# Patient Record
Sex: FEMALE | Race: WHITE | NOT HISPANIC OR LATINO | Employment: OTHER | URBAN - METROPOLITAN AREA
[De-identification: names, ages, dates, MRNs, and addresses within clinical notes are randomized per-mention and may not be internally consistent; named-entity substitution may affect disease eponyms.]

---

## 2017-02-06 DIAGNOSIS — I10 HTN (HYPERTENSION), BENIGN: ICD-10-CM

## 2017-02-06 RX ORDER — TRIAMTERENE/HYDROCHLOROTHIAZID 37.5-25 MG
TABLET ORAL
Qty: 90 TABLET | Refills: 1 | Status: SHIPPED | OUTPATIENT
Start: 2017-02-06 | End: 2017-08-30

## 2017-02-08 ENCOUNTER — OFFICE VISIT (OUTPATIENT)
Dept: UROGYNECOLOGY | Facility: CLINIC | Age: 72
End: 2017-02-08
Payer: MEDICARE

## 2017-02-08 VITALS
HEIGHT: 66 IN | BODY MASS INDEX: 27.8 KG/M2 | SYSTOLIC BLOOD PRESSURE: 117 MMHG | WEIGHT: 173 LBS | HEART RATE: 81 BPM | DIASTOLIC BLOOD PRESSURE: 72 MMHG

## 2017-02-08 DIAGNOSIS — Z46.89 PESSARY MAINTENANCE: Primary | ICD-10-CM

## 2017-02-08 DIAGNOSIS — K46.9 ENTEROCELE: ICD-10-CM

## 2017-02-08 LAB
BILIRUB SERPL-MCNC: ABNORMAL MG/DL
BLOOD URINE, POC: ABNORMAL
COLOR, POC UA: YELLOW
GLUCOSE UR QL STRIP: ABNORMAL
KETONES UR QL STRIP: ABNORMAL
LEUKOCYTE ESTERASE URINE, POC: ABNORMAL
NITRITE, POC UA: ABNORMAL
PH, POC UA: 5
PROTEIN, POC: ABNORMAL
SPECIFIC GRAVITY, POC UA: 1.02
UROBILINOGEN, POC UA: ABNORMAL

## 2017-02-08 PROCEDURE — 99999 PR PBB SHADOW E&M-EST. PATIENT-LVL III: CPT | Mod: PBBFAC,,, | Performed by: OBSTETRICS & GYNECOLOGY

## 2017-02-08 PROCEDURE — 81002 URINALYSIS NONAUTO W/O SCOPE: CPT | Mod: PBBFAC,PO | Performed by: OBSTETRICS & GYNECOLOGY

## 2017-02-08 PROCEDURE — 99213 OFFICE O/P EST LOW 20 MIN: CPT | Mod: S$PBB,,, | Performed by: OBSTETRICS & GYNECOLOGY

## 2017-02-08 PROCEDURE — 99213 OFFICE O/P EST LOW 20 MIN: CPT | Mod: PBBFAC,PO | Performed by: OBSTETRICS & GYNECOLOGY

## 2017-02-08 NOTE — PROGRESS NOTES
Subjective:     Chief Complaint: Enterocele  History of Present Illness: Jazz Martinez is a 71 y.o. female who presents for pessary maintenance for enterocele.  She has been doing well with no discharge or bleeding.  The pessary stays in place with no discomfort and she is able to do all her activities.    Review of Systems    Constitutional: No acute distress. No weight gain/loss.  Eyes: No vision changes.  ENT: No headaches.   Respiratory: No smoker  Cardiovascular: Hypertension  Gastrointestinal: GERD  Genitourinary: No vaginal bleeding or discharge.  Integument/Breast: Negative  Hematologic/Lymphatic: gout  Musculoskeletal: Arthritis  Neurological: No disc problems. No paresthesias.  Behavioral/Psych: No history of depression.   Endocrine:  hormonal replacement.  Allergy/Immune: no recent reactions     Objective:   General Exam:  General appearance: WDNF. NAD.   HEENT: Eyeglasses  Neck: Normal thyroid.   Back: No CVA tenderness.  RESP: No SOB.  Breasts: deferred  Abdomen: Benign without localizing signs.  Extremities: Superficial varices.  Lymphatic: noncontributory  Skin: No rashes. No lesions.  Neurologic: Intact.   Psych: Oriented.   Pelvic Exam:  V:  No lesions. No palpable nodes.   Va:No d/c bleeding or lesions.  small enterocele.  Pessary fits well   .Meatus:No caruncle or stenosis  Urethra: Non tender. No suburethral masses.  Cx/Cuff: Normal   Uterus: Surgically absent.  Ad: No mass or tenderness.  Levators :Symmetrical. Normal tone. Non tender.  BL: Non tender  RV: No hemorrhoids.  Assessment:   Doing well with ring pessary     Procedure note; pessary cleaning; the ring pessary was removed without difficulty; there were no lesions and only minimal discharge; after Betadine irrigation the pessary was replaced without difficulty  Plan:      rtc 3mo

## 2017-03-10 ENCOUNTER — PATIENT MESSAGE (OUTPATIENT)
Dept: FAMILY MEDICINE | Facility: CLINIC | Age: 72
End: 2017-03-10

## 2017-03-10 DIAGNOSIS — F32.A DEPRESSION: ICD-10-CM

## 2017-03-10 DIAGNOSIS — N95.1 MENOPAUSAL SYMPTOMS: ICD-10-CM

## 2017-03-10 RX ORDER — SERTRALINE HYDROCHLORIDE 100 MG/1
100 TABLET, FILM COATED ORAL DAILY
Qty: 90 TABLET | Refills: 3 | Status: SHIPPED | OUTPATIENT
Start: 2017-03-10 | End: 2018-05-04 | Stop reason: SDUPTHER

## 2017-05-10 ENCOUNTER — OFFICE VISIT (OUTPATIENT)
Dept: UROGYNECOLOGY | Facility: CLINIC | Age: 72
End: 2017-05-10
Payer: MEDICARE

## 2017-05-10 VITALS
BODY MASS INDEX: 26.75 KG/M2 | DIASTOLIC BLOOD PRESSURE: 73 MMHG | HEART RATE: 74 BPM | SYSTOLIC BLOOD PRESSURE: 131 MMHG | WEIGHT: 166.44 LBS | TEMPERATURE: 98 F | HEIGHT: 66 IN

## 2017-05-10 DIAGNOSIS — N95.2 ATROPHIC VAGINITIS: ICD-10-CM

## 2017-05-10 DIAGNOSIS — Z46.89 PESSARY MAINTENANCE: ICD-10-CM

## 2017-05-10 DIAGNOSIS — N81.11 MIDLINE CYSTOCELE: Primary | ICD-10-CM

## 2017-05-10 DIAGNOSIS — N32.81 OAB (OVERACTIVE BLADDER): ICD-10-CM

## 2017-05-10 LAB
BILIRUB SERPL-MCNC: NORMAL MG/DL
BLOOD URINE, POC: NORMAL
COLOR, POC UA: YELLOW
GLUCOSE UR QL STRIP: NORMAL
KETONES UR QL STRIP: NORMAL
LEUKOCYTE ESTERASE URINE, POC: NORMAL
NITRITE, POC UA: NORMAL
PH, POC UA: 7
PROTEIN, POC: NORMAL
SPECIFIC GRAVITY, POC UA: 1.01
UROBILINOGEN, POC UA: NORMAL

## 2017-05-10 PROCEDURE — 99214 OFFICE O/P EST MOD 30 MIN: CPT | Mod: PBBFAC,PO | Performed by: NURSE PRACTITIONER

## 2017-05-10 PROCEDURE — 99999 PR PBB SHADOW E&M-EST. PATIENT-LVL IV: CPT | Mod: PBBFAC,,, | Performed by: NURSE PRACTITIONER

## 2017-05-10 PROCEDURE — 81002 URINALYSIS NONAUTO W/O SCOPE: CPT | Mod: PBBFAC,PO | Performed by: NURSE PRACTITIONER

## 2017-05-10 PROCEDURE — 99213 OFFICE O/P EST LOW 20 MIN: CPT | Mod: S$PBB,,, | Performed by: NURSE PRACTITIONER

## 2017-05-10 NOTE — PROGRESS NOTES
Subjective:       Patient ID: Jazz Martinez is a 71 y.o. female.    Chief Complaint: Pessary Check    HPI  Jazz Martinez is a 71 y.o. female who presents today for routine pessary maintenance.  She has been using the ring pessary for awhile and feels that it works well for her.  She denies any complaints/concerns.  She wishes to continue using the ring pessary.    Review of Systems   Constitutional: Negative for activity change, fever and unexpected weight change.   HENT: Negative for hearing loss.    Eyes: Negative for visual disturbance.   Respiratory: Negative for shortness of breath and wheezing.    Cardiovascular: Negative for chest pain, palpitations and leg swelling.   Gastrointestinal: Negative for abdominal pain, constipation and diarrhea.   Genitourinary: Negative for dyspareunia, dysuria, frequency, urgency, vaginal bleeding and vaginal discharge.   Musculoskeletal: Negative for gait problem and neck pain.   Skin: Negative for rash and wound.   Allergic/Immunologic: Negative for immunocompromised state.   Neurological: Negative for tremors, speech difficulty and weakness.   Hematological: Does not bruise/bleed easily.   Psychiatric/Behavioral: Negative for agitation and confusion.       Objective:      Physical Exam   Constitutional: She is oriented to person, place, and time. She appears well-developed and well-nourished. No distress.   HENT:   Head: Normocephalic and atraumatic.   Neck: Neck supple. No thyromegaly present.   Pulmonary/Chest: Effort normal. No respiratory distress.   Abdominal: Soft. There is no tenderness. No hernia.   Musculoskeletal: Normal range of motion.   Neurological: She is alert and oriented to person, place, and time.   Skin: Skin is warm and dry. No rash noted.   Psychiatric: She has a normal mood and affect. Her behavior is normal.     Pelvic Exam:  V: No lesions. No palpable nodes.   Va: small amount of thin white discharge.  No bleeding.  Good length.  Tissue is  slightly atrophic.  Midline cystocele Ba=-1  Meatus:No caruncle or stenosis  Urethra: Non tender. No suburethral masses.  Cx/Cuff: Normal   Uterus: surgically absent  Ad: No mass or tenderness.  Levators :Symmetrical. Normal tone. Non tender.  BL: Non tender  RV: No hemorrhoids.      Assessment:       1. Midline cystocele    2. Pessary maintenance    3. Atrophic vaginitis    4. OAB (overactive bladder)          Procedure note- #2 ring pessary removed and cleaned with soap and water.  After betadine irrigation of the vagina, #2 ring pessary was reinserted into the vagina.  Pt ambulated in the room and denies any discomfort.  NP reminded pt that the first 24-48 hours are the most likely time for the pessary to fall out and to monitor the toilet before flushing.  Pt verbalized understanding.      Plan:       Midline cystocele- continue with ring pessary    Pessary maintenance- continue with ring pessary  -     POCT urine dipstick without microscope    Atrophic vaginitis- monitor    OAB (overactive bladder)- doing well lately, monitor    RTC 3 months

## 2017-05-17 ENCOUNTER — TELEPHONE (OUTPATIENT)
Dept: FAMILY MEDICINE | Facility: CLINIC | Age: 72
End: 2017-05-17

## 2017-05-17 NOTE — TELEPHONE ENCOUNTER
----- Message from Yanelis Rivas sent at 5/16/2017 10:27 AM CDT -----  Contact: self  Patient wants to speak with nurse regarding being seen today. Please call back at [phone]

## 2017-08-16 ENCOUNTER — OFFICE VISIT (OUTPATIENT)
Dept: UROGYNECOLOGY | Facility: CLINIC | Age: 72
End: 2017-08-16
Payer: MEDICARE

## 2017-08-16 VITALS
TEMPERATURE: 99 F | DIASTOLIC BLOOD PRESSURE: 80 MMHG | BODY MASS INDEX: 27.66 KG/M2 | WEIGHT: 166 LBS | SYSTOLIC BLOOD PRESSURE: 129 MMHG | HEART RATE: 71 BPM | HEIGHT: 65 IN

## 2017-08-16 DIAGNOSIS — Z46.89 PESSARY MAINTENANCE: ICD-10-CM

## 2017-08-16 DIAGNOSIS — N81.11 MIDLINE CYSTOCELE: Primary | ICD-10-CM

## 2017-08-16 DIAGNOSIS — N39.41 URGE INCONTINENCE OF URINE: ICD-10-CM

## 2017-08-16 DIAGNOSIS — R35.0 URINARY FREQUENCY: ICD-10-CM

## 2017-08-16 DIAGNOSIS — R39.15 URINARY URGENCY: ICD-10-CM

## 2017-08-16 PROCEDURE — 99999 PR PBB SHADOW E&M-EST. PATIENT-LVL IV: CPT | Mod: PBBFAC,,, | Performed by: NURSE PRACTITIONER

## 2017-08-16 PROCEDURE — 99214 OFFICE O/P EST MOD 30 MIN: CPT | Mod: PBBFAC,PO | Performed by: NURSE PRACTITIONER

## 2017-08-16 PROCEDURE — 3079F DIAST BP 80-89 MM HG: CPT | Mod: ,,, | Performed by: NURSE PRACTITIONER

## 2017-08-16 PROCEDURE — 81002 URINALYSIS NONAUTO W/O SCOPE: CPT | Mod: PBBFAC,PO | Performed by: NURSE PRACTITIONER

## 2017-08-16 PROCEDURE — 1126F AMNT PAIN NOTED NONE PRSNT: CPT | Mod: ,,, | Performed by: NURSE PRACTITIONER

## 2017-08-16 PROCEDURE — 3074F SYST BP LT 130 MM HG: CPT | Mod: ,,, | Performed by: NURSE PRACTITIONER

## 2017-08-16 PROCEDURE — 99214 OFFICE O/P EST MOD 30 MIN: CPT | Mod: S$PBB,,, | Performed by: NURSE PRACTITIONER

## 2017-08-16 PROCEDURE — 1159F MED LIST DOCD IN RCRD: CPT | Mod: ,,, | Performed by: NURSE PRACTITIONER

## 2017-08-16 NOTE — PROGRESS NOTES
Subjective:       Patient ID: Jazz Martinez is a 72 y.o. female.    Chief Complaint: Pessary Check    HPI  Jazz Martinez is a 72 y.o. female who presents today for routine pessary maintenance.  She denies any vaginal complaints/concerns.  She feels that the pessary is comfortable and denies any discharge or bleeding.  She is however, getting upset about her urinary status.  She has been having worsening symptoms for the past year.  She has frequency x 4-6 during the day and nocturia x 2-3.  She has urge incontinence mostly at night but occasionally during the day.  She denies any FLEX.  She denies any feeling of PVF.  She drinks mostly water and tries to avoid caffeine.  She would prefer to avoid medication, but her symptoms are bothersome enough that she is willing to try something for awhile.    Review of Systems   Constitutional: Negative for activity change, fever and unexpected weight change.   HENT: Negative for hearing loss.    Eyes: Negative for visual disturbance.   Respiratory: Negative for shortness of breath and wheezing.    Cardiovascular: Negative for chest pain, palpitations and leg swelling.   Gastrointestinal: Negative for abdominal pain, constipation and diarrhea.   Genitourinary: Positive for frequency and urgency. Negative for dyspareunia, dysuria, vaginal bleeding and vaginal discharge.   Musculoskeletal: Negative for gait problem and neck pain.   Skin: Negative for rash and wound.   Allergic/Immunologic: Negative for immunocompromised state.   Neurological: Negative for tremors, speech difficulty and weakness.   Hematological: Does not bruise/bleed easily.   Psychiatric/Behavioral: Negative for agitation and confusion.       Objective:      Physical Exam   Constitutional: She is oriented to person, place, and time. She appears well-developed and well-nourished. No distress.   HENT:   Head: Normocephalic and atraumatic.   Neck: Neck supple. No thyromegaly present.   Pulmonary/Chest:  Effort normal. No respiratory distress.   Abdominal: Soft. There is no tenderness. No hernia.   Musculoskeletal: Normal range of motion.   Neurological: She is alert and oriented to person, place, and time.   Skin: Skin is warm and dry. No rash noted.   Psychiatric: She has a normal mood and affect. Her behavior is normal.     Pelvic Exam:  V: No lesions. No palpable nodes.   Va: no discharge or bleeding.  Good length.  Dominant cystocele.  Ba=0  Meatus:No caruncle or stenosis  Urethra: Non tender. No suburethral masses.  Cx/Cuff: Normal   Uterus: surgically absent  Ad: No mass or tenderness.  Levators :Symmetrical. Normal tone. Non tender.  BL: Non tender  RV: No hemorrhoids.      Assessment:       1. Midline cystocele    2. Pessary maintenance    3. Urinary frequency    4. Urinary urgency    5. Urge incontinence of urine          Procedure note- #2 ring pessary removed and cleaned with soap and water.  After betadine irrigation of the vagina, #2 ring pessary was reinserted into the vagina.  Pt ambulated in the room and denies any discomfort.  NP reminded pt that the first 24-48 hours are the most likely time for the pessary to fall out and to monitor the toilet before flushing.  Pt verbalized understanding.      Plan:       Midline cystocele- continue with ring pessary    Pessary maintenance- continue with ring pessary    Urinary frequency- trial of myrbetriq 50 mg 1 tab po daily  -     POCT urine dipstick without microscope    Urinary urgency- as noted above    Urge incontinence of urine- trial of myrbetriq, but also reviewed pelvic floor exercises and information sheet given    RTC 1 month for med follow up and 3 months for pessary

## 2017-08-17 LAB
BILIRUB SERPL-MCNC: ABNORMAL MG/DL
BLOOD URINE, POC: ABNORMAL
COLOR, POC UA: YELLOW
GLUCOSE UR QL STRIP: ABNORMAL
KETONES UR QL STRIP: ABNORMAL
LEUKOCYTE ESTERASE URINE, POC: ABNORMAL
NITRITE, POC UA: ABNORMAL
PH, POC UA: 8
PROTEIN, POC: ABNORMAL
SPECIFIC GRAVITY, POC UA: 1.01
UROBILINOGEN, POC UA: ABNORMAL

## 2017-08-30 ENCOUNTER — OFFICE VISIT (OUTPATIENT)
Dept: FAMILY MEDICINE | Facility: CLINIC | Age: 72
End: 2017-08-30
Payer: MEDICARE

## 2017-08-30 ENCOUNTER — DOCUMENTATION ONLY (OUTPATIENT)
Dept: FAMILY MEDICINE | Facility: CLINIC | Age: 72
End: 2017-08-30

## 2017-08-30 VITALS
DIASTOLIC BLOOD PRESSURE: 75 MMHG | OXYGEN SATURATION: 98 % | HEART RATE: 72 BPM | SYSTOLIC BLOOD PRESSURE: 113 MMHG | TEMPERATURE: 98 F | BODY MASS INDEX: 27.66 KG/M2 | HEIGHT: 65 IN | WEIGHT: 166 LBS

## 2017-08-30 DIAGNOSIS — E78.5 HYPERLIPIDEMIA, UNSPECIFIED HYPERLIPIDEMIA TYPE: ICD-10-CM

## 2017-08-30 DIAGNOSIS — I10 ESSENTIAL HYPERTENSION: Primary | ICD-10-CM

## 2017-08-30 PROCEDURE — 3074F SYST BP LT 130 MM HG: CPT | Mod: S$GLB,,, | Performed by: INTERNAL MEDICINE

## 2017-08-30 PROCEDURE — 1159F MED LIST DOCD IN RCRD: CPT | Mod: S$GLB,,, | Performed by: INTERNAL MEDICINE

## 2017-08-30 PROCEDURE — 99213 OFFICE O/P EST LOW 20 MIN: CPT | Mod: S$GLB,,, | Performed by: INTERNAL MEDICINE

## 2017-08-30 PROCEDURE — 3078F DIAST BP <80 MM HG: CPT | Mod: S$GLB,,, | Performed by: INTERNAL MEDICINE

## 2017-08-30 PROCEDURE — 1125F AMNT PAIN NOTED PAIN PRSNT: CPT | Mod: S$GLB,,, | Performed by: INTERNAL MEDICINE

## 2017-08-30 RX ORDER — ATORVASTATIN CALCIUM 10 MG/1
10 TABLET, FILM COATED ORAL DAILY
Qty: 90 TABLET | Refills: 3 | Status: SHIPPED | OUTPATIENT
Start: 2017-08-30 | End: 2018-10-11 | Stop reason: SDUPTHER

## 2017-08-30 NOTE — PATIENT INSTRUCTIONS

## 2017-08-30 NOTE — PROGRESS NOTES
Health Maintenance Due   Topic Date Due    TETANUS VACCINE  07/24/1961    Colonoscopy  07/24/1993    Zoster Vaccine  07/24/2003    Influenza Vaccine  08/01/2017

## 2017-08-30 NOTE — PROGRESS NOTES
Subjective:       Patient ID: Jazz Martinez is a 72 y.o. female.    Chief Complaint: Follow-up    HPI     Migraine stop when she got off of caffeine.      CHIEF COMPLAINT: Hypertension  HPI:     ONSET:      QUALITY/COURSE:   Controlled:  yes     INTENSITY/SEVERITY:  Average blood pressure is 120/70.     MODIFIERS/TREATMENTS:  Taking medications: yes. .High sodium intake: no. alcohol: no      The following symptoms are positive only if BOLDED, otherwise are negative.      SYMPTOMS/RELATED: Possible medication side effects include:   Depression..  . Cough. . Constipation.    REVIEW OF SYMPTOMS: . Weight_loss . Weight_gain . Leg_cramps .Potency_problems .    TARGET ORGAN DAMAGE:: angina/ prior myocardial infarction, chronic kidney disease, heart failure, left ventricular hypertrophy, peripheral artery disease, prior coronary revascularization, retinopathy, stroke. transient ischemic attack.        CHIEF COMPLAINT: Hyperlipidemia. cholesterol screening: no.   HPI: Dr. Daniel asked her to increase the door to 40 mg after she was having chest pain.  Her stress test was negative.  She stopped taking the Lipitor at that time.    ONSET:    MODIFIERS/TREATMENTS: . Taking medications: No. . Non-compliance with following diet: no. .     SYMPTOMS/RELATED:Possible medication side effects include:   Myalgia: no.  .     REVIEW OF SYMPTOMS: past weights:   Wt Readings from Last 1 Encounters:   08/30/17 1015 75.3 kg (166 lb 0.1 oz)                                                     Last lipids: total   Lab Results   Component Value Date    CHOL 206 (H) 10/19/2016    CHOL 228 (H) 10/20/2015    CHOL 306 (H) 07/15/2015                                                                     HDL   Lab Results   Component Value Date    HDL 53 10/19/2016    HDL 55 10/20/2015    HDL 72 07/15/2015                                                                     LDL   Lab Results   Component Value Date    LDLCALC 111.4 10/19/2016    LDLCALC  "128.2 10/20/2015    LDLCALC 189.4 (H) 07/15/2015                                                                     TRIG   Lab Results   Component Value Date    TRIG 208 (H) 10/19/2016    TRIG 224 (H) 10/20/2015    TRIG 223 (H) 07/15/2015                                                                         Review of Systems   Constitutional: Negative for diaphoresis.   Eyes: Negative for visual disturbance.   Respiratory: Negative for chest tightness and shortness of breath.    Cardiovascular: Negative for chest pain.   Neurological: Positive for headaches (much better). Negative for dizziness, syncope and weakness.   Psychiatric/Behavioral: Negative for dysphoric mood. The patient is not nervous/anxious.        Objective:      Vitals:    08/30/17 1015   BP: 113/75   Pulse: 72   Temp: 98.3 °F (36.8 °C)   TempSrc: Oral   SpO2: 98%   Weight: 75.3 kg (166 lb 0.1 oz)   Height: 5' 5" (1.651 m)   PainSc:   3   PainLoc: Generalized     Physical Exam   Constitutional: She appears well-developed and well-nourished.   Eyes: Pupils are equal, round, and reactive to light.   Cardiovascular: Normal rate, regular rhythm and normal heart sounds.    Pulmonary/Chest: Effort normal and breath sounds normal.   Abdominal: Soft. There is no tenderness.   Neurological: She is alert.   Psychiatric: She has a normal mood and affect. Her behavior is normal. Thought content normal.   Nursing note and vitals reviewed.        Assessment:       1. Essential hypertension    2. Hyperlipidemia, unspecified hyperlipidemia type          Plan:     Essential hypertension  -     Comprehensive metabolic panel; Future; Expected date: 08/30/2017    Hyperlipidemia, unspecified hyperlipidemia type  -     Lipid panel; Future; Expected date: 08/30/2017  -     atorvastatin (LIPITOR) 10 MG tablet; Take 1 tablet (10 mg total) by mouth once daily.  Dispense: 90 tablet; Refill: 3      Return in about 6 months (around 2/28/2018).      "

## 2017-08-31 DIAGNOSIS — Z12.11 COLON CANCER SCREENING: ICD-10-CM

## 2017-10-05 DIAGNOSIS — I10 HTN (HYPERTENSION), BENIGN: ICD-10-CM

## 2017-10-05 RX ORDER — TRIAMTERENE/HYDROCHLOROTHIAZID 37.5-25 MG
TABLET ORAL
Qty: 90 TABLET | Refills: 0 | Status: SHIPPED | OUTPATIENT
Start: 2017-10-05 | End: 2017-10-06 | Stop reason: SDUPTHER

## 2017-10-06 DIAGNOSIS — I10 HTN (HYPERTENSION), BENIGN: ICD-10-CM

## 2017-10-06 RX ORDER — TRIAMTERENE/HYDROCHLOROTHIAZID 37.5-25 MG
1 TABLET ORAL DAILY
Qty: 90 TABLET | Refills: 0 | Status: SHIPPED | OUTPATIENT
Start: 2017-10-06 | End: 2018-06-12 | Stop reason: SDUPTHER

## 2017-11-15 ENCOUNTER — OFFICE VISIT (OUTPATIENT)
Dept: UROGYNECOLOGY | Facility: CLINIC | Age: 72
End: 2017-11-15
Payer: MEDICARE

## 2017-11-15 ENCOUNTER — TELEPHONE (OUTPATIENT)
Dept: UROGYNECOLOGY | Facility: CLINIC | Age: 72
End: 2017-11-15

## 2017-11-15 VITALS
WEIGHT: 170.63 LBS | SYSTOLIC BLOOD PRESSURE: 128 MMHG | TEMPERATURE: 99 F | DIASTOLIC BLOOD PRESSURE: 75 MMHG | HEIGHT: 65 IN | HEART RATE: 71 BPM | BODY MASS INDEX: 28.43 KG/M2 | RESPIRATION RATE: 15 BRPM

## 2017-11-15 DIAGNOSIS — N39.41 URGE INCONTINENCE OF URINE: ICD-10-CM

## 2017-11-15 DIAGNOSIS — Z12.31 ENCOUNTER FOR SCREENING MAMMOGRAM FOR BREAST CANCER: Primary | ICD-10-CM

## 2017-11-15 DIAGNOSIS — R35.0 URINARY FREQUENCY: ICD-10-CM

## 2017-11-15 DIAGNOSIS — N81.11 CYSTOCELE, MIDLINE: Primary | ICD-10-CM

## 2017-11-15 DIAGNOSIS — Z46.89 PESSARY MAINTENANCE: ICD-10-CM

## 2017-11-15 LAB
BILIRUB SERPL-MCNC: NORMAL MG/DL
BLOOD URINE, POC: NORMAL
COLOR, POC UA: YELLOW
GLUCOSE UR QL STRIP: NORMAL
KETONES UR QL STRIP: NORMAL
LEUKOCYTE ESTERASE URINE, POC: NORMAL
NITRITE, POC UA: NORMAL
PH, POC UA: 5
PROTEIN, POC: NORMAL
SPECIFIC GRAVITY, POC UA: 1.02
UROBILINOGEN, POC UA: NORMAL

## 2017-11-15 PROCEDURE — 99213 OFFICE O/P EST LOW 20 MIN: CPT | Mod: S$PBB,,, | Performed by: NURSE PRACTITIONER

## 2017-11-15 PROCEDURE — 81002 URINALYSIS NONAUTO W/O SCOPE: CPT | Mod: PBBFAC,PO | Performed by: NURSE PRACTITIONER

## 2017-11-15 PROCEDURE — 99214 OFFICE O/P EST MOD 30 MIN: CPT | Mod: PBBFAC,PO | Performed by: NURSE PRACTITIONER

## 2017-11-15 PROCEDURE — 99999 PR PBB SHADOW E&M-EST. PATIENT-LVL IV: CPT | Mod: PBBFAC,,, | Performed by: NURSE PRACTITIONER

## 2017-11-15 NOTE — PROGRESS NOTES
"Subjective:       Patient ID: Jazz Martinez is a 72 y.o. female.    Chief Complaint: cystocele    HPI  Jazz Martinez is a 72 y.o. female who presents today for routine pessary maintenance.  She feels that the pessary has been doing well and denies any vaginal discharge, bleeding or bulging around the pessary.  She took the myrbetriq 50 mg for about 3 weeks and felt that it did help with her urinary symptoms, especially in the first week, but then she had a lot of stress and family issues so that she stopped the medication after the 3rd week.  She is now back to having urinary frequency, urgency and urge incontinence to the point where she just "pours"  She would like to try medication again.  She denies any other acute complaints/concerns at this time.    Review of Systems   Constitutional: Negative for activity change, fever and unexpected weight change.   HENT: Negative for hearing loss.    Eyes: Negative for visual disturbance.   Respiratory: Negative for shortness of breath and wheezing.    Cardiovascular: Negative for chest pain, palpitations and leg swelling.   Gastrointestinal: Negative for abdominal pain, constipation and diarrhea.   Genitourinary: Positive for frequency and urgency. Negative for dyspareunia, dysuria, vaginal bleeding and vaginal discharge.   Musculoskeletal: Negative for gait problem and neck pain.   Skin: Negative for rash and wound.   Allergic/Immunologic: Negative for immunocompromised state.   Neurological: Negative for tremors, speech difficulty and weakness.   Hematological: Does not bruise/bleed easily.   Psychiatric/Behavioral: Negative for agitation and confusion.       Objective:      Physical Exam   Constitutional: She is oriented to person, place, and time. She appears well-developed and well-nourished. No distress.   HENT:   Head: Normocephalic and atraumatic.   Neck: Neck supple. No thyromegaly present.   Pulmonary/Chest: Effort normal. No respiratory distress. "   Abdominal: Soft. There is no tenderness. No hernia.   Musculoskeletal: Normal range of motion.   Neurological: She is alert and oriented to person, place, and time.   Skin: Skin is warm and dry. No rash noted.   Psychiatric: She has a normal mood and affect. Her behavior is normal.     Pelvic Exam:  V: No lesions. No palpable nodes.   Va: no discharge or bleeding.  Good length.  Dominant midline cystocele Ba=0  Meatus:No caruncle or stenosis  Urethra: Non tender. No suburethral masses.  Cx/Cuff: Normal   Uterus: surgically absent  Ad: No mass or tenderness.  Levators :Symmetrical. Normal tone. Non tender.  BL: Non tender  RV: No hemorrhoids.      Assessment:       1. Cystocele, midline    2. Pessary maintenance    3. Urge incontinence of urine    4. Urinary frequency          Procedure note- #2 ring pessary removed and cleaned with soap and water.  After betadine irrigation of the vagina, #2 ring pessary was reinserted into the vagina.  Pt ambulated in the room and denies any discomfort.  NP reminded pt that the first 24-48 hours are the most likely time for the pessary to fall out and to monitor the toilet before flushing.  Pt verbalized understanding.      Plan:       Cystocele, midline- continue with #2 ring pessary    Pessary maintenance- continue with ring pessary    Urge incontinence of urine- restart myrbetriq 50 mg 1 tab po daily  -     POCT URINE DIPSTICK WITHOUT MICROSCOPE    Urinary frequency- restart the myrbetriq    RTC 3 months

## 2017-11-15 NOTE — TELEPHONE ENCOUNTER
I forgot to print out her mammogram order.  I have it in my in basket.  Please let the pt know she can either stop by and pick it up or we can fax it to [facility]

## 2017-11-16 NOTE — TELEPHONE ENCOUNTER
----- Message from Ivonne Downs sent at 11/16/2017 10:31 AM CST -----  Contact: self [phone] or [phone]  Call placed to pod. Patient returned your call, please call back.  Thank you!

## 2017-11-16 NOTE — TELEPHONE ENCOUNTER
Attempted to contact patient on home and mobile number no answer left message with call back number

## 2017-11-27 DIAGNOSIS — N95.2 ATROPHIC VAGINITIS: ICD-10-CM

## 2017-11-27 DIAGNOSIS — N95.1 MENOPAUSAL SYMPTOMS: ICD-10-CM

## 2017-11-27 RX ORDER — ESTRADIOL 0.5 MG/1
TABLET ORAL
Qty: 30 TABLET | Refills: 0 | Status: SHIPPED | OUTPATIENT
Start: 2017-11-27 | End: 2018-03-05 | Stop reason: SDUPTHER

## 2017-12-17 ENCOUNTER — PATIENT MESSAGE (OUTPATIENT)
Dept: UROGYNECOLOGY | Facility: CLINIC | Age: 72
End: 2017-12-17

## 2017-12-18 DIAGNOSIS — R10.32 LEFT LOWER QUADRANT PAIN: Primary | ICD-10-CM

## 2017-12-19 NOTE — TELEPHONE ENCOUNTER
I tried to send in myrbetriq 50 mg to [facility].  They do not accept e prescriptions.  Could you please fax the RX and let the pt know when the fax has gone through.

## 2018-02-21 ENCOUNTER — OFFICE VISIT (OUTPATIENT)
Dept: UROGYNECOLOGY | Facility: CLINIC | Age: 73
End: 2018-02-21
Payer: MEDICARE

## 2018-02-21 ENCOUNTER — APPOINTMENT (OUTPATIENT)
Dept: LAB | Facility: HOSPITAL | Age: 73
End: 2018-02-21
Attending: NURSE PRACTITIONER
Payer: MEDICARE

## 2018-02-21 VITALS
DIASTOLIC BLOOD PRESSURE: 76 MMHG | HEART RATE: 72 BPM | WEIGHT: 165.56 LBS | SYSTOLIC BLOOD PRESSURE: 120 MMHG | BODY MASS INDEX: 27.58 KG/M2 | HEIGHT: 65 IN

## 2018-02-21 DIAGNOSIS — N39.41 URGE INCONTINENCE OF URINE: ICD-10-CM

## 2018-02-21 DIAGNOSIS — T83.89XA VAGINAL IRRITATION FROM PESSARY: ICD-10-CM

## 2018-02-21 DIAGNOSIS — Z46.89 PESSARY MAINTENANCE: ICD-10-CM

## 2018-02-21 DIAGNOSIS — N89.8 VAGINAL IRRITATION FROM PESSARY: ICD-10-CM

## 2018-02-21 DIAGNOSIS — N81.11 CYSTOCELE, MIDLINE: Primary | ICD-10-CM

## 2018-02-21 DIAGNOSIS — R35.0 URINARY FREQUENCY: ICD-10-CM

## 2018-02-21 DIAGNOSIS — R31.0 GROSS HEMATURIA: ICD-10-CM

## 2018-02-21 LAB
BACTERIA #/AREA URNS HPF: NORMAL /HPF
BILIRUB SERPL-MCNC: ABNORMAL MG/DL
BLOOD URINE, POC: ABNORMAL
COLOR, POC UA: YELLOW
GLUCOSE UR QL STRIP: ABNORMAL
KETONES UR QL STRIP: ABNORMAL
LEUKOCYTE ESTERASE URINE, POC: ABNORMAL
MICROSCOPIC COMMENT: NORMAL
NITRITE, POC UA: ABNORMAL
PH, POC UA: 5
PROTEIN, POC: ABNORMAL
RBC #/AREA URNS HPF: 2 /HPF (ref 0–4)
SPECIFIC GRAVITY, POC UA: 1.01
SQUAMOUS #/AREA URNS HPF: 8 /HPF
UROBILINOGEN, POC UA: ABNORMAL
WBC #/AREA URNS HPF: 1 /HPF (ref 0–5)

## 2018-02-21 PROCEDURE — 88112 CYTOPATH CELL ENHANCE TECH: CPT | Performed by: PATHOLOGY

## 2018-02-21 PROCEDURE — 1126F AMNT PAIN NOTED NONE PRSNT: CPT | Mod: ,,, | Performed by: NURSE PRACTITIONER

## 2018-02-21 PROCEDURE — 81000 URINALYSIS NONAUTO W/SCOPE: CPT

## 2018-02-21 PROCEDURE — 87086 URINE CULTURE/COLONY COUNT: CPT

## 2018-02-21 PROCEDURE — 99999 PR PBB SHADOW E&M-EST. PATIENT-LVL IV: CPT | Mod: PBBFAC,,, | Performed by: NURSE PRACTITIONER

## 2018-02-21 PROCEDURE — 81002 URINALYSIS NONAUTO W/O SCOPE: CPT | Mod: PBBFAC,PO | Performed by: NURSE PRACTITIONER

## 2018-02-21 PROCEDURE — 99214 OFFICE O/P EST MOD 30 MIN: CPT | Mod: S$PBB,,, | Performed by: NURSE PRACTITIONER

## 2018-02-21 PROCEDURE — 99214 OFFICE O/P EST MOD 30 MIN: CPT | Mod: PBBFAC,PO | Performed by: NURSE PRACTITIONER

## 2018-02-21 PROCEDURE — 1159F MED LIST DOCD IN RCRD: CPT | Mod: ,,, | Performed by: NURSE PRACTITIONER

## 2018-02-21 NOTE — PROGRESS NOTES
Subjective:       Patient ID: Jazz Martinez is a 72 y.o. female.    Chief Complaint: Pessary Check (has been having bladder infections ( june and jan))    HPI  Jazz Martinez is a 72 y.o. female who presents today for routine pessary maintenance.  She denies any complaints/concerns with the pessary.  She is concerned about her bladder.  She had symptoms of UTI in December and she went to [facility] in [place].  They treated her and she felt better.  Then in Jan. She had a large amount of gross hematuria.  She went back to urgent care and was given a shot and antibiotics.  She believes that they did a culture because they called her later and told her she was positive, but she does not remember the organism or the antibiotics she was given.  She  Since feels better, but on Sunday she started having some spotting.  This stopped yesterday.  She  Is not sure that all the blood that she has seen is in the urine or if it is from the pessary.  She is nervous though, because her brother had bladder cancer and his first sign was hematuria.  She would like to begin the workup for evaluation.  Otherwise, she is also frustrated with her incontinence.  She has been on sanctura, ditropan, vesicare and most recently myrbetriq.  She felt that she was reasonable well controlled on the sanctura, but did not like taking medication and it became too expensive.  She does not feel that the myrbetriq worked for her and it was also very expensive.  She is wondering if there is anything else that can be done.    Review of Systems   Constitutional: Negative for activity change, fever and unexpected weight change.   HENT: Negative for hearing loss.    Eyes: Negative for visual disturbance.   Respiratory: Negative for shortness of breath and wheezing.    Cardiovascular: Negative for chest pain, palpitations and leg swelling.   Gastrointestinal: Negative for abdominal pain, constipation and diarrhea.   Genitourinary: Positive  for frequency, hematuria, urgency and vaginal bleeding. Negative for dyspareunia, dysuria and vaginal discharge.   Musculoskeletal: Negative for gait problem and neck pain.   Skin: Negative for rash and wound.   Allergic/Immunologic: Negative for immunocompromised state.   Neurological: Negative for tremors, speech difficulty and weakness.   Hematological: Does not bruise/bleed easily.   Psychiatric/Behavioral: Negative for agitation and confusion.       Objective:      Physical Exam   Constitutional: She is oriented to person, place, and time. She appears well-developed and well-nourished. No distress.   HENT:   Head: Normocephalic and atraumatic.   Neck: Neck supple. No thyromegaly present.   Pulmonary/Chest: Effort normal. No respiratory distress.   Abdominal: Soft. There is no tenderness. No hernia.   Musculoskeletal: Normal range of motion.   Neurological: She is alert and oriented to person, place, and time.   Skin: Skin is warm and dry. No rash noted.   Psychiatric: She has a normal mood and affect. Her behavior is normal.     Pelvic Exam:  V: No lesions. No palpable nodes.   Va: minimal amount of thin yellow discharge.  No active bleeding, but 2 areas of irritation noted on the anterior vaginal wall.  Dominant midline cystocele Ba=-1  Meatus:No caruncle or stenosis  Urethra: Non tender. No suburethral masses.  Cx/Cuff: Normal   Uterus: surgically absent  Ad: No mass or tenderness.  Levators :Symmetrical. Normal tone. Non tender.  BL: Non tender  RV: No hemorrhoids.      Assessment:       1. Cystocele, midline    2. Pessary maintenance    3. Urinary frequency    4. Urge incontinence of urine    5. Gross hematuria    6. Vaginal irritation from pessary          Procedure note- #2 ring  pessary removed and cleaned with soap and water.  After betadine irrigation of the vagina, #2 ring pessary was reinserted into the vagina.  Pt ambulated in the room and denies any discomfort.  NP reminded pt that the first 24-48  hours are the most likely time for the pessary to fall out and to monitor the toilet before flushing.  Pt verbalized understanding.      Plan:       Cystocele, midline- continue with ring pessary for now    Pessary maintenance- continue with ring pessary    Urinary frequency- we will schedule a CMG to evaluate next step in therapy    Urge incontinence of urine- as noted above    Gross hematuria- it is not 100% clear if there is hematuria or if it was vaginal bleeding.  Pt declined to have a cath urine specimen drawn at this time.  There is a trace amount of blood on UA today so we will send for the tests noted below.  Discussed possible cysto of hematuria continues.    -     POCT urine dipstick without microscope  -     Urinalysis Microscopic  -     Urine culture  -     Cytology, urine    Vaginal irritation from pessary- some irritation noted at this time.  Reviewed with pt that it is not enough to keep the pessary out at this time since the pt would prefer to keep using the pessary.  However,  If it remains the same at next visit we should probably leave the pessary out for a week.    RTC 1 month for CMG, 3 months for pessary

## 2018-02-22 ENCOUNTER — TELEPHONE (OUTPATIENT)
Dept: OBSTETRICS AND GYNECOLOGY | Facility: CLINIC | Age: 73
End: 2018-02-22

## 2018-02-23 ENCOUNTER — TELEPHONE (OUTPATIENT)
Dept: UROGYNECOLOGY | Facility: CLINIC | Age: 73
End: 2018-02-23

## 2018-02-23 LAB — BACTERIA UR CULT: NO GROWTH

## 2018-02-23 NOTE — TELEPHONE ENCOUNTER
----- Message from BORA Banks sent at 2/23/2018 12:39 PM CST -----  Her urine culture shows no growth.  How is she feeling?

## 2018-02-26 ENCOUNTER — TELEPHONE (OUTPATIENT)
Dept: UROGYNECOLOGY | Facility: CLINIC | Age: 73
End: 2018-02-26

## 2018-02-26 NOTE — TELEPHONE ENCOUNTER
Spoke with patient, results reviewed.  States doing better.  Also, needs refill on hormones.  Wanted to know if they have come out with a patch that is less expensive.  Easier to remember.  If not then just the regular Rx and send to [facility].

## 2018-02-26 NOTE — TELEPHONE ENCOUNTER
----- Message from Sami Rivas sent at 2/26/2018 11:31 AM CST -----  Contact: Patient  Jazz, [phone], returning nurse's call regarding some questions needing answers from her. Please advise. Thanks.

## 2018-02-27 NOTE — TELEPHONE ENCOUNTER
There is the estring and climara patch.  I don't know how much her cost would be, but she can check with her insurance company and let us know what she wants to do, or I can refill the tablets.

## 2018-02-27 NOTE — TELEPHONE ENCOUNTER
----- Message from BORA Banks sent at 2/27/2018  9:20 AM CST -----  Her urine cytology was negative.  I believe the blood she saw was from the vaginal area not in the urine.  If she continues to have gross hematuria or any concerns, we can discuss doing a cystoscopy.

## 2018-03-05 ENCOUNTER — PATIENT MESSAGE (OUTPATIENT)
Dept: OBSTETRICS AND GYNECOLOGY | Facility: CLINIC | Age: 73
End: 2018-03-05

## 2018-03-05 ENCOUNTER — PATIENT MESSAGE (OUTPATIENT)
Dept: UROGYNECOLOGY | Facility: CLINIC | Age: 73
End: 2018-03-05

## 2018-03-05 DIAGNOSIS — N95.2 ATROPHIC VAGINITIS: ICD-10-CM

## 2018-03-05 DIAGNOSIS — N95.1 MENOPAUSAL SYMPTOMS: ICD-10-CM

## 2018-03-05 RX ORDER — ESTRADIOL 0.5 MG/1
TABLET ORAL
Qty: 30 TABLET | Refills: 0 | Status: SHIPPED | OUTPATIENT
Start: 2018-03-05 | End: 2018-06-06 | Stop reason: SDUPTHER

## 2018-03-05 NOTE — TELEPHONE ENCOUNTER
Patient is out,  And was told by carline that you usually don't fill this med, had told patient, but seeing if you can call in enough till she gets in with someone?

## 2018-03-07 ENCOUNTER — CLINICAL SUPPORT (OUTPATIENT)
Dept: FAMILY MEDICINE | Facility: CLINIC | Age: 73
End: 2018-03-07
Payer: MEDICARE

## 2018-03-07 DIAGNOSIS — E78.5 HYPERLIPIDEMIA, UNSPECIFIED HYPERLIPIDEMIA TYPE: ICD-10-CM

## 2018-03-07 DIAGNOSIS — I10 ESSENTIAL HYPERTENSION: ICD-10-CM

## 2018-03-07 LAB
ALBUMIN SERPL BCP-MCNC: 4 G/DL
ALP SERPL-CCNC: 66 U/L
ALT SERPL W/O P-5'-P-CCNC: 14 U/L
ANION GAP SERPL CALC-SCNC: 9 MMOL/L
AST SERPL-CCNC: 21 U/L
BILIRUB SERPL-MCNC: 0.4 MG/DL
BUN SERPL-MCNC: 16 MG/DL
CALCIUM SERPL-MCNC: 9.7 MG/DL
CHLORIDE SERPL-SCNC: 103 MMOL/L
CHOLEST SERPL-MCNC: 238 MG/DL
CHOLEST/HDLC SERPL: 5 {RATIO}
CO2 SERPL-SCNC: 29 MMOL/L
CREAT SERPL-MCNC: 0.7 MG/DL
EST. GFR  (AFRICAN AMERICAN): >60 ML/MIN/1.73 M^2
EST. GFR  (NON AFRICAN AMERICAN): >60 ML/MIN/1.73 M^2
GLUCOSE SERPL-MCNC: 82 MG/DL
HDLC SERPL-MCNC: 48 MG/DL
HDLC SERPL: 20.2 %
LDLC SERPL CALC-MCNC: 141.6 MG/DL
NONHDLC SERPL-MCNC: 190 MG/DL
POTASSIUM SERPL-SCNC: 4.7 MMOL/L
PROT SERPL-MCNC: 7.3 G/DL
SODIUM SERPL-SCNC: 141 MMOL/L
TRIGL SERPL-MCNC: 242 MG/DL

## 2018-03-07 PROCEDURE — 80061 LIPID PANEL: CPT

## 2018-03-07 PROCEDURE — 80053 COMPREHEN METABOLIC PANEL: CPT

## 2018-03-08 ENCOUNTER — TELEPHONE (OUTPATIENT)
Dept: UROGYNECOLOGY | Facility: CLINIC | Age: 73
End: 2018-03-08

## 2018-03-12 DIAGNOSIS — Z78.0 ASYMPTOMATIC MENOPAUSAL STATE: Primary | ICD-10-CM

## 2018-03-13 ENCOUNTER — DOCUMENTATION ONLY (OUTPATIENT)
Dept: FAMILY MEDICINE | Facility: CLINIC | Age: 73
End: 2018-03-13

## 2018-03-13 NOTE — PROGRESS NOTES
Health Maintenance Due   Topic Date Due    TETANUS VACCINE  07/24/1961    Zoster Vaccine  07/24/2003    Influenza Vaccine  08/01/2017    DEXA SCAN  10/14/2017    Pneumococcal (65+) (2 of 2 - PPSV23) 10/17/2017

## 2018-03-14 ENCOUNTER — OFFICE VISIT (OUTPATIENT)
Dept: FAMILY MEDICINE | Facility: CLINIC | Age: 73
End: 2018-03-14
Payer: MEDICARE

## 2018-03-14 VITALS
HEART RATE: 69 BPM | SYSTOLIC BLOOD PRESSURE: 130 MMHG | OXYGEN SATURATION: 99 % | BODY MASS INDEX: 28.1 KG/M2 | HEIGHT: 65 IN | RESPIRATION RATE: 16 BRPM | WEIGHT: 168.63 LBS | TEMPERATURE: 99 F | DIASTOLIC BLOOD PRESSURE: 86 MMHG

## 2018-03-14 DIAGNOSIS — Z78.0 POSTMENOPAUSAL: ICD-10-CM

## 2018-03-14 DIAGNOSIS — I10 ESSENTIAL HYPERTENSION: ICD-10-CM

## 2018-03-14 DIAGNOSIS — B02.9 HERPES ZOSTER WITHOUT COMPLICATION: ICD-10-CM

## 2018-03-14 DIAGNOSIS — M70.61 GREATER TROCHANTERIC BURSITIS OF RIGHT HIP: Primary | ICD-10-CM

## 2018-03-14 DIAGNOSIS — E78.5 HYPERLIPIDEMIA, UNSPECIFIED HYPERLIPIDEMIA TYPE: ICD-10-CM

## 2018-03-14 PROCEDURE — 99213 OFFICE O/P EST LOW 20 MIN: CPT | Mod: 25,S$GLB,, | Performed by: INTERNAL MEDICINE

## 2018-03-14 PROCEDURE — 20610 DRAIN/INJ JOINT/BURSA W/O US: CPT | Mod: RT,S$GLB,, | Performed by: INTERNAL MEDICINE

## 2018-03-14 RX ORDER — METHYLPREDNISOLONE ACETATE 40 MG/ML
40 INJECTION, SUSPENSION INTRA-ARTICULAR; INTRALESIONAL; INTRAMUSCULAR; SOFT TISSUE
Status: DISCONTINUED | OUTPATIENT
Start: 2018-03-14 | End: 2018-03-14 | Stop reason: HOSPADM

## 2018-03-14 RX ORDER — VALACYCLOVIR HYDROCHLORIDE 500 MG/1
500 TABLET, FILM COATED ORAL 2 TIMES DAILY
Qty: 6 TABLET | Refills: 5 | Status: SHIPPED | OUTPATIENT
Start: 2018-03-14 | End: 2018-04-09 | Stop reason: SDUPTHER

## 2018-03-14 RX ADMIN — METHYLPREDNISOLONE ACETATE 40 MG: 40 INJECTION, SUSPENSION INTRA-ARTICULAR; INTRALESIONAL; INTRAMUSCULAR; SOFT TISSUE at 08:03

## 2018-03-14 NOTE — PROCEDURES
Large Joint Aspiration/Injection  Date/Time: 3/14/2018 8:53 AM  Performed by: DEV OSWALD  Authorized by: DEV OSWALD     Consent Done?:  Yes (Written)  Indications:  Pain  Timeout: Prior to procedure the correct patient, procedure, and site was verified    Needle size:  25 G  Medications:  40 mg methylPREDNISolone acetate 40 mg/mL  Patient tolerance:  Patient tolerated the procedure well with no immediate complications

## 2018-03-14 NOTE — PROGRESS NOTES
Subjective:       Patient ID: Jazz Martinez is a 72 y.o. female.    Chief Complaint: Hypertension (labs)    HPI         CHIEF COMPLAINT: Hyperlipidemia. cholesterol screening: no.   HPI:     ONSET:    MODIFIERS/TREATMENTS: . Taking medications: yes. But not at night.  . Non-compliance with following diet: no. .     SYMPTOMS/RELATED:Possible medication side effects include:   Myalgia: no.  .     REVIEW OF SYMPTOMS: past weights:   Wt Readings from Last 1 Encounters:   03/14/18 0815 76.5 kg (168 lb 10.4 oz)                                                     Last lipids: total   Lab Results   Component Value Date    CHOL 238 (H) 03/07/2018    CHOL 206 (H) 10/19/2016    CHOL 228 (H) 10/20/2015                                                                     HDL   Lab Results   Component Value Date    HDL 48 03/07/2018    HDL 53 10/19/2016    HDL 55 10/20/2015                                                                     LDL   Lab Results   Component Value Date    LDLCALC 141.6 03/07/2018    LDLCALC 111.4 10/19/2016    LDLCALC 128.2 10/20/2015                                                                     TRIG   Lab Results   Component Value Date    TRIG 242 (H) 03/07/2018    TRIG 208 (H) 10/19/2016    TRIG 224 (H) 10/20/2015                                                                     Has frequent recurrences of shingles in the left sacral area has frequent recurrences of shingles in the left sacral area.      CHIEF COMPLAINT: Hypertension  HPI:     ONSET:      QUALITY/COURSE:   Unchanged.     INTENSITY/SEVERITY:  Average blood pressure is 120/78 .     MODIFIERS/TREATMENTS:  Taking medications: yes. .High sodium intake: no. alcohol: no      The following symptoms are positive only if BOLDED, otherwise are negative.      SYMPTOMS/RELATED: Possible medication side effects include:   Depression..  . Cough. . Constipation.    REVIEW OF SYMPTOMS: . Weight_loss . Weight_gain . Leg_cramps ..     "TARGET ORGAN DAMAGE:: angina/ prior myocardial infarction, chronic kidney disease, heart failure, left ventricular hypertrophy, peripheral artery disease, prior coronary revascularization, retinopathy, stroke. transient ischemic attack.          CHIEF COMPLAINT: Back Pain.  HPI: had 3 epidurals    ONSET/TIMING: Onset   15 y    ago. . Inciting event:  Lifting: no.  Over-exertion: no.     DURATION: . Intermittent    QUALITY/COURSE:   worse    LOCATION:       bilat s1         Radiation:   none    INTENSITY/SEVERITY: Severity is #    4  (10 point scale)..      MODIFIERS/TREATMENTS: .. Taking medications:    yes. . . Litigation_pending: no ..  MRI: no .    SYMPTOMS/RELATED: . --Possible medication side effects include:  .     The symptoms/statements  below are positive if BOLDED, otherwise negative.           CONTEXT/WHEN: . --Activity. . Coughing..  Bending.  Sitting. Hx_of_CA:.. 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  Work_related.. Similar_problems _in_past. Trauma .       REVIEW OF SYMPTOMS:       .Leg _Pain_to_below_knee.  Hip_pain..  Weight_loss.  Incontinence .  dyspareunia .  Weakness.  Numbness.          Review of Systems    Objective:      Vitals:    03/14/18 0815   BP: 130/86   Pulse: 69   Resp: 16   Temp: 98.6 °F (37 °C)   TempSrc: Oral   SpO2: 99%   Weight: 76.5 kg (168 lb 10.4 oz)   Height: 5' 5" (1.651 m)   PainSc: 0-No pain     Physical Exam   Constitutional: She appears well-developed and well-nourished.   Cardiovascular: Normal rate, regular rhythm and normal heart sounds.    Pulmonary/Chest: Effort normal and breath sounds normal.   Abdominal: Soft. There is no tenderness.   Musculoskeletal: She exhibits tenderness (tender over right S1 and over the right greater).   Range of motion at the waist: Limited  Straight leg raising: Normal  Gait: Normal  Strength: Normal  Sensation: Normal   Neurological: She is alert.   Skin:        Psychiatric: She has a normal mood and affect. Her behavior is normal. Thought content " normal.   Nursing note and vitals reviewed.        Assessment:       1. Greater trochanteric bursitis of right hip (for procedure only)     2. Essential hypertension    3. Hyperlipidemia, unspecified hyperlipidemia type    4. Postmenopausal    5. Herpes zoster without complication          Plan:     Greater trochanteric bursitis of right hip  -     Large Joint Aspiration/Injection  -     methylPREDNISolone acetate injection 40 mg; Inject 40 mg into the articular space.    Essential hypertension  -     Comprehensive metabolic panel; Future; Expected date: 03/14/2018    Hyperlipidemia, unspecified hyperlipidemia type  -     Lipid panel; Future; Expected date: 03/14/2018    Postmenopausal  -     DXA Bone Density Spine And Hip; Future; Expected date: 03/14/2018    Herpes zoster without complication  -     valACYclovir (VALTREX) 500 MG tablet; Take 1 tablet (500 mg total) by mouth 2 (two) times daily.  Dispense: 6 tablet; Refill: 5      Follow-up in about 3 months (around 6/14/2018).

## 2018-03-14 NOTE — PATIENT INSTRUCTIONS
Take a total distended bedtime    Smart temp cold pack and a back buddy    Low-Salt Diet  This diet removes foods that are high in salt. It also limits the amount of salt you use when cooking. It is most often used for people with high blood pressure, edema (fluid retention), and kidney, liver, or heart disease.  Table salt contains the mineral sodium. Your body needs sodium to work normally. But too much sodium can make your health problems worse. Your healthcare provider is recommending a low-salt (also called low-sodium) diet for you. Your total daily allowance of salt is 1,500 to 2,300 milligrams (mg). It is less than 1 teaspoon of table salt. This means you can have only about 500 to 700 mg of sodium at each meal. People with certain health problems should limit salt intake to the lower end of the recommended range.    When you cook, dont add much salt. If you can cook without using salt, even better. Dont add salt to your food at the table.  When shopping, read food labels. Salt is often called sodium on the label. Choose foods that are salt-free, low salt, or very low salt. Note that foods with reduced salt may not lower your salt intake enough.    Beans, potatoes, and pasta  Ok: Dry beans, split peas, lentils, potatoes, rice, macaroni, pasta, spaghetti without added salt  Avoid: Potato chips, tortilla chips, and similar products  Breads and cereals  Ok: Low-sodium breads, rolls, cereals, and cakes; low-salt crackers, matzo crackers  Avoid: Salted crackers, pretzels, popcorn, French toast, pancakes, muffins  Dairy  Ok: Milk, chocolate milk, hot chocolate mix, low-salt cheeses, and yogurt  Avoid: Processed cheese and cheese spreads; Roquefort, Camembert, and cottage cheese; buttermilk, instant breakfast drink  Desserts  Ok: Ice cream, frozen yogurt, juice bars, gelatin, cookies and pies, sugar, honey, jelly, hard candy  Avoid: Most pies, cakes and cookies prepared or processed with salt; instant  pudding  Drinks  Ok: Tea, coffee, fizzy (carbonated) drinks, juices  Avoid: Flavored coffees, electrolyte replacement drinks, sports drinks  Meats  Ok: All fresh meat, fish, poultry, low-salt tuna, eggs, egg substitute  Avoid: Smoked, pickled, brine-cured, or salted meats and fish. This includes sanchez, chipped beef, corned beef, hot dogs, deli meats, ham, kosher meats, salt pork, sausage, canned tuna, salted codfish, smoked salmon, herring, sardines, or anchovies.  Seasonings and spices  Ok: Most seasonings are okay. Good substitutes for salt include: fresh herb blends, hot sauce, lemon, garlic, capps, vinegar, dry mustard, parsley, cilantro, horseradish, tomato paste, regular margarine, mayonnaise, unsalted butter, cream cheese, vegetable oil, cream, low-salt salad dressing and gravy.  Avoid: Regular ketchup, relishes, pickles, soy sauce, teriyaki sauce, Worcestershire sauce, BBQ sauce, tartar sauce, meat tenderizer, chili sauce, regular gravy, regular salad dressing, salted butter  Soups  Ok: Low-salt soups and broths made with allowed foods  Avoid: Bouillon cubes, soups with smoked or salted meats, regular soup and broth  Vegetables  Ok: Most vegetables are okay; also low-salt tomato and vegetable juices  Avoid: Sauerkraut and other brine-soaked vegetables; pickles and other pickled vegetables; tomato juice, olives  © 3060-5693 [employer]. [address] [place] [address], [place] [address]. All rights reserved. This information is not intended as a substitute for professional medical care. Always follow your healthcare professional's instructions.  .

## 2018-03-22 ENCOUNTER — APPOINTMENT (OUTPATIENT)
Dept: LAB | Facility: HOSPITAL | Age: 73
End: 2018-03-22
Attending: NURSE PRACTITIONER
Payer: MEDICARE

## 2018-03-22 ENCOUNTER — OFFICE VISIT (OUTPATIENT)
Dept: UROGYNECOLOGY | Facility: CLINIC | Age: 73
End: 2018-03-22
Payer: MEDICARE

## 2018-03-22 VITALS
HEIGHT: 65 IN | HEART RATE: 64 BPM | BODY MASS INDEX: 27.99 KG/M2 | WEIGHT: 168 LBS | SYSTOLIC BLOOD PRESSURE: 147 MMHG | DIASTOLIC BLOOD PRESSURE: 87 MMHG | TEMPERATURE: 98 F

## 2018-03-22 DIAGNOSIS — R35.0 URINARY FREQUENCY: Primary | ICD-10-CM

## 2018-03-22 DIAGNOSIS — N81.11 CYSTOCELE, MIDLINE: ICD-10-CM

## 2018-03-22 DIAGNOSIS — N39.41 URGE INCONTINENCE OF URINE: ICD-10-CM

## 2018-03-22 LAB
BACTERIA #/AREA URNS HPF: NORMAL /HPF
BILIRUB SERPL-MCNC: ABNORMAL MG/DL
BLOOD URINE, POC: ABNORMAL
COLOR, POC UA: ABNORMAL
GLUCOSE UR QL STRIP: ABNORMAL
KETONES UR QL STRIP: ABNORMAL
LEUKOCYTE ESTERASE URINE, POC: ABNORMAL
MICROSCOPIC COMMENT: NORMAL
NITRITE, POC UA: ABNORMAL
PH, POC UA: 7
PROTEIN, POC: ABNORMAL
RBC #/AREA URNS HPF: 3 /HPF (ref 0–4)
SPECIFIC GRAVITY, POC UA: 1.01
SQUAMOUS #/AREA URNS HPF: 1 /HPF
UROBILINOGEN, POC UA: ABNORMAL
WBC #/AREA URNS HPF: 2 /HPF (ref 0–5)

## 2018-03-22 PROCEDURE — 99215 OFFICE O/P EST HI 40 MIN: CPT | Mod: PBBFAC,PO,25 | Performed by: NURSE PRACTITIONER

## 2018-03-22 PROCEDURE — 81002 URINALYSIS NONAUTO W/O SCOPE: CPT | Mod: PBBFAC,PO | Performed by: NURSE PRACTITIONER

## 2018-03-22 PROCEDURE — 51725 SIMPLE CYSTOMETROGRAM: CPT | Mod: 26,S$PBB,, | Performed by: NURSE PRACTITIONER

## 2018-03-22 PROCEDURE — 51725 SIMPLE CYSTOMETROGRAM: CPT | Mod: PBBFAC,PO | Performed by: NURSE PRACTITIONER

## 2018-03-22 PROCEDURE — 81000 URINALYSIS NONAUTO W/SCOPE: CPT

## 2018-03-22 PROCEDURE — 99999 PR PBB SHADOW E&M-EST. PATIENT-LVL V: CPT | Mod: PBBFAC,,, | Performed by: NURSE PRACTITIONER

## 2018-03-22 PROCEDURE — 87086 URINE CULTURE/COLONY COUNT: CPT

## 2018-03-22 PROCEDURE — 99214 OFFICE O/P EST MOD 30 MIN: CPT | Mod: S$PBB,25,, | Performed by: NURSE PRACTITIONER

## 2018-03-22 NOTE — PROGRESS NOTES
Subjective:       Patient ID: Jazz Martinez is a 72 y.o. female.    Chief Complaint: PELVIC PRESSURE    HPI  Jazz Martinez is a 72 y.o. female who presents today for CMG to help determine the next step in therapy.  She has been using a ring pessary for quite awhile and has been starting to have some problems with it lately.  She had surgery in the past with Dr. Oakes about 8 years ago, and is questioning what happened that she continues to need the pessary.  She started having some spotting on Tuesday, but has not had any other discharge since then.  She is also having some lower abd. Pressure starting yesterday and is concerned that she may be developing another UTI.  Her cath urine shows trace blood.  She is very concerned about bladder cancer at this time.    Review of Systems   Constitutional: Negative for activity change, fever and unexpected weight change.   HENT: Negative for hearing loss.    Eyes: Negative for visual disturbance.   Respiratory: Negative for shortness of breath and wheezing.    Cardiovascular: Negative for chest pain, palpitations and leg swelling.   Gastrointestinal: Positive for abdominal pain. Negative for constipation and diarrhea.   Genitourinary: Positive for frequency, urgency and vaginal bleeding. Negative for dyspareunia, dysuria and vaginal discharge.   Musculoskeletal: Negative for gait problem and neck pain.   Skin: Negative for rash and wound.   Allergic/Immunologic: Negative for immunocompromised state.   Neurological: Negative for tremors, speech difficulty and weakness.   Hematological: Does not bruise/bleed easily.   Psychiatric/Behavioral: Negative for agitation and confusion.       Objective:      Physical Exam   Constitutional: She is oriented to person, place, and time. She appears well-developed and well-nourished. No distress.   HENT:   Head: Normocephalic and atraumatic.   Neck: Neck supple. No thyromegaly present.   Pulmonary/Chest: Effort normal. No  respiratory distress.   Abdominal: Soft. There is tenderness in the suprapubic area. No hernia.   Musculoskeletal: Normal range of motion.   Neurological: She is alert and oriented to person, place, and time.   Skin: Skin is warm and dry. No rash noted.   Psychiatric: She has a normal mood and affect. Her behavior is normal.     Pelvic Exam:  V: No lesions. No palpable nodes.   Va: small amount of thin yellow discharge.  Area of irritation noted on the anterior vaginal wall.  Dominant midline cystocele Ba=-1  Meatus:No caruncle or stenosis  Urethra: Non tender. No suburethral masses.  Cx/Cuff: Normal   Uterus: surgically absent  Ad: No mass or tenderness.  Levators :Symmetrical. Normal tone. Non tender.  BL: mildly tender  RV: No hemorrhoids.      Assessment:       1. Urinary frequency    2. Cystocele, midline    3. Urge incontinence of urine          Procedure note- CMG- After betadine irrigation of the urethra, lidocaine instilled via urojet.  A #8 [place] catheter inserted into the bladder.  100 mls of urine withdrawn.  The catheter was then attached to sterile water and the water was allowed to flow into the bladder.  30-35 cm of water closure pressure noted.  The pt was then asked to stand.  First sensation was at approx 300 mls.  Total bladder capacity was approx 500 mls.  The catheter was then withdrawn.  Pt asked to cough and had some incontinence.  Pt then allowed to dress and ambulate to the restroom.  Pt had no incontinence while ambulating and waiting for the restroom.    Procedure note- #2 ring pessary was removed before CMG and vaginal tissue examined.  The pessary was replace for the CMG, but was again removed after the CMG to help allow the irritation to heal.    Plan:       Urinary frequency- NP will review the CMG results with Dr. Oakes.  NP did discuss either pelvic floor physical therapy and/or surgical correction as possible options.  Pt is interested in pelvic floor physical therapy.  -      POCT URINE DIPSTICK WITHOUT MICROSCOPE    Cystocele, midline- we will leave the pessary out at this time but pt will return next week for it to be replaced.    Urge incontinence of urine- will discuss with Dr. aOkes    RTC 1 week

## 2018-03-24 LAB — BACTERIA UR CULT: NO GROWTH

## 2018-03-27 ENCOUNTER — HOSPITAL ENCOUNTER (OUTPATIENT)
Dept: RADIOLOGY | Facility: CLINIC | Age: 73
Discharge: HOME | End: 2018-03-27
Attending: INTERNAL MEDICINE
Payer: MEDICARE

## 2018-03-27 ENCOUNTER — TELEPHONE (OUTPATIENT)
Dept: UROGYNECOLOGY | Facility: CLINIC | Age: 73
End: 2018-03-27

## 2018-03-27 DIAGNOSIS — Z78.0 ASYMPTOMATIC MENOPAUSAL STATE: ICD-10-CM

## 2018-03-27 PROCEDURE — 77080 DXA BONE DENSITY AXIAL: CPT | Mod: TC,PO

## 2018-03-27 PROCEDURE — 77080 DXA BONE DENSITY AXIAL: CPT | Mod: 26,,, | Performed by: RADIOLOGY

## 2018-03-27 NOTE — TELEPHONE ENCOUNTER
I did review the CMG results with Dr. Oakes as well as her irritation from the pessary, her concern about bladder cancer and her question about previous surgery that Dr. Oakes performed 8 years ago.  He would like for her to leave the pessary out for a few weeks and then come in for an appt with him to discuss her questions and concerns.  She has an appt on 4/3 with me.  Please cancel this appt and make her an appt at least 1-2 weeks later with Dr. Oakes.

## 2018-03-27 NOTE — TELEPHONE ENCOUNTER
----- Message from BORA Banks sent at 3/27/2018 10:31 AM CDT -----  Her urine culture shows no growth.  Her microscopic UA shows 3 RBC's which is in a normal range.  I will discuss her CMG and her options with Dr. Oakes later today

## 2018-04-09 ENCOUNTER — OFFICE VISIT (OUTPATIENT)
Dept: UROGYNECOLOGY | Facility: CLINIC | Age: 73
End: 2018-04-09
Payer: MEDICARE

## 2018-04-09 VITALS
BODY MASS INDEX: 28.25 KG/M2 | SYSTOLIC BLOOD PRESSURE: 138 MMHG | HEIGHT: 65 IN | WEIGHT: 169.56 LBS | DIASTOLIC BLOOD PRESSURE: 82 MMHG | HEART RATE: 71 BPM

## 2018-04-09 DIAGNOSIS — N32.81 OAB (OVERACTIVE BLADDER): ICD-10-CM

## 2018-04-09 DIAGNOSIS — N81.11 CYSTOCELE, MIDLINE: ICD-10-CM

## 2018-04-09 DIAGNOSIS — R35.0 URINARY FREQUENCY: Primary | ICD-10-CM

## 2018-04-09 LAB
BILIRUB SERPL-MCNC: NORMAL MG/DL
BLOOD URINE, POC: NORMAL
COLOR, POC UA: YELLOW
GLUCOSE UR QL STRIP: NORMAL
KETONES UR QL STRIP: NORMAL
LEUKOCYTE ESTERASE URINE, POC: NORMAL
NITRITE, POC UA: NORMAL
PH, POC UA: 7
PROTEIN, POC: NORMAL
SPECIFIC GRAVITY, POC UA: 1
UROBILINOGEN, POC UA: NORMAL

## 2018-04-09 PROCEDURE — 99999 PR PBB SHADOW E&M-EST. PATIENT-LVL III: CPT | Mod: PBBFAC,,, | Performed by: OBSTETRICS & GYNECOLOGY

## 2018-04-09 PROCEDURE — 99213 OFFICE O/P EST LOW 20 MIN: CPT | Mod: PBBFAC,PO | Performed by: OBSTETRICS & GYNECOLOGY

## 2018-04-09 PROCEDURE — 99213 OFFICE O/P EST LOW 20 MIN: CPT | Mod: S$PBB,,, | Performed by: OBSTETRICS & GYNECOLOGY

## 2018-04-09 PROCEDURE — 81002 URINALYSIS NONAUTO W/O SCOPE: CPT | Mod: PBBFAC,PO | Performed by: OBSTETRICS & GYNECOLOGY

## 2018-04-09 RX ORDER — VALACYCLOVIR HYDROCHLORIDE 500 MG/1
TABLET, FILM COATED ORAL
COMMUNITY
Start: 2018-03-26 | End: 2018-07-20 | Stop reason: SDUPTHER

## 2018-04-09 NOTE — PROGRESS NOTES
Subjective:     Chief Complaint: Cystocele  History of Present Illness: Jazz Martinez is a 72 y.o. female who presents for cystocele.  She had previous pelvic relaxation surgery twice and while she had good apical support with weakness anteriorly.  We have been trying to protect this from worsening with the pessary.  She is however is beginning to have complications with the pessary.  She is also concerned about the possibility of bladder cancer as her brother  of bladder cancer.  We reviewed her urine test for last 4 years and has been no hematuria except when she had a UTI.  She will rest the further options as far as protecting from recurrence of her cystocele.  We also discussed that she has significant overactive bladder symptoms which has not responded well to medications.  Emphasized that this is probably age-related issue and not related to her previous surgeries or the presence of a cystocele    Review of Systems    Constitutional: No acute distress. No weight gain/loss.  Eyes: No vision changes.  ENT: No headaches.   Respiratory: PE.  Cardiovascular:  Hypertension  Gastrointestinal: GERD   Genitourinary: No vaginal bleeding or discharge.  Integument/Breast: Negative  Hematologic/Lymphatic: Gout  Musculoskeletal:  back pain. No abdominal pain.  Neurological: No disc problems. No paresthesias.  Behavioral/Psych: No history of depression.   Endocrine: No hormonal replacement.  Allergy/Immune: no recent reactions     Objective:   General Exam:  General appearance: WDNF. NAD.   HEENT: Jenni. EOM's intact.  Neck: Normal thyroid.   Back: No CVA tenderness.  RESP: No SOB.  Breasts: deferred  Abdomen: Benign without localizing signs.  Extremities: No edema. No varices.  Lymphatic: noncontributory  Skin: No rashes. No lesions.  Neurologic: Intact.   Psych: Oriented.   Pelvic Exam:  V:  No lesions. No palpable nodes.   Va:No d/c bleeding or lesions.  Good apical support and posterior length but there is  weakening anterior.Ba-1  .Meatus:No caruncle or stenosis  Urethra: Non tender. No suburethral masses.  Cx/Cuff: Normal good support  Uterus: Surgically absent.  Ad: No mass or tenderness.  Levators :Symmetrical. Normal tone. Non tender.  BL: Non tender  RV: No hemorrhoids.  Assessment:   She has good apical support she may do well without the pessary  With normal urinalysis would last 4 years but does not think she's had any significant risk of bladder cancer   OAB which has not responded well to medications    Plan:      TNS  observe for any worsening of the cystocele

## 2018-04-25 ENCOUNTER — OFFICE VISIT (OUTPATIENT)
Dept: UROGYNECOLOGY | Facility: CLINIC | Age: 73
End: 2018-04-25
Payer: MEDICARE

## 2018-04-25 VITALS
SYSTOLIC BLOOD PRESSURE: 135 MMHG | BODY MASS INDEX: 28.5 KG/M2 | HEART RATE: 72 BPM | WEIGHT: 171.06 LBS | HEIGHT: 65 IN | DIASTOLIC BLOOD PRESSURE: 75 MMHG

## 2018-04-25 DIAGNOSIS — R35.0 URINARY FREQUENCY: ICD-10-CM

## 2018-04-25 DIAGNOSIS — N39.41 URGE INCONTINENCE OF URINE: Primary | ICD-10-CM

## 2018-04-25 PROCEDURE — 99499 UNLISTED E&M SERVICE: CPT | Mod: S$PBB,,, | Performed by: NURSE PRACTITIONER

## 2018-04-25 PROCEDURE — 99213 OFFICE O/P EST LOW 20 MIN: CPT | Mod: PBBFAC,PO | Performed by: NURSE PRACTITIONER

## 2018-04-25 PROCEDURE — 64566 NEUROELTRD STIM POST TIBIAL: CPT | Mod: PBBFAC,PO | Performed by: NURSE PRACTITIONER

## 2018-04-25 PROCEDURE — 64566 NEUROELTRD STIM POST TIBIAL: CPT | Mod: S$PBB,,, | Performed by: NURSE PRACTITIONER

## 2018-04-25 PROCEDURE — 99999 PR PBB SHADOW E&M-EST. PATIENT-LVL III: CPT | Mod: PBBFAC,,, | Performed by: NURSE PRACTITIONER

## 2018-04-25 NOTE — PROGRESS NOTES
Subjective:       Patient ID: Jazz Martinez is a 72 y.o. female.    Chief Complaint: Urinary Frequency    HPI  Jazz Martinez is a 72 y.o. female who presents today for her initial PTNS therapy.  She has tried vesicare, ditropan, and myrbetriq in the past with no relief in her bladder symptoms.  She has frequency x 6 during the day and nocturia x 2.  She has about 3 accidents a day and has strong urgency.  She has left the pessary out at this time, but has not been feeling the bulging sensation like she did in the past so she may continue to leave it out.  She denies any other acute complaints/concerns and is ready to proceed with the treatment.    Review of Systems   Constitutional: Negative for activity change, fever and unexpected weight change.   HENT: Negative for hearing loss.    Eyes: Negative for visual disturbance.   Respiratory: Negative for shortness of breath and wheezing.    Cardiovascular: Negative for chest pain, palpitations and leg swelling.   Gastrointestinal: Negative for abdominal pain, constipation and diarrhea.   Genitourinary: Positive for frequency and urgency. Negative for dyspareunia, dysuria, vaginal bleeding and vaginal discharge.   Musculoskeletal: Negative for gait problem and neck pain.   Skin: Negative for rash and wound.   Allergic/Immunologic: Negative for immunocompromised state.   Neurological: Negative for tremors, speech difficulty and weakness.   Hematological: Does not bruise/bleed easily.   Psychiatric/Behavioral: Negative for agitation and confusion.       Objective:      Physical Exam   Constitutional: She is oriented to person, place, and time. She appears well-developed and well-nourished. No distress.   HENT:   Head: Normocephalic and atraumatic.   Neck: Neck supple. No thyromegaly present.   Pulmonary/Chest: Effort normal. No respiratory distress.   Abdominal: Soft. There is no tenderness. No hernia.   Musculoskeletal: Normal range of motion.   Neurological:  She is alert and oriented to person, place, and time.   Skin: Skin is warm and dry. No rash noted.   Psychiatric: She has a normal mood and affect. Her behavior is normal.         Assessment:       1. Urge incontinence of urine    2. Urinary frequency          Procedure note-  After informed consent obtained, Patient's right upper ankle was swabbed with alcohol.  #34 gauge needle inserted into ankle and attached to stimulator.  Treatment setting was 1 today.  Pt tolerated 30 minute therapy without discomfort.        Plan:       Urge incontinence of urine- continue with PTNS    Urinary frequency- continue with PTNS          RTC 1 week.

## 2018-05-02 ENCOUNTER — OFFICE VISIT (OUTPATIENT)
Dept: UROGYNECOLOGY | Facility: CLINIC | Age: 73
End: 2018-05-02
Payer: MEDICARE

## 2018-05-02 VITALS
BODY MASS INDEX: 26.61 KG/M2 | WEIGHT: 169.56 LBS | HEART RATE: 63 BPM | HEIGHT: 67 IN | SYSTOLIC BLOOD PRESSURE: 135 MMHG | DIASTOLIC BLOOD PRESSURE: 77 MMHG

## 2018-05-02 DIAGNOSIS — N39.41 URGE INCONTINENCE OF URINE: Primary | ICD-10-CM

## 2018-05-02 DIAGNOSIS — R35.0 URINARY FREQUENCY: ICD-10-CM

## 2018-05-02 LAB
BILIRUB SERPL-MCNC: NEGATIVE MG/DL
BLOOD URINE, POC: NEGATIVE
COLOR, POC UA: NORMAL
GLUCOSE UR QL STRIP: NORMAL
KETONES UR QL STRIP: NEGATIVE
LEUKOCYTE ESTERASE URINE, POC: NEGATIVE
NITRITE, POC UA: NEGATIVE
PH, POC UA: 7
PROTEIN, POC: NEGATIVE
SPECIFIC GRAVITY, POC UA: 1.01
UROBILINOGEN, POC UA: NORMAL

## 2018-05-02 PROCEDURE — 64566 NEUROELTRD STIM POST TIBIAL: CPT | Mod: PBBFAC,PO | Performed by: NURSE PRACTITIONER

## 2018-05-02 PROCEDURE — 99999 PR PBB SHADOW E&M-EST. PATIENT-LVL III: CPT | Mod: PBBFAC,,, | Performed by: NURSE PRACTITIONER

## 2018-05-02 PROCEDURE — 81002 URINALYSIS NONAUTO W/O SCOPE: CPT | Mod: PBBFAC,PO | Performed by: NURSE PRACTITIONER

## 2018-05-02 PROCEDURE — 99213 OFFICE O/P EST LOW 20 MIN: CPT | Mod: PBBFAC,PO,25 | Performed by: NURSE PRACTITIONER

## 2018-05-02 PROCEDURE — 99499 UNLISTED E&M SERVICE: CPT | Mod: S$PBB,,, | Performed by: NURSE PRACTITIONER

## 2018-05-02 PROCEDURE — 64566 NEUROELTRD STIM POST TIBIAL: CPT | Mod: S$PBB,,, | Performed by: NURSE PRACTITIONER

## 2018-05-02 NOTE — PROGRESS NOTES
Subjective:       Patient ID: Jazz Martinez is a 72 y.o. female.    Chief Complaint: PTNS    HPI  Jazz Martinez is a 72 y.o. female who presents today for her 2nd PTNS therapy.  She feels that everything is about the same as last week.  She does note that she is having more of a daily large leak episode instead of 3 smaller leaks a day.  Otherwise, her frequency, urgency and nocturia remain the same.  She denies any other acute complaints/concerns and is ready to proceed with the therapy.    Review of Systems   Constitutional: Negative for activity change, fever and unexpected weight change.   HENT: Negative for hearing loss.    Eyes: Negative for visual disturbance.   Respiratory: Negative for shortness of breath and wheezing.    Cardiovascular: Negative for chest pain, palpitations and leg swelling.   Gastrointestinal: Negative for abdominal pain, constipation and diarrhea.   Genitourinary: Positive for frequency and urgency. Negative for dyspareunia, dysuria, vaginal bleeding and vaginal discharge.   Musculoskeletal: Negative for gait problem and neck pain.   Skin: Negative for rash and wound.   Allergic/Immunologic: Negative for immunocompromised state.   Neurological: Negative for tremors, speech difficulty and weakness.   Hematological: Does not bruise/bleed easily.   Psychiatric/Behavioral: Negative for agitation and confusion.       Objective:      Physical Exam   Constitutional: She is oriented to person, place, and time. She appears well-developed and well-nourished. No distress.   HENT:   Head: Normocephalic and atraumatic.   Neck: Neck supple. No thyromegaly present.   Pulmonary/Chest: Effort normal. No respiratory distress.   Abdominal: Soft. There is no tenderness. No hernia.   Musculoskeletal: Normal range of motion.   Neurological: She is alert and oriented to person, place, and time.   Skin: Skin is warm and dry. No rash noted.   Psychiatric: She has a normal mood and affect. Her behavior  is normal.         Assessment:       1. Urge incontinence of urine    2. Urinary frequency          Procedure note-  After informed consent obtained, Patient's right upper ankle was swabbed with alcohol.  #34 gauge needle inserted into ankle and attached to stimulator.  Treatment setting was 18 today.  Pt tolerated 30 minute therapy without discomfort.        Plan:       Urge incontinence of urine- continue with PTNS    Urinary frequency- continue with PTNS  -     POCT urine dipstick without microscope          RTC 1 week.

## 2018-05-04 DIAGNOSIS — F32.A DEPRESSION: ICD-10-CM

## 2018-05-04 DIAGNOSIS — N95.1 MENOPAUSAL SYMPTOMS: ICD-10-CM

## 2018-05-04 RX ORDER — SERTRALINE HYDROCHLORIDE 100 MG/1
TABLET, FILM COATED ORAL
Qty: 90 TABLET | Refills: 0 | Status: SHIPPED | OUTPATIENT
Start: 2018-05-04 | End: 2018-07-20 | Stop reason: SDUPTHER

## 2018-05-09 ENCOUNTER — OFFICE VISIT (OUTPATIENT)
Dept: UROGYNECOLOGY | Facility: CLINIC | Age: 73
End: 2018-05-09
Payer: MEDICARE

## 2018-05-09 VITALS
BODY MASS INDEX: 26.09 KG/M2 | HEART RATE: 74 BPM | SYSTOLIC BLOOD PRESSURE: 133 MMHG | HEIGHT: 67 IN | DIASTOLIC BLOOD PRESSURE: 80 MMHG | WEIGHT: 166.25 LBS

## 2018-05-09 DIAGNOSIS — N39.41 URGE INCONTINENCE OF URINE: Primary | ICD-10-CM

## 2018-05-09 DIAGNOSIS — R35.0 URINARY FREQUENCY: ICD-10-CM

## 2018-05-09 LAB
BILIRUB SERPL-MCNC: NORMAL MG/DL
BLOOD URINE, POC: NORMAL
COLOR, POC UA: NORMAL
GLUCOSE UR QL STRIP: NORMAL
KETONES UR QL STRIP: NORMAL
LEUKOCYTE ESTERASE URINE, POC: NORMAL
NITRITE, POC UA: NORMAL
PH, POC UA: 8
PROTEIN, POC: NORMAL
SPECIFIC GRAVITY, POC UA: 1
UROBILINOGEN, POC UA: NORMAL

## 2018-05-09 PROCEDURE — 81002 URINALYSIS NONAUTO W/O SCOPE: CPT | Mod: PBBFAC,PO | Performed by: NURSE PRACTITIONER

## 2018-05-09 PROCEDURE — 99499 UNLISTED E&M SERVICE: CPT | Mod: S$PBB,,, | Performed by: NURSE PRACTITIONER

## 2018-05-09 PROCEDURE — 99213 OFFICE O/P EST LOW 20 MIN: CPT | Mod: PBBFAC,PO | Performed by: NURSE PRACTITIONER

## 2018-05-09 PROCEDURE — 64566 NEUROELTRD STIM POST TIBIAL: CPT | Mod: S$PBB,,, | Performed by: NURSE PRACTITIONER

## 2018-05-09 PROCEDURE — 64566 NEUROELTRD STIM POST TIBIAL: CPT | Mod: PBBFAC,PO | Performed by: NURSE PRACTITIONER

## 2018-05-09 PROCEDURE — 99999 PR PBB SHADOW E&M-EST. PATIENT-LVL III: CPT | Mod: PBBFAC,,, | Performed by: NURSE PRACTITIONER

## 2018-05-09 NOTE — PROGRESS NOTES
Subjective:       Patient ID: Jazz Martinez is a 72 y.o. female.    Chief Complaint: Urinary Frequency and Urinary Incontinence    HPI  Jazz Martinez is a 72 y.o. female who presents today for her 3rd PTNS therapy.  She does not feel that she has had much change in her symptoms as of yet.  She has frequency x 6 during the day and nocturia x 2.  She still has a lot of urgency, but her incontinence does seem to be decreasing slightly to 3 x a week.  She denies any other acute complaints/concerns and is ready to proceed with the therapy.    Review of Systems   Constitutional: Negative for activity change, fever and unexpected weight change.   HENT: Negative for hearing loss.    Eyes: Negative for visual disturbance.   Respiratory: Negative for shortness of breath and wheezing.    Cardiovascular: Negative for chest pain, palpitations and leg swelling.   Gastrointestinal: Negative for abdominal pain, constipation and diarrhea.   Genitourinary: Positive for frequency and urgency. Negative for dyspareunia, dysuria, vaginal bleeding and vaginal discharge.   Musculoskeletal: Negative for gait problem and neck pain.   Skin: Negative for rash and wound.   Allergic/Immunologic: Negative for immunocompromised state.   Neurological: Negative for tremors, speech difficulty and weakness.   Hematological: Does not bruise/bleed easily.   Psychiatric/Behavioral: Negative for agitation and confusion.       Objective:      Physical Exam   Constitutional: She is oriented to person, place, and time. She appears well-developed and well-nourished. No distress.   HENT:   Head: Normocephalic and atraumatic.   Neck: Neck supple. No thyromegaly present.   Pulmonary/Chest: Effort normal. No respiratory distress.   Abdominal: Soft. There is no tenderness. No hernia.   Musculoskeletal: Normal range of motion.   Neurological: She is alert and oriented to person, place, and time.   Skin: Skin is warm and dry. No rash noted.   Psychiatric:  She has a normal mood and affect. Her behavior is normal.         Assessment:       1. Urge incontinence of urine    2. Urinary frequency          Procedure note-  After informed consent obtained, Patient's right upper ankle was swabbed with alcohol.  #34 gauge needle inserted into ankle and attached to stimulator.  Treatment setting was 7 today.  Pt tolerated 30 minute therapy without discomfort.        Plan:       Urge incontinence of urine- continue with PTNS    Urinary frequency- continue with PTNS  -     POCT URINE DIPSTICK WITHOUT MICROSCOPE          RTC 1 week.

## 2018-05-16 ENCOUNTER — OFFICE VISIT (OUTPATIENT)
Dept: UROGYNECOLOGY | Facility: CLINIC | Age: 73
End: 2018-05-16
Payer: MEDICARE

## 2018-05-16 VITALS
WEIGHT: 168.44 LBS | SYSTOLIC BLOOD PRESSURE: 129 MMHG | HEART RATE: 65 BPM | DIASTOLIC BLOOD PRESSURE: 76 MMHG | BODY MASS INDEX: 26.44 KG/M2 | HEIGHT: 67 IN

## 2018-05-16 DIAGNOSIS — N39.41 URGE INCONTINENCE OF URINE: Primary | ICD-10-CM

## 2018-05-16 DIAGNOSIS — R35.0 URINARY FREQUENCY: ICD-10-CM

## 2018-05-16 LAB
BILIRUB SERPL-MCNC: NEGATIVE MG/DL
BLOOD URINE, POC: NEGATIVE
COLOR, POC UA: CLEAR
GLUCOSE UR QL STRIP: NORMAL
KETONES UR QL STRIP: NEGATIVE
LEUKOCYTE ESTERASE URINE, POC: NEGATIVE
NITRITE, POC UA: NEGATIVE
PH, POC UA: 7
PROTEIN, POC: NEGATIVE
SPECIFIC GRAVITY, POC UA: 1.01
UROBILINOGEN, POC UA: NORMAL

## 2018-05-16 PROCEDURE — 99999 PR PBB SHADOW E&M-EST. PATIENT-LVL III: CPT | Mod: PBBFAC,,, | Performed by: NURSE PRACTITIONER

## 2018-05-16 PROCEDURE — 99213 OFFICE O/P EST LOW 20 MIN: CPT | Mod: PBBFAC,PO,25 | Performed by: NURSE PRACTITIONER

## 2018-05-16 PROCEDURE — 64566 NEUROELTRD STIM POST TIBIAL: CPT | Mod: S$PBB,,, | Performed by: NURSE PRACTITIONER

## 2018-05-16 PROCEDURE — 99499 UNLISTED E&M SERVICE: CPT | Mod: S$PBB,,, | Performed by: NURSE PRACTITIONER

## 2018-05-16 PROCEDURE — 81002 URINALYSIS NONAUTO W/O SCOPE: CPT | Mod: PBBFAC,PO | Performed by: NURSE PRACTITIONER

## 2018-05-16 PROCEDURE — 64566 NEUROELTRD STIM POST TIBIAL: CPT | Mod: PBBFAC,PO | Performed by: NURSE PRACTITIONER

## 2018-05-16 NOTE — PROGRESS NOTES
Subjective:       Patient ID: Jazz Martinez is a 72 y.o. female.    Chief Complaint: Follow-up (ptns)    HPI  Jazz Martinez is a 72 y.o. female who presents today for her 4th PTNS therapy.  She had 2 nights this past week where she was able to sleep through the night and didn't get up at all.  Now she feels like she is back to where she was previously, but she is noticing a gradual change.  She has daytime frequency x 5 and nocturia x 1 on average.  She feels that her urgency is slightly less and her incontinence is down to about 3 times a week.  She denies any other acute complaints/concerns and is ready to proceed with the therapy.    Review of Systems   Constitutional: Negative for activity change, fever and unexpected weight change.   HENT: Negative for hearing loss.    Eyes: Negative for visual disturbance.   Respiratory: Negative for shortness of breath and wheezing.    Cardiovascular: Negative for chest pain, palpitations and leg swelling.   Gastrointestinal: Negative for abdominal pain, constipation and diarrhea.   Genitourinary: Positive for frequency and urgency. Negative for dyspareunia, dysuria, vaginal bleeding and vaginal discharge.   Musculoskeletal: Negative for gait problem and neck pain.   Skin: Negative for rash and wound.   Allergic/Immunologic: Negative for immunocompromised state.   Neurological: Negative for tremors, speech difficulty and weakness.   Hematological: Does not bruise/bleed easily.   Psychiatric/Behavioral: Negative for agitation and confusion.       Objective:      Physical Exam   Constitutional: She is oriented to person, place, and time. She appears well-developed and well-nourished. No distress.   HENT:   Head: Normocephalic and atraumatic.   Neck: Neck supple. No thyromegaly present.   Pulmonary/Chest: Effort normal. No respiratory distress.   Abdominal: Soft. There is no tenderness. No hernia.   Musculoskeletal: Normal range of motion.   Neurological: She is alert  and oriented to person, place, and time.   Skin: Skin is warm and dry. No rash noted.   Psychiatric: She has a normal mood and affect. Her behavior is normal.         Assessment:       1. Urge incontinence of urine    2. Urinary frequency          Procedure note-  After informed consent obtained, Patient's right upper ankle was swabbed with alcohol.  #34 gauge needle inserted into ankle and attached to stimulator.  Treatment setting was 13 today.  Pt tolerated 30 minute therapy without discomfort.        Plan:       Urge incontinence of urine- continue with PTNS    Urinary frequency- continue with PTNS  -     POCT urine dipstick without microscope          RTC 1 week.

## 2018-05-23 ENCOUNTER — OFFICE VISIT (OUTPATIENT)
Dept: UROGYNECOLOGY | Facility: CLINIC | Age: 73
End: 2018-05-23
Payer: MEDICARE

## 2018-05-23 VITALS
DIASTOLIC BLOOD PRESSURE: 69 MMHG | HEART RATE: 71 BPM | SYSTOLIC BLOOD PRESSURE: 129 MMHG | BODY MASS INDEX: 26.57 KG/M2 | WEIGHT: 169.31 LBS | HEIGHT: 67 IN

## 2018-05-23 DIAGNOSIS — N39.41 URGE INCONTINENCE OF URINE: Primary | ICD-10-CM

## 2018-05-23 DIAGNOSIS — R35.0 URINARY FREQUENCY: ICD-10-CM

## 2018-05-23 PROCEDURE — 64566 NEUROELTRD STIM POST TIBIAL: CPT | Mod: PBBFAC,PO | Performed by: NURSE PRACTITIONER

## 2018-05-23 PROCEDURE — 99499 UNLISTED E&M SERVICE: CPT | Mod: S$PBB,,, | Performed by: NURSE PRACTITIONER

## 2018-05-23 PROCEDURE — 99999 PR PBB SHADOW E&M-EST. PATIENT-LVL III: CPT | Mod: PBBFAC,,, | Performed by: NURSE PRACTITIONER

## 2018-05-23 PROCEDURE — 64566 NEUROELTRD STIM POST TIBIAL: CPT | Mod: S$PBB,,, | Performed by: NURSE PRACTITIONER

## 2018-05-23 PROCEDURE — 99213 OFFICE O/P EST LOW 20 MIN: CPT | Mod: PBBFAC,PO | Performed by: NURSE PRACTITIONER

## 2018-05-23 NOTE — PROGRESS NOTES
Subjective:       Patient ID: Jazz Martinez is a 72 y.o. female.    Chief Complaint: PTNS    HPI  Jazz Martinez is a 72 y.o. female who presents today for her 5th PTNS therapy.  She has had a noticeable increase in her stress level this week and feels that as a results her bladder is acting up as well.  Her daytime frequency has increased to x 8.  Her nocturia increased to x 2.  Her urgency is still present and she had six episodes of incontinence yesterday.  She feels that shortly her stress will start decreasing and she is hoping that the PTNS will start to have more effect.  She denies any other acute complaints/concern and is ready to proceed with the therapy.    Review of Systems   Constitutional: Negative for activity change, fever and unexpected weight change.   HENT: Negative for hearing loss.    Eyes: Negative for visual disturbance.   Respiratory: Negative for shortness of breath and wheezing.    Cardiovascular: Negative for chest pain, palpitations and leg swelling.   Gastrointestinal: Negative for abdominal pain, constipation and diarrhea.   Genitourinary: Positive for frequency and urgency. Negative for dyspareunia, dysuria, vaginal bleeding and vaginal discharge.   Musculoskeletal: Negative for gait problem and neck pain.   Skin: Negative for rash and wound.   Allergic/Immunologic: Negative for immunocompromised state.   Neurological: Negative for tremors, speech difficulty and weakness.   Hematological: Does not bruise/bleed easily.   Psychiatric/Behavioral: Negative for agitation and confusion.       Objective:      Physical Exam   Constitutional: She is oriented to person, place, and time. She appears well-developed and well-nourished. No distress.   HENT:   Head: Normocephalic and atraumatic.   Neck: Neck supple. No thyromegaly present.   Pulmonary/Chest: Effort normal. No respiratory distress.   Abdominal: Soft. There is no tenderness. No hernia.   Musculoskeletal: Normal range of  motion.   Neurological: She is alert and oriented to person, place, and time.   Skin: Skin is warm and dry. No rash noted.   Psychiatric: She has a normal mood and affect. Her behavior is normal.         Assessment:       1. Urge incontinence of urine    2. Urinary frequency          Procedure note-  After informed consent obtained, Patient's right upper ankle was swabbed with alcohol.  #34 gauge needle inserted into ankle and attached to stimulator.  Treatment setting was 8 today.  Pt tolerated 30 minute therapy without discomfort.        Plan:       Urge incontinence of urine- continue with PTNS    Urinary frequency- continue with PNTS          RTC 1 week.

## 2018-05-30 ENCOUNTER — OFFICE VISIT (OUTPATIENT)
Dept: UROGYNECOLOGY | Facility: CLINIC | Age: 73
End: 2018-05-30
Payer: MEDICARE

## 2018-05-30 VITALS
BODY MASS INDEX: 26.51 KG/M2 | HEART RATE: 71 BPM | SYSTOLIC BLOOD PRESSURE: 115 MMHG | DIASTOLIC BLOOD PRESSURE: 72 MMHG | WEIGHT: 168.88 LBS | HEIGHT: 67 IN

## 2018-05-30 DIAGNOSIS — R35.0 URINARY FREQUENCY: ICD-10-CM

## 2018-05-30 DIAGNOSIS — N39.41 URGE INCONTINENCE OF URINE: Primary | ICD-10-CM

## 2018-05-30 LAB
BILIRUB SERPL-MCNC: NORMAL MG/DL
BLOOD URINE, POC: NORMAL
COLOR, POC UA: CLEAR
GLUCOSE UR QL STRIP: NORMAL
KETONES UR QL STRIP: NORMAL
LEUKOCYTE ESTERASE URINE, POC: NORMAL
NITRITE, POC UA: NORMAL
PH, POC UA: 7
PROTEIN, POC: NORMAL
SPECIFIC GRAVITY, POC UA: 1.01
UROBILINOGEN, POC UA: NORMAL

## 2018-05-30 PROCEDURE — 64566 NEUROELTRD STIM POST TIBIAL: CPT | Mod: S$PBB,,, | Performed by: NURSE PRACTITIONER

## 2018-05-30 PROCEDURE — 99213 OFFICE O/P EST LOW 20 MIN: CPT | Mod: PBBFAC,PO | Performed by: NURSE PRACTITIONER

## 2018-05-30 PROCEDURE — 81002 URINALYSIS NONAUTO W/O SCOPE: CPT | Mod: PBBFAC,PO | Performed by: NURSE PRACTITIONER

## 2018-05-30 PROCEDURE — 99999 PR PBB SHADOW E&M-EST. PATIENT-LVL III: CPT | Mod: PBBFAC,,, | Performed by: NURSE PRACTITIONER

## 2018-05-30 PROCEDURE — 99499 UNLISTED E&M SERVICE: CPT | Mod: S$PBB,,, | Performed by: NURSE PRACTITIONER

## 2018-05-30 PROCEDURE — 64566 NEUROELTRD STIM POST TIBIAL: CPT | Mod: PBBFAC,PO | Performed by: NURSE PRACTITIONER

## 2018-05-30 NOTE — PROGRESS NOTES
Subjective:       Patient ID: Jazz Martinez is a 72 y.o. female.    Chief Complaint: PTNS    HPI  Jazz Martinez is a 72 y.o. female who presents today for her 6th weekly PTNS treatment.  She feels that she is doing slightly better.  Her frequency during the day is down to 6.  Her nocturia has remained about the same at 2-3.  She still has strong urgency at times and her incontinence is about the same at 6 episodes a day.  Her stress level has not decreased recently and she still feels that this plays a role in her bladder issues.  She denies any other acute complaints/concerns and is ready to proceed with the therapy.    Review of Systems   Constitutional: Negative for activity change, fever and unexpected weight change.   HENT: Negative for hearing loss.    Eyes: Negative for visual disturbance.   Respiratory: Negative for shortness of breath and wheezing.    Cardiovascular: Negative for chest pain, palpitations and leg swelling.   Gastrointestinal: Negative for abdominal pain, constipation and diarrhea.   Genitourinary: Positive for frequency and urgency. Negative for dyspareunia, dysuria, vaginal bleeding and vaginal discharge.   Musculoskeletal: Negative for gait problem and neck pain.   Skin: Negative for rash and wound.   Allergic/Immunologic: Negative for immunocompromised state.   Neurological: Negative for tremors, speech difficulty and weakness.   Hematological: Does not bruise/bleed easily.   Psychiatric/Behavioral: Negative for agitation and confusion.       Objective:      Physical Exam   Constitutional: She is oriented to person, place, and time. She appears well-developed and well-nourished. No distress.   HENT:   Head: Normocephalic and atraumatic.   Neck: Neck supple. No thyromegaly present.   Pulmonary/Chest: Effort normal. No respiratory distress.   Abdominal: Soft. There is no tenderness. No hernia.   Musculoskeletal: Normal range of motion.   Neurological: She is alert and oriented to  person, place, and time.   Skin: Skin is warm and dry. No rash noted.   Psychiatric: She has a normal mood and affect. Her behavior is normal.         Assessment:       1. Urge incontinence of urine    2. Urinary frequency          Procedure note-  After informed consent obtained, Patient's right upper ankle was swabbed with alcohol.  #34 gauge needle inserted into ankle and attached to stimulator.  Treatment setting was 9 today.  Pt tolerated 30 minute therapy without discomfort.        Plan:       Urge incontinence of urine- continue with PTNS    Urinary frequency- continue with PTNS  -     POCT URINE DIPSTICK WITHOUT MICROSCOPE          RTC 1 week.

## 2018-06-06 ENCOUNTER — OFFICE VISIT (OUTPATIENT)
Dept: UROGYNECOLOGY | Facility: CLINIC | Age: 73
End: 2018-06-06
Payer: MEDICARE

## 2018-06-06 ENCOUNTER — TELEPHONE (OUTPATIENT)
Dept: FAMILY MEDICINE | Facility: CLINIC | Age: 73
End: 2018-06-06

## 2018-06-06 ENCOUNTER — OFFICE VISIT (OUTPATIENT)
Dept: FAMILY MEDICINE | Facility: CLINIC | Age: 73
End: 2018-06-06
Payer: MEDICARE

## 2018-06-06 VITALS
SYSTOLIC BLOOD PRESSURE: 124 MMHG | HEART RATE: 70 BPM | WEIGHT: 171.94 LBS | TEMPERATURE: 98 F | DIASTOLIC BLOOD PRESSURE: 76 MMHG | BODY MASS INDEX: 26.93 KG/M2

## 2018-06-06 VITALS
SYSTOLIC BLOOD PRESSURE: 142 MMHG | HEART RATE: 66 BPM | DIASTOLIC BLOOD PRESSURE: 81 MMHG | WEIGHT: 171.06 LBS | HEIGHT: 67 IN | BODY MASS INDEX: 26.85 KG/M2

## 2018-06-06 DIAGNOSIS — Z01.419 ENCOUNTER FOR WELL WOMAN EXAM: Primary | ICD-10-CM

## 2018-06-06 DIAGNOSIS — N95.1 MENOPAUSAL SYMPTOMS: ICD-10-CM

## 2018-06-06 DIAGNOSIS — N39.41 URGE INCONTINENCE OF URINE: Primary | ICD-10-CM

## 2018-06-06 DIAGNOSIS — N95.2 ATROPHIC VAGINITIS: ICD-10-CM

## 2018-06-06 DIAGNOSIS — R35.0 URINARY FREQUENCY: ICD-10-CM

## 2018-06-06 DIAGNOSIS — I10 HTN (HYPERTENSION), BENIGN: ICD-10-CM

## 2018-06-06 PROCEDURE — 99213 OFFICE O/P EST LOW 20 MIN: CPT | Mod: PBBFAC,PO,25 | Performed by: NURSE PRACTITIONER

## 2018-06-06 PROCEDURE — 99999 PR PBB SHADOW E&M-EST. PATIENT-LVL III: CPT | Mod: PBBFAC,,, | Performed by: NURSE PRACTITIONER

## 2018-06-06 PROCEDURE — 64566 NEUROELTRD STIM POST TIBIAL: CPT | Mod: S$PBB,,, | Performed by: NURSE PRACTITIONER

## 2018-06-06 PROCEDURE — 81002 URINALYSIS NONAUTO W/O SCOPE: CPT | Mod: PBBFAC,PO | Performed by: NURSE PRACTITIONER

## 2018-06-06 PROCEDURE — 64566 NEUROELTRD STIM POST TIBIAL: CPT | Mod: PBBFAC,PO | Performed by: NURSE PRACTITIONER

## 2018-06-06 PROCEDURE — G0101 CA SCREEN;PELVIC/BREAST EXAM: HCPCS | Mod: PBBFAC,PO | Performed by: NURSE PRACTITIONER

## 2018-06-06 PROCEDURE — 99213 OFFICE O/P EST LOW 20 MIN: CPT | Mod: PBBFAC,27,PO,25 | Performed by: NURSE PRACTITIONER

## 2018-06-06 PROCEDURE — G0101 CA SCREEN;PELVIC/BREAST EXAM: HCPCS | Mod: S$PBB,,, | Performed by: NURSE PRACTITIONER

## 2018-06-06 PROCEDURE — 99499 UNLISTED E&M SERVICE: CPT | Mod: S$PBB,,, | Performed by: NURSE PRACTITIONER

## 2018-06-06 RX ORDER — ESTRADIOL 0.5 MG/1
TABLET ORAL
Qty: 30 TABLET | Refills: 12 | Status: SHIPPED | OUTPATIENT
Start: 2018-06-06 | End: 2019-12-11

## 2018-06-06 RX ORDER — TRIAMTERENE/HYDROCHLOROTHIAZID 37.5-25 MG
1 TABLET ORAL DAILY
Qty: 90 TABLET | Refills: 0 | Status: CANCELLED | OUTPATIENT
Start: 2018-06-06

## 2018-06-06 NOTE — TELEPHONE ENCOUNTER
Patient present for appointment with Mrs. Yamileth Mane NP. Requesting a message be forwarded to PCP (Dr. Juan) for refill of Maxzide. Patient is requesting a call once refill has been forwarded to pharmacy.

## 2018-06-06 NOTE — PROGRESS NOTES
CC: Well Woman Exam    History of Present Illness: Jazz Martinez is a 72 y.o. female that presents today 2018 for well gyn visit.  Pt presents today for well woman exam. Pt reports that she had a hysterectomy/oophorectomy when she was 25 due to ovarian cysts. She has been on hormone replacement since then. Pt tried weaning off of the hormones about 2 years ago, but was having vaginal irritation and some bleeding from the pessary and she began noticing hot flashes. Her dose of estrace has been decreased for about 3 years now and she does not take the pills daily - sometimes every other day to every other 2 days. Pt does not currently have pessary in because she is having pelvic floor treatments done frequently. Pt had mammogram done in 2017 (scanned in media). No other complaints or concerns noted.         Past Medical History:   Diagnosis Date    Corns and callosities     Deep vein thrombosis     GERD (gastroesophageal reflux disease)     Gout     H/O bladder problems     uses pessary    Hyperlipidemia     Hypertension     Pulmonary embolism        Past Surgical History:   Procedure Laterality Date    APPENDECTOMY  AGE 12    BLADDER SURGERY      Twice    BREAST CYST EXCISION      Benign per patient    HAND SURGERY Left     HYSTERECTOMY      Not related to cancer per patient.    OOPHORECTOMY         Family History   Problem Relation Age of Onset    Cancer Brother         bladder    Breast cancer Neg Hx     Colon cancer Neg Hx     Ovarian cancer Neg Hx        Social History     Social History    Marital status:      Spouse name: N/A    Number of children: N/A    Years of education: N/A     Social History Main Topics    Smoking status: Never Smoker    Smokeless tobacco: Never Used    Alcohol use No    Drug use: No    Sexual activity: No     Other Topics Concern    None     Social History Narrative    None       OB History    Para Term  AB Living   1 1  1     1   SAB TAB Ectopic Multiple Live Births                  # Outcome Date GA Lbr Cesar/2nd Weight Sex Delivery Anes PTL Lv   1 Term               Obstetric Comments          Current Outpatient Prescriptions   Medication Sig Dispense Refill    atorvastatin (LIPITOR) 10 MG tablet Take 1 tablet (10 mg total) by mouth once daily. 90 tablet 3    estradiol (ESTRACE) 0.5 MG tablet TAKE 1 TABLET(0.5 MG) BY MOUTH EVERY DAY 30 tablet 12    sertraline (ZOLOFT) 100 MG tablet TAKE 1 TABLET EVERY DAY 90 tablet 0    sumatriptan (IMITREX) 100 MG tablet 1 tab po at start of headache, may repeat in 2 hours X1 in 24 hours. (Patient taking differently: as needed. 1 tab po at start of headache, may repeat in 2 hours X1 in 24 hours.) 10 tablet 11    tramadol (ULTRAM) 50 mg tablet 1 tablet every 4 to 6 hours as needed.  0    triamterene-hydrochlorothiazide 37.5-25 mg (MAXZIDE-25) 37.5-25 mg per tablet Take 1 tablet by mouth once daily. 90 tablet 0    valACYclovir (VALTREX) 500 MG tablet        No current facility-administered medications for this visit.        Review of patient's allergies indicates:   Allergen Reactions    Sulfa dyne Other (See Comments)     Blisters in mouth    Sulfa (sulfonamide antibiotics)          Review of Symptoms:  GENERAL: Denies weight gain or weight loss. Feeling well overall.   SKIN: Denies rash or lesions.   HEAD: Denies head injury or headache.   ABDOMEN: No abdominal pain, constipation, diarrhea, nausea, vomiting or rectal bleeding.   URINARY: No frequency, dysuria, hematuria, or burning on urination.    /76 (BP Location: Right arm, Patient Position: Sitting, BP Method: Small (Automatic))   Pulse 70   Temp 98.3 °F (36.8 °C)   Wt 78 kg (171 lb 15.3 oz)     Physical Exam:  APPEARANCE: Well nourished, well developed, in no acute distress.  SKIN: Normal skin turgor, no lesions.  RESPIRATORY: Normal respiratory effort with no retractions or use of accessory muscles  ABDOMEN: Soft.  No tenderness or masses. No hepatosplenomegaly. No hernias.  BREASTS: Symmetrical, no skin changes or visible lesions. No palpable masses, nipple discharge or adenopathy bilaterally.  PELVIC: Normal external female genitalia without lesions. Normal hair distribution. Adequate perineal body, normal urethral meatus. Urethra with no masses.  Bladder nontender. Vagina dry and atrophic; without lesions or discharge. Cystocele noted.  Adnexa without masses or tenderness. Urethra and bladder normal.     ASSESSMENT/PLAN:  Encounter for well woman exam    Atrophic vaginitis  -     estradiol (ESTRACE) 0.5 MG tablet; TAKE 1 TABLET(0.5 MG) BY MOUTH EVERY DAY  Dispense: 30 tablet; Refill: 12    Menopausal symptoms  -     estradiol (ESTRACE) 0.5 MG tablet; TAKE 1 TABLET(0.5 MG) BY MOUTH EVERY DAY  Dispense: 30 tablet; Refill: 12      A full discussion of the benefit-risk ratio of hormonal replacement therapy was carried out. Improvement in vasomotor and other climacteric symptoms is discussed, including possible improvements in sleep and mood. Reduction of risk for osteoporosis was explained. We discussed the study data showing increased risk of thrombo-embolic events such as myocardial infarction, stroke and also possibly breast cancer with estrogen replacement, and how this might affect her. The range of side effects such as breast tenderness, weight gain and including possible increases in lifetime risk of breast cancer and possible thrombotic complications was discussed. We also discussed ACOG's recommendation to use hormone replacement therapy for the shortest amount of time and the lowest dose possible. Alternative such as herbal and soy-based products were reviewed. All of her questions about this therapy were answered. PT is fully aware of risks and wishes to continue taking pills.           Patient was counseled today on Pap guidelines, recommendation for yearly exams, mammograms starting annually at age 40, Colonoscopy  after the age of 50, Dexa Bone Scan and calcium and vitamin D supplementation in menopause and to see her PCP for other health maintenance.       Follow-up:  RTC in 1 year for well woman exam or as needed

## 2018-06-06 NOTE — PROGRESS NOTES
Subjective:       Patient ID: Jazz Martinez is a 72 y.o. female.    Chief Complaint: PTNS    HPI  Jazz Martinez is a 72 y.o. female who presents today for her 7th weekly treatment.  She feels that she might have had some slight improvement this week.  Her frequency is down to 6-7 during the day.  Her nocturia remains the same at about 2.  Her urgency is still present.  Her incontinence has decreased slightly to 4-5 times a day.  She denies any other acute changes in her status and is ready to proceed with the therapy.    Review of Systems   Constitutional: Negative for activity change, fever and unexpected weight change.   HENT: Negative for hearing loss.    Eyes: Negative for visual disturbance.   Respiratory: Negative for shortness of breath and wheezing.    Cardiovascular: Negative for chest pain, palpitations and leg swelling.   Gastrointestinal: Negative for abdominal pain, constipation and diarrhea.   Genitourinary: Positive for frequency and urgency. Negative for dyspareunia, dysuria, vaginal bleeding and vaginal discharge.   Musculoskeletal: Negative for gait problem and neck pain.   Skin: Negative for rash and wound.   Allergic/Immunologic: Negative for immunocompromised state.   Neurological: Negative for tremors, speech difficulty and weakness.   Hematological: Does not bruise/bleed easily.   Psychiatric/Behavioral: Negative for agitation and confusion.       Objective:      Physical Exam   Constitutional: She is oriented to person, place, and time. She appears well-developed and well-nourished. No distress.   HENT:   Head: Normocephalic and atraumatic.   Neck: Neck supple. No thyromegaly present.   Pulmonary/Chest: Effort normal. No respiratory distress.   Abdominal: Soft. There is no tenderness. No hernia.   Musculoskeletal: Normal range of motion.   Neurological: She is alert and oriented to person, place, and time.   Skin: Skin is warm and dry. No rash noted.   Psychiatric: She has a normal  mood and affect. Her behavior is normal.         Assessment:       1. Urge incontinence of urine    2. Urinary frequency          Procedure note-  After informed consent obtained, Patient's right upper ankle was swabbed with alcohol.  #34 gauge needle inserted into ankle and attached to stimulator.  Treatment setting was 9 today.  Pt tolerated 30 minute therapy without discomfort.        Plan:       Urge incontinence of urine- continue with PTNS    Urinary frequency- continue with PTNS  -     POCT URINE DIPSTICK WITHOUT MICROSCOPE          RTC 1 week.

## 2018-06-12 DIAGNOSIS — I10 HTN (HYPERTENSION), BENIGN: ICD-10-CM

## 2018-06-12 RX ORDER — TRIAMTERENE/HYDROCHLOROTHIAZID 37.5-25 MG
TABLET ORAL
Qty: 90 TABLET | Refills: 0 | Status: SHIPPED | OUTPATIENT
Start: 2018-06-12 | End: 2018-06-14 | Stop reason: SDUPTHER

## 2018-06-13 ENCOUNTER — OFFICE VISIT (OUTPATIENT)
Dept: UROGYNECOLOGY | Facility: CLINIC | Age: 73
End: 2018-06-13
Payer: MEDICARE

## 2018-06-13 VITALS
BODY MASS INDEX: 26.4 KG/M2 | HEART RATE: 68 BPM | SYSTOLIC BLOOD PRESSURE: 132 MMHG | WEIGHT: 168.19 LBS | HEIGHT: 67 IN | DIASTOLIC BLOOD PRESSURE: 85 MMHG

## 2018-06-13 DIAGNOSIS — N39.41 URGE INCONTINENCE: Primary | ICD-10-CM

## 2018-06-13 DIAGNOSIS — R35.0 URINARY FREQUENCY: ICD-10-CM

## 2018-06-13 LAB
BILIRUB SERPL-MCNC: ABNORMAL MG/DL
BLOOD URINE, POC: ABNORMAL
COLOR, POC UA: YELLOW
GLUCOSE UR QL STRIP: ABNORMAL
KETONES UR QL STRIP: ABNORMAL
LEUKOCYTE ESTERASE URINE, POC: ABNORMAL
NITRITE, POC UA: ABNORMAL
PH, POC UA: 5
PROTEIN, POC: ABNORMAL
SPECIFIC GRAVITY, POC UA: 1.01
UROBILINOGEN, POC UA: ABNORMAL

## 2018-06-13 PROCEDURE — 99214 OFFICE O/P EST MOD 30 MIN: CPT | Mod: PBBFAC,PO | Performed by: NURSE PRACTITIONER

## 2018-06-13 PROCEDURE — 81002 URINALYSIS NONAUTO W/O SCOPE: CPT | Mod: PBBFAC,PO | Performed by: NURSE PRACTITIONER

## 2018-06-13 PROCEDURE — 64566 NEUROELTRD STIM POST TIBIAL: CPT | Mod: S$PBB,,, | Performed by: NURSE PRACTITIONER

## 2018-06-13 PROCEDURE — 87077 CULTURE AEROBIC IDENTIFY: CPT

## 2018-06-13 PROCEDURE — 99499 UNLISTED E&M SERVICE: CPT | Mod: S$PBB,,, | Performed by: NURSE PRACTITIONER

## 2018-06-13 PROCEDURE — 99999 PR PBB SHADOW E&M-EST. PATIENT-LVL IV: CPT | Mod: PBBFAC,,, | Performed by: NURSE PRACTITIONER

## 2018-06-13 PROCEDURE — 64566 NEUROELTRD STIM POST TIBIAL: CPT | Mod: PBBFAC,PO | Performed by: NURSE PRACTITIONER

## 2018-06-13 PROCEDURE — 87186 SC STD MICRODIL/AGAR DIL: CPT

## 2018-06-13 PROCEDURE — 87088 URINE BACTERIA CULTURE: CPT

## 2018-06-13 PROCEDURE — 87086 URINE CULTURE/COLONY COUNT: CPT

## 2018-06-13 NOTE — PROGRESS NOTES
Subjective:       Patient ID: Jazz Martinez is a 72 y.o. female.    Chief Complaint: PTNS    HPI  Jazz Martinez is a 72 y.o. female who presents today for her 8th weekly PTNS therapy.  She has remained about the same this past week with frequency x 6-7 during the day.  Nocturia x 2.  She still has a fairly strong sense of urgency and about 5 incontinence episodes a day.  She did not 3 days where she had dysuria and she is not sure what this is related to.  Otherwise, she is willing to continue with the therapy.    Review of Systems   Constitutional: Negative for activity change, fever and unexpected weight change.   HENT: Negative for hearing loss.    Eyes: Negative for visual disturbance.   Respiratory: Negative for shortness of breath and wheezing.    Cardiovascular: Negative for chest pain, palpitations and leg swelling.   Gastrointestinal: Negative for abdominal pain, constipation and diarrhea.   Genitourinary: Positive for dysuria, frequency and urgency. Negative for dyspareunia, vaginal bleeding and vaginal discharge.   Musculoskeletal: Negative for gait problem and neck pain.   Skin: Negative for rash and wound.   Allergic/Immunologic: Negative for immunocompromised state.   Neurological: Negative for tremors, speech difficulty and weakness.   Hematological: Does not bruise/bleed easily.   Psychiatric/Behavioral: Negative for agitation and confusion.       Objective:      Physical Exam   Constitutional: She is oriented to person, place, and time. She appears well-developed and well-nourished. No distress.   HENT:   Head: Normocephalic and atraumatic.   Neck: Neck supple. No thyromegaly present.   Pulmonary/Chest: Effort normal. No respiratory distress.   Abdominal: Soft. There is no tenderness. No hernia.   Musculoskeletal: Normal range of motion.   Neurological: She is alert and oriented to person, place, and time.   Skin: Skin is warm and dry. No rash noted.   Psychiatric: She has a normal mood  and affect. Her behavior is normal.         Assessment:       1. Urge incontinence    2. Urinary frequency          Procedure note-  After informed consent obtained, Patient's right upper ankle was swabbed with alcohol.  #34 gauge needle inserted into ankle and attached to stimulator.  Treatment setting was 12 today.  Pt tolerated 30 minute therapy without discomfort.        Plan:       Urge incontinence- we will send her urine for culture.  Pt will continue the weekly PTNS  -     Urine culture    Urinary frequency- as noted above  -     POCT urine dipstick without microscope  -     Urine culture          RTC 1 week.

## 2018-06-14 DIAGNOSIS — I10 HTN (HYPERTENSION), BENIGN: ICD-10-CM

## 2018-06-14 RX ORDER — TRIAMTERENE/HYDROCHLOROTHIAZID 37.5-25 MG
1 TABLET ORAL DAILY
Qty: 90 TABLET | Refills: 0 | Status: SHIPPED | OUTPATIENT
Start: 2018-06-14 | End: 2019-05-30 | Stop reason: SDUPTHER

## 2018-06-15 ENCOUNTER — TELEPHONE (OUTPATIENT)
Dept: UROGYNECOLOGY | Facility: CLINIC | Age: 73
End: 2018-06-15

## 2018-06-15 RX ORDER — NITROFURANTOIN (MACROCRYSTALS) 100 MG/1
100 CAPSULE ORAL EVERY 12 HOURS
Qty: 10 CAPSULE | Refills: 0 | Status: SHIPPED | OUTPATIENT
Start: 2018-06-15 | End: 2018-06-20

## 2018-06-15 NOTE — TELEPHONE ENCOUNTER
Called and informed patient of this and she will  antibiotic today and understands if we need to change once the final results come back.

## 2018-06-15 NOTE — TELEPHONE ENCOUNTER
----- Message from Adriane Barber sent at 6/15/2018  9:07 AM CDT -----  Type:  Test Results    Who Called: self   Name of Test (Lab/Mammo/Etc):  Lab    Date of Test:  06/13/2018  Ordering Provider:  Hina Ms Cohen   Where the test was performed:  [facility]   Best Call Back Number:  [phone]  Additional Information:  Patient called asking for lab results.

## 2018-06-15 NOTE — TELEPHONE ENCOUNTER
Her urine culture is not complete yet, but it is showing enterococcus species.  I will send in antibiotics for her, but we may have to change them once the sensitivities come in.

## 2018-06-15 NOTE — TELEPHONE ENCOUNTER
Every time she goes to urinate  she is having a burning  pain , informed that we are waiting on the urine culture results they are still pending. States that she is not sure if it because she does not have the pessary in or not. Informed her we will let her know as soon as the results come in.

## 2018-06-17 LAB — BACTERIA UR CULT: NORMAL

## 2018-06-18 ENCOUNTER — TELEPHONE (OUTPATIENT)
Dept: UROGYNECOLOGY | Facility: CLINIC | Age: 73
End: 2018-06-18

## 2018-06-18 RX ORDER — CIPROFLOXACIN 500 MG/1
500 TABLET ORAL 2 TIMES DAILY
Qty: 10 TABLET | Refills: 1 | Status: SHIPPED | OUTPATIENT
Start: 2018-06-18 | End: 2018-07-20

## 2018-06-18 NOTE — TELEPHONE ENCOUNTER
Patient called and states that the antibiotic Nitrofurantoin, she took one dose Friday night and  And she has been wiped out since Friday, states that she had urine incontenence on Saturday and a headache that did not go away even with her migraine medicine.

## 2018-06-18 NOTE — TELEPHONE ENCOUNTER
----- Message from Maddi Kerr sent at 6/18/2018 12:02 PM CDT -----   Type:  Pharmacy Calling to Clarify an RX    Name of Caller:  pt  Pharmacy Name:   [facility] - [place], [place] - [address] [place] [address]  Phone: [phone] Fax: [phone]    Prescription Name:[facility]  [facility]   What do they need to clarify?:na  Best Call Back Number: [phone]  Additional Information:   Patient  Is calling  About a  Med  For  uti  That  Made  Pt  Ill // please call

## 2018-06-19 NOTE — TELEPHONE ENCOUNTER
Called and left message that cipro was called in for patient and that we did place the nitrofurantoin as an allergy in the computer for her.

## 2018-06-21 ENCOUNTER — OFFICE VISIT (OUTPATIENT)
Dept: UROGYNECOLOGY | Facility: CLINIC | Age: 73
End: 2018-06-21
Payer: MEDICARE

## 2018-06-21 VITALS
HEART RATE: 68 BPM | WEIGHT: 170 LBS | SYSTOLIC BLOOD PRESSURE: 137 MMHG | DIASTOLIC BLOOD PRESSURE: 83 MMHG | HEIGHT: 67 IN | BODY MASS INDEX: 26.68 KG/M2

## 2018-06-21 DIAGNOSIS — N39.41 URGE INCONTINENCE OF URINE: Primary | ICD-10-CM

## 2018-06-21 DIAGNOSIS — R35.0 URINARY FREQUENCY: ICD-10-CM

## 2018-06-21 PROCEDURE — 81002 URINALYSIS NONAUTO W/O SCOPE: CPT | Mod: PBBFAC,PO | Performed by: NURSE PRACTITIONER

## 2018-06-21 PROCEDURE — 99213 OFFICE O/P EST LOW 20 MIN: CPT | Mod: PBBFAC,PO,25 | Performed by: NURSE PRACTITIONER

## 2018-06-21 PROCEDURE — 99499 UNLISTED E&M SERVICE: CPT | Mod: S$PBB,,, | Performed by: NURSE PRACTITIONER

## 2018-06-21 PROCEDURE — 99999 PR PBB SHADOW E&M-EST. PATIENT-LVL III: CPT | Mod: PBBFAC,,, | Performed by: NURSE PRACTITIONER

## 2018-06-21 PROCEDURE — 64566 NEUROELTRD STIM POST TIBIAL: CPT | Mod: S$PBB,,, | Performed by: NURSE PRACTITIONER

## 2018-06-21 PROCEDURE — 64566 NEUROELTRD STIM POST TIBIAL: CPT | Mod: PBBFAC,PO | Performed by: NURSE PRACTITIONER

## 2018-06-21 NOTE — PROGRESS NOTES
Subjective:       Patient ID: Jazz Martinez is a 72 y.o. female.    Chief Complaint: ptns    HPI  Jazz Martinez is a 72 y.o. female  Who presents today for her 9th PTNS therapy.  She had a UTI last week and was given macrodantin.  She had an allergic reaction to the macrodantin and was changed to cipro.  She is still having some residual effects of everything and has not seen a huge change in her urinary symptoms.  She has daytime frequency x 6-7.  Nocturia x 2.  Her urgency is still fairly strong.  Her incontinence has decreased to 2-3 during the day.  Overall, she feels she is improving.  She is ready to proceed with the therapy.    Review of Systems   Constitutional: Negative for activity change, fever and unexpected weight change.   HENT: Negative for hearing loss.    Eyes: Negative for visual disturbance.   Respiratory: Negative for shortness of breath and wheezing.    Cardiovascular: Negative for chest pain, palpitations and leg swelling.   Gastrointestinal: Negative for abdominal pain, constipation and diarrhea.   Genitourinary: Positive for frequency and urgency. Negative for dyspareunia, dysuria, vaginal bleeding and vaginal discharge.   Musculoskeletal: Negative for gait problem and neck pain.   Skin: Negative for rash and wound.   Allergic/Immunologic: Negative for immunocompromised state.   Neurological: Negative for tremors, speech difficulty and weakness.   Hematological: Does not bruise/bleed easily.   Psychiatric/Behavioral: Negative for agitation and confusion.       Objective:      Physical Exam   Constitutional: She is oriented to person, place, and time. She appears well-developed and well-nourished. No distress.   HENT:   Head: Normocephalic and atraumatic.   Neck: Neck supple. No thyromegaly present.   Pulmonary/Chest: Effort normal. No respiratory distress.   Abdominal: Soft. There is no tenderness. No hernia.   Musculoskeletal: Normal range of motion.   Neurological: She is alert  and oriented to person, place, and time.   Skin: Skin is warm and dry. No rash noted.   Psychiatric: She has a normal mood and affect. Her behavior is normal.         Assessment:       1. Urge incontinence of urine    2. Urinary frequency          Procedure note-  After informed consent obtained, Patient's right upper ankle was swabbed with alcohol.  #34 gauge needle inserted into ankle and attached to stimulator.  Treatment setting was 7 today.  Pt tolerated 30 minute therapy without discomfort.        Plan:       Urge incontinence of urine- finish cipro.  Continue with PTNS  -     POCT URINE DIPSTICK WITHOUT MICROSCOPE    Urinary frequency- as noted above          RTC 1 week.

## 2018-06-27 ENCOUNTER — OFFICE VISIT (OUTPATIENT)
Dept: UROGYNECOLOGY | Facility: CLINIC | Age: 73
End: 2018-06-27
Payer: MEDICARE

## 2018-06-27 VITALS
HEIGHT: 67 IN | BODY MASS INDEX: 26.82 KG/M2 | WEIGHT: 170.88 LBS | DIASTOLIC BLOOD PRESSURE: 81 MMHG | HEART RATE: 66 BPM | SYSTOLIC BLOOD PRESSURE: 119 MMHG

## 2018-06-27 DIAGNOSIS — N39.41 URGE INCONTINENCE OF URINE: Primary | ICD-10-CM

## 2018-06-27 DIAGNOSIS — R35.0 URINARY FREQUENCY: ICD-10-CM

## 2018-06-27 PROCEDURE — 64566 NEUROELTRD STIM POST TIBIAL: CPT | Mod: PBBFAC,PO | Performed by: NURSE PRACTITIONER

## 2018-06-27 PROCEDURE — 99213 OFFICE O/P EST LOW 20 MIN: CPT | Mod: PBBFAC,PO,25 | Performed by: NURSE PRACTITIONER

## 2018-06-27 PROCEDURE — 99499 UNLISTED E&M SERVICE: CPT | Mod: S$PBB,,, | Performed by: NURSE PRACTITIONER

## 2018-06-27 PROCEDURE — 99999 PR PBB SHADOW E&M-EST. PATIENT-LVL III: CPT | Mod: PBBFAC,,, | Performed by: NURSE PRACTITIONER

## 2018-06-27 PROCEDURE — 81002 URINALYSIS NONAUTO W/O SCOPE: CPT | Mod: PBBFAC,PO | Performed by: NURSE PRACTITIONER

## 2018-06-27 PROCEDURE — 64566 NEUROELTRD STIM POST TIBIAL: CPT | Mod: S$PBB,,, | Performed by: NURSE PRACTITIONER

## 2018-06-27 NOTE — PROGRESS NOTES
Subjective:       Patient ID: Jazz Martinez is a 72 y.o. female.    Chief Complaint: PTNS    HPI  Jazz Martinez is a 72 y.o. female who presents today for her 10th PTNS therapy.  She feels that she is doing a little better this week.  Her daytime voids are averaging 7 and nocturia remains steady at 2.  Her incontinence has improved to 2-3 x a day.  She denies any other acute complaints/concerns and is ready to proceed with the therapy.    Review of Systems   Constitutional: Negative for activity change, fever and unexpected weight change.   HENT: Negative for hearing loss.    Eyes: Negative for visual disturbance.   Respiratory: Negative for shortness of breath and wheezing.    Cardiovascular: Negative for chest pain, palpitations and leg swelling.   Gastrointestinal: Negative for abdominal pain, constipation and diarrhea.   Genitourinary: Positive for frequency and urgency. Negative for dyspareunia, dysuria, vaginal bleeding and vaginal discharge.   Musculoskeletal: Negative for gait problem and neck pain.   Skin: Negative for rash and wound.   Allergic/Immunologic: Negative for immunocompromised state.   Neurological: Negative for tremors, speech difficulty and weakness.   Hematological: Does not bruise/bleed easily.   Psychiatric/Behavioral: Negative for agitation and confusion.       Objective:      Physical Exam   Constitutional: She is oriented to person, place, and time. She appears well-developed and well-nourished. No distress.   HENT:   Head: Normocephalic and atraumatic.   Neck: Neck supple. No thyromegaly present.   Pulmonary/Chest: Effort normal. No respiratory distress.   Abdominal: Soft. There is no tenderness. No hernia.   Musculoskeletal: Normal range of motion.   Neurological: She is alert and oriented to person, place, and time.   Skin: Skin is warm and dry. No rash noted.   Psychiatric: She has a normal mood and affect. Her behavior is normal.         Assessment:       1. Urge  incontinence of urine    2. Urinary frequency          Procedure note-  After informed consent obtained, Patient's right upper ankle was swabbed with alcohol.  #34 gauge needle inserted into ankle and attached to stimulator.  Treatment setting was 11 today.  Pt tolerated 30 minute therapy without discomfort.        Plan:       Urge incontinence of urine- continue with PTNS    Urinary frequency - continue with PTNS  -     POCT URINE DIPSTICK WITHOUT MICROSCOPE          RTC 1 week.

## 2018-07-10 ENCOUNTER — TELEPHONE (OUTPATIENT)
Dept: UROGYNECOLOGY | Facility: CLINIC | Age: 73
End: 2018-07-10

## 2018-07-10 NOTE — TELEPHONE ENCOUNTER
----- Message from Krysten Moreno sent at 7/10/2018 10:09 AM CDT -----  Contact: Self  Patient just spoke to Araceli and you changed her appt time for tomorrow.  Now the patient is requesting a call back to let you know she might be 5 to 10 minutes late tomorrow because she has to go to [place] first.  Call back at [phone].  Thank you!

## 2018-07-10 NOTE — TELEPHONE ENCOUNTER
Spoke to pt and she does not think that she will make it in time for 10:30 , would be late is asking to come in at 1:00.

## 2018-07-11 ENCOUNTER — OFFICE VISIT (OUTPATIENT)
Dept: UROGYNECOLOGY | Facility: CLINIC | Age: 73
End: 2018-07-11
Payer: MEDICARE

## 2018-07-11 VITALS
WEIGHT: 170.63 LBS | HEART RATE: 72 BPM | HEIGHT: 67 IN | DIASTOLIC BLOOD PRESSURE: 79 MMHG | BODY MASS INDEX: 26.78 KG/M2 | SYSTOLIC BLOOD PRESSURE: 135 MMHG

## 2018-07-11 DIAGNOSIS — N39.41 URGE INCONTINENCE OF URINE: ICD-10-CM

## 2018-07-11 DIAGNOSIS — R35.0 URINARY FREQUENCY: ICD-10-CM

## 2018-07-11 LAB
BILIRUB SERPL-MCNC: ABNORMAL MG/DL
BLOOD URINE, POC: ABNORMAL
COLOR, POC UA: ABNORMAL
GLUCOSE UR QL STRIP: ABNORMAL
KETONES UR QL STRIP: ABNORMAL
LEUKOCYTE ESTERASE URINE, POC: ABNORMAL
NITRITE, POC UA: ABNORMAL
PH, POC UA: 7
PROTEIN, POC: ABNORMAL
SPECIFIC GRAVITY, POC UA: 1
UROBILINOGEN, POC UA: ABNORMAL

## 2018-07-11 PROCEDURE — 99499 UNLISTED E&M SERVICE: CPT | Mod: S$PBB,,, | Performed by: NURSE PRACTITIONER

## 2018-07-11 PROCEDURE — 81002 URINALYSIS NONAUTO W/O SCOPE: CPT | Mod: PBBFAC,PO | Performed by: NURSE PRACTITIONER

## 2018-07-11 PROCEDURE — 64566 NEUROELTRD STIM POST TIBIAL: CPT | Mod: PBBFAC,PO | Performed by: NURSE PRACTITIONER

## 2018-07-11 PROCEDURE — 99213 OFFICE O/P EST LOW 20 MIN: CPT | Mod: PBBFAC,PO | Performed by: NURSE PRACTITIONER

## 2018-07-11 PROCEDURE — 99999 PR PBB SHADOW E&M-EST. PATIENT-LVL III: CPT | Mod: PBBFAC,,, | Performed by: NURSE PRACTITIONER

## 2018-07-11 PROCEDURE — 64566 NEUROELTRD STIM POST TIBIAL: CPT | Mod: S$PBB,,, | Performed by: NURSE PRACTITIONER

## 2018-07-11 NOTE — PROGRESS NOTES
Subjective:       Patient ID: Jazz Martinez is a 72 y.o. female.    Chief Complaint: PTNS    HPI  Jazz Martinez is a 72 y.o. female who presents today for her 11th PTNS treatment.  She feels that she has really started to see improvement lately.  She has frequency down to 4-5 during the day and nocturia x 1.  Her incontinence is down to x1 during the day.  Her urgency has also decreased.  Overall, she is happy with her improvement and wishes to continue with the PTNS.    Review of Systems   Constitutional: Negative for activity change, fever and unexpected weight change.   HENT: Negative for hearing loss.    Eyes: Negative for visual disturbance.   Respiratory: Negative for shortness of breath and wheezing.    Cardiovascular: Negative for chest pain, palpitations and leg swelling.   Gastrointestinal: Negative for abdominal pain, constipation and diarrhea.   Genitourinary: Positive for frequency and urgency. Negative for dyspareunia, dysuria, vaginal bleeding and vaginal discharge.   Musculoskeletal: Negative for gait problem and neck pain.   Skin: Negative for rash and wound.   Allergic/Immunologic: Negative for immunocompromised state.   Neurological: Negative for tremors, speech difficulty and weakness.   Hematological: Does not bruise/bleed easily.   Psychiatric/Behavioral: Negative for agitation and confusion.       Objective:      Physical Exam   Constitutional: She is oriented to person, place, and time. She appears well-developed and well-nourished. No distress.   HENT:   Head: Normocephalic and atraumatic.   Neck: Neck supple. No thyromegaly present.   Pulmonary/Chest: Effort normal. No respiratory distress.   Abdominal: Soft. There is no tenderness. No hernia.   Musculoskeletal: Normal range of motion.   Neurological: She is alert and oriented to person, place, and time.   Skin: Skin is warm and dry. No rash noted.   Psychiatric: She has a normal mood and affect. Her behavior is normal.          Assessment:       1. Urge incontinence of urine    2. Urinary frequency          Procedure note-  After informed consent obtained, Patient's right upper ankle was swabbed with alcohol.  #34 gauge needle inserted into ankle and attached to stimulator.  Treatment setting was 12 today.  Pt tolerated 30 minute therapy without discomfort.        Plan:       Urge incontinence of urine- continue with PTNS    Urinary frequency- continue with PTNS  -     POCT URINE DIPSTICK WITHOUT MICROSCOPE          RTC 1 week.

## 2018-07-18 ENCOUNTER — PATIENT OUTREACH (OUTPATIENT)
Dept: ADMINISTRATIVE | Facility: HOSPITAL | Age: 73
End: 2018-07-18

## 2018-07-20 ENCOUNTER — DOCUMENTATION ONLY (OUTPATIENT)
Dept: FAMILY MEDICINE | Facility: CLINIC | Age: 73
End: 2018-07-20

## 2018-07-20 ENCOUNTER — OFFICE VISIT (OUTPATIENT)
Dept: FAMILY MEDICINE | Facility: CLINIC | Age: 73
End: 2018-07-20
Payer: MEDICARE

## 2018-07-20 VITALS
SYSTOLIC BLOOD PRESSURE: 128 MMHG | DIASTOLIC BLOOD PRESSURE: 80 MMHG | RESPIRATION RATE: 20 BRPM | OXYGEN SATURATION: 96 % | WEIGHT: 171.94 LBS | BODY MASS INDEX: 26.99 KG/M2 | TEMPERATURE: 97 F | HEIGHT: 67 IN | HEART RATE: 61 BPM

## 2018-07-20 DIAGNOSIS — E78.5 HYPERLIPIDEMIA, UNSPECIFIED HYPERLIPIDEMIA TYPE: Primary | ICD-10-CM

## 2018-07-20 DIAGNOSIS — N95.1 MENOPAUSAL SYMPTOMS: ICD-10-CM

## 2018-07-20 DIAGNOSIS — I10 ESSENTIAL HYPERTENSION: ICD-10-CM

## 2018-07-20 DIAGNOSIS — F32.A DEPRESSION, UNSPECIFIED DEPRESSION TYPE: ICD-10-CM

## 2018-07-20 DIAGNOSIS — R35.0 URINARY FREQUENCY: ICD-10-CM

## 2018-07-20 PROCEDURE — 99213 OFFICE O/P EST LOW 20 MIN: CPT | Mod: S$GLB,,, | Performed by: INTERNAL MEDICINE

## 2018-07-20 RX ORDER — VALACYCLOVIR HYDROCHLORIDE 500 MG/1
1000 TABLET, FILM COATED ORAL 2 TIMES DAILY
Qty: 12 TABLET | Refills: 3 | Status: SHIPPED | OUTPATIENT
Start: 2018-07-20 | End: 2018-12-27 | Stop reason: SDUPTHER

## 2018-07-20 RX ORDER — SERTRALINE HYDROCHLORIDE 100 MG/1
100 TABLET, FILM COATED ORAL DAILY
Qty: 90 TABLET | Refills: 0 | Status: SHIPPED | OUTPATIENT
Start: 2018-07-20 | End: 2018-10-31 | Stop reason: SDUPTHER

## 2018-07-20 NOTE — PROGRESS NOTES
Health Maintenance Due   Topic Date Due    TETANUS VACCINE  07/24/1963    Pneumococcal (65+) (2 of 2 - PPSV23) 10/17/2017

## 2018-07-20 NOTE — PATIENT INSTRUCTIONS
Low-Salt Diet  This diet removes foods that are high in salt. It also limits the amount of salt you use when cooking. It is most often used for people with high blood pressure, edema (fluid retention), and kidney, liver, or heart disease.  Table salt contains the mineral sodium. Your body needs sodium to work normally. But too much sodium can make your health problems worse. Your healthcare provider is recommending a low-salt (also called low-sodium) diet for you. Your total daily allowance of salt is 1,500 to 2,300 milligrams (mg). It is less than 1 teaspoon of table salt. This means you can have only about 500 to 700 mg of sodium at each meal. People with certain health problems should limit salt intake to the lower end of the recommended range.    When you cook, don´t add much salt. If you can cook without using salt, even better. Don´t add salt to your food at the table.  When shopping, read food labels. Salt is often called sodium on the label. Choose foods that are salt-free, low salt, or very low salt. Note that foods with reduced salt may not lower your salt intake enough.    Beans, potatoes, and pasta  Ok: Dry beans, split peas, lentils, potatoes, rice, macaroni, pasta, spaghetti without added salt  Avoid: Potato chips, tortilla chips, and similar products  Breads and cereals  Ok: Low-sodium breads, rolls, cereals, and cakes; low-salt crackers, matzo crackers  Avoid: Salted crackers, pretzels, popcorn, [place] toast, pancakes, muffins  Dairy  Ok: Milk, chocolate milk, hot chocolate mix, low-salt cheeses, and yogurt  Avoid: Processed cheese and cheese spreads; Roquefort, Camembert, and cottage cheese; buttermilk, instant breakfast drink  Desserts  Ok: Ice cream, frozen yogurt, juice bars, gelatin, cookies and pies, sugar, honey, jelly, hard candy  Avoid: Most pies, cakes and cookies prepared or processed with salt; instant pudding  Drinks  Ok: Tea, coffee, fizzy (carbonated) drinks, juices  Avoid:  Flavored coffees, electrolyte replacement drinks, sports drinks  Meats  Ok: All fresh meat, fish, poultry, low-salt tuna, eggs, egg substitute  Avoid: Smoked, pickled, brine-cured, or salted meats and fish. This includes sanchez, chipped beef, corned beef, hot dogs, deli meats, ham, kosher meats, salt pork, sausage, canned tuna, salted codfish, smoked salmon, herring, sardines, or anchovies.  Seasonings and spices  Ok: Most seasonings are okay. Good substitutes for salt include: fresh herb blends, hot sauce, lemon, garlic, capps, vinegar, dry mustard, parsley, cilantro, horseradish, tomato paste, regular margarine, mayonnaise, unsalted butter, cream cheese, vegetable oil, cream, low-salt salad dressing and gravy.  Avoid: Regular ketchup, relishes, pickles, soy sauce, teriyaki sauce, Worcestershire sauce, BBQ sauce, tartar sauce, meat tenderizer, chili sauce, regular gravy, regular salad dressing, salted butter  Soups  Ok: Low-salt soups and broths made with allowed foods  Avoid: Bouillon cubes, soups with smoked or salted meats, regular soup and broth  Vegetables  Ok: Most vegetables are okay; also low-salt tomato and vegetable juices  Avoid: Sauerkraut and other brine-soaked vegetables; pickles and other pickled vegetables; tomato juice, olives  © 5694-8065 [employer]. [address] [place] [id number]. All rights reserved. This information is not intended as a substitute for professional medical care. Always follow your healthcare professional's instructions.

## 2018-07-20 NOTE — PROGRESS NOTES
Subjective:       Patient ID: Jazz Martinez is a 72 y.o. female.    Chief Complaint: Hyperlipidemia and Hypertension    HPI           CHIEF COMPLAINT: Hypertension  HPI:     ONSET:      QUALITY/COURSE:   Unchanged.     INTENSITY/SEVERITY:  Average blood pressure is  130/80.     MODIFIERS/TREATMENTS:  Taking medications: yes. .High sodium intake: no. alcohol: no      The following symptoms are positive only if BOLDED, otherwise are negative.      SYMPTOMS/RELATED: Possible medication side effects include:   Depression..  . Cough. . Constipation.    REVIEW OF SYMPTOMS: . Weight_loss . Weight_gain . Leg_cramps .Potency_problems .    TARGET ORGAN DAMAGE:: angina/ prior myocardial infarction, chronic kidney disease, heart failure, left ventricular hypertrophy, peripheral artery disease, prior coronary revascularization, retinopathy, stroke. transient ischemic attack.        CHIEF COMPLAINT: Hyperlipidemia. cholesterol screening: no.   HPI:     ONSET:    MODIFIERS/TREATMENTS: . Taking medications: yes. . Non-compliance with following diet: no. .     SYMPTOMS/RELATED:Possible medication side effects include:   Myalgia: no.  .     REVIEW OF SYMPTOMS: past weights:   Wt Readings from Last 1 Encounters:   07/20/18 0902 78 kg (171 lb 15.3 oz)                                                     Last lipids: total   Lab Results   Component Value Date    CHOL 238 (H) 03/07/2018    CHOL 206 (H) 10/19/2016    CHOL 228 (H) 10/20/2015                                                                     HDL   Lab Results   Component Value Date    HDL 48 03/07/2018    HDL 53 10/19/2016    HDL 55 10/20/2015                                                                     LDL   Lab Results   Component Value Date    LDLCALC 141.6 03/07/2018    LDLCALC 111.4 10/19/2016    LDLCALC 128.2 10/20/2015                                                                     TRIG   Lab Results   Component Value Date    TRIG 242 (H) 03/07/2018  "   TRIG 208 (H) 10/19/2016    TRIG 224 (H) 10/20/2015             PTS is working well for her urinary incontinence.                                                              Review of Systems   Constitutional: Negative for diaphoresis.   Eyes: Negative for visual disturbance.   Respiratory: Negative for chest tightness and shortness of breath.    Cardiovascular: Negative for chest pain.   Neurological: Negative for dizziness, syncope, weakness and headaches.   Psychiatric/Behavioral: Negative for dysphoric mood. The patient is not nervous/anxious.          Objective:      Vitals:    07/20/18 0902   BP: 128/80   Pulse: 61   Resp: 20   Temp: 97.4 °F (36.3 °C)   TempSrc: Oral   SpO2: 96%   Weight: 78 kg (171 lb 15.3 oz)   Height: 5' 7" (1.702 m)   PainSc:   4   PainLoc: Back     Physical Exam   Constitutional: She appears well-developed and well-nourished.   Cardiovascular: Normal rate, regular rhythm and normal heart sounds.    Pulmonary/Chest: Effort normal and breath sounds normal.   Abdominal: Soft. There is no tenderness.   Neurological: She is alert.   Psychiatric: She has a normal mood and affect. Her behavior is normal. Thought content normal.   Nursing note and vitals reviewed.        Assessment:       1. Hyperlipidemia, unspecified hyperlipidemia type    2. Menopausal symptoms    3. Depression, unspecified depression type    4. Urinary frequency    5. Essential hypertension          Plan:       Hyperlipidemia, unspecified hyperlipidemia type  -     Lipid panel; Future; Expected date: 07/20/2018    Menopausal symptoms  -     sertraline (ZOLOFT) 100 MG tablet; Take 1 tablet (100 mg total) by mouth once daily.  Dispense: 90 tablet; Refill: 0    Depression, unspecified depression type  -     sertraline (ZOLOFT) 100 MG tablet; Take 1 tablet (100 mg total) by mouth once daily.  Dispense: 90 tablet; Refill: 0    Urinary frequency  -     valACYclovir (VALTREX) 500 MG tablet; Take 2 tablets (1,000 mg total) by " mouth 2 (two) times daily.  Dispense: 12 tablet; Refill: 3    Essential hypertension  -     Comprehensive metabolic panel; Future; Expected date: 07/20/2018      Follow-up in about 3 months (around 10/20/2018).

## 2018-07-25 ENCOUNTER — OFFICE VISIT (OUTPATIENT)
Dept: UROGYNECOLOGY | Facility: CLINIC | Age: 73
End: 2018-07-25
Payer: MEDICARE

## 2018-07-25 VITALS
HEART RATE: 77 BPM | SYSTOLIC BLOOD PRESSURE: 128 MMHG | BODY MASS INDEX: 27.09 KG/M2 | WEIGHT: 172.63 LBS | DIASTOLIC BLOOD PRESSURE: 77 MMHG | HEIGHT: 67 IN

## 2018-07-25 DIAGNOSIS — R35.0 URINARY FREQUENCY: ICD-10-CM

## 2018-07-25 DIAGNOSIS — N39.41 URGE INCONTINENCE OF URINE: Primary | ICD-10-CM

## 2018-07-25 LAB
BILIRUB SERPL-MCNC: NORMAL MG/DL
BLOOD URINE, POC: NORMAL
COLOR, POC UA: NORMAL
GLUCOSE UR QL STRIP: NORMAL
KETONES UR QL STRIP: NORMAL
LEUKOCYTE ESTERASE URINE, POC: NORMAL
NITRITE, POC UA: NORMAL
PH, POC UA: 5
PROTEIN, POC: NORMAL
SPECIFIC GRAVITY, POC UA: 1.01
UROBILINOGEN, POC UA: NORMAL

## 2018-07-25 PROCEDURE — 64566 NEUROELTRD STIM POST TIBIAL: CPT | Mod: S$PBB,,, | Performed by: NURSE PRACTITIONER

## 2018-07-25 PROCEDURE — 81002 URINALYSIS NONAUTO W/O SCOPE: CPT | Mod: PBBFAC,PO | Performed by: NURSE PRACTITIONER

## 2018-07-25 PROCEDURE — 99499 UNLISTED E&M SERVICE: CPT | Mod: S$PBB,,, | Performed by: NURSE PRACTITIONER

## 2018-07-25 PROCEDURE — 64566 NEUROELTRD STIM POST TIBIAL: CPT | Mod: PBBFAC,PO | Performed by: NURSE PRACTITIONER

## 2018-07-25 PROCEDURE — 99999 PR PBB SHADOW E&M-EST. PATIENT-LVL III: CPT | Mod: PBBFAC,,, | Performed by: NURSE PRACTITIONER

## 2018-07-25 PROCEDURE — 99213 OFFICE O/P EST LOW 20 MIN: CPT | Mod: PBBFAC,PO,25 | Performed by: NURSE PRACTITIONER

## 2018-07-25 NOTE — PROGRESS NOTES
Subjective:       Patient ID: aJzz Martinez is a 73 y.o. female.    Chief Complaint: PTNS    HPI  Jazz Martinez is a 73 y.o. female who presents today for her 12th PTNS therapy.  She feels that she is doing very well.  Her frequency has decreased to 4 during the day and nocturia x 1.  She has very little urgency.  She does have some incontinence at night, but she is no longer wearing pads during the day.  She is very happy with her status and would like to continue with the treatment.    Review of Systems   Constitutional: Negative for activity change, fever and unexpected weight change.   HENT: Negative for hearing loss.    Eyes: Negative for visual disturbance.   Respiratory: Negative for shortness of breath and wheezing.    Cardiovascular: Negative for chest pain, palpitations and leg swelling.   Gastrointestinal: Negative for abdominal pain, constipation and diarrhea.   Genitourinary: Negative for dyspareunia, dysuria, frequency, urgency, vaginal bleeding and vaginal discharge.   Musculoskeletal: Negative for gait problem and neck pain.   Skin: Negative for rash and wound.   Allergic/Immunologic: Negative for immunocompromised state.   Neurological: Negative for tremors, speech difficulty and weakness.   Hematological: Does not bruise/bleed easily.   Psychiatric/Behavioral: Negative for agitation and confusion.       Objective:      Physical Exam   Constitutional: She is oriented to person, place, and time. She appears well-developed and well-nourished. No distress.   HENT:   Head: Normocephalic and atraumatic.   Neck: Neck supple. No thyromegaly present.   Pulmonary/Chest: Effort normal. No respiratory distress.   Abdominal: Soft. There is no tenderness. No hernia.   Musculoskeletal: Normal range of motion.   Neurological: She is alert and oriented to person, place, and time.   Skin: Skin is warm and dry. No rash noted.   Psychiatric: She has a normal mood and affect. Her behavior is normal.          Assessment:       1. Urge incontinence of urine    2. Urinary frequency          Procedure note-  After informed consent obtained, Patient's right upper ankle was swabbed with alcohol.  #34 gauge needle inserted into ankle and attached to stimulator.  Treatment setting was 10 today.  Pt tolerated 30 minute therapy without discomfort.        Plan:       Urge incontinence of urine- continue with monthly PTNS    Urinary frequency- continue with monthly PTNS  -     POCT urine dipstick without microscope          RTC 1 month

## 2018-08-06 ENCOUNTER — TELEPHONE (OUTPATIENT)
Dept: UROGYNECOLOGY | Facility: CLINIC | Age: 73
End: 2018-08-06

## 2018-08-06 ENCOUNTER — CLINICAL SUPPORT (OUTPATIENT)
Dept: UROGYNECOLOGY | Facility: CLINIC | Age: 73
End: 2018-08-06
Payer: MEDICARE

## 2018-08-06 DIAGNOSIS — R35.0 URINARY FREQUENCY: Primary | ICD-10-CM

## 2018-08-06 LAB
BILIRUB SERPL-MCNC: NEGATIVE MG/DL
BLOOD URINE, POC: 250
COLOR, POC UA: ABNORMAL
GLUCOSE UR QL STRIP: NEGATIVE
KETONES UR QL STRIP: NEGATIVE
LEUKOCYTE ESTERASE URINE, POC: NEGATIVE
NITRITE, POC UA: NEGATIVE
PH, POC UA: 8
PROTEIN, POC: NEGATIVE
SPECIFIC GRAVITY, POC UA: 1
UROBILINOGEN, POC UA: NEGATIVE

## 2018-08-06 PROCEDURE — 87086 URINE CULTURE/COLONY COUNT: CPT

## 2018-08-06 PROCEDURE — 87186 SC STD MICRODIL/AGAR DIL: CPT

## 2018-08-06 PROCEDURE — 81002 URINALYSIS NONAUTO W/O SCOPE: CPT | Mod: PBBFAC,PO | Performed by: OBSTETRICS & GYNECOLOGY

## 2018-08-06 PROCEDURE — 87077 CULTURE AEROBIC IDENTIFY: CPT

## 2018-08-06 PROCEDURE — 87088 URINE BACTERIA CULTURE: CPT

## 2018-08-06 RX ORDER — CEPHALEXIN 500 MG/1
500 CAPSULE ORAL 3 TIMES DAILY
Qty: 15 CAPSULE | Refills: 0 | Status: SHIPPED | OUTPATIENT
Start: 2018-08-06 | End: 2018-08-11

## 2018-08-06 NOTE — PROGRESS NOTES
Kai complained of urgency frequency and pain on urination, dropped of urine , sent for culture, informed Dr Oakes.

## 2018-08-06 NOTE — TELEPHONE ENCOUNTER
Called and spoke to pt and she is having urgency and frequency and pain on urination is going to drop off a urine sample for a nurse visit to be tested and will show Dr Oakes.

## 2018-08-06 NOTE — TELEPHONE ENCOUNTER
----- Message from Butch GONZALES Frisard sent at 8/6/2018  7:50 AM CDT -----  Contact: same  Patient called in and wanted to see if she can be squeezed in today Monday 8/6/18 as she thinks she has another bladder infection.  Next available appt was not until Wed 8/8/18.    Patient call back number is [phone]

## 2018-08-07 ENCOUNTER — OFFICE VISIT (OUTPATIENT)
Dept: UROGYNECOLOGY | Facility: CLINIC | Age: 73
End: 2018-08-07
Payer: MEDICARE

## 2018-08-07 VITALS
WEIGHT: 172.38 LBS | BODY MASS INDEX: 27.06 KG/M2 | DIASTOLIC BLOOD PRESSURE: 71 MMHG | HEIGHT: 67 IN | HEART RATE: 71 BPM | SYSTOLIC BLOOD PRESSURE: 118 MMHG

## 2018-08-07 DIAGNOSIS — R35.0 URINARY FREQUENCY: ICD-10-CM

## 2018-08-07 DIAGNOSIS — N81.11 CYSTOCELE, MIDLINE: ICD-10-CM

## 2018-08-07 DIAGNOSIS — N39.41 URGE INCONTINENCE OF URINE: Primary | ICD-10-CM

## 2018-08-07 DIAGNOSIS — Z46.89 PESSARY MAINTENANCE: ICD-10-CM

## 2018-08-07 LAB
BILIRUB SERPL-MCNC: ABNORMAL MG/DL
BLOOD URINE, POC: ABNORMAL
COLOR, POC UA: YELLOW
GLUCOSE UR QL STRIP: ABNORMAL
KETONES UR QL STRIP: ABNORMAL
LEUKOCYTE ESTERASE URINE, POC: ABNORMAL
NITRITE, POC UA: ABNORMAL
PH, POC UA: 7
PROTEIN, POC: ABNORMAL
SPECIFIC GRAVITY, POC UA: 1.02
UROBILINOGEN, POC UA: ABNORMAL

## 2018-08-07 PROCEDURE — 99213 OFFICE O/P EST LOW 20 MIN: CPT | Mod: PBBFAC,PO | Performed by: NURSE PRACTITIONER

## 2018-08-07 PROCEDURE — 81002 URINALYSIS NONAUTO W/O SCOPE: CPT | Mod: PBBFAC,PO | Performed by: NURSE PRACTITIONER

## 2018-08-07 PROCEDURE — 99999 PR PBB SHADOW E&M-EST. PATIENT-LVL III: CPT | Mod: PBBFAC,,, | Performed by: NURSE PRACTITIONER

## 2018-08-07 PROCEDURE — 99213 OFFICE O/P EST LOW 20 MIN: CPT | Mod: S$PBB,,, | Performed by: NURSE PRACTITIONER

## 2018-08-07 NOTE — PROGRESS NOTES
Subjective:       Patient ID: Jazz Martinez is a 73 y.o. female.    Chief Complaint: pessary    HPI  Jazz Martinez is a 73 y.o. female who presents today for pessary placement.  She was at her brother's house last Friday helping clean and noticed more vaginal bulging.  She then started to have dysuria and increased frequency/urgency on sat and sun.  She stopped by the office yesterday and her urine was sent for culture and antibiotics were sent out.  She is feeling slightly better, but she continues to feel a mild bulging sensation in the vaginal area and would like to have the pessary replaced.  She denies any other acute complaints/concerns at this time.    Review of Systems   Constitutional: Negative for activity change, fever and unexpected weight change.   HENT: Negative for hearing loss.    Eyes: Negative for visual disturbance.   Respiratory: Negative for shortness of breath and wheezing.    Cardiovascular: Negative for chest pain, palpitations and leg swelling.   Gastrointestinal: Negative for abdominal pain, constipation and diarrhea.   Genitourinary: Positive for frequency and urgency. Negative for dyspareunia, dysuria, vaginal bleeding and vaginal discharge.        Vaginal bulge   Musculoskeletal: Negative for gait problem and neck pain.   Skin: Negative for rash and wound.   Allergic/Immunologic: Negative for immunocompromised state.   Neurological: Negative for tremors, speech difficulty and weakness.   Hematological: Does not bruise/bleed easily.   Psychiatric/Behavioral: Negative for agitation and confusion.       Objective:      Physical Exam   Constitutional: She is oriented to person, place, and time. She appears well-developed and well-nourished. No distress.   HENT:   Head: Normocephalic and atraumatic.   Neck: Neck supple. No thyromegaly present.   Pulmonary/Chest: Effort normal. No respiratory distress.   Abdominal: Soft. There is no tenderness. No hernia.   Musculoskeletal: Normal  range of motion.   Neurological: She is alert and oriented to person, place, and time.   Skin: Skin is warm and dry. No rash noted.   Psychiatric: She has a normal mood and affect. Her behavior is normal.     Pelvic Exam:  V: No lesions. No palpable nodes.   Va: no discharge or bleeding.  Mild irritation noted on the anterior vaginal wall near the 9 o'clock position.  Dominant midline cystocele Ba=+1  Meatus:No caruncle or stenosis  Urethra: Non tender. No suburethral masses.  Cx/Cuff: Normal   Uterus: surgically absent  Ad: No mass or tenderness.  Levators :Symmetrical. Normal tone. Non tender.  BL: Non tender  RV: No hemorrhoids.      Assessment:       1. Urge incontinence of urine    2. Urinary frequency    3. Cystocele, midline    4. Pessary maintenance          Procedure note- After betadine irrigation of the vagina, #2 permanent ring pessary which the pt brought in with her,  was reinserted into the vagina.  Pt ambulated in the room and denies any discomfort.  NP reminded pt that the first 24-48 hours are the most likely time for the pessary to fall out and to monitor the toilet before flushing.  Pt verbalized understanding.      Plan:       Urge incontinence of urine- continue with PTNS    Urinary frequency- continue with PTNS  -     POCT urine dipstick without microscope    Cystocele, midline- resume #2 ring pessary    Pessary maintenance- as noted above    RTC 3 months for pessary

## 2018-08-09 LAB — BACTERIA UR CULT: NORMAL

## 2018-08-13 ENCOUNTER — TELEPHONE (OUTPATIENT)
Dept: UROGYNECOLOGY | Facility: CLINIC | Age: 73
End: 2018-08-13

## 2018-08-13 NOTE — TELEPHONE ENCOUNTER
----- Message from Mima Woody sent at 8/13/2018  2:08 PM CDT -----  Type:  Patient Returning Call    Who Called:  Patient   Who Left Message for Patient:  Nurse  Does the patient know what this is regarding?:  The Nurse wanted to know if she felt better  Best Call Back Number:    Additional Information:  She is calling to let the Nurse know that she had a little irritation yesterday. She took her last pill today and is feeling better. Please advise.

## 2018-08-13 NOTE — TELEPHONE ENCOUNTER
Spoke with Ms. Chuchoclint and she had some minor irritation yesterday after several days of feeling better but the she reported feeling well again today. She will let us know if she starts to feel bad over the next few days.

## 2018-08-30 ENCOUNTER — OFFICE VISIT (OUTPATIENT)
Dept: UROGYNECOLOGY | Facility: CLINIC | Age: 73
End: 2018-08-30
Payer: MEDICARE

## 2018-08-30 VITALS
WEIGHT: 171.06 LBS | HEART RATE: 72 BPM | HEIGHT: 67 IN | BODY MASS INDEX: 26.85 KG/M2 | DIASTOLIC BLOOD PRESSURE: 87 MMHG | SYSTOLIC BLOOD PRESSURE: 140 MMHG

## 2018-08-30 DIAGNOSIS — N39.41 URGE INCONTINENCE OF URINE: ICD-10-CM

## 2018-08-30 DIAGNOSIS — R35.0 URINARY FREQUENCY: Primary | ICD-10-CM

## 2018-08-30 PROCEDURE — 99213 OFFICE O/P EST LOW 20 MIN: CPT | Mod: PBBFAC,PO | Performed by: NURSE PRACTITIONER

## 2018-08-30 PROCEDURE — 81002 URINALYSIS NONAUTO W/O SCOPE: CPT | Mod: PBBFAC,PO | Performed by: NURSE PRACTITIONER

## 2018-08-30 PROCEDURE — 99999 PR PBB SHADOW E&M-EST. PATIENT-LVL III: CPT | Mod: PBBFAC,,, | Performed by: NURSE PRACTITIONER

## 2018-08-30 PROCEDURE — 64566 NEUROELTRD STIM POST TIBIAL: CPT | Mod: S$PBB,,, | Performed by: NURSE PRACTITIONER

## 2018-08-30 PROCEDURE — 99499 UNLISTED E&M SERVICE: CPT | Mod: S$PBB,,, | Performed by: NURSE PRACTITIONER

## 2018-08-30 PROCEDURE — 64566 NEUROELTRD STIM POST TIBIAL: CPT | Mod: PBBFAC,PO | Performed by: NURSE PRACTITIONER

## 2018-08-30 NOTE — PROGRESS NOTES
Subjective:       Patient ID: Jazz Martinez is a 73 y.o. female.    Chief Complaint: PTNS    HPI  Jazz Martinez is a 73 y.o. female who presents today for her 1st monthly PTNS therapy.   She feels that she is doing well with the therapy.  She has frequency x 5 during the day and nocturia x 1.  She has some urgency, but she has been procrastinating when she needs to urinate, so has been having about 2 incontinence episodes a day.  She does realize that if she would go when she gets the sensation that this would probably decrease.  She deneis any other acute complaints/concerns and would like to proceed with the therapy.    Review of Systems   Constitutional: Negative for activity change, fever and unexpected weight change.   HENT: Negative for hearing loss.    Eyes: Negative for visual disturbance.   Respiratory: Negative for shortness of breath and wheezing.    Cardiovascular: Negative for chest pain, palpitations and leg swelling.   Gastrointestinal: Negative for abdominal pain, constipation and diarrhea.   Genitourinary: Positive for frequency. Negative for dyspareunia, dysuria, urgency, vaginal bleeding and vaginal discharge.   Musculoskeletal: Negative for gait problem and neck pain.   Skin: Negative for rash and wound.   Allergic/Immunologic: Negative for immunocompromised state.   Neurological: Negative for tremors, speech difficulty and weakness.   Hematological: Does not bruise/bleed easily.   Psychiatric/Behavioral: Negative for agitation and confusion.       Objective:      Physical Exam   Constitutional: She is oriented to person, place, and time. She appears well-developed and well-nourished. No distress.   HENT:   Head: Normocephalic and atraumatic.   Neck: Neck supple. No thyromegaly present.   Pulmonary/Chest: Effort normal. No respiratory distress.   Abdominal: Soft. There is no tenderness. No hernia.   Musculoskeletal: Normal range of motion.   Neurological: She is alert and oriented to  person, place, and time.   Skin: Skin is warm and dry. No rash noted.   Psychiatric: She has a normal mood and affect. Her behavior is normal.         Assessment:       1. Urinary frequency    2. Urge incontinence of urine          Procedure note-  After informed consent obtained, Patient's right upper ankle was swabbed with alcohol.  #34 gauge needle inserted into ankle and attached to stimulator.  Treatment setting was 14 today.  Pt tolerated 30 minute therapy without discomfort.        Plan:       Urinary frequency- continue with monthly PTNS  -     POCT urine dipstick without microscope    Urge incontinence of urine- as noted above          RTC 1 month

## 2018-09-27 ENCOUNTER — OFFICE VISIT (OUTPATIENT)
Dept: UROGYNECOLOGY | Facility: CLINIC | Age: 73
End: 2018-09-27
Payer: MEDICARE

## 2018-09-27 VITALS
DIASTOLIC BLOOD PRESSURE: 71 MMHG | WEIGHT: 171.31 LBS | SYSTOLIC BLOOD PRESSURE: 127 MMHG | HEIGHT: 67 IN | BODY MASS INDEX: 26.89 KG/M2 | HEART RATE: 70 BPM

## 2018-09-27 DIAGNOSIS — N39.41 URGE INCONTINENCE OF URINE: Primary | ICD-10-CM

## 2018-09-27 DIAGNOSIS — R35.0 URINARY FREQUENCY: ICD-10-CM

## 2018-09-27 PROCEDURE — 99213 OFFICE O/P EST LOW 20 MIN: CPT | Mod: PBBFAC,PO | Performed by: NURSE PRACTITIONER

## 2018-09-27 PROCEDURE — 64566 NEUROELTRD STIM POST TIBIAL: CPT | Mod: PBBFAC,PO | Performed by: NURSE PRACTITIONER

## 2018-09-27 PROCEDURE — 81002 URINALYSIS NONAUTO W/O SCOPE: CPT | Mod: PBBFAC,PO | Performed by: NURSE PRACTITIONER

## 2018-09-27 PROCEDURE — 99499 UNLISTED E&M SERVICE: CPT | Mod: S$PBB,,, | Performed by: NURSE PRACTITIONER

## 2018-09-27 PROCEDURE — 99999 PR PBB SHADOW E&M-EST. PATIENT-LVL III: CPT | Mod: PBBFAC,,, | Performed by: NURSE PRACTITIONER

## 2018-09-27 PROCEDURE — 64566 NEUROELTRD STIM POST TIBIAL: CPT | Mod: S$PBB,,, | Performed by: NURSE PRACTITIONER

## 2018-09-27 RX ORDER — CIPROFLOXACIN 500 MG/1
TABLET ORAL
Refills: 0 | COMMUNITY
Start: 2018-09-15 | End: 2018-10-31 | Stop reason: ALTCHOICE

## 2018-09-27 NOTE — PROGRESS NOTES
Subjective:       Patient ID: Jazz Martinez is a 73 y.o. female.    Chief Complaint: PTNS    HPI  Jazz Martinez is a 73 y.o. female who presents today for her 2nd monthly PTNS therapy. She feels that she has been doing fairly well.  She did end up going to see urgent care for a UTI on 09/15.  She has taken all the antibiotics and feels that she has cleared the infection.  Her UA today is negative.  She has frequency x 5 during the day and nocturia x 2.  She has been having some incontinence only at night.  She thinks this might be because she is not completely awake when she is getting up to void and not having as much control as she would like.  She has some minor urgency at times.  She denies any other acute complaints/concerns and is ready to proceed with the therapy.    Review of Systems   Constitutional: Negative for activity change, fever and unexpected weight change.   HENT: Negative for hearing loss.    Eyes: Negative for visual disturbance.   Respiratory: Negative for shortness of breath and wheezing.    Cardiovascular: Negative for chest pain, palpitations and leg swelling.   Gastrointestinal: Negative for abdominal pain, constipation and diarrhea.   Genitourinary: Positive for frequency and urgency. Negative for dyspareunia, dysuria, vaginal bleeding and vaginal discharge.   Musculoskeletal: Negative for gait problem and neck pain.   Skin: Negative for rash and wound.   Allergic/Immunologic: Negative for immunocompromised state.   Neurological: Negative for tremors, speech difficulty and weakness.   Hematological: Does not bruise/bleed easily.   Psychiatric/Behavioral: Negative for agitation and confusion.       Objective:      Physical Exam   Constitutional: She is oriented to person, place, and time. She appears well-developed and well-nourished. No distress.   HENT:   Head: Normocephalic and atraumatic.   Neck: Neck supple. No thyromegaly present.   Pulmonary/Chest: Effort normal. No  respiratory distress.   Abdominal: Soft. There is no tenderness. No hernia.   Musculoskeletal: Normal range of motion.   Neurological: She is alert and oriented to person, place, and time.   Skin: Skin is warm and dry. No rash noted.   Psychiatric: She has a normal mood and affect. Her behavior is normal.         Assessment:       1. Urge incontinence of urine    2. Urinary frequency          Procedure note-  After informed consent obtained, Patient's right upper ankle was swabbed with alcohol.  #34 gauge needle inserted into ankle and attached to stimulator.  Treatment setting was 6 today.  Pt tolerated 30 minute therapy without discomfort.        Plan:       Urge incontinence of urine- continue with PTNS    Urinary frequency- continue with PTNS  -     POCT URINE DIPSTICK WITHOUT MICROSCOPE          RTC 1 month

## 2018-10-11 DIAGNOSIS — E78.5 HYPERLIPIDEMIA, UNSPECIFIED HYPERLIPIDEMIA TYPE: ICD-10-CM

## 2018-10-14 RX ORDER — ATORVASTATIN CALCIUM 10 MG/1
TABLET, FILM COATED ORAL
Qty: 90 TABLET | Refills: 0 | Status: SHIPPED | OUTPATIENT
Start: 2018-10-14 | End: 2018-12-27 | Stop reason: SDUPTHER

## 2018-10-23 ENCOUNTER — TELEPHONE (OUTPATIENT)
Dept: FAMILY MEDICINE | Facility: CLINIC | Age: 73
End: 2018-10-23

## 2018-10-23 NOTE — TELEPHONE ENCOUNTER
I spoke with patient and told her she doesn't have to come at exactly 8 at for her labs, she just have to come before 10.   She verbally understood.

## 2018-10-23 NOTE — TELEPHONE ENCOUNTER
----- Message from Maddi Kerr sent at 10/23/2018  3:30 PM CDT -----    Pt  Is calling to  Reschedule  Her lab  Tomorrow  // pt  Wanted a  Later  Time   With lab // schedule  Is  Months  Out  // placed a call to  Pod // please call [phone]

## 2018-10-24 ENCOUNTER — CLINICAL SUPPORT (OUTPATIENT)
Dept: FAMILY MEDICINE | Facility: CLINIC | Age: 73
End: 2018-10-24
Payer: MEDICARE

## 2018-10-24 DIAGNOSIS — E78.5 HYPERLIPIDEMIA, UNSPECIFIED HYPERLIPIDEMIA TYPE: ICD-10-CM

## 2018-10-24 DIAGNOSIS — I10 ESSENTIAL HYPERTENSION: ICD-10-CM

## 2018-10-24 LAB
ALBUMIN SERPL BCP-MCNC: 4.2 G/DL
ALP SERPL-CCNC: 61 U/L
ALT SERPL W/O P-5'-P-CCNC: 20 U/L
ANION GAP SERPL CALC-SCNC: 13 MMOL/L
AST SERPL-CCNC: 22 U/L
BILIRUB SERPL-MCNC: 0.4 MG/DL
BUN SERPL-MCNC: 20 MG/DL
CALCIUM SERPL-MCNC: 9.5 MG/DL
CHLORIDE SERPL-SCNC: 103 MMOL/L
CHOLEST SERPL-MCNC: 334 MG/DL
CHOLEST/HDLC SERPL: 7.3 {RATIO}
CO2 SERPL-SCNC: 25 MMOL/L
CREAT SERPL-MCNC: 0.7 MG/DL
EST. GFR  (AFRICAN AMERICAN): >60 ML/MIN/1.73 M^2
EST. GFR  (NON AFRICAN AMERICAN): >60 ML/MIN/1.73 M^2
GLUCOSE SERPL-MCNC: 88 MG/DL
HDLC SERPL-MCNC: 46 MG/DL
HDLC SERPL: 13.8 %
LDLC SERPL CALC-MCNC: ABNORMAL MG/DL
NONHDLC SERPL-MCNC: 288 MG/DL
POTASSIUM SERPL-SCNC: 4.1 MMOL/L
PROT SERPL-MCNC: 7.4 G/DL
SODIUM SERPL-SCNC: 141 MMOL/L
TRIGL SERPL-MCNC: 440 MG/DL

## 2018-10-24 PROCEDURE — 80053 COMPREHEN METABOLIC PANEL: CPT

## 2018-10-24 PROCEDURE — 80061 LIPID PANEL: CPT

## 2018-10-31 ENCOUNTER — TELEPHONE (OUTPATIENT)
Dept: FAMILY MEDICINE | Facility: CLINIC | Age: 73
End: 2018-10-31

## 2018-10-31 ENCOUNTER — OFFICE VISIT (OUTPATIENT)
Dept: FAMILY MEDICINE | Facility: CLINIC | Age: 73
End: 2018-10-31
Payer: MEDICARE

## 2018-10-31 VITALS
OXYGEN SATURATION: 97 % | HEIGHT: 67 IN | RESPIRATION RATE: 16 BRPM | SYSTOLIC BLOOD PRESSURE: 132 MMHG | WEIGHT: 171.31 LBS | TEMPERATURE: 98 F | BODY MASS INDEX: 26.89 KG/M2 | DIASTOLIC BLOOD PRESSURE: 84 MMHG | HEART RATE: 68 BPM

## 2018-10-31 DIAGNOSIS — E78.2 MIXED HYPERLIPIDEMIA: Primary | ICD-10-CM

## 2018-10-31 DIAGNOSIS — F32.A DEPRESSION, UNSPECIFIED DEPRESSION TYPE: ICD-10-CM

## 2018-10-31 DIAGNOSIS — I10 ESSENTIAL HYPERTENSION: ICD-10-CM

## 2018-10-31 DIAGNOSIS — N95.1 MENOPAUSAL SYMPTOMS: ICD-10-CM

## 2018-10-31 DIAGNOSIS — M54.50 CHRONIC BILATERAL LOW BACK PAIN WITHOUT SCIATICA: ICD-10-CM

## 2018-10-31 DIAGNOSIS — G89.29 CHRONIC BILATERAL LOW BACK PAIN WITHOUT SCIATICA: ICD-10-CM

## 2018-10-31 DIAGNOSIS — F33.1 MODERATE EPISODE OF RECURRENT MAJOR DEPRESSIVE DISORDER: Primary | ICD-10-CM

## 2018-10-31 PROCEDURE — 99214 OFFICE O/P EST MOD 30 MIN: CPT | Mod: S$GLB,,, | Performed by: INTERNAL MEDICINE

## 2018-10-31 RX ORDER — BUSPIRONE HYDROCHLORIDE 15 MG/1
15 TABLET ORAL 2 TIMES DAILY
Qty: 60 TABLET | Refills: 11 | Status: SHIPPED | OUTPATIENT
Start: 2018-10-31 | End: 2019-03-29

## 2018-10-31 RX ORDER — FENOFIBRATE 160 MG/1
160 TABLET ORAL DAILY
Qty: 90 TABLET | Refills: 3 | Status: SHIPPED | OUTPATIENT
Start: 2018-10-31 | End: 2019-12-19

## 2018-10-31 RX ORDER — SERTRALINE HYDROCHLORIDE 100 MG/1
150 TABLET, FILM COATED ORAL DAILY
Qty: 135 TABLET | Refills: 1 | Status: SHIPPED | OUTPATIENT
Start: 2018-10-31 | End: 2019-05-30 | Stop reason: SDUPTHER

## 2018-10-31 RX ORDER — DICLOFENAC SODIUM 10 MG/G
2 GEL TOPICAL 4 TIMES DAILY
Qty: 100 G | Refills: 5 | Status: SHIPPED | OUTPATIENT
Start: 2018-10-31 | End: 2022-01-18

## 2018-10-31 NOTE — PROGRESS NOTES
Subjective:       Patient ID: Jazz Martinez is a 73 y.o. female.    Chief Complaint: Hyperlipidemia (labs)    HPI         CHIEF COMPLAINT: Hyperlipidemia. cholesterol screening: no.   HPI:     ONSET:    MODIFIERS/TREATMENTS: . Taking medications: yes. . Non-compliance with following diet: no. .     SYMPTOMS/RELATED:Possible medication side effects include:   Myalgia: no.  .     REVIEW OF SYMPTOMS: past weights:   Wt Readings from Last 1 Encounters:   10/31/18 0912 77.7 kg (171 lb 4.8 oz)                                                     Last lipids: total   Lab Results   Component Value Date    CHOL 334 (H) 10/24/2018    CHOL 238 (H) 03/07/2018    CHOL 206 (H) 10/19/2016                                                                     HDL   Lab Results   Component Value Date    HDL 46 10/24/2018    HDL 48 03/07/2018    HDL 53 10/19/2016                                                                     LDL   Lab Results   Component Value Date    LDLCALC Invalid, Trig>400.0 10/24/2018    LDLCALC 141.6 03/07/2018    LDLCALC 111.4 10/19/2016                                                                     TRIG   Lab Results   Component Value Date    TRIG 440 (H) 10/24/2018    TRIG 242 (H) 03/07/2018    TRIG 208 (H) 10/19/2016                                                                           CHIEF COMPLAINT: Hypertension  HPI:     ONSET:      QUALITY/COURSE:   Unchanged.     INTENSITY/SEVERITY:  Average blood pressure is 120/80 .     MODIFIERS/TREATMENTS:  Taking medications: yes. .High sodium intake: no. alcohol: no      The following symptoms are positive only if BOLDED, otherwise are negative.      SYMPTOMS/RELATED: Possible medication side effects include:   Depression..  . Cough. . Constipation.    REVIEW OF SYMPTOMS: . Weight_loss . Weight_gain . Leg_cramps ..    TARGET ORGAN DAMAGE:: angina/ prior myocardial infarction, chronic kidney disease, heart failure, left ventricular hypertrophy,  "peripheral artery disease, prior coronary revascularization, retinopathy, stroke. transient ischemic attack.        CHIEF COMPLAINT: Depression: yes.  . Anxiety: yes.   HPI: stress in family    ONSET/TIMIN yrs ago.  Similar problems in past: .yes.   . # of episodes  of panic per week      0. .    DURATION:  constant    QUALITY/COURSE: worse x 6 mo    INTENSITY/SEVERITY: .Severity is # 7 (10 point scale)    CONTEXT/WHEN:  Postpartum: no..  Personal loss: no ..  Stress: yes..    MODIFIERS/TREATMENTS: . Taking medications: yes.  Compliance with taking prescribed medications: yes.  alcohol abuse: no ...  Drug abuse: no .  Chronic disease: no.      The following symptoms are positive if BOLD, negative otherwise.        SYMPTOMS/RELATED: Possible medication side effects include:  dry mouth, decreased libido, impotence, GI disturbance, headache, insomnia, weight gain and weight loss.    REVIEW OF SYSTEMS: Sadness . Weakness . Fatigue . Lack_of _enjoyment_in_life . Sleep_disturbances . Anorexia .  Increased_appetite .  Irritability . Crying_spells .  Guilt .  Lost_concentration . Suicidal_ideation .          Review of Systems      Objective:      Vitals:    10/31/18 0912   BP: 132/84   Pulse: 68   Resp: 16   Temp: 97.7 °F (36.5 °C)   TempSrc: Oral   SpO2: 97%   Weight: 77.7 kg (171 lb 4.8 oz)   Height: 5' 7" (1.702 m)   PainSc: 0-No pain     Physical Exam   Constitutional: She appears well-developed and well-nourished.   Cardiovascular: Normal rate, regular rhythm and normal heart sounds.   Pulmonary/Chest: Effort normal and breath sounds normal.   Abdominal: Soft. There is no tenderness.   Neurological: She is alert.   Psychiatric: She has a normal mood and affect. Her behavior is normal. Thought content normal.   Nursing note and vitals reviewed.        Assessment:       1. Mixed hyperlipidemia    2. Menopausal symptoms    3. Depression, unspecified depression type    4. Essential hypertension    5. Chronic bilateral " low back pain without sciatica          Plan:       Mixed hyperlipidemia  -     fenofibrate 160 MG Tab; Take 1 tablet (160 mg total) by mouth once daily.  Dispense: 90 tablet; Refill: 3  -     Comprehensive metabolic panel; Future; Expected date: 10/31/2018  -     Lipid panel; Future; Expected date: 10/31/2018    Menopausal symptoms  -     sertraline (ZOLOFT) 100 MG tablet; Take 1.5 tablets (150 mg total) by mouth once daily.  Dispense: 135 tablet; Refill: 1    Depression, unspecified depression type  -     sertraline (ZOLOFT) 100 MG tablet; Take 1.5 tablets (150 mg total) by mouth once daily.  Dispense: 135 tablet; Refill: 1  -     busPIRone (BUSPAR) 15 MG tablet; Take 1 tablet (15 mg total) by mouth 2 (two) times daily.  Dispense: 60 tablet; Refill: 11  -     Ambulatory Referral to Psychiatry    Essential hypertension  -     Comprehensive metabolic panel; Future; Expected date: 10/31/2018    Chronic bilateral low back pain without sciatica  -     diclofenac sodium (VOLTAREN) 1 % Gel; Apply 2 g topically 4 (four) times daily.  Dispense: 100 g; Refill: 5      Follow-up in about 6 weeks (around 12/12/2018).

## 2018-10-31 NOTE — TELEPHONE ENCOUNTER
----- Message from Moriah Barron sent at 10/31/2018  3:34 PM CDT -----  Type: Needs Medical Advice    Who Called: Patient    Best Call Back Number:[phone]  Additional Information: Stating Dr. Juan referred patient to service and they do not service her county, please advise

## 2018-11-01 ENCOUNTER — OFFICE VISIT (OUTPATIENT)
Dept: UROGYNECOLOGY | Facility: CLINIC | Age: 73
End: 2018-11-01
Payer: MEDICARE

## 2018-11-01 VITALS
HEART RATE: 75 BPM | DIASTOLIC BLOOD PRESSURE: 74 MMHG | SYSTOLIC BLOOD PRESSURE: 135 MMHG | HEIGHT: 67 IN | WEIGHT: 174.63 LBS | BODY MASS INDEX: 27.41 KG/M2 | TEMPERATURE: 99 F

## 2018-11-01 DIAGNOSIS — R35.0 URINARY FREQUENCY: ICD-10-CM

## 2018-11-01 DIAGNOSIS — N39.41 URGE INCONTINENCE OF URINE: Primary | ICD-10-CM

## 2018-11-01 PROCEDURE — 81002 URINALYSIS NONAUTO W/O SCOPE: CPT | Mod: PBBFAC,PO | Performed by: NURSE PRACTITIONER

## 2018-11-01 PROCEDURE — 64566 NEUROELTRD STIM POST TIBIAL: CPT | Mod: S$PBB,,, | Performed by: NURSE PRACTITIONER

## 2018-11-01 PROCEDURE — 99213 OFFICE O/P EST LOW 20 MIN: CPT | Mod: PBBFAC,PO | Performed by: NURSE PRACTITIONER

## 2018-11-01 PROCEDURE — 99999 PR PBB SHADOW E&M-EST. PATIENT-LVL III: CPT | Mod: PBBFAC,,, | Performed by: NURSE PRACTITIONER

## 2018-11-01 PROCEDURE — 64566 NEUROELTRD STIM POST TIBIAL: CPT | Mod: PBBFAC,PO | Performed by: NURSE PRACTITIONER

## 2018-11-01 PROCEDURE — 99499 UNLISTED E&M SERVICE: CPT | Mod: S$PBB,,, | Performed by: NURSE PRACTITIONER

## 2018-11-01 NOTE — PROGRESS NOTES
Subjective:       Patient ID: Jazz Martinez is a 73 y.o. female.    Chief Complaint: PTNS    HPI  Jazz Martinez is a 73 y.o. female who presents today for her 3rd monthly PTNS therapy.  She has had some problems this past month with her urgency and UUI.  She feels that she has almost even regressed slightly.  She has frequency x 6-8 during the day and nocturia x 2.  She has urgency and urge incontinence about 3-4 x a day.  She believes that a lot of the change in her urinary symptoms is coming from stress and anxiety.  She recently saw her PCP and is going to start some medication for depression and anxiety.      Review of Systems   Constitutional: Negative for activity change, fever and unexpected weight change.   HENT: Negative for hearing loss.    Eyes: Negative for visual disturbance.   Respiratory: Negative for shortness of breath and wheezing.    Cardiovascular: Negative for chest pain, palpitations and leg swelling.   Gastrointestinal: Negative for abdominal pain, constipation and diarrhea.   Genitourinary: Positive for frequency and urgency. Negative for dyspareunia, dysuria, vaginal bleeding and vaginal discharge.   Musculoskeletal: Negative for gait problem and neck pain.   Skin: Negative for rash and wound.   Allergic/Immunologic: Negative for immunocompromised state.   Neurological: Negative for tremors, speech difficulty and weakness.   Hematological: Does not bruise/bleed easily.   Psychiatric/Behavioral: Negative for agitation and confusion.       Objective:      Physical Exam   Constitutional: She is oriented to person, place, and time. She appears well-developed and well-nourished. No distress.   HENT:   Head: Normocephalic and atraumatic.   Neck: Neck supple. No thyromegaly present.   Pulmonary/Chest: Effort normal. No respiratory distress.   Abdominal: Soft. There is no tenderness. No hernia.   Musculoskeletal: Normal range of motion.   Neurological: She is alert and oriented to person,  place, and time.   Skin: Skin is warm and dry. No rash noted.   Psychiatric: She has a normal mood and affect. Her behavior is normal.         Assessment:       1. Urge incontinence of urine    2. Urinary frequency          Procedure note-  After informed consent obtained, Patient's right upper ankle was swabbed with alcohol.  #34 gauge needle inserted into ankle and attached to stimulator.  Treatment setting was 6 today.  Pt tolerated 30 minute therapy without discomfort.        Plan:       Urge incontinence of urine- continue with monthly PTNS    Urinary frequency- continue with monthly PTNS  -     POCT URINE DIPSTICK WITHOUT MICROSCOPE          RTC 1 month

## 2018-12-06 ENCOUNTER — OFFICE VISIT (OUTPATIENT)
Dept: UROGYNECOLOGY | Facility: CLINIC | Age: 73
End: 2018-12-06
Payer: MEDICARE

## 2018-12-06 VITALS
HEART RATE: 60 BPM | WEIGHT: 173.5 LBS | HEIGHT: 67 IN | TEMPERATURE: 98 F | BODY MASS INDEX: 27.23 KG/M2 | SYSTOLIC BLOOD PRESSURE: 122 MMHG | DIASTOLIC BLOOD PRESSURE: 65 MMHG

## 2018-12-06 DIAGNOSIS — N39.41 URGE INCONTINENCE OF URINE: ICD-10-CM

## 2018-12-06 DIAGNOSIS — Z46.89 PESSARY MAINTENANCE: ICD-10-CM

## 2018-12-06 DIAGNOSIS — R35.0 URINARY FREQUENCY: Primary | ICD-10-CM

## 2018-12-06 DIAGNOSIS — N81.11 CYSTOCELE, MIDLINE: ICD-10-CM

## 2018-12-06 PROCEDURE — 99214 OFFICE O/P EST MOD 30 MIN: CPT | Mod: PBBFAC,PO | Performed by: NURSE PRACTITIONER

## 2018-12-06 PROCEDURE — 99999 PR PBB SHADOW E&M-EST. PATIENT-LVL IV: CPT | Mod: PBBFAC,,, | Performed by: NURSE PRACTITIONER

## 2018-12-06 PROCEDURE — 99213 OFFICE O/P EST LOW 20 MIN: CPT | Mod: S$PBB,,, | Performed by: NURSE PRACTITIONER

## 2018-12-06 PROCEDURE — 81002 URINALYSIS NONAUTO W/O SCOPE: CPT | Mod: PBBFAC,PO | Performed by: NURSE PRACTITIONER

## 2018-12-06 RX ORDER — TOPIRAMATE 25 MG/1
TABLET ORAL
Refills: 5 | COMMUNITY
Start: 2018-11-07 | End: 2018-12-19 | Stop reason: SDUPTHER

## 2018-12-06 RX ORDER — TOPIRAMATE 25 MG/1
TABLET ORAL
COMMUNITY
End: 2019-12-11

## 2018-12-06 RX ORDER — ALPRAZOLAM 1 MG/1
TABLET ORAL
COMMUNITY
End: 2019-10-16

## 2018-12-06 RX ORDER — IBUPROFEN 600 MG/1
TABLET ORAL
COMMUNITY
End: 2020-11-16

## 2018-12-06 RX ORDER — ESZOPICLONE 2 MG/1
TABLET, FILM COATED ORAL
COMMUNITY
End: 2019-10-16

## 2018-12-06 RX ORDER — GABAPENTIN 300 MG/1
CAPSULE ORAL
COMMUNITY
End: 2019-03-29 | Stop reason: SINTOL

## 2018-12-06 RX ORDER — OXYCODONE HYDROCHLORIDE AND ASPIRIN 4.8355; 325 MG/1; MG/1
TABLET ORAL
COMMUNITY
End: 2018-12-19

## 2018-12-06 RX ORDER — TRAMADOL HYDROCHLORIDE 50 MG/1
TABLET ORAL
COMMUNITY
End: 2019-11-04

## 2018-12-06 NOTE — PROGRESS NOTES
Subjective:       Patient ID: Jazz Martinez is a 73 y.o. female.    Chief Complaint: Pessary Check    HPI  Jazz Martinez is a 73 y.o. female who presents today for routine pessary maintenance.  She has been using the ring pessary since august and feels that it is doing well.  She denies any vaginal discharge/bleeding or bulging around the pessary.  She denies any real urinary complaints at this time.  She is ready to proceed with the pessary cleaning.    Review of Systems   Constitutional: Negative for activity change, fever and unexpected weight change.   HENT: Negative for hearing loss.    Eyes: Negative for visual disturbance.   Respiratory: Negative for shortness of breath and wheezing.    Cardiovascular: Negative for chest pain, palpitations and leg swelling.   Gastrointestinal: Negative for abdominal pain, constipation and diarrhea.   Genitourinary: Positive for frequency and urgency. Negative for dyspareunia, dysuria, vaginal bleeding and vaginal discharge.   Musculoskeletal: Negative for gait problem and neck pain.   Skin: Negative for rash and wound.   Allergic/Immunologic: Negative for immunocompromised state.   Neurological: Negative for tremors, speech difficulty and weakness.   Hematological: Does not bruise/bleed easily.   Psychiatric/Behavioral: Negative for agitation and confusion.       Objective:      Physical Exam   Constitutional: She is oriented to person, place, and time. She appears well-developed and well-nourished. No distress.   HENT:   Head: Normocephalic and atraumatic.   Neck: Neck supple. No thyromegaly present.   Pulmonary/Chest: Effort normal. No respiratory distress.   Abdominal: Soft. There is no tenderness. No hernia.   Musculoskeletal: Normal range of motion.   Neurological: She is alert and oriented to person, place, and time.   Skin: Skin is warm and dry. No rash noted.   Psychiatric: She has a normal mood and affect. Her behavior is normal.     Pelvic Exam:  V: No  lesions. No palpable nodes.   Va: no discharge or bleeding.  Good length.  Dominant midline cystocele Ba=0  Meatus:No caruncle or stenosis  Urethra: Non tender. No suburethral masses.  Cx/Cuff: Normal   Uterus: surgically absent  Ad: No mass or tenderness.  Levators :Symmetrical. Normal tone. Non tender.  BL: Non tender  RV: No hemorrhoids.      Assessment:       1. Urinary frequency    2. Urge incontinence of urine    3. Cystocele, midline    4. Pessary maintenance          Procedure note- #2 ring pessary removed and cleaned with soap and water.  After betadine irrigation of the vagina, #2 ring pessary was reinserted into the vagina.  Pt ambulated in the room and denies any discomfort.  NP reminded pt that the first 24-48 hours are the most likely time for the pessary to fall out and to monitor the toilet before flushing.  Pt verbalized understanding.      Plan:       Urinary frequency- continue with PTNS  -     POCT URINE DIPSTICK WITHOUT MICROSCOPE  -     Cancel: POCT 24 HR Ambulatory BP monitor    Urge incontinence of urine- continue with PTNS    Cystocele, midline- continue with ring pessary    Pessary maintenance- continue with ring pessary    RTC 3 months for pessary

## 2018-12-10 ENCOUNTER — LAB VISIT (OUTPATIENT)
Dept: LAB | Facility: HOSPITAL | Age: 73
End: 2018-12-10
Attending: INTERNAL MEDICINE
Payer: MEDICARE

## 2018-12-10 DIAGNOSIS — I10 ESSENTIAL HYPERTENSION: ICD-10-CM

## 2018-12-10 DIAGNOSIS — E78.2 MIXED HYPERLIPIDEMIA: ICD-10-CM

## 2018-12-10 LAB
ALBUMIN SERPL BCP-MCNC: 3.9 G/DL
ALP SERPL-CCNC: 40 U/L
ALT SERPL W/O P-5'-P-CCNC: 19 U/L
ANION GAP SERPL CALC-SCNC: 8 MMOL/L
AST SERPL-CCNC: 23 U/L
BILIRUB SERPL-MCNC: 0.5 MG/DL
BUN SERPL-MCNC: 24 MG/DL
CALCIUM SERPL-MCNC: 9 MG/DL
CHLORIDE SERPL-SCNC: 107 MMOL/L
CHOLEST SERPL-MCNC: 141 MG/DL
CHOLEST/HDLC SERPL: 2.1 {RATIO}
CO2 SERPL-SCNC: 26 MMOL/L
CREAT SERPL-MCNC: 0.8 MG/DL
EST. GFR  (AFRICAN AMERICAN): >60 ML/MIN/1.73 M^2
EST. GFR  (NON AFRICAN AMERICAN): >60 ML/MIN/1.73 M^2
GLUCOSE SERPL-MCNC: 85 MG/DL
HDLC SERPL-MCNC: 66 MG/DL
HDLC SERPL: 46.8 %
LDLC SERPL CALC-MCNC: 57.8 MG/DL
NONHDLC SERPL-MCNC: 75 MG/DL
POTASSIUM SERPL-SCNC: 3.6 MMOL/L
PROT SERPL-MCNC: 6.9 G/DL
SODIUM SERPL-SCNC: 141 MMOL/L
TRIGL SERPL-MCNC: 86 MG/DL

## 2018-12-10 PROCEDURE — 36415 COLL VENOUS BLD VENIPUNCTURE: CPT | Mod: PO

## 2018-12-10 PROCEDURE — 80053 COMPREHEN METABOLIC PANEL: CPT

## 2018-12-10 PROCEDURE — 80061 LIPID PANEL: CPT

## 2018-12-12 ENCOUNTER — OFFICE VISIT (OUTPATIENT)
Dept: UROGYNECOLOGY | Facility: CLINIC | Age: 73
End: 2018-12-12
Payer: MEDICARE

## 2018-12-12 VITALS
HEART RATE: 66 BPM | DIASTOLIC BLOOD PRESSURE: 77 MMHG | HEIGHT: 67 IN | SYSTOLIC BLOOD PRESSURE: 142 MMHG | WEIGHT: 171.94 LBS | BODY MASS INDEX: 26.99 KG/M2

## 2018-12-12 DIAGNOSIS — N39.41 URGE INCONTINENCE OF URINE: Primary | ICD-10-CM

## 2018-12-12 DIAGNOSIS — R35.0 URINARY FREQUENCY: ICD-10-CM

## 2018-12-12 PROCEDURE — 81002 URINALYSIS NONAUTO W/O SCOPE: CPT | Mod: PBBFAC,PO | Performed by: NURSE PRACTITIONER

## 2018-12-12 PROCEDURE — 99214 OFFICE O/P EST MOD 30 MIN: CPT | Mod: PBBFAC,PO,25 | Performed by: NURSE PRACTITIONER

## 2018-12-12 PROCEDURE — 64566 NEUROELTRD STIM POST TIBIAL: CPT | Mod: S$PBB,,, | Performed by: NURSE PRACTITIONER

## 2018-12-12 PROCEDURE — 64566 NEUROELTRD STIM POST TIBIAL: CPT | Mod: PBBFAC,PO | Performed by: NURSE PRACTITIONER

## 2018-12-12 PROCEDURE — 99499 UNLISTED E&M SERVICE: CPT | Mod: S$PBB,,, | Performed by: NURSE PRACTITIONER

## 2018-12-12 PROCEDURE — 99999 PR PBB SHADOW E&M-EST. PATIENT-LVL IV: CPT | Mod: PBBFAC,,, | Performed by: NURSE PRACTITIONER

## 2018-12-12 NOTE — PROGRESS NOTES
Subjective:       Patient ID: Jazz Martinez is a 73 y.o. female.    Chief Complaint: PTNS    HPI  Jazz Martinez is a 73 y.o. female who presents today for her 4th monthly PTNS therapy.  She feels that she is doing a little better this month.  Her frequency is down to about 5 during the day.  Her nocturia is x 1-2.  She feels that her urgency is less.  Her incontinence is about 1-2.  She denies any other acute complaints/concerns and is ready to proceed with the therapy.    Review of Systems   Constitutional: Negative for activity change, fever and unexpected weight change.   HENT: Negative for hearing loss.    Eyes: Negative for visual disturbance.   Respiratory: Negative for shortness of breath and wheezing.    Cardiovascular: Negative for chest pain, palpitations and leg swelling.   Gastrointestinal: Negative for abdominal pain, constipation and diarrhea.   Genitourinary: Positive for frequency. Negative for dyspareunia, dysuria, urgency, vaginal bleeding and vaginal discharge.   Musculoskeletal: Negative for gait problem and neck pain.   Skin: Negative for rash and wound.   Allergic/Immunologic: Negative for immunocompromised state.   Neurological: Negative for tremors, speech difficulty and weakness.   Hematological: Does not bruise/bleed easily.   Psychiatric/Behavioral: Negative for agitation and confusion.       Objective:      Physical Exam   Constitutional: She is oriented to person, place, and time. She appears well-developed and well-nourished. No distress.   HENT:   Head: Normocephalic and atraumatic.   Neck: Neck supple. No thyromegaly present.   Pulmonary/Chest: Effort normal. No respiratory distress.   Abdominal: Soft. There is no tenderness. No hernia.   Musculoskeletal: Normal range of motion.   Neurological: She is alert and oriented to person, place, and time.   Skin: Skin is warm and dry. No rash noted.   Psychiatric: She has a normal mood and affect. Her behavior is normal.          Assessment:       1. Urge incontinence of urine    2. Urinary frequency          Procedure note-  After informed consent obtained, Patient's right upper ankle was swabbed with alcohol.  #34 gauge needle inserted into ankle and attached to stimulator.  Treatment setting was 11 today.  Pt tolerated 30 minute therapy without discomfort.        Plan:       Urge incontinence of urine- continue with monthly PTNS    Urinary frequency- continue with monthly PTNS  -     POCT urine dipstick without microscope          RTC 1 month

## 2018-12-19 ENCOUNTER — OFFICE VISIT (OUTPATIENT)
Dept: FAMILY MEDICINE | Facility: CLINIC | Age: 73
End: 2018-12-19
Payer: MEDICARE

## 2018-12-19 VITALS
BODY MASS INDEX: 26.78 KG/M2 | SYSTOLIC BLOOD PRESSURE: 122 MMHG | RESPIRATION RATE: 16 BRPM | TEMPERATURE: 98 F | HEIGHT: 67 IN | OXYGEN SATURATION: 96 % | DIASTOLIC BLOOD PRESSURE: 82 MMHG | HEART RATE: 80 BPM | WEIGHT: 170.63 LBS

## 2018-12-19 DIAGNOSIS — E78.5 HYPERLIPIDEMIA, UNSPECIFIED HYPERLIPIDEMIA TYPE: ICD-10-CM

## 2018-12-19 DIAGNOSIS — Z12.39 BREAST CANCER SCREENING: ICD-10-CM

## 2018-12-19 DIAGNOSIS — J20.9 ACUTE BRONCHITIS, UNSPECIFIED ORGANISM: Primary | ICD-10-CM

## 2018-12-19 PROCEDURE — 99213 OFFICE O/P EST LOW 20 MIN: CPT | Mod: 25,S$GLB,, | Performed by: INTERNAL MEDICINE

## 2018-12-19 PROCEDURE — 96372 THER/PROPH/DIAG INJ SC/IM: CPT | Mod: S$GLB,,, | Performed by: INTERNAL MEDICINE

## 2018-12-19 RX ORDER — BENZONATATE 200 MG/1
200 CAPSULE ORAL 2 TIMES DAILY PRN
Qty: 20 CAPSULE | Refills: 0 | Status: SHIPPED | OUTPATIENT
Start: 2018-12-19 | End: 2018-12-29

## 2018-12-19 RX ORDER — FLUTICASONE PROPIONATE 110 UG/1
1 AEROSOL, METERED RESPIRATORY (INHALATION) 2 TIMES DAILY
Qty: 12 G | Refills: 0 | Status: SHIPPED | OUTPATIENT
Start: 2018-12-19 | End: 2019-06-13 | Stop reason: ALTCHOICE

## 2018-12-19 RX ORDER — DEXAMETHASONE SODIUM PHOSPHATE 4 MG/ML
4 INJECTION, SOLUTION INTRA-ARTICULAR; INTRALESIONAL; INTRAMUSCULAR; INTRAVENOUS; SOFT TISSUE
Status: COMPLETED | OUTPATIENT
Start: 2018-12-19 | End: 2018-12-19

## 2018-12-19 RX ADMIN — DEXAMETHASONE SODIUM PHOSPHATE 4 MG: 4 INJECTION, SOLUTION INTRA-ARTICULAR; INTRALESIONAL; INTRAMUSCULAR; INTRAVENOUS; SOFT TISSUE at 04:12

## 2018-12-19 NOTE — PATIENT INSTRUCTIONS
Cool mist vaporizor.  Hot fluids. Hot tea with lemon and honey.    For best results take both the Tessalon Perles and Robitussin-RAMA

## 2018-12-19 NOTE — PROGRESS NOTES
Subjective:       Patient ID: Jazz Martinez is a 73 y.o. female.    Chief Complaint: Hyperlipidemia (labs); Headache; Sinus Problem; and Cough    HPI         CHIEF COMPLAINT: Hyperlipidemia. cholesterol screening: no.   HPI:     ONSET:    MODIFIERS/TREATMENTS: . Taking medications: yes. . Non-compliance with following diet: no. .     SYMPTOMS/RELATED:Possible medication side effects include:   Myalgia: no.  .     REVIEW OF SYMPTOMS: past weights:   Wt Readings from Last 1 Encounters:   12/19/18 1525 77.4 kg (170 lb 10.2 oz)                                                     Last lipids: total   Lab Results   Component Value Date    CHOL 141 12/10/2018    CHOL 334 (H) 10/24/2018    CHOL 238 (H) 03/07/2018                                                                     HDL   Lab Results   Component Value Date    HDL 66 12/10/2018    HDL 46 10/24/2018    HDL 48 03/07/2018                                                                     LDL   Lab Results   Component Value Date    LDLCALC 57.8 (L) 12/10/2018    LDLCALC Invalid, Trig>400.0 10/24/2018    LDLCALC 141.6 03/07/2018                                                                     TRIG   Lab Results   Component Value Date    TRIG 86 12/10/2018    TRIG 440 (H) 10/24/2018    TRIG 242 (H) 03/07/2018                                 CHIEF COMPLAINT: Cough(+).  HPI: had a flu shot    ONSET/TIMING:   3 d  ago    DURATION:               Paroxysmal: no .    QUALITY/COURSE:. worse    INTENSITY/SEVERITY: Severity is #  7(10 point scale)      The following symptoms/statements are positive if BOLD, negative otherwise.      CONTEXT/WHEN:  Tobacco_use. Smokers_in_home. Seasonal_pattern. Allergies/Hayfever. Sinusitis. Irritant_Exposure(smoke/dust/fumes). Exposure_to_others_with_similar_symptoms.        Similar_problems_in_past.   PAST TREATMENT OR EVALUATION: mucinex, zyrtec, flonase.   previous PPD. Recent_previous_chest_x-ray. Recent_antibiotics.  Associated  "Symptoms:     sputum production: scant. copious. Hemoptysis.  Medical History: Past_pulmonary_infections.  Cardiovascular_disease.chronic_lung_disease.  tuberculosis. Asthma. AIDS. Gastroesophageal_reflux_disease .                                                        Review of Systems   Constitutional: Negative for activity change, chills, diaphoresis, fever and unexpected weight change.   HENT: Positive for rhinorrhea, sinus pressure and sore throat. Negative for hearing loss and trouble swallowing.    Eyes: Negative for discharge and visual disturbance.   Respiratory: Positive for cough. Negative for chest tightness, shortness of breath and wheezing.    Cardiovascular: Negative for chest pain and palpitations.   Gastrointestinal: Negative for blood in stool, constipation, diarrhea and vomiting.   Endocrine: Negative for polydipsia and polyuria.   Genitourinary: Negative for difficulty urinating, dysuria, hematuria and menstrual problem.   Musculoskeletal: Positive for arthralgias and neck pain. Negative for joint swelling and myalgias.   Neurological: Positive for headaches. Negative for weakness.   Psychiatric/Behavioral: Negative for confusion and dysphoric mood.         Objective:      Vitals:    12/19/18 1525   BP: 122/82   Pulse: 80   Resp: 16   Temp: 98.3 °F (36.8 °C)   TempSrc: Oral   SpO2: 96%   Weight: 77.4 kg (170 lb 10.2 oz)   Height: 5' 7" (1.702 m)   PainSc:   3   PainLoc: Head     Physical Exam   Constitutional: She appears well-developed and well-nourished.   HENT:   Right Ear: External ear normal.   Left Ear: External ear normal.   Mouth/Throat: Oropharynx is clear and moist. No oropharyngeal exudate.   Congested   Cardiovascular: Normal rate, regular rhythm and normal heart sounds. Exam reveals no friction rub.   No murmur heard.  Pulmonary/Chest: Effort normal and breath sounds normal. No respiratory distress. She has no wheezes. She has no rales. She exhibits no tenderness.   Abdominal: Soft. " Bowel sounds are normal. There is no tenderness.   Musculoskeletal: She exhibits no edema.   Lymphadenopathy:     She has no cervical adenopathy.   Nursing note and vitals reviewed.        Assessment:       1. Acute bronchitis, unspecified organism    2. Breast cancer screening    3. Hyperlipidemia, unspecified hyperlipidemia type          Plan:       Acute bronchitis, unspecified organism  -     fluticasone (FLOVENT HFA) 110 mcg/actuation inhaler; Inhale 1 puff into the lungs 2 (two) times daily. Wash mouth afterwards with water  Dispense: 12 g; Refill: 0  -     benzonatate (TESSALON) 200 MG capsule; Take 1 capsule (200 mg total) by mouth 2 (two) times daily as needed for Cough.  Dispense: 20 capsule; Refill: 0  -     dexamethasone injection 4 mg    Breast cancer screening  -     Mammo Digital Screening Bilat; Future; Expected date: 12/19/2018    Hyperlipidemia, unspecified hyperlipidemia type  -     Lipid panel; Future; Expected date: 12/19/2018      Follow-up in about 3 months (around 3/19/2019) for if you are not better return in 2 weeks.

## 2018-12-27 DIAGNOSIS — E78.5 HYPERLIPIDEMIA, UNSPECIFIED HYPERLIPIDEMIA TYPE: ICD-10-CM

## 2018-12-27 DIAGNOSIS — R35.0 URINARY FREQUENCY: ICD-10-CM

## 2018-12-28 RX ORDER — ATORVASTATIN CALCIUM 10 MG/1
TABLET, FILM COATED ORAL
Qty: 90 TABLET | Refills: 2 | Status: SHIPPED | OUTPATIENT
Start: 2018-12-28 | End: 2019-04-29

## 2018-12-28 RX ORDER — VALACYCLOVIR HYDROCHLORIDE 500 MG/1
1000 TABLET, FILM COATED ORAL 2 TIMES DAILY
Qty: 12 TABLET | Refills: 3 | Status: SHIPPED | OUTPATIENT
Start: 2018-12-28 | End: 2019-07-08 | Stop reason: SDUPTHER

## 2019-01-24 ENCOUNTER — OFFICE VISIT (OUTPATIENT)
Dept: UROGYNECOLOGY | Facility: CLINIC | Age: 74
End: 2019-01-24
Payer: MEDICARE

## 2019-01-24 VITALS
HEART RATE: 66 BPM | WEIGHT: 171.31 LBS | DIASTOLIC BLOOD PRESSURE: 63 MMHG | HEIGHT: 67 IN | BODY MASS INDEX: 26.89 KG/M2 | SYSTOLIC BLOOD PRESSURE: 114 MMHG

## 2019-01-24 DIAGNOSIS — N39.41 URGE INCONTINENCE OF URINE: Primary | ICD-10-CM

## 2019-01-24 DIAGNOSIS — R35.0 URINARY FREQUENCY: ICD-10-CM

## 2019-01-24 LAB
BILIRUB SERPL-MCNC: ABNORMAL MG/DL
BLOOD URINE, POC: ABNORMAL
COLOR, POC UA: ABNORMAL
GLUCOSE UR QL STRIP: ABNORMAL
KETONES UR QL STRIP: ABNORMAL
LEUKOCYTE ESTERASE URINE, POC: ABNORMAL
NITRITE, POC UA: ABNORMAL
PH, POC UA: 5
PROTEIN, POC: ABNORMAL
SPECIFIC GRAVITY, POC UA: 1.01
UROBILINOGEN, POC UA: ABNORMAL

## 2019-01-24 PROCEDURE — 64566 PR POST TIBIAL NEUROSTIMULATION,PERC NEEDLE ELECTRODE: ICD-10-PCS | Mod: S$PBB,,, | Performed by: NURSE PRACTITIONER

## 2019-01-24 PROCEDURE — 99215 OFFICE O/P EST HI 40 MIN: CPT | Mod: PBBFAC,PO,25 | Performed by: NURSE PRACTITIONER

## 2019-01-24 PROCEDURE — 99499 UNLISTED E&M SERVICE: CPT | Mod: S$PBB,,, | Performed by: NURSE PRACTITIONER

## 2019-01-24 PROCEDURE — 64566 NEUROELTRD STIM POST TIBIAL: CPT | Mod: S$PBB,,, | Performed by: NURSE PRACTITIONER

## 2019-01-24 PROCEDURE — 99499 NO LOS: ICD-10-PCS | Mod: S$PBB,,, | Performed by: NURSE PRACTITIONER

## 2019-01-24 PROCEDURE — 99999 PR PBB SHADOW E&M-EST. PATIENT-LVL V: CPT | Mod: PBBFAC,,, | Performed by: NURSE PRACTITIONER

## 2019-01-24 PROCEDURE — 99999 PR PBB SHADOW E&M-EST. PATIENT-LVL V: ICD-10-PCS | Mod: PBBFAC,,, | Performed by: NURSE PRACTITIONER

## 2019-01-24 PROCEDURE — 64566 NEUROELTRD STIM POST TIBIAL: CPT | Mod: PBBFAC,PO | Performed by: NURSE PRACTITIONER

## 2019-01-24 PROCEDURE — 81002 URINALYSIS NONAUTO W/O SCOPE: CPT | Mod: PBBFAC,PO | Performed by: NURSE PRACTITIONER

## 2019-01-24 NOTE — PROGRESS NOTES
Subjective:       Patient ID: Jazz Martinez is a 73 y.o. female.    Chief Complaint: PTNS# 5    HPI  Jazz Martinez is a 73 y.o. female who presents today for her 5th monthly PTNS therapy.  She feels that she is doing about the same.  She has frequency x 4-5 during the day and nocturia about 3.  She has about 3 incontinence episodes a day.  She does not that stress is a very large part of her problem.  When she was staying at her brother's house to help him out, she didn't have any incontinence  And had decreased nocturia.  She is not sure how exactly to decrease her stress level in her home.  She denies any other acute complaints/concerns at this time.    Review of Systems   Constitutional: Negative for activity change, fever and unexpected weight change.   HENT: Negative for hearing loss.    Eyes: Negative for visual disturbance.   Respiratory: Negative for shortness of breath and wheezing.    Cardiovascular: Negative for chest pain, palpitations and leg swelling.   Gastrointestinal: Negative for abdominal pain, constipation and diarrhea.   Genitourinary: Positive for frequency. Negative for dyspareunia, dysuria, urgency, vaginal bleeding and vaginal discharge.   Musculoskeletal: Negative for gait problem and neck pain.   Skin: Negative for rash and wound.   Allergic/Immunologic: Negative for immunocompromised state.   Neurological: Negative for tremors, speech difficulty and weakness.   Hematological: Does not bruise/bleed easily.   Psychiatric/Behavioral: Negative for agitation and confusion.       Objective:      Physical Exam   Constitutional: She is oriented to person, place, and time. She appears well-developed and well-nourished. No distress.   HENT:   Head: Normocephalic and atraumatic.   Neck: Neck supple. No thyromegaly present.   Pulmonary/Chest: Effort normal. No respiratory distress.   Abdominal: Soft. There is no tenderness. No hernia.   Musculoskeletal: Normal range of motion.    Neurological: She is alert and oriented to person, place, and time.   Skin: Skin is warm and dry. No rash noted.   Psychiatric: She has a normal mood and affect. Her behavior is normal.         Assessment:       1. Urge incontinence of urine    2. Urinary frequency          Procedure note-  After informed consent obtained, Patient's left upper ankle was swabbed with alcohol.  #34 gauge needle inserted into ankle and attached to stimulator.  Treatment setting was 15 today.  Pt tolerated 30 minute therapy without discomfort.        Plan:       Urge incontinence of urine - continue with monthly PTNS    Urinary frequency- continue with monthly PTNS  -     POCT urine dipstick without microscope          RTC 1 month

## 2019-02-27 ENCOUNTER — OFFICE VISIT (OUTPATIENT)
Dept: UROGYNECOLOGY | Facility: CLINIC | Age: 74
End: 2019-02-27
Payer: MEDICARE

## 2019-02-27 VITALS
HEART RATE: 63 BPM | SYSTOLIC BLOOD PRESSURE: 120 MMHG | DIASTOLIC BLOOD PRESSURE: 74 MMHG | BODY MASS INDEX: 27.88 KG/M2 | WEIGHT: 167.31 LBS | HEIGHT: 65 IN

## 2019-02-27 DIAGNOSIS — R35.0 URINARY FREQUENCY: ICD-10-CM

## 2019-02-27 DIAGNOSIS — N39.41 URGE INCONTINENCE OF URINE: Primary | ICD-10-CM

## 2019-02-27 LAB
BILIRUB SERPL-MCNC: ABNORMAL MG/DL
BLOOD URINE, POC: ABNORMAL
COLOR, POC UA: ABNORMAL
GLUCOSE UR QL STRIP: ABNORMAL
KETONES UR QL STRIP: ABNORMAL
LEUKOCYTE ESTERASE URINE, POC: ABNORMAL
NITRITE, POC UA: ABNORMAL
PH, POC UA: 8
PROTEIN, POC: ABNORMAL
SPECIFIC GRAVITY, POC UA: 1.01
UROBILINOGEN, POC UA: ABNORMAL

## 2019-02-27 PROCEDURE — 64566 NEUROELTRD STIM POST TIBIAL: CPT | Mod: PBBFAC,PO | Performed by: NURSE PRACTITIONER

## 2019-02-27 PROCEDURE — 99499 NO LOS: ICD-10-PCS | Mod: S$PBB,,, | Performed by: NURSE PRACTITIONER

## 2019-02-27 PROCEDURE — 64566 NEUROELTRD STIM POST TIBIAL: CPT | Mod: S$PBB,,, | Performed by: NURSE PRACTITIONER

## 2019-02-27 PROCEDURE — 99214 OFFICE O/P EST MOD 30 MIN: CPT | Mod: PBBFAC,PO,25 | Performed by: NURSE PRACTITIONER

## 2019-02-27 PROCEDURE — 64566 PR POST TIBIAL NEUROSTIMULATION,PERC NEEDLE ELECTRODE: ICD-10-PCS | Mod: S$PBB,,, | Performed by: NURSE PRACTITIONER

## 2019-02-27 PROCEDURE — 81002 URINALYSIS NONAUTO W/O SCOPE: CPT | Mod: PBBFAC,PO | Performed by: NURSE PRACTITIONER

## 2019-02-27 PROCEDURE — 99999 PR PBB SHADOW E&M-EST. PATIENT-LVL IV: ICD-10-PCS | Mod: PBBFAC,,, | Performed by: NURSE PRACTITIONER

## 2019-02-27 PROCEDURE — 99999 PR PBB SHADOW E&M-EST. PATIENT-LVL IV: CPT | Mod: PBBFAC,,, | Performed by: NURSE PRACTITIONER

## 2019-02-27 PROCEDURE — 99499 UNLISTED E&M SERVICE: CPT | Mod: S$PBB,,, | Performed by: NURSE PRACTITIONER

## 2019-02-27 NOTE — PROGRESS NOTES
Subjective:       Patient ID: Jazz Martinez is a 73 y.o. female.    Chief Complaint: PTNS #6    HPI  Jazz Martinez is a 73 y.o. female who presents today for her 6th monthly PTNS therapy.  She feels that she is doing about the same this month.  She might be having a few more leaks but does feel that her stress level has been elevated too.  She has frequency x 5 during the day and nocturia x 2.  She has about 4 incontinence episodes a day which tend to be more during the night.  She has urgency with the incontinence, but otherwise denies very much urgency.  She denies any other acute complaints/concerns at this time.  She is ready to proceed with the therapy.    Review of Systems   Constitutional: Negative for activity change, fever and unexpected weight change.   HENT: Negative for hearing loss.    Eyes: Negative for visual disturbance.   Respiratory: Negative for shortness of breath and wheezing.    Cardiovascular: Negative for chest pain, palpitations and leg swelling.   Gastrointestinal: Negative for abdominal pain, constipation and diarrhea.   Genitourinary: Positive for frequency and urgency. Negative for dyspareunia, dysuria, vaginal bleeding and vaginal discharge.   Musculoskeletal: Negative for gait problem and neck pain.   Skin: Negative for rash and wound.   Allergic/Immunologic: Negative for immunocompromised state.   Neurological: Negative for tremors, speech difficulty and weakness.   Hematological: Does not bruise/bleed easily.   Psychiatric/Behavioral: Negative for agitation and confusion.       Objective:      Physical Exam   Constitutional: She is oriented to person, place, and time. She appears well-developed and well-nourished. No distress.   HENT:   Head: Normocephalic and atraumatic.   Neck: Neck supple. No thyromegaly present.   Pulmonary/Chest: Effort normal. No respiratory distress.   Abdominal: Soft. There is no tenderness. No hernia.   Musculoskeletal: Normal range of motion.    Neurological: She is alert and oriented to person, place, and time.   Skin: Skin is warm and dry. No rash noted.   Psychiatric: She has a normal mood and affect. Her behavior is normal.         Assessment:       1. Urge incontinence of urine    2. Urinary frequency          Procedure note-  After informed consent obtained, Patient's right upper ankle was swabbed with alcohol.  #34 gauge needle inserted into ankle and attached to stimulator.  Treatment setting was 13 today.  Pt tolerated 30 minute therapy without discomfort.        Plan:       Urge incontinence of urine- continue with PTNS    Urinary frequency- continue with PTNS  -     POCT URINE DIPSTICK WITHOUT MICROSCOPE          RTC 1 month

## 2019-03-04 ENCOUNTER — TELEPHONE (OUTPATIENT)
Dept: UROGYNECOLOGY | Facility: CLINIC | Age: 74
End: 2019-03-04

## 2019-03-04 NOTE — TELEPHONE ENCOUNTER
----- Message from Danielle Toribio sent at 3/4/2019  9:33 AM CST -----  Contact: self   Type:  Sooner Apoointment Request    Caller is requesting a sooner appointment.  Caller declined first available appointment listed below.  Caller will not accept being placed on the waitlist and is requesting a message be sent to doctor.    Name of Caller: patient   When is the first available appointment? 3/26  Symptoms: f/u   Best Call Back Number:[phone]    Additional Information: patient is requesting to be seen sometime next week

## 2019-03-13 ENCOUNTER — OFFICE VISIT (OUTPATIENT)
Dept: UROGYNECOLOGY | Facility: CLINIC | Age: 74
End: 2019-03-13
Payer: MEDICARE

## 2019-03-13 VITALS
SYSTOLIC BLOOD PRESSURE: 119 MMHG | WEIGHT: 163.81 LBS | HEART RATE: 68 BPM | HEIGHT: 65 IN | BODY MASS INDEX: 27.29 KG/M2 | DIASTOLIC BLOOD PRESSURE: 76 MMHG

## 2019-03-13 DIAGNOSIS — Z46.89 PESSARY MAINTENANCE: ICD-10-CM

## 2019-03-13 DIAGNOSIS — N81.11 CYSTOCELE, MIDLINE: ICD-10-CM

## 2019-03-13 DIAGNOSIS — N39.41 URGE INCONTINENCE OF URINE: ICD-10-CM

## 2019-03-13 DIAGNOSIS — R35.0 URINARY FREQUENCY: Primary | ICD-10-CM

## 2019-03-13 DIAGNOSIS — R10.819 ABDOMINAL TENDERNESS, REBOUND TENDERNESS PRESENCE NOT SPECIFIED, UNSPECIFIED LOCATION: ICD-10-CM

## 2019-03-13 PROCEDURE — 87186 SC STD MICRODIL/AGAR DIL: CPT

## 2019-03-13 PROCEDURE — 81002 URINALYSIS NONAUTO W/O SCOPE: CPT | Mod: PBBFAC,PO | Performed by: NURSE PRACTITIONER

## 2019-03-13 PROCEDURE — 87088 URINE BACTERIA CULTURE: CPT

## 2019-03-13 PROCEDURE — 99213 PR OFFICE/OUTPT VISIT, EST, LEVL III, 20-29 MIN: ICD-10-PCS | Mod: S$PBB,,, | Performed by: NURSE PRACTITIONER

## 2019-03-13 PROCEDURE — 99215 OFFICE O/P EST HI 40 MIN: CPT | Mod: PBBFAC,PO | Performed by: NURSE PRACTITIONER

## 2019-03-13 PROCEDURE — 87077 CULTURE AEROBIC IDENTIFY: CPT

## 2019-03-13 PROCEDURE — 87086 URINE CULTURE/COLONY COUNT: CPT

## 2019-03-13 PROCEDURE — 99999 PR PBB SHADOW E&M-EST. PATIENT-LVL V: CPT | Mod: PBBFAC,,, | Performed by: NURSE PRACTITIONER

## 2019-03-13 PROCEDURE — 99999 PR PBB SHADOW E&M-EST. PATIENT-LVL V: ICD-10-PCS | Mod: PBBFAC,,, | Performed by: NURSE PRACTITIONER

## 2019-03-13 PROCEDURE — 99213 OFFICE O/P EST LOW 20 MIN: CPT | Mod: S$PBB,,, | Performed by: NURSE PRACTITIONER

## 2019-03-13 NOTE — PROGRESS NOTES
Subjective:       Patient ID: Jazz Martinez is a 73 y.o. female.    Chief Complaint: pessary maintenance    HPI  Jazz Martinez is a 73 y.o. female who presents today for routine pessary maintenance.   She has been using the ring pessary for awhile and feels that it works well for her.  She denies any real change in her urinary symptoms.  She is having some abd pain, but is not sure if this is from the pessary, stress or her bladder.  She denies any real LUTS. She denies any vaginal discharge or bleeding.  She is ready to proceed with the pessary cleaning.    Review of Systems   Constitutional: Negative for activity change, fever and unexpected weight change.   HENT: Negative for hearing loss.    Eyes: Negative for visual disturbance.   Respiratory: Negative for shortness of breath and wheezing.    Cardiovascular: Negative for chest pain, palpitations and leg swelling.   Gastrointestinal: Positive for abdominal pain. Negative for constipation and diarrhea.   Genitourinary: Positive for frequency. Negative for dyspareunia, dysuria, urgency, vaginal bleeding and vaginal discharge.   Musculoskeletal: Negative for gait problem and neck pain.   Skin: Negative for rash and wound.   Allergic/Immunologic: Negative for immunocompromised state.   Neurological: Negative for tremors, speech difficulty and weakness.   Hematological: Does not bruise/bleed easily.   Psychiatric/Behavioral: Negative for agitation and confusion.       Objective:      Physical Exam   Constitutional: She is oriented to person, place, and time. She appears well-developed and well-nourished. No distress.   HENT:   Head: Normocephalic and atraumatic.   Neck: Neck supple. No thyromegaly present.   Pulmonary/Chest: Effort normal. No respiratory distress.   Abdominal: Soft. There is tenderness in the suprapubic area. No hernia.   Musculoskeletal: Normal range of motion.   Neurological: She is alert and oriented to person, place, and time.   Skin:  Skin is warm and dry. No rash noted.   Psychiatric: She has a normal mood and affect. Her behavior is normal.     Pelvic Exam:  V: No lesions. No palpable nodes.   Va: no discharge or bleeding.  No areas of irritation.  Good length.  Dominant midline cystocele Ba=-1  Meatus:No caruncle or stenosis  Urethra: Non tender. No suburethral masses.  Cx/Cuff: Normal   Uterus: surgically absent  Ad: No mass or tenderness.  Levators :Symmetrical. Normal tone. mildly tender bilaterally.  BL: mildly tender  RV: No hemorrhoids.      Assessment:       1. Urinary frequency    2. Urge incontinence of urine    3. Cystocele, midline    4. Pessary maintenance    5. Abdominal tenderness, rebound tenderness presence not specified, unspecified location          Procedure note- #2 ring pessary removed and cleaned with soap and water.  After betadine irrigation of the vagina, #2 ring pessary was reinserted into the vagina.  Pt ambulated in the room and denies any discomfort.  NP reminded pt that the first 24-48 hours are the most likely time for the pessary to fall out and to monitor the toilet before flushing.  Pt verbalized understanding.      Plan:       Urinary frequency- continue with monthly PTNS  -     POCT urine dipstick without microscope    Urge incontinence of urine- as noted above    Cystocele, midline- continue with ring pessary    Pessary maintenance- as noted above    Abdominal tenderness, rebound tenderness presence not specified, unspecified location- we will send her urine for culture to be sure it is negative.  I don't believe that the pessary is causing the tenderness, but if it persists we can remove the pessary for a week to see if the tenderness resolves    RTC 3 months for pessary, 3 weeks for PTNS

## 2019-03-15 LAB — BACTERIA UR CULT: NORMAL

## 2019-03-18 ENCOUNTER — TELEPHONE (OUTPATIENT)
Dept: UROGYNECOLOGY | Facility: CLINIC | Age: 74
End: 2019-03-18

## 2019-03-18 RX ORDER — CEPHALEXIN 500 MG/1
500 CAPSULE ORAL 3 TIMES DAILY
Qty: 15 CAPSULE | Refills: 0 | Status: SHIPPED | OUTPATIENT
Start: 2019-03-18 | End: 2019-03-23

## 2019-03-18 NOTE — TELEPHONE ENCOUNTER
----- Message from Bonnie Ocasio sent at 3/18/2019  8:40 AM CDT -----  Contact: self  Type: Needs Medical Advice    Who Called:  self  Symptoms (please be specific):  Bladder infection  How long has patient had these symptoms:  This weekend  Pharmacy name and phone #:  [facility]  Best Call Back Number: [phone]  Additional Information: patient had a urine culture done and is asking if she needs to take medication or come in. Please call patient. Thanks!   [facility] [place] - [address] [facility]  [address] [facility] [place] [address]  Phone: [phone] Fax: [phone]

## 2019-03-18 NOTE — TELEPHONE ENCOUNTER
Urine culture came back positive, is not on antibiotics, is allergic to sulfa, nitrofurantoin, please advise? States lower part of stomach started hurting on Saturday. [facility] something to [facility].

## 2019-03-28 ENCOUNTER — OFFICE VISIT (OUTPATIENT)
Dept: UROGYNECOLOGY | Facility: CLINIC | Age: 74
End: 2019-03-28
Payer: MEDICARE

## 2019-03-28 VITALS
WEIGHT: 163.81 LBS | SYSTOLIC BLOOD PRESSURE: 126 MMHG | DIASTOLIC BLOOD PRESSURE: 68 MMHG | BODY MASS INDEX: 27.29 KG/M2 | HEIGHT: 65 IN | HEART RATE: 70 BPM

## 2019-03-28 DIAGNOSIS — R35.0 URINARY FREQUENCY: ICD-10-CM

## 2019-03-28 DIAGNOSIS — N39.41 URGE INCONTINENCE OF URINE: Primary | ICD-10-CM

## 2019-03-28 PROCEDURE — 64566 NEUROELTRD STIM POST TIBIAL: CPT | Mod: PBBFAC,PO | Performed by: NURSE PRACTITIONER

## 2019-03-28 PROCEDURE — 81002 URINALYSIS NONAUTO W/O SCOPE: CPT | Mod: PBBFAC,PO | Performed by: NURSE PRACTITIONER

## 2019-03-28 PROCEDURE — 87086 URINE CULTURE/COLONY COUNT: CPT

## 2019-03-28 PROCEDURE — 99214 OFFICE O/P EST MOD 30 MIN: CPT | Mod: PBBFAC,PO,25 | Performed by: NURSE PRACTITIONER

## 2019-03-28 PROCEDURE — 87186 SC STD MICRODIL/AGAR DIL: CPT

## 2019-03-28 PROCEDURE — 64566 NEUROELTRD STIM POST TIBIAL: CPT | Mod: S$PBB,,, | Performed by: NURSE PRACTITIONER

## 2019-03-28 PROCEDURE — 99499 UNLISTED E&M SERVICE: CPT | Mod: S$PBB,,, | Performed by: NURSE PRACTITIONER

## 2019-03-28 PROCEDURE — 87077 CULTURE AEROBIC IDENTIFY: CPT

## 2019-03-28 PROCEDURE — 87088 URINE BACTERIA CULTURE: CPT

## 2019-03-28 PROCEDURE — 99999 PR PBB SHADOW E&M-EST. PATIENT-LVL IV: CPT | Mod: PBBFAC,,, | Performed by: NURSE PRACTITIONER

## 2019-03-28 PROCEDURE — 99999 PR PBB SHADOW E&M-EST. PATIENT-LVL IV: ICD-10-PCS | Mod: PBBFAC,,, | Performed by: NURSE PRACTITIONER

## 2019-03-28 PROCEDURE — 99499 NO LOS: ICD-10-PCS | Mod: S$PBB,,, | Performed by: NURSE PRACTITIONER

## 2019-03-28 PROCEDURE — 64566 PR POST TIBIAL NEUROSTIMULATION,PERC NEEDLE ELECTRODE: ICD-10-PCS | Mod: S$PBB,,, | Performed by: NURSE PRACTITIONER

## 2019-03-28 NOTE — PROGRESS NOTES
Subjective:       Patient ID: Jazz Martinez is a 73 y.o. female.    Chief Complaint: ptns#7    HPI  Jazz Martinez is a 73 y.o. female who presents today for her 7th monthly PTNS therapy.  She feels that she has been doing ok.  She did have a UTI about 2 weeks ago and was treated with keflex.  The pt took all the medication, but still feels some irritation in the bladder area.  She is not sure if she is having bladder spasms or where the pain is coming from.  Her UA today is clear.  Her frequency and urgency have stayed about the same as last month and her nocturia  And incontinence is about the same as last month.  She denies any other acute complaints/concerns and is ready to proceed with the PTNS.    Review of Systems   Constitutional: Negative for activity change, fever and unexpected weight change.   HENT: Negative for hearing loss.    Eyes: Negative for visual disturbance.   Respiratory: Negative for shortness of breath and wheezing.    Cardiovascular: Negative for chest pain, palpitations and leg swelling.   Gastrointestinal: Positive for abdominal pain. Negative for constipation and diarrhea.   Genitourinary: Positive for frequency and urgency. Negative for dyspareunia, dysuria, vaginal bleeding and vaginal discharge.   Musculoskeletal: Negative for gait problem and neck pain.   Skin: Negative for rash and wound.   Allergic/Immunologic: Negative for immunocompromised state.   Neurological: Negative for tremors, speech difficulty and weakness.   Hematological: Does not bruise/bleed easily.   Psychiatric/Behavioral: Negative for agitation and confusion.       Objective:      Physical Exam   Constitutional: She is oriented to person, place, and time. She appears well-developed and well-nourished. No distress.   HENT:   Head: Normocephalic and atraumatic.   Neck: Neck supple. No thyromegaly present.   Pulmonary/Chest: Effort normal. No respiratory distress.   Abdominal: Soft. There is tenderness in the  suprapubic area. No hernia.   Musculoskeletal: Normal range of motion.   Neurological: She is alert and oriented to person, place, and time.   Skin: Skin is warm and dry. No rash noted.   Psychiatric: She has a normal mood and affect. Her behavior is normal.         Assessment:       1. Urge incontinence of urine    2. Urinary frequency          Procedure note-  After informed consent obtained, Patient's right upper ankle was swabbed with alcohol.  #34 gauge needle inserted into ankle and attached to stimulator.  Treatment setting was 15 today.  Pt tolerated 30 minute therapy without discomfort.        Plan:       Urge incontinence of urine- continue with PTNS    Urinary frequency- we will send her urine for culture.  She will try some urogesic blue to see if this helps ease her symptoms.  -     POCT URINE DIPSTICK WITHOUT MICROSCOPE          RTC 1 month

## 2019-03-29 ENCOUNTER — OFFICE VISIT (OUTPATIENT)
Dept: FAMILY MEDICINE | Facility: CLINIC | Age: 74
End: 2019-03-29
Payer: MEDICARE

## 2019-03-29 ENCOUNTER — DOCUMENTATION ONLY (OUTPATIENT)
Dept: FAMILY MEDICINE | Facility: CLINIC | Age: 74
End: 2019-03-29

## 2019-03-29 VITALS
TEMPERATURE: 98 F | HEART RATE: 75 BPM | RESPIRATION RATE: 16 BRPM | OXYGEN SATURATION: 98 % | SYSTOLIC BLOOD PRESSURE: 126 MMHG | WEIGHT: 163.38 LBS | DIASTOLIC BLOOD PRESSURE: 82 MMHG | HEIGHT: 65 IN | BODY MASS INDEX: 27.22 KG/M2

## 2019-03-29 DIAGNOSIS — K52.9 ACUTE GASTROENTERITIS: Primary | ICD-10-CM

## 2019-03-29 PROCEDURE — 99213 OFFICE O/P EST LOW 20 MIN: CPT | Mod: S$GLB,,, | Performed by: INTERNAL MEDICINE

## 2019-03-29 PROCEDURE — 99213 PR OFFICE/OUTPT VISIT, EST, LEVL III, 20-29 MIN: ICD-10-PCS | Mod: S$GLB,,, | Performed by: INTERNAL MEDICINE

## 2019-03-29 RX ORDER — ONDANSETRON 4 MG/1
4 TABLET, FILM COATED ORAL EVERY 8 HOURS PRN
Qty: 20 TABLET | Refills: 1 | Status: SHIPPED | OUTPATIENT
Start: 2019-03-29 | End: 2020-11-16

## 2019-03-29 NOTE — PROGRESS NOTES
Health Maintenance Due   Topic Date Due    TETANUS VACCINE  07/24/1963    Pneumococcal Vaccine (65+ Low/Medium Risk) (2 of 2 - PPSV23) 10/17/2017

## 2019-03-29 NOTE — PATIENT INSTRUCTIONS
Clear liquids for 6 hr then add bananas, applesauce, rice and toast.  If doing well after that point can go to a regular diet but no dairy products for 3 days    Imodium ad 2 with the first loose bowel movement and then one with every loose bowel movement thereafter.      Diet for Vomiting or Diarrhea (Adult)    Your symptoms may return or get worse after eating certain foods listed below. If this happens, stop eating these foods until your symptoms ease and you feel better.  Once the vomiting stops, follow the steps below.   During the first 12 to 24 hours  During the first 12 to 24 hours, follow this diet:  · Drinks. Plain water, sport drinks like electrolyte solutions, soft drinks without caffeine, mineral water (plain or flavored), clear fruit juices, and decaffeinated tea and coffee.  · Soups. Clear broth.  · Desserts. Plain gelatin, popsicles, and fruit juice bars. As you feel better, you may add 6 to 8 ounces of yogurt per day. If you have diarrhea, don't have foods or drinks that contain sugar, high-fructose corn syrup, or sugar alcohols.  During the next 24 hours  During the next 24 hours you may add the following to the above:  · Hot cereal, plain toast, bread, rolls, and crackers  · Plain noodles, rice, mashed potatoes, and chicken noodle or rice soup  · Unsweetened canned fruit (but not pineapple) and bananas  Don't eat more than 15 grams of fat a day. Do this by staying away from margarine, butter, oils, mayonnaise, sauces, gravies, fried foods, peanut butter, meat, poultry, and fish.  Don't eat much fiber. Stay away from raw or cooked vegetables, fresh fruits (except bananas), and bran cereals.  Limit how much caffeine and chocolate you have. Do not use any spices or seasonings except salt.  During the next 24 hours  Slowly go back to your normal diet, as you feel better and your symptoms ease.  Date Last Reviewed: 8/1/2016  © 0386-2490 [employer]. [address] [place] [address], [place]  [address]. All rights reserved. This information is not intended as a substitute for professional medical care. Always follow your healthcare professional's instructions.

## 2019-03-29 NOTE — PROGRESS NOTES
"Subjective:       Patient ID: Jazz Martinez is a 73 y.o. female.    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 . Onset  6 h     ago. Sudden: no.      DURATION: .  Intermittent     QUALITY/COURSE:    worse.     LOCATION: lower abd quesy.     INTENSITY/SEVERITY:  #   7  / 10 (on 1 to 10 scale).  Vomited:  0 x/day.  diarhia:  0 x/d.            The following symptoms/statements  are positive if BOLD, negative otherwise.      MODIFIERS/TREATMENTS: . Eating worsens.. . Drinking worsens.  Possible contaminated food.      CONTEXT/WHEN: . Similar problems.  Exposure_to_others_with_similar_symptoms. .  .  Recent_contaminated_food. .  Alcohol_ingestion.   Stopped_adrenal_steroids . . Antibiotics.  Travel    REVIEW OF SYMPTOMS:    . Fever(subjective) .  Vertigo .  Headache . Hematemesis .. Bloody_stools . Watery_stools . Loose_stools . Abdominal_pain . Chest_Pain . Dyspnea .  Urinary_Problems . Jaundice . dizziness .          Review of Systems      Objective:      Vitals:    03/29/19 1100   BP: 126/82   Pulse: 75   Resp: 16   Temp: 98.3 °F (36.8 °C)   TempSrc: Oral   SpO2: 98%   Weight: 74.1 kg (163 lb 5.8 oz)   Height: 5' 5" (1.651 m)   PainSc: 0-No pain     Physical Exam   Constitutional: She appears well-developed and well-nourished.   Immediate capillary refill   Cardiovascular: Normal rate, regular rhythm and normal heart sounds.   Pulmonary/Chest: Effort normal and breath sounds normal.   Abdominal: Soft. There is no tenderness.   Neurological: She is alert.   Psychiatric: She has a normal mood and affect. Her behavior is normal. Thought content normal.   Nursing note and vitals reviewed.        Assessment:       1. Acute gastroenteritis          Plan:       Acute gastroenteritis  -     ondansetron (ZOFRAN) 4 MG tablet; Take 1 " tablet (4 mg total) by mouth every 8 (eight) hours as needed for Nausea.  Dispense: 20 tablet; Refill: 1      Follow up for if you are not better return in one week.

## 2019-03-30 LAB — BACTERIA UR CULT: NORMAL

## 2019-04-02 RX ORDER — AMOXICILLIN AND CLAVULANATE POTASSIUM 875; 125 MG/1; MG/1
1 TABLET, FILM COATED ORAL 2 TIMES DAILY
Qty: 14 TABLET | Refills: 0 | Status: SHIPPED | OUTPATIENT
Start: 2019-04-02 | End: 2019-04-09

## 2019-04-18 ENCOUNTER — TELEPHONE (OUTPATIENT)
Dept: UROGYNECOLOGY | Facility: CLINIC | Age: 74
End: 2019-04-18

## 2019-04-18 ENCOUNTER — CLINICAL SUPPORT (OUTPATIENT)
Dept: UROGYNECOLOGY | Facility: CLINIC | Age: 74
End: 2019-04-18
Payer: MEDICARE

## 2019-04-18 DIAGNOSIS — R35.0 URINARY FREQUENCY: ICD-10-CM

## 2019-04-18 DIAGNOSIS — R30.0 BURNING WITH URINATION: Primary | ICD-10-CM

## 2019-04-18 LAB
BILIRUB SERPL-MCNC: NEGATIVE MG/DL
BLOOD URINE, POC: 250
COLOR, POC UA: ABNORMAL
GLUCOSE UR QL STRIP: NORMAL
KETONES UR QL STRIP: ABNORMAL
LEUKOCYTE ESTERASE URINE, POC: ABNORMAL
NITRITE, POC UA: POSITIVE
PH, POC UA: 5
PROTEIN, POC: ABNORMAL
SPECIFIC GRAVITY, POC UA: 1.03
UROBILINOGEN, POC UA: NORMAL

## 2019-04-18 PROCEDURE — 87086 URINE CULTURE/COLONY COUNT: CPT

## 2019-04-18 PROCEDURE — 81002 URINALYSIS NONAUTO W/O SCOPE: CPT | Mod: PBBFAC,PO

## 2019-04-18 PROCEDURE — 87186 SC STD MICRODIL/AGAR DIL: CPT

## 2019-04-18 PROCEDURE — 87077 CULTURE AEROBIC IDENTIFY: CPT

## 2019-04-18 PROCEDURE — 87088 URINE BACTERIA CULTURE: CPT

## 2019-04-18 RX ORDER — CEPHALEXIN 500 MG/1
500 CAPSULE ORAL 3 TIMES DAILY
COMMUNITY
Start: 2019-04-18 | End: 2019-04-23

## 2019-04-18 NOTE — TELEPHONE ENCOUNTER
Called and spoke to patient and informed that no one was here but would call carline to ask about something I could call in for her.

## 2019-04-18 NOTE — TELEPHONE ENCOUNTER
----- Message from Maricarmen Gonzales sent at 4/18/2019  9:41 AM CDT -----  Type: Needs Medical Advice    Who Called:  Patient   Symptoms (please be specific):  Bladder infecton  How long has patient had these symptoms:    Pharmacy name and phone #:    [facility]  [place], [place] - [address] [address]  Phone: [phone] Fax: [phone]  Best Call Back Number: [phone]  Additional Information:

## 2019-04-18 NOTE — PROGRESS NOTES
gladys complained of burning on urination, urine obtained and shows positive UTI, sent for culture. Keflex 500 mg 1 po TID for 5 days #15 no refills , per verbal order Lubna SAHNI called into  [facility] in [place] to [place]a.

## 2019-04-18 NOTE — TELEPHONE ENCOUNTER
Patient informed that we received verbal order from Lubna Cohen for Keflex 500 mg 1 po TID  # 15 to call in.

## 2019-04-22 LAB — BACTERIA UR CULT: NORMAL

## 2019-04-29 DIAGNOSIS — E78.5 HYPERLIPIDEMIA, UNSPECIFIED HYPERLIPIDEMIA TYPE: ICD-10-CM

## 2019-04-29 RX ORDER — ATORVASTATIN CALCIUM 10 MG/1
10 TABLET, FILM COATED ORAL DAILY
Qty: 90 TABLET | Refills: 2 | Status: SHIPPED | OUTPATIENT
Start: 2019-04-29 | End: 2020-08-31

## 2019-05-16 ENCOUNTER — OFFICE VISIT (OUTPATIENT)
Dept: UROGYNECOLOGY | Facility: CLINIC | Age: 74
End: 2019-05-16
Payer: MEDICARE

## 2019-05-16 VITALS
HEART RATE: 63 BPM | HEIGHT: 65 IN | DIASTOLIC BLOOD PRESSURE: 71 MMHG | BODY MASS INDEX: 27.18 KG/M2 | WEIGHT: 163.13 LBS | SYSTOLIC BLOOD PRESSURE: 114 MMHG

## 2019-05-16 DIAGNOSIS — R35.0 URINARY FREQUENCY: ICD-10-CM

## 2019-05-16 DIAGNOSIS — N39.41 URGE INCONTINENCE OF URINE: Primary | ICD-10-CM

## 2019-05-16 LAB
BILIRUB SERPL-MCNC: NORMAL MG/DL
BLOOD URINE, POC: NORMAL
COLOR, POC UA: YELLOW
GLUCOSE UR QL STRIP: NORMAL
KETONES UR QL STRIP: NORMAL
LEUKOCYTE ESTERASE URINE, POC: NORMAL
NITRITE, POC UA: NORMAL
PH, POC UA: 8
PROTEIN, POC: NORMAL
SPECIFIC GRAVITY, POC UA: 1
UROBILINOGEN, POC UA: NORMAL

## 2019-05-16 PROCEDURE — 99999 PR PBB SHADOW E&M-EST. PATIENT-LVL IV: CPT | Mod: PBBFAC,,, | Performed by: NURSE PRACTITIONER

## 2019-05-16 PROCEDURE — 81002 URINALYSIS NONAUTO W/O SCOPE: CPT | Mod: PBBFAC,PO | Performed by: NURSE PRACTITIONER

## 2019-05-16 PROCEDURE — 99499 UNLISTED E&M SERVICE: CPT | Mod: S$PBB,,, | Performed by: NURSE PRACTITIONER

## 2019-05-16 PROCEDURE — 99999 PR PBB SHADOW E&M-EST. PATIENT-LVL IV: ICD-10-PCS | Mod: PBBFAC,,, | Performed by: NURSE PRACTITIONER

## 2019-05-16 PROCEDURE — 99214 OFFICE O/P EST MOD 30 MIN: CPT | Mod: PBBFAC,PO | Performed by: NURSE PRACTITIONER

## 2019-05-16 PROCEDURE — 99499 NO LOS: ICD-10-PCS | Mod: S$PBB,,, | Performed by: NURSE PRACTITIONER

## 2019-05-16 NOTE — PROGRESS NOTES
Subjective:       Patient ID: Jazz Martinez is a 73 y.o. female.    Chief Complaint: PTNS #8    HPI  Jazz Martinez is a 73 y.o. female who presents today for her 8th monthly PTNS therapy.  She is upset as she does not feel that the PTNS is working as well for her as it did previously.  She is having about the same number of accidents, but it is a much larger quantity of urine and she is unable to stop the flow of urine.  She has more urgency and has been having a slight increase in her nocturia to 3-4 x at night.  Her daytime frequency is about the same around 4-5 x a day. She is wondering if there is anything else that can be done to help give her some more control.    Review of Systems   Constitutional: Negative for activity change, fever and unexpected weight change.   HENT: Negative for hearing loss.    Eyes: Negative for visual disturbance.   Respiratory: Negative for shortness of breath and wheezing.    Cardiovascular: Negative for chest pain, palpitations and leg swelling.   Gastrointestinal: Negative for abdominal pain, constipation and diarrhea.   Genitourinary: Positive for frequency and urgency. Negative for dyspareunia, dysuria, vaginal bleeding and vaginal discharge.   Musculoskeletal: Negative for gait problem and neck pain.   Skin: Negative for rash and wound.   Allergic/Immunologic: Negative for immunocompromised state.   Neurological: Negative for tremors, speech difficulty and weakness.   Hematological: Does not bruise/bleed easily.   Psychiatric/Behavioral: Negative for agitation and confusion.       Objective:      Physical Exam   Constitutional: She is oriented to person, place, and time. She appears well-developed and well-nourished. No distress.   HENT:   Head: Normocephalic and atraumatic.   Neck: Neck supple. No thyromegaly present.   Pulmonary/Chest: Effort normal. No respiratory distress.   Abdominal: Soft. There is no tenderness. No hernia.   Musculoskeletal: Normal range of  motion.   Neurological: She is alert and oriented to person, place, and time.   Skin: Skin is warm and dry. No rash noted.   Psychiatric: She has a normal mood and affect. Her behavior is normal.         Assessment:       1. Urge incontinence of urine    2. Urinary frequency          Procedure note-  After informed consent obtained, Patient's right upper ankle was swabbed with alcohol.  #34 gauge needle inserted into ankle and attached to stimulator.  Treatment setting was 15 today.  Pt tolerated 30 minute therapy without discomfort.        Plan:       Urge incontinence of urine- continue with PTNS for now, but NP did review possible pelvic floor physical therapy which the pt is open to trying.    Urinary frequency- as noted above  -     POCT urine dipstick without microscope          RTC 2 weeks for PT

## 2019-05-30 DIAGNOSIS — N95.1 MENOPAUSAL SYMPTOMS: ICD-10-CM

## 2019-05-30 DIAGNOSIS — I10 HTN (HYPERTENSION), BENIGN: ICD-10-CM

## 2019-05-30 DIAGNOSIS — F32.A DEPRESSION, UNSPECIFIED DEPRESSION TYPE: ICD-10-CM

## 2019-06-04 RX ORDER — SERTRALINE HYDROCHLORIDE 100 MG/1
150 TABLET, FILM COATED ORAL DAILY
Qty: 135 TABLET | Refills: 1 | Status: SHIPPED | OUTPATIENT
Start: 2019-06-04 | End: 2020-03-27

## 2019-06-04 RX ORDER — TRIAMTERENE/HYDROCHLOROTHIAZID 37.5-25 MG
1 TABLET ORAL DAILY
Qty: 90 TABLET | Refills: 0 | Status: SHIPPED | OUTPATIENT
Start: 2019-06-04 | End: 2019-10-16 | Stop reason: SDUPTHER

## 2019-06-13 ENCOUNTER — OFFICE VISIT (OUTPATIENT)
Dept: UROGYNECOLOGY | Facility: CLINIC | Age: 74
End: 2019-06-13
Payer: MEDICARE

## 2019-06-13 VITALS — HEIGHT: 65 IN | BODY MASS INDEX: 26.08 KG/M2 | RESPIRATION RATE: 18 BRPM | WEIGHT: 156.5 LBS

## 2019-06-13 DIAGNOSIS — N39.41 URGE INCONTINENCE OF URINE: Primary | ICD-10-CM

## 2019-06-13 DIAGNOSIS — Z46.89 PESSARY MAINTENANCE: ICD-10-CM

## 2019-06-13 DIAGNOSIS — N81.11 CYSTOCELE, MIDLINE: ICD-10-CM

## 2019-06-13 DIAGNOSIS — R35.0 URINARY FREQUENCY: ICD-10-CM

## 2019-06-13 LAB
BILIRUB SERPL-MCNC: ABNORMAL MG/DL
BLOOD URINE, POC: ABNORMAL
COLOR, POC UA: YELLOW
GLUCOSE UR QL STRIP: ABNORMAL
KETONES UR QL STRIP: ABNORMAL
LEUKOCYTE ESTERASE URINE, POC: ABNORMAL
NITRITE, POC UA: ABNORMAL
PH, POC UA: 7
PROTEIN, POC: ABNORMAL
SPECIFIC GRAVITY, POC UA: 1.01
UROBILINOGEN, POC UA: ABNORMAL

## 2019-06-13 PROCEDURE — 99213 PR OFFICE/OUTPT VISIT, EST, LEVL III, 20-29 MIN: ICD-10-PCS | Mod: S$PBB,,, | Performed by: NURSE PRACTITIONER

## 2019-06-13 PROCEDURE — 99999 PR PBB SHADOW E&M-EST. PATIENT-LVL III: CPT | Mod: PBBFAC,,, | Performed by: NURSE PRACTITIONER

## 2019-06-13 PROCEDURE — 99999 PR PBB SHADOW E&M-EST. PATIENT-LVL III: ICD-10-PCS | Mod: PBBFAC,,, | Performed by: NURSE PRACTITIONER

## 2019-06-13 PROCEDURE — 81002 URINALYSIS NONAUTO W/O SCOPE: CPT | Mod: PBBFAC,PO | Performed by: NURSE PRACTITIONER

## 2019-06-13 PROCEDURE — 99213 OFFICE O/P EST LOW 20 MIN: CPT | Mod: PBBFAC,PO | Performed by: NURSE PRACTITIONER

## 2019-06-13 PROCEDURE — 99213 OFFICE O/P EST LOW 20 MIN: CPT | Mod: S$PBB,,, | Performed by: NURSE PRACTITIONER

## 2019-06-13 NOTE — PROGRESS NOTES
Subjective:       Patient ID: Jazz Martinez is a 73 y.o. female.    Chief Complaint: 3 mth pessary check    HPI  Jazz Martinez is a 73 y.o. female who presents today for routine pessary maintenance.  She has been using the ring pessary for awhile and feels that it works well for her.  She continues to have urinary frequency and urge incontinence.  She is willing to try the pelvic floor PT.  This has been authorized.  However, she is very stressed and has a lot of responsibilities in relation to her family and grandchildren that she is watching for the summer, so we are postponing the pelvic floor PT for now.  She denies any other acute complaints/concerns and is ready to proceed with the pessary.    Review of Systems   Constitutional: Negative for activity change, fever and unexpected weight change.   HENT: Negative for hearing loss.    Eyes: Negative for visual disturbance.   Respiratory: Negative for shortness of breath and wheezing.    Cardiovascular: Negative for chest pain, palpitations and leg swelling.   Gastrointestinal: Negative for abdominal pain, constipation and diarrhea.   Genitourinary: Positive for frequency and urgency. Negative for dyspareunia, dysuria, vaginal bleeding and vaginal discharge.   Musculoskeletal: Negative for gait problem and neck pain.   Skin: Negative for rash and wound.   Allergic/Immunologic: Negative for immunocompromised state.   Neurological: Negative for tremors, speech difficulty and weakness.   Hematological: Does not bruise/bleed easily.   Psychiatric/Behavioral: Negative for agitation and confusion.       Objective:      Physical Exam   Constitutional: She is oriented to person, place, and time. She appears well-developed and well-nourished. No distress.   HENT:   Head: Normocephalic and atraumatic.   Neck: Neck supple. No thyromegaly present.   Pulmonary/Chest: Effort normal. No respiratory distress.   Abdominal: Soft. There is no tenderness. No hernia.    Musculoskeletal: Normal range of motion.   Neurological: She is alert and oriented to person, place, and time.   Skin: Skin is warm and dry. No rash noted.   Psychiatric: She has a normal mood and affect. Her behavior is normal.     Pelvic Exam:  V: No lesions. No palpable nodes.   Va: small amount of thin yellow discharge.  Some mild irritation noted on the anterior vaginal wall.  Dominant midline cystocele Ba=0  Meatus:No caruncle or stenosis  Urethra: Non tender. No suburethral masses.  Cx/Cuff: Normal   Uterus: surgically absent  Ad: No mass or tenderness.  Levators :Symmetrical. Normal tone. Non tender.  BL: mildly tender  RV: No hemorrhoids.      Assessment:       1. Urge incontinence of urine    2. Urinary frequency    3. Cystocele, midline    4. Pessary maintenance          Procedure note- #2 ring pessary removed with forceps and cleaned with soap and water.  After betadine irrigation of the vagina, #2 ring pessary was reinserted into the vagina.  Pt ambulated in the room and denies any discomfort.  NP reminded pt that the first 24-48 hours are the most likely time for the pessary to fall out and to monitor the toilet before flushing.  Pt verbalized understanding.      Plan:       Urge incontinence of urine- she will schedule pelvic floor PT in the future after things settle down at home  -     POCT URINE DIPSTICK WITHOUT MICROSCOPE    Urinary frequency- as noted above    Cystocele, midline- continue with ring pessary    Pessary maintenance- continue with ring pessary    RTC 3 months for pessary

## 2019-07-08 DIAGNOSIS — R35.0 URINARY FREQUENCY: ICD-10-CM

## 2019-07-08 RX ORDER — VALACYCLOVIR HYDROCHLORIDE 500 MG/1
TABLET, FILM COATED ORAL
Qty: 12 TABLET | Refills: 3 | Status: SHIPPED | OUTPATIENT
Start: 2019-07-08 | End: 2020-11-16 | Stop reason: SDUPTHER

## 2019-07-22 ENCOUNTER — TELEPHONE (OUTPATIENT)
Dept: FAMILY MEDICINE | Facility: CLINIC | Age: 74
End: 2019-07-22

## 2019-07-22 NOTE — TELEPHONE ENCOUNTER
----- Message from Mima Woody sent at 7/22/2019 12:41 PM CDT -----  Contact: Patient  Type: Needs Medical Advice    Who Called:  Patient   Symptoms (please be specific):  Bursitis pain  Best Call Back Number:   Additional Information: Calling to request an appt next week, any day except 7/30/19 for the bursitis pain. She is requesting an injection.

## 2019-07-29 ENCOUNTER — DOCUMENTATION ONLY (OUTPATIENT)
Dept: FAMILY MEDICINE | Facility: CLINIC | Age: 74
End: 2019-07-29

## 2019-07-29 ENCOUNTER — OFFICE VISIT (OUTPATIENT)
Dept: FAMILY MEDICINE | Facility: CLINIC | Age: 74
End: 2019-07-29
Payer: MEDICARE

## 2019-07-29 VITALS
OXYGEN SATURATION: 98 % | RESPIRATION RATE: 16 BRPM | BODY MASS INDEX: 26.7 KG/M2 | HEART RATE: 60 BPM | SYSTOLIC BLOOD PRESSURE: 134 MMHG | WEIGHT: 160.25 LBS | TEMPERATURE: 98 F | DIASTOLIC BLOOD PRESSURE: 82 MMHG | HEIGHT: 65 IN

## 2019-07-29 DIAGNOSIS — E78.2 MIXED HYPERLIPIDEMIA: ICD-10-CM

## 2019-07-29 DIAGNOSIS — M70.61 TROCHANTERIC BURSITIS, RIGHT HIP: ICD-10-CM

## 2019-07-29 DIAGNOSIS — N39.492 POSTURAL URINARY INCONTINENCE: Primary | ICD-10-CM

## 2019-07-29 DIAGNOSIS — I10 ESSENTIAL HYPERTENSION: ICD-10-CM

## 2019-07-29 DIAGNOSIS — Z12.11 COLON CANCER SCREENING: ICD-10-CM

## 2019-07-29 LAB
ALBUMIN SERPL BCP-MCNC: 4.4 G/DL (ref 3.5–5.2)
ALP SERPL-CCNC: 48 U/L (ref 55–135)
ALT SERPL W/O P-5'-P-CCNC: 21 U/L (ref 10–44)
ANION GAP SERPL CALC-SCNC: 13 MMOL/L (ref 8–16)
AST SERPL-CCNC: 24 U/L (ref 10–40)
BILIRUB SERPL-MCNC: 0.3 MG/DL (ref 0.1–1)
BUN SERPL-MCNC: 23 MG/DL (ref 8–23)
CALCIUM SERPL-MCNC: 10 MG/DL (ref 8.7–10.5)
CHLORIDE SERPL-SCNC: 107 MMOL/L (ref 95–110)
CHOLEST SERPL-MCNC: 182 MG/DL (ref 120–199)
CHOLEST/HDLC SERPL: 2.8 {RATIO} (ref 2–5)
CO2 SERPL-SCNC: 25 MMOL/L (ref 23–29)
CREAT SERPL-MCNC: 0.8 MG/DL (ref 0.5–1.4)
EST. GFR  (AFRICAN AMERICAN): >60 ML/MIN/1.73 M^2
EST. GFR  (NON AFRICAN AMERICAN): >60 ML/MIN/1.73 M^2
GLUCOSE SERPL-MCNC: 87 MG/DL (ref 70–110)
HDLC SERPL-MCNC: 65 MG/DL (ref 40–75)
HDLC SERPL: 35.7 % (ref 20–50)
LDLC SERPL CALC-MCNC: 89 MG/DL (ref 63–159)
NONHDLC SERPL-MCNC: 117 MG/DL
POTASSIUM SERPL-SCNC: 4.3 MMOL/L (ref 3.5–5.1)
PROT SERPL-MCNC: 7.3 G/DL (ref 6–8.4)
SODIUM SERPL-SCNC: 145 MMOL/L (ref 136–145)
TRIGL SERPL-MCNC: 140 MG/DL (ref 30–150)

## 2019-07-29 PROCEDURE — 80061 LIPID PANEL: CPT

## 2019-07-29 PROCEDURE — 20610 DRAIN/INJ JOINT/BURSA W/O US: CPT

## 2019-07-29 PROCEDURE — 20610 LARGE JOINT ASPIRATION/INJECTION: ICD-10-PCS | Mod: RT,S$GLB,, | Performed by: INTERNAL MEDICINE

## 2019-07-29 PROCEDURE — 99214 OFFICE O/P EST MOD 30 MIN: CPT | Mod: 25,S$GLB,, | Performed by: INTERNAL MEDICINE

## 2019-07-29 PROCEDURE — 99214 PR OFFICE/OUTPT VISIT, EST, LEVL IV, 30-39 MIN: ICD-10-PCS | Mod: 25,S$GLB,, | Performed by: INTERNAL MEDICINE

## 2019-07-29 PROCEDURE — 80053 COMPREHEN METABOLIC PANEL: CPT

## 2019-07-29 PROCEDURE — 20610 DRAIN/INJ JOINT/BURSA W/O US: CPT | Mod: RT,S$GLB,, | Performed by: INTERNAL MEDICINE

## 2019-07-29 RX ORDER — METHYLPREDNISOLONE ACETATE 40 MG/ML
40 INJECTION, SUSPENSION INTRA-ARTICULAR; INTRALESIONAL; INTRAMUSCULAR; SOFT TISSUE
Status: DISCONTINUED | OUTPATIENT
Start: 2019-07-29 | End: 2019-07-31 | Stop reason: HOSPADM

## 2019-07-29 RX ADMIN — METHYLPREDNISOLONE ACETATE 40 MG: 40 INJECTION, SUSPENSION INTRA-ARTICULAR; INTRALESIONAL; INTRAMUSCULAR; SOFT TISSUE at 09:07

## 2019-07-29 NOTE — PROGRESS NOTES
Subjective:      8:58 AM     Patient ID: Jazz Martinez is a 74 y.o. female.    Chief Complaint: Hip Pain (needs labs ordered,no refills needed)    HPI       Lower_Extremity_Problems(+). Pain: yes. . Injury: no.   HPI:     ONSET/TIMING: . Onset   1 y   ago.     DURATION:   Intermittent         QUALITY/COURSE:  better ,. .     LOCATION:     r hip   . Radiation:  Lower back and right knee.      INTENSITY/SEVERITY:. Severity is #   6  (10 point scale).        MODIFIERS/TREATMENTS: Current_Limitations_are:  none..   Taking medications: no    Physical_Therapy: no.  Chiropractor: no.     SYMPTOMS/RELATED: . --Possible medication side effects include:.     The following symptoms/statements  are positive if BOLD, negative otherwise.      CONTEXT/WHEN: . Activity . weight bearing. Inactivity.  Sudden  Work related.  Similar_problems_in past.   . Litigation_pending . X-rays. CT . MRI      REVIEW OF SYMPTOMS:   Numbness. Weakness. Muscle_Problems. Fever. Joint_Problems. Swelling. Erythema. Weight_loss. Instability.      .         CHIEF COMPLAINT: Hyperlipidemia. cholesterol screening: no.   HPI:     ONSET:    MODIFIERS/TREATMENTS: . Taking medications: yes. . Non-compliance with following diet: no. .     SYMPTOMS/RELATED:Possible medication side effects include:   Myalgia: no.  .     REVIEW OF SYMPTOMS: past weights:   Wt Readings from Last 1 Encounters:   07/29/19 0835 72.7 kg (160 lb 4.4 oz)                                                     Last lipids: total   Lab Results   Component Value Date    CHOL 141 12/10/2018    CHOL 334 (H) 10/24/2018    CHOL 238 (H) 03/07/2018                                                                     HDL   Lab Results   Component Value Date    HDL 66 12/10/2018    HDL 46 10/24/2018    HDL 48 03/07/2018                                                                     LDL   Lab Results   Component Value Date    LDLCALC 57.8 (L) 12/10/2018    LDLCALC Invalid, Trig>400.0 10/24/2018  "   LDLCALC 141.6 03/07/2018                                                                     TRIG   Lab Results   Component Value Date    TRIG 86 12/10/2018    TRIG 440 (H) 10/24/2018    TRIG 242 (H) 03/07/2018                                                                           CHIEF COMPLAINT: Hypertension  HPI:     ONSET:      QUALITY/COURSE:   Unchanged.     INTENSITY/SEVERITY:  Average blood pressure is unknown.      MODIFIERS/TREATMENTS:  Taking medications: yes. .High sodium intake: no. alcohol: no      The following symptoms are positive only if BOLDED, otherwise are negative.      SYMPTOMS/RELATED: Possible medication side effects include:   Depression..  . Cough. . Constipation.    REVIEW OF SYMPTOMS: . Weight_loss . Weight_gain . Leg_cramps .Potency_problems .    TARGET ORGAN DAMAGE:: angina/ prior myocardial infarction, chronic kidney disease, heart failure, left ventricular hypertrophy, peripheral artery disease, prior coronary revascularization, retinopathy, stroke. transient ischemic attack.          Has urinary incontinence which is dramatic when she stands up as she has absolutely no control.  She has seen Urogynecology and then had another opinion where somebody with suggesting a surgery.    Review of Systems   Constitutional: Negative for diaphoresis.   Eyes: Negative for visual disturbance.   Respiratory: Negative for chest tightness and shortness of breath.    Cardiovascular: Negative for chest pain.   Neurological: Negative for dizziness, syncope, weakness and headaches.   Psychiatric/Behavioral: Negative for dysphoric mood. The patient is not nervous/anxious.          Objective:      Vitals:    07/29/19 0835   BP: 134/82   Pulse: 60   Resp: 16   Temp: 98.1 °F (36.7 °C)   TempSrc: Oral   SpO2: 98%   Weight: 72.7 kg (160 lb 4.4 oz)   Height: 5' 5" (1.651 m)   PainSc:   5   PainLoc: Hip     Physical Exam   Constitutional: She appears well-developed and well-nourished.   Cardiovascular: " Normal rate, regular rhythm and normal heart sounds.   Pulmonary/Chest: Effort normal and breath sounds normal.   Abdominal: Soft. There is no tenderness.   Musculoskeletal: She exhibits tenderness (Right greater trochanter).   Range of motion at the waist: Limited  Straight leg raising: Normal  Gait: Normal  Strength: Normal  Sensation: Normal   Neurological: She is alert.   Psychiatric: She has a normal mood and affect. Her behavior is normal. Thought content normal.   Nursing note and vitals reviewed.        Assessment:       1. Postural urinary incontinence    2. Colon cancer screening    3. Mixed hyperlipidemia    4. Essential hypertension    5. Trochanteric bursitis, right hip          Plan:       Postural urinary incontinence  -     COMMODE FOR HOME USE    Colon cancer screening  -     Fecal Immunochemical Test (iFOBT); Future; Expected date: 08/12/2019    Mixed hyperlipidemia  -     Lipid panel; Future; Expected date: 07/29/2019    Essential hypertension  -     Comprehensive metabolic panel; Future; Expected date: 07/29/2019    Trochanteric bursitis, right hip  -     Large Joint Aspiration/Injection  -     methylPREDNISolone acetate injection 40 mg      Follow up in about 3 months (around 10/29/2019).

## 2019-07-29 NOTE — PATIENT INSTRUCTIONS
Before undergoing surgery for bladder suggest getting a 2nd opinion      Low-Salt Diet  This diet removes foods that are high in salt. It also limits the amount of salt you use when cooking. It is most often used for people with high blood pressure, edema (fluid retention), and kidney, liver, or heart disease.  Table salt contains the mineral sodium. Your body needs sodium to work normally. But too much sodium can make your health problems worse. Your healthcare provider is recommending a low-salt (also called low-sodium) diet for you. Your total daily allowance of salt is 1,500 to 2,300 milligrams (mg). It is less than 1 teaspoon of table salt. This means you can have only about 500 to 700 mg of sodium at each meal. People with certain health problems should limit salt intake to the lower end of the recommended range.    When you cook, don´t add much salt. If you can cook without using salt, even better. Don´t add salt to your food at the table.  When shopping, read food labels. Salt is often called sodium on the label. Choose foods that are salt-free, low salt, or very low salt. Note that foods with reduced salt may not lower your salt intake enough.    Beans, potatoes, and pasta  Ok: Dry beans, split peas, lentils, potatoes, rice, macaroni, pasta, spaghetti without added salt  Avoid: Potato chips, tortilla chips, and similar products  Breads and cereals  Ok: Low-sodium breads, rolls, cereals, and cakes; low-salt crackers, matzo crackers  Avoid: Salted crackers, pretzels, popcorn, [place] toast, pancakes, muffins  Dairy  Ok: Milk, chocolate milk, hot chocolate mix, low-salt cheeses, and yogurt  Avoid: Processed cheese and cheese spreads; Roquefort, Camembert, and cottage cheese; buttermilk, instant breakfast drink  Desserts  Ok: Ice cream, frozen yogurt, juice bars, gelatin, cookies and pies, sugar, honey, jelly, hard candy  Avoid: Most pies, cakes and cookies prepared or processed with salt; instant  pudding  Drinks  Ok: Tea, coffee, fizzy (carbonated) drinks, juices  Avoid: Flavored coffees, electrolyte replacement drinks, sports drinks  Meats  Ok: All fresh meat, fish, poultry, low-salt tuna, eggs, egg substitute  Avoid: Smoked, pickled, brine-cured, or salted meats and fish. This includes sanchez, chipped beef, corned beef, hot dogs, deli meats, ham, kosher meats, salt pork, sausage, canned tuna, salted codfish, smoked salmon, herring, sardines, or anchovies.  Seasonings and spices  Ok: Most seasonings are okay. Good substitutes for salt include: fresh herb blends, hot sauce, lemon, garlic, capps, vinegar, dry mustard, parsley, cilantro, horseradish, tomato paste, regular margarine, mayonnaise, unsalted butter, cream cheese, vegetable oil, cream, low-salt salad dressing and gravy.  Avoid: Regular ketchup, relishes, pickles, soy sauce, teriyaki sauce, Worcestershire sauce, BBQ sauce, tartar sauce, meat tenderizer, chili sauce, regular gravy, regular salad dressing, salted butter  Soups  Ok: Low-salt soups and broths made with allowed foods  Avoid: Bouillon cubes, soups with smoked or salted meats, regular soup and broth  Vegetables  Ok: Most vegetables are okay; also low-salt tomato and vegetable juices  Avoid: Sauerkraut and other brine-soaked vegetables; pickles and other pickled vegetables; tomato juice, olives  © 9275-5202 [employer]. [address] [place] [address], [place] [address]. All rights reserved. This information is not intended as a substitute for professional medical care. Always follow your healthcare professional's instructions.    Please fill out the patient experience survey.

## 2019-07-29 NOTE — PROCEDURES
Large Joint Aspiration/Injection  Date/Time: 7/29/2019 9:37 AM  Performed by: Timur Juan MD  Authorized by: Timur Juan MD     Consent Done?:  Yes (Written)  Indications:  Pain  Timeout: Prior to procedure the correct patient, procedure, and site was verified    Prep: Patient was prepped and draped in usual sterile fashion    Needle size:  25 G  Medications:  40 mg methylPREDNISolone acetate 40 mg/mL  Patient tolerance:  Patient tolerated the procedure well with no immediate complications

## 2019-07-29 NOTE — PROGRESS NOTES
ID patient by name and date of birth.  Specimen collected with 25g butterfly to left AC using aseptic technique. Patient tolerated well.

## 2019-07-29 NOTE — PROGRESS NOTES
Health Maintenance Due   Topic Date Due    TETANUS VACCINE  07/24/1963    Colonoscopy  07/14/2019

## 2019-07-30 ENCOUNTER — TELEPHONE (OUTPATIENT)
Dept: FAMILY MEDICINE | Facility: CLINIC | Age: 74
End: 2019-07-30

## 2019-07-30 ENCOUNTER — PATIENT MESSAGE (OUTPATIENT)
Dept: FAMILY MEDICINE | Facility: CLINIC | Age: 74
End: 2019-07-30

## 2019-07-31 ENCOUNTER — TELEPHONE (OUTPATIENT)
Dept: FAMILY MEDICINE | Facility: CLINIC | Age: 74
End: 2019-07-31

## 2019-08-06 ENCOUNTER — PATIENT MESSAGE (OUTPATIENT)
Dept: FAMILY MEDICINE | Facility: CLINIC | Age: 74
End: 2019-08-06

## 2019-08-28 ENCOUNTER — TELEPHONE (OUTPATIENT)
Dept: UROGYNECOLOGY | Facility: CLINIC | Age: 74
End: 2019-08-28

## 2019-08-28 NOTE — TELEPHONE ENCOUNTER
----- Message from Manuel Jiménez sent at 8/28/2019  9:20 AM CDT -----  Contact: patient  Type: Needs Medical Advice    Who Called:  patient  Symptoms (please be specific):    How long has patient had these symptoms:    Pharmacy name and phone #:    Best Call Back Number: [phone]  Additional Information: requesting a call back regarding upcoming appointment

## 2019-09-03 ENCOUNTER — TELEPHONE (OUTPATIENT)
Dept: UROGYNECOLOGY | Facility: CLINIC | Age: 74
End: 2019-09-03

## 2019-09-03 NOTE — TELEPHONE ENCOUNTER
----- Message from Maricarmen Gonzales sent at 9/3/2019  9:37 AM CDT -----  Type: Needs Medical Advice    Who Called:  Patient   Symptoms (please be specific):  Blood in urine  How long has patient had these symptoms:  Since yesterday  Pharmacy name and phone #:    [facility]  [place] - [address]  [address] [facility] [address]  Phone: [phone] Fax: [phone]  Best Call Back Number: [phone]  Additional Information:

## 2019-09-04 ENCOUNTER — TELEPHONE (OUTPATIENT)
Dept: UROGYNECOLOGY | Facility: CLINIC | Age: 74
End: 2019-09-04

## 2019-09-04 ENCOUNTER — OFFICE VISIT (OUTPATIENT)
Dept: UROGYNECOLOGY | Facility: CLINIC | Age: 74
End: 2019-09-04
Payer: MEDICARE

## 2019-09-04 VITALS
DIASTOLIC BLOOD PRESSURE: 76 MMHG | SYSTOLIC BLOOD PRESSURE: 113 MMHG | BODY MASS INDEX: 26.26 KG/M2 | HEART RATE: 62 BPM | HEIGHT: 65 IN | WEIGHT: 157.63 LBS

## 2019-09-04 DIAGNOSIS — R35.0 URINARY FREQUENCY: Primary | ICD-10-CM

## 2019-09-04 DIAGNOSIS — N89.8 VAGINAL IRRITATION FROM PESSARY: ICD-10-CM

## 2019-09-04 DIAGNOSIS — N81.11 CYSTOCELE, MIDLINE: ICD-10-CM

## 2019-09-04 DIAGNOSIS — T83.89XA VAGINAL IRRITATION FROM PESSARY: ICD-10-CM

## 2019-09-04 DIAGNOSIS — N39.41 URGE INCONTINENCE OF URINE: ICD-10-CM

## 2019-09-04 DIAGNOSIS — N93.9 VAGINAL BLEEDING: ICD-10-CM

## 2019-09-04 DIAGNOSIS — N89.8 VAGINAL DISCHARGE: ICD-10-CM

## 2019-09-04 LAB
BILIRUB SERPL-MCNC: NORMAL MG/DL
BLOOD URINE, POC: NORMAL
COLOR, POC UA: NORMAL
GLUCOSE UR QL STRIP: NORMAL
KETONES UR QL STRIP: NORMAL
LEUKOCYTE ESTERASE URINE, POC: NORMAL
NITRITE, POC UA: NORMAL
PH, POC UA: 9
PROTEIN, POC: NORMAL
SPECIFIC GRAVITY, POC UA: 1005
UROBILINOGEN, POC UA: NORMAL

## 2019-09-04 PROCEDURE — 81002 URINALYSIS NONAUTO W/O SCOPE: CPT | Mod: PBBFAC,PO | Performed by: NURSE PRACTITIONER

## 2019-09-04 PROCEDURE — 99213 PR OFFICE/OUTPT VISIT, EST, LEVL III, 20-29 MIN: ICD-10-PCS | Mod: S$PBB,,, | Performed by: NURSE PRACTITIONER

## 2019-09-04 PROCEDURE — 99213 OFFICE O/P EST LOW 20 MIN: CPT | Mod: S$PBB,,, | Performed by: NURSE PRACTITIONER

## 2019-09-04 PROCEDURE — 99214 OFFICE O/P EST MOD 30 MIN: CPT | Mod: PBBFAC,PO | Performed by: NURSE PRACTITIONER

## 2019-09-04 PROCEDURE — 99999 PR PBB SHADOW E&M-EST. PATIENT-LVL IV: CPT | Mod: PBBFAC,,, | Performed by: NURSE PRACTITIONER

## 2019-09-04 PROCEDURE — 99999 PR PBB SHADOW E&M-EST. PATIENT-LVL IV: ICD-10-PCS | Mod: PBBFAC,,, | Performed by: NURSE PRACTITIONER

## 2019-09-04 RX ORDER — METRONIDAZOLE 7.5 MG/G
1 GEL VAGINAL NIGHTLY
Qty: 70 G | Refills: 0 | Status: SHIPPED | OUTPATIENT
Start: 2019-09-04 | End: 2019-09-11

## 2019-09-04 NOTE — TELEPHONE ENCOUNTER
I called and spoke with the pharmacy and there is not really anything cheaper.  As this is too expensive, I'll have her use trimosan.  If she doesn't have any at home, she can stop by the office for some.

## 2019-09-04 NOTE — PROGRESS NOTES
"Subjective:       Patient ID: Jazz Martinez is a 74 y.o. female.    Chief Complaint: Hematuria    HPI  Jazz Martinez is a 74 y.o. female who presents today for bleeding.  She started having some spotting on Monday night.  Then yesterday morning she had a large "stream of blood" when she was wiping.  She has some lower abd pressure, but denies any real dysuria.  Her UA today is negative.  She does also feel that she has a strong vaginal odor that is bothersome to her.  She does think that the blood might be coming from the vaginal area and the pessary.  She has decided that she would like to talk to Dr. Putnam about possible surgical correction of her prolapse.  She is tired of using the pessary but she is anxious about surgical intervention as she had surgery with Dr. Oakes in the past and developed blood clots.  She also feels that surgical correction of her prolapse would help her urinary symptoms, but NP did caution her that they might not completely resolve with surgery.  Pt denies any other acute complaints/concerns at this time.    Review of Systems   Constitutional: Negative for activity change, fever and unexpected weight change.   HENT: Negative for hearing loss.    Eyes: Negative for visual disturbance.   Respiratory: Negative for shortness of breath and wheezing.    Cardiovascular: Negative for chest pain, palpitations and leg swelling.   Gastrointestinal: Negative for abdominal pain, constipation and diarrhea.   Genitourinary: Positive for frequency, urgency, vaginal bleeding and vaginal discharge. Negative for dyspareunia and dysuria.   Musculoskeletal: Negative for gait problem and neck pain.   Skin: Negative for rash and wound.   Allergic/Immunologic: Negative for immunocompromised state.   Neurological: Negative for tremors, speech difficulty and weakness.   Hematological: Does not bruise/bleed easily.   Psychiatric/Behavioral: Negative for agitation and confusion.       Objective:    "   Physical Exam   Constitutional: She is oriented to person, place, and time. She appears well-developed and well-nourished. No distress.   HENT:   Head: Normocephalic and atraumatic.   Neck: Neck supple. No thyromegaly present.   Pulmonary/Chest: Effort normal. No respiratory distress.   Abdominal: Soft. There is tenderness in the right lower quadrant and suprapubic area. No hernia.   Mild abd tenderness   Musculoskeletal: Normal range of motion.   Neurological: She is alert and oriented to person, place, and time.   Skin: Skin is warm and dry. No rash noted.   Psychiatric: She has a normal mood and affect. Her behavior is normal.     Pelvic Exam:  V: No lesions. No palpable nodes.   Va: mod. Amount of thin yellow discharge.  Small area of irritation noted on the right vaginal wall at approx. The 10 o'clock position that is oozing.  Area treated with silver nitrate.  Midline cystocele Ba=0  Meatus:No caruncle or stenosis  Urethra: Non tender. No suburethral masses.  Cx/Cuff: Normal   Uterus: surgically absent  Ad: No mass or tenderness.  Levators :Symmetrical. Normal tone. Tender on the Right.  BL: mildly tender  RV: No hemorrhoids.      Assessment:       1. Urinary frequency    2. Vaginal bleeding    3. Vaginal irritation from pessary    4. Vaginal discharge    5. Cystocele, midline    6. Urge incontinence of urine          Procedure note- #2 ring pessary removed with forceps and cleaned with soap and water. We will leave the pessary out at this time and it was sent home with the pt.    Plan:       Urinary frequency- monitor  -     POCT URINE DIPSTICK WITHOUT MICROSCOPE    Vaginal bleeding- area cauterized with silver nitrate    Vaginal irritation from pessary- as noted below  -     metroNIDAZOLE (METROGEL) 0.75 % vaginal gel; Place 1 applicator vaginally nightly. for 7 days  Dispense: 70 g; Refill: 0    Vaginal discharge- metrogel as noted    Cystocele, midline- we will leave the pessary out at this time.  Pt  will meet with Dr. Putnam for evaluation of possible robotic surgical correction of her prolapse.    Urge incontinence of urine- monitor    RTC 1 week with Dr. Putnam

## 2019-09-04 NOTE — TELEPHONE ENCOUNTER
----- Message from Caitlyn Pena sent at 9/4/2019  3:26 PM CDT -----  Contact: patient  Type: Needs Medical Advice    Who Called:  patient  Symptoms (please be specific):  na  How long has patient had these symptoms:  jennifer  Pharmacy name and phone #:  jennifer  Best Call Back Number: [phone]  Additional Information: Patients states cream prescribed at today visit was too expensive 86.00, please call in an alternative.  Please call to advise. Thanks!

## 2019-09-11 ENCOUNTER — OFFICE VISIT (OUTPATIENT)
Dept: UROGYNECOLOGY | Facility: CLINIC | Age: 74
End: 2019-09-11
Payer: MEDICARE

## 2019-09-11 VITALS
HEART RATE: 84 BPM | SYSTOLIC BLOOD PRESSURE: 127 MMHG | WEIGHT: 157.19 LBS | BODY MASS INDEX: 26.19 KG/M2 | DIASTOLIC BLOOD PRESSURE: 76 MMHG | HEIGHT: 65 IN

## 2019-09-11 DIAGNOSIS — N95.2 POSTMENOPAUSE ATROPHIC VAGINITIS: ICD-10-CM

## 2019-09-11 DIAGNOSIS — N81.11 CYSTOCELE, MIDLINE: ICD-10-CM

## 2019-09-11 DIAGNOSIS — T83.89XA VAGINAL IRRITATION FROM PESSARY: ICD-10-CM

## 2019-09-11 DIAGNOSIS — N99.3 PROLAPSE OF VAGINAL VAULT AFTER HYSTERECTOMY: ICD-10-CM

## 2019-09-11 DIAGNOSIS — R35.0 URINARY FREQUENCY: Primary | ICD-10-CM

## 2019-09-11 DIAGNOSIS — N89.8 VAGINAL IRRITATION FROM PESSARY: ICD-10-CM

## 2019-09-11 LAB
BILIRUB SERPL-MCNC: ABNORMAL MG/DL
BLOOD URINE, POC: ABNORMAL
COLOR, POC UA: ABNORMAL
GLUCOSE UR QL STRIP: ABNORMAL
KETONES UR QL STRIP: ABNORMAL
LEUKOCYTE ESTERASE URINE, POC: ABNORMAL
NITRITE, POC UA: ABNORMAL
PH, POC UA: 8
PROTEIN, POC: ABNORMAL
SPECIFIC GRAVITY, POC UA: 1000
UROBILINOGEN, POC UA: ABNORMAL

## 2019-09-11 PROCEDURE — 99214 OFFICE O/P EST MOD 30 MIN: CPT | Mod: PBBFAC,PO | Performed by: OBSTETRICS & GYNECOLOGY

## 2019-09-11 PROCEDURE — 81002 URINALYSIS NONAUTO W/O SCOPE: CPT | Mod: PBBFAC,PO | Performed by: OBSTETRICS & GYNECOLOGY

## 2019-09-11 PROCEDURE — 99215 PR OFFICE/OUTPT VISIT, EST, LEVL V, 40-54 MIN: ICD-10-PCS | Mod: S$PBB,,, | Performed by: OBSTETRICS & GYNECOLOGY

## 2019-09-11 PROCEDURE — 99999 PR PBB SHADOW E&M-EST. PATIENT-LVL IV: CPT | Mod: PBBFAC,,, | Performed by: OBSTETRICS & GYNECOLOGY

## 2019-09-11 PROCEDURE — 99999 PR PBB SHADOW E&M-EST. PATIENT-LVL IV: ICD-10-PCS | Mod: PBBFAC,,, | Performed by: OBSTETRICS & GYNECOLOGY

## 2019-09-11 PROCEDURE — 99215 OFFICE O/P EST HI 40 MIN: CPT | Mod: S$PBB,,, | Performed by: OBSTETRICS & GYNECOLOGY

## 2019-09-11 RX ORDER — ESTRADIOL 0.1 MG/G
1 CREAM VAGINAL
Qty: 12 G | Refills: 11 | Status: SHIPPED | OUTPATIENT
Start: 2019-09-11 | End: 2019-09-13

## 2019-09-11 NOTE — PROGRESS NOTES
Subjective:      Patient ID: Jazz Martinez is a 74 y.o. female.    Chief Complaint:  discuss sx      History of Present Illness  74-year-old para 100 1 times  history of hysterectomy along with bladder suspension x2 secondary to prolapse after the last 1 9 years ago patient developed blood clots since then patient has been trying to manage her recurrent prolapse with pessary however now she has started wearing pessary in fact there has been trauma to the vaginal epithelium secondary to pessary utilization in she just does not want to utilize any more.  At additionally patient was substantial leakage of urine with coughing and sneezing as well as frequent urination.  The review of the electronic medical record does not show the OR case a patient states that it was done at an ambulatory surgery center 9 years ago that at this place stool and business an open that patient will obtain operative record form 80 cc          Past Medical History:   Diagnosis Date    Corns and callosities     Deep vein thrombosis     GERD (gastroesophageal reflux disease)     Gout     H/O bladder problems     uses pessary    Hyperlipidemia     Hypertension     Pulmonary embolism        Past Surgical History:   Procedure Laterality Date    APPENDECTOMY  AGE 12    BLADDER SURGERY      Twice    BREAST CYST EXCISION      Benign per patient    HAND SURGERY Left     HYSTERECTOMY      Not related to cancer per patient.    OOPHORECTOMY         GYN & OB History  No LMP recorded. Patient has had a hysterectomy.   Date of Last Pap: No result found    OB History    Para Term  AB Living   1 1 1     1   SAB TAB Ectopic Multiple Live Births                  # Outcome Date GA Lbr Cesar/2nd Weight Sex Delivery Anes PTL Lv   1 Term               Obstetric Comments          Health Maintenance       Date Due Completion Date    TETANUS VACCINE 1961 ---    Shingles Vaccine (2 of 3) 2015 3/14/2015     "Colonoscopy 07/14/2019 7/14/2014    Influenza Vaccine (1) 09/01/2019 10/15/2018    Lipid Panel 07/29/2020 7/29/2019    DEXA SCAN 03/27/2021 3/27/2018    Override on 10/14/2014: [facility] ([facility]          Family History   Problem Relation Age of Onset    Cancer Brother         bladder    Breast cancer Neg Hx     Colon cancer Neg Hx     Ovarian cancer Neg Hx        Social History     Socioeconomic History    Marital status:      Spouse name: Not on file    Number of children: Not on file    Years of education: Not on file    Highest education level: Not on file   Occupational History    Not on file   Social Needs    Financial resource strain: Not on file    Food insecurity:     Worry: Not on file     Inability: Not on file    Transportation needs:     Medical: Not on file     Non-medical: Not on file   Tobacco Use    Smoking status: Never Smoker    Smokeless tobacco: Never Used   Substance and Sexual Activity    Alcohol use: No    Drug use: No    Sexual activity: Never   Lifestyle    Physical activity:     Days per week: Not on file     Minutes per session: Not on file    Stress: Not on file   Relationships    Social connections:     Talks on phone: Not on file     Gets together: Not on file     Attends Jainism service: Not on file     Active member of club or organization: Not on file     Attends meetings of clubs or organizations: Not on file     Relationship status: Not on file   Other Topics Concern    Not on file   Social History Narrative    Not on file       Review of Systems  Review of Systems   Genitourinary: Positive for frequency (Incontinence incontinence with coughing sneezing movement).    Positive bulge       Objective:   /76   Pulse 84   Ht 5' 5" (1.651 m)   Wt 71.3 kg (157 lb 3 oz)   BMI 26.16 kg/m²     Physical Exam   Genitourinary: Pelvic exam was performed with patient supine. Skenes normal. Right labia normal. Urethra exhibits no urethral " caruncle, no urethral diverticulum, no urethral mass and no hypermobility. There is a cystocele (BpH to +1) and unspecified prolapse of vaginal walls ( pulled FCI to the introitus) in the vagina. No rectocele in the vagina. Right adnexum displays no mass. Left adnexum displays no mass. Cervix exhibits absence. Uterus is absent.   POP-Q  Aa: -1 Ba: 1 C: -4   GH: 2 PB: 2 TVL: 9   Ap: -2 Bp:  D:                       Obvious fixation of bladder neck       Assessment:     1. Urinary frequency    2. Cystocele, midline    3. Vaginal irritation from pessary    4. Prolapse of vaginal vault after hysterectomy    5. Postmenopause atrophic vaginitis            Plan:     1. Urinary frequency    2. Cystocele, midline    3. Vaginal irritation from pessary    4. Prolapse of vaginal vault after hysterectomy    5. Postmenopause atrophic vaginitis      After examination I used [employer] [facility] interactive web site to diagram prolapse  I then discussed her options.  Utilizing patient leaflets we did discuss sacral colpopexy in relation to native tissue repair given graft augmentation represents may be a less chance of yet another recurrence I am going to move recommend with sacral colpopexy patient does not want to lose the ability for possible coital activity in future for that reason colpocleisis will not be covered     Will be carrying out urodynamics and cysto as well as hormone supplementation transvaginal UDS insistent will take place in the next 6 weeks patient understanding pressure of the time spent with her today.  Total time 40 min greater than 50% time 30 min in direct discussion given options and discussing approach patient pleased      Patient Instructions

## 2019-09-12 DIAGNOSIS — N95.2 POSTMENOPAUSE ATROPHIC VAGINITIS: Primary | ICD-10-CM

## 2019-09-12 DIAGNOSIS — T83.89XA VAGINAL IRRITATION FROM PESSARY: ICD-10-CM

## 2019-09-12 DIAGNOSIS — N89.8 VAGINAL IRRITATION FROM PESSARY: ICD-10-CM

## 2019-09-12 NOTE — TELEPHONE ENCOUNTER
----- Message from Bashir Rojas sent at 9/12/2019  3:50 PM CDT -----  Contact: pt  Type: Needs Medical Advice    Who Called:  pt    Best Call Back Number: [phone]  Additional Information: pt states that the medication estradiol (ESTRACE) 0.01 % (0.1 mg/gram) vaginal cream is too expensive. Pt states that the medication is $156 after insurance. Pt would like to discuss options. Please call to advise.

## 2019-09-13 NOTE — TELEPHONE ENCOUNTER
Pt. Informed me that Estrace was too expensive and would like something cheaper called in if possible. I encouraged pt. To visit Estrace website for possible coupons which may be of some assistance. I also informed pt. That I would send a message to  to see if there is anything cheaper. Pt. Thanked me and ended the call.

## 2019-09-16 ENCOUNTER — CLINICAL SUPPORT (OUTPATIENT)
Dept: UROGYNECOLOGY | Facility: CLINIC | Age: 74
End: 2019-09-16
Payer: MEDICARE

## 2019-09-16 ENCOUNTER — TELEPHONE (OUTPATIENT)
Dept: UROGYNECOLOGY | Facility: CLINIC | Age: 74
End: 2019-09-16

## 2019-09-16 DIAGNOSIS — R30.0 BURNING WITH URINATION: Primary | ICD-10-CM

## 2019-09-16 DIAGNOSIS — R35.0 URINARY FREQUENCY: Primary | ICD-10-CM

## 2019-09-16 DIAGNOSIS — R39.82 CHRONIC BLADDER PAIN: ICD-10-CM

## 2019-09-16 DIAGNOSIS — Z87.440 HISTORY OF RECURRENT UTIS: ICD-10-CM

## 2019-09-16 LAB
BILIRUB SERPL-MCNC: ABNORMAL MG/DL
BLOOD URINE, POC: ABNORMAL
COLOR, POC UA: YELLOW
GLUCOSE UR QL STRIP: ABNORMAL
KETONES UR QL STRIP: ABNORMAL
LEUKOCYTE ESTERASE URINE, POC: ABNORMAL
NITRITE, POC UA: ABNORMAL
PH, POC UA: 5
PROTEIN, POC: ABNORMAL
SPECIFIC GRAVITY, POC UA: 1015
UROBILINOGEN, POC UA: ABNORMAL

## 2019-09-16 PROCEDURE — 87077 CULTURE AEROBIC IDENTIFY: CPT

## 2019-09-16 PROCEDURE — 87088 URINE BACTERIA CULTURE: CPT

## 2019-09-16 PROCEDURE — 87086 URINE CULTURE/COLONY COUNT: CPT

## 2019-09-16 PROCEDURE — 87186 SC STD MICRODIL/AGAR DIL: CPT

## 2019-09-16 PROCEDURE — 81002 URINALYSIS NONAUTO W/O SCOPE: CPT | Mod: PBBFAC,PO

## 2019-09-16 RX ORDER — NITROFURANTOIN (MACROCRYSTALS) 100 MG/1
100 CAPSULE ORAL EVERY 12 HOURS
Qty: 14 CAPSULE | Refills: 0 | Status: CANCELLED | OUTPATIENT
Start: 2019-09-16

## 2019-09-16 RX ORDER — CEPHALEXIN 500 MG/1
500 CAPSULE ORAL EVERY 12 HOURS
Qty: 10 CAPSULE | Refills: 0 | Status: SHIPPED | OUTPATIENT
Start: 2019-09-16 | End: 2019-09-21

## 2019-09-16 RX ORDER — NITROFURANTOIN (MACROCRYSTALS) 100 MG/1
100 CAPSULE ORAL 2 TIMES DAILY
Qty: 10 CAPSULE | Refills: 0 | Status: SHIPPED | OUTPATIENT
Start: 2019-09-16 | End: 2019-09-21

## 2019-09-16 NOTE — TELEPHONE ENCOUNTER
I spoke to pt. And she informed me that she spoke to her insurance company and was informed that Estrace was yasmin expensive because she hadn't met her deductible, however pt. Stated she picked up the original Estrace rx. And will do what she needs to do as it relates to cost. Additionally pt. Has c/o's of UTI(burning and dysuria) and stated she is usually treated by her PCP w/Cipro, but would like to know if  would be willing to treat her,  I notified the pt. That we would order a urine culture to [facility] and treat her empirically while awaiting results. Pt. Very appreciative and verbalized understanding.

## 2019-09-16 NOTE — TELEPHONE ENCOUNTER
----- Message from Brien Watters sent at 9/16/2019  8:25 AM CDT -----  PT RETURNING YOUR CALL [phone]

## 2019-09-16 NOTE — TELEPHONE ENCOUNTER
----- Message from Javier Putnam MD sent at 9/16/2019  4:20 PM CDT -----  done  ----- Message -----  From: Kendra Serrano LPN  Sent: 9/16/2019   3:55 PM  To: Javier Putnam MD    The nitrofurantion is on her allergy , if there is something else, please, can't take sulfa either.  ----- Message -----  From: Javier Putnam MD  Sent: 9/16/2019   3:53 PM  To: Kendra Serrano LPN    Done captain    ----- Message -----  From: Kendra Serrano LPN  Sent: 9/16/2019   3:33 PM  To: Javier Putnam MD    patient came by clinic to drop off urine complaints of spotting blood on Saturday, and having burning and pain when urinating. Is asking for something to be called in antibiotic  And something for discomfort, states she had taken AZO and it does not seem to help. Urine was positive for UTI: SP 1.015; PH 5; Leuk 1+;Nitrile  +; protein Trace;glucose normal; ketones negative; urobil normal;bili 2+;and blood trace. Please send something to pharmacy?

## 2019-09-16 NOTE — PROGRESS NOTES
patient dropped by clinic has burning on urination , spotting of blood on Saturday, and pain when urinating. Would like to get something called in for  This , has tried AZO but it did not help. Informed I would let Dr Putnam know and get back with her.

## 2019-09-19 LAB — BACTERIA UR CULT: ABNORMAL

## 2019-09-26 ENCOUNTER — OFFICE VISIT (OUTPATIENT)
Dept: FAMILY MEDICINE | Facility: CLINIC | Age: 74
End: 2019-09-26
Payer: MEDICARE

## 2019-09-26 ENCOUNTER — HOSPITAL ENCOUNTER (OUTPATIENT)
Dept: RADIOLOGY | Facility: HOSPITAL | Age: 74
Discharge: HOME | End: 2019-09-26
Attending: INTERNAL MEDICINE
Payer: MEDICARE

## 2019-09-26 VITALS
HEIGHT: 65 IN | SYSTOLIC BLOOD PRESSURE: 136 MMHG | DIASTOLIC BLOOD PRESSURE: 86 MMHG | BODY MASS INDEX: 26.6 KG/M2 | OXYGEN SATURATION: 98 % | HEART RATE: 64 BPM | WEIGHT: 159.63 LBS | TEMPERATURE: 98 F | RESPIRATION RATE: 16 BRPM

## 2019-09-26 DIAGNOSIS — R60.0 LEG EDEMA, LEFT: ICD-10-CM

## 2019-09-26 DIAGNOSIS — S93.492A SPRAIN OF ANTERIOR TALOFIBULAR LIGAMENT OF LEFT ANKLE, INITIAL ENCOUNTER: Primary | ICD-10-CM

## 2019-09-26 DIAGNOSIS — N30.01 ACUTE CYSTITIS WITH HEMATURIA: ICD-10-CM

## 2019-09-26 PROBLEM — G43.909 MIGRAINE: Status: ACTIVE | Noted: 2019-09-26

## 2019-09-26 PROCEDURE — 90662 FLU VACCINE - HIGH DOSE (65+) PRESERVATIVE FREE IM: ICD-10-PCS | Mod: S$GLB,,, | Performed by: INTERNAL MEDICINE

## 2019-09-26 PROCEDURE — G0008 ADMIN INFLUENZA VIRUS VAC: HCPCS | Mod: S$GLB,,, | Performed by: INTERNAL MEDICINE

## 2019-09-26 PROCEDURE — 99214 OFFICE O/P EST MOD 30 MIN: CPT | Mod: 25,S$GLB,, | Performed by: INTERNAL MEDICINE

## 2019-09-26 PROCEDURE — 93971 US LOWER EXTREMITY VEINS LEFT: ICD-10-PCS | Mod: 26,LT,, | Performed by: RADIOLOGY

## 2019-09-26 PROCEDURE — 93971 EXTREMITY STUDY: CPT | Mod: 26,LT,, | Performed by: RADIOLOGY

## 2019-09-26 PROCEDURE — 99214 PR OFFICE/OUTPT VISIT, EST, LEVL IV, 30-39 MIN: ICD-10-PCS | Mod: 25,S$GLB,, | Performed by: INTERNAL MEDICINE

## 2019-09-26 PROCEDURE — 90662 IIV NO PRSV INCREASED AG IM: CPT | Mod: S$GLB,,, | Performed by: INTERNAL MEDICINE

## 2019-09-26 PROCEDURE — 93971 EXTREMITY STUDY: CPT | Mod: TC,LT

## 2019-09-26 PROCEDURE — G0008 FLU VACCINE - HIGH DOSE (65+) PRESERVATIVE FREE IM: ICD-10-PCS | Mod: S$GLB,,, | Performed by: INTERNAL MEDICINE

## 2019-09-26 RX ORDER — PREDNISONE 20 MG/1
40 TABLET ORAL DAILY
Qty: 4 TABLET | Refills: 0 | Status: SHIPPED | OUTPATIENT
Start: 2019-09-26 | End: 2019-09-28

## 2019-09-26 RX ORDER — TRAMADOL HYDROCHLORIDE 50 MG/1
50 TABLET ORAL EVERY 6 HOURS PRN
Qty: 30 TABLET | Refills: 0 | Status: SHIPPED | OUTPATIENT
Start: 2019-09-26 | End: 2019-11-04

## 2019-09-26 NOTE — PATIENT INSTRUCTIONS
Use of ox a walker and only use partial weight-bearing.        Thank you for choosing [facility].     Please fill out the patient experience survey.

## 2019-09-26 NOTE — PROGRESS NOTES
Subjective:      8:25 AM     Patient ID: Jazz Martinez is a 74 y.o. female.    Chief Complaint: Foot Pain (no refills needed)    HPI     Patient says she has had no shortness of breath.  She has had blood clots in the past.    Lower_Extremity_Problems(+). Pain: yes. . Injury: no.   HPI:  Has had 1 prior episode of gout according to the patient.    ONSET/TIMING: . Onset   2 weeks ago   ago.     DURATION:   Intermittent         QUALITY/COURSE:    Worse for 4 days,. .     LOCATION:     Left ankle laterally   . Radiation: no.      INTENSITY/SEVERITY:. Severity is #   9 with weight-bearing   (10 point scale).        MODIFIERS/TREATMENTS: Current_Limitations_are:  none..   Taking medications: no    Physical_Therapy: no.  Chiropractor: no.     SYMPTOMS/RELATED: . --Possible medication side effects include:.     The following symptoms/statements  are positive if BOLD, negative otherwise.      CONTEXT/WHEN: . Activity . weight bearing. Inactivity.  Sudden  Work related.  Similar_problems_in past.   . Litigation_pending . X-rays. CT . MRI      REVIEW OF SYMPTOMS:   Numbness. Weakness. Muscle_Problems. Fever. Joint_Problems. Swelling. Erythema. Weight_loss. Instability.      .   Had a recent urinary tract infection but that is resolved now.  Was put on antibiotics is seeing Urogynecology with surgery planned.    Hip pain is better    Review of Systems   Constitutional: Negative for activity change and unexpected weight change.   HENT: Negative for hearing loss, rhinorrhea and trouble swallowing.    Eyes: Negative for discharge and visual disturbance.   Respiratory: Negative for chest tightness and wheezing.    Cardiovascular: Negative for chest pain and palpitations.   Gastrointestinal: Negative for blood in stool, constipation, diarrhea and vomiting.   Endocrine: Negative for polydipsia and polyuria.   Genitourinary: Positive for difficulty urinating, dysuria and hematuria.   Musculoskeletal: Positive for arthralgias and  "joint swelling. Negative for neck pain.   Neurological: Negative for weakness and headaches.   Psychiatric/Behavioral: Positive for dysphoric mood. Negative for confusion.         Objective:      Vitals:    09/26/19 0822   BP: 136/86   Pulse: 64   Resp: 16   Temp: 98 °F (36.7 °C)   TempSrc: Oral   SpO2: 98%   Weight: 72.4 kg (159 lb 9.8 oz)   Height: 5' 5" (1.651 m)   PainSc:   7   PainLoc: Foot     Physical Exam   Constitutional: She appears well-developed and well-nourished.   Cardiovascular: Normal rate, regular rhythm and normal heart sounds.   Pulmonary/Chest: Effort normal and breath sounds normal.   Abdominal: Soft. There is no tenderness.   Musculoskeletal: She exhibits edema (Left calf is swollen up to the knee and the foot is quite swollen 3+.) and tenderness ( fibula talar ligament tender and painful especially with valgus stress.).   Left ankle anterior drawer sign is negative.  The patient does not have pain with passive motion of the ankle.  Ankle is not hot   Neurological: She is alert.   Psychiatric: She has a normal mood and affect. Her behavior is normal. Thought content normal.   Nursing note and vitals reviewed.        Assessment:       1. Sprain of anterior talofibular ligament of left ankle, initial encounter    2. Leg edema, left    3. Acute cystitis with hematuria          Plan:   Doubt   she has gout as she has painless passive motion.  Probably has a sprain but need to rule out a DVT    Sprain of anterior talofibular ligament of left ankle, initial encounter  -     traMADol (ULTRAM) 50 mg tablet; Take 1 tablet (50 mg total) by mouth every 6 (six) hours as needed for Pain.  Dispense: 30 tablet; Refill: 0  -     GEL ANKLE SPLINT FOR HOME USE  -     Ambulatory referral/consult to Orthopedics; Future; Expected date: 09/26/2019  -     predniSONE (DELTASONE) 20 MG tablet; Take 2 tablets (40 mg total) by mouth once daily. for 2 days  Dispense: 4 tablet; Refill: 0    Leg edema, left  -     US Lower " Extremity Veins Left; Future; Expected date: 09/26/2019    Acute cystitis with hematuria    Other orders  -     Influenza - High Dose (65+) (PF) (IM)      Follow up for if you are not better return in one week.

## 2019-09-26 NOTE — PROGRESS NOTES
Normal study.  No blood clot in the leg    This was reviewed by Dr. Juan.  If you have any questions needs or problems let us know

## 2019-09-30 DIAGNOSIS — M25.572 LEFT ANKLE PAIN, UNSPECIFIED CHRONICITY: Primary | ICD-10-CM

## 2019-10-01 ENCOUNTER — OFFICE VISIT (OUTPATIENT)
Dept: ORTHOPEDICS | Facility: CLINIC | Age: 74
End: 2019-10-01
Payer: MEDICARE

## 2019-10-01 ENCOUNTER — CLINICAL SUPPORT (OUTPATIENT)
Dept: UROGYNECOLOGY | Facility: CLINIC | Age: 74
End: 2019-10-01
Payer: MEDICARE

## 2019-10-01 ENCOUNTER — HOSPITAL ENCOUNTER (OUTPATIENT)
Dept: RADIOLOGY | Facility: HOSPITAL | Age: 74
Discharge: HOME | End: 2019-10-01
Attending: ORTHOPAEDIC SURGERY
Payer: MEDICARE

## 2019-10-01 VITALS
WEIGHT: 159.63 LBS | BODY MASS INDEX: 26.6 KG/M2 | DIASTOLIC BLOOD PRESSURE: 72 MMHG | HEART RATE: 61 BPM | HEIGHT: 65 IN | SYSTOLIC BLOOD PRESSURE: 162 MMHG

## 2019-10-01 DIAGNOSIS — M25.572 LEFT ANKLE PAIN, UNSPECIFIED CHRONICITY: ICD-10-CM

## 2019-10-01 DIAGNOSIS — S93.492A SPRAIN OF ANTERIOR TALOFIBULAR LIGAMENT OF LEFT ANKLE, INITIAL ENCOUNTER: ICD-10-CM

## 2019-10-01 DIAGNOSIS — R35.0 URINARY FREQUENCY: Primary | ICD-10-CM

## 2019-10-01 LAB
BILIRUB SERPL-MCNC: ABNORMAL MG/DL
BLOOD URINE, POC: ABNORMAL
COLOR, POC UA: YELLOW
GLUCOSE UR QL STRIP: ABNORMAL
KETONES UR QL STRIP: ABNORMAL
LEUKOCYTE ESTERASE URINE, POC: ABNORMAL
NITRITE, POC UA: ABNORMAL
PH, POC UA: 8
PROTEIN, POC: ABNORMAL
SPECIFIC GRAVITY, POC UA: 1
UROBILINOGEN, POC UA: ABNORMAL

## 2019-10-01 PROCEDURE — 87086 URINE CULTURE/COLONY COUNT: CPT

## 2019-10-01 PROCEDURE — 73610 X-RAY EXAM OF ANKLE: CPT | Mod: TC,PN,LT

## 2019-10-01 PROCEDURE — 81002 URINALYSIS NONAUTO W/O SCOPE: CPT | Mod: PBBFAC,PO

## 2019-10-01 PROCEDURE — 99999 PR PBB SHADOW E&M-EST. PATIENT-LVL III: ICD-10-PCS | Mod: PBBFAC,,, | Performed by: ORTHOPAEDIC SURGERY

## 2019-10-01 PROCEDURE — 99203 PR OFFICE/OUTPT VISIT, NEW, LEVL III, 30-44 MIN: ICD-10-PCS | Mod: S$PBB,,, | Performed by: ORTHOPAEDIC SURGERY

## 2019-10-01 PROCEDURE — 99203 OFFICE O/P NEW LOW 30 MIN: CPT | Mod: S$PBB,,, | Performed by: ORTHOPAEDIC SURGERY

## 2019-10-01 PROCEDURE — 73610 XR ANKLE COMPLETE 3 VIEW LEFT: ICD-10-PCS | Mod: 26,LT,, | Performed by: RADIOLOGY

## 2019-10-01 PROCEDURE — 73610 X-RAY EXAM OF ANKLE: CPT | Mod: 26,LT,, | Performed by: RADIOLOGY

## 2019-10-01 PROCEDURE — 99999 PR PBB SHADOW E&M-EST. PATIENT-LVL III: CPT | Mod: PBBFAC,,, | Performed by: ORTHOPAEDIC SURGERY

## 2019-10-01 PROCEDURE — 99213 OFFICE O/P EST LOW 20 MIN: CPT | Mod: PBBFAC,25,PN | Performed by: ORTHOPAEDIC SURGERY

## 2019-10-01 RX ORDER — CEPHALEXIN 500 MG/1
500 CAPSULE ORAL EVERY 8 HOURS
Qty: 15 CAPSULE | Refills: 0 | Status: SHIPPED | OUTPATIENT
Start: 2019-10-01 | End: 2019-10-06

## 2019-10-01 NOTE — PROGRESS NOTES
Walked into clinic complaining of burning and discomfort feels like UTi, urine specimen obtained and dipped. Showed 1+ leukocytes, 1+ protein and trace of blood. Informed will send for culture asked if can wait till culture came back is asking if can get Keflex like last time to take . Informed would check with provider and get back to her. Urine sent for culture.

## 2019-10-01 NOTE — LETTER
October 4, 2019      Timur Juan MD  [address]  [facility] [address]           [facility]  [facility] [address]  Phone: [phone]          Patient: Jazz Martinez   MR Number: [id number]   YOB: 1945   Date of Visit: 10/1/2019       Dear Dr. Timur Juan:    Thank you for referring Jazz Martinez to me for evaluation. Attached you will find relevant portions of my assessment and plan of care.    If you have questions, please do not hesitate to call me. I look forward to following Jazz Martinez along with you.    Sincerely,    Walter Tobias MD    Enclosure  CC:  No Recipients    If you would like to receive this communication electronically, please contact [email or web address] or [phone] to request more information on [facility] Link access.    For providers and/or their staff who would like to refer a patient to [facility], please contact us through our one-stop-shop provider referral line, [facility], at [phone].    If you feel you have received this communication in error or would no longer like to receive these types of communications, please e-mail [email or web address]

## 2019-10-03 LAB
BACTERIA UR CULT: NORMAL
BACTERIA UR CULT: NORMAL

## 2019-10-04 RX ORDER — IBUPROFEN AND FAMOTIDINE 26.6; 8 MG/1; MG/1
1 TABLET ORAL 3 TIMES DAILY
Qty: 90 TABLET | Refills: 0 | Status: SHIPPED | OUTPATIENT
Start: 2019-10-04 | End: 2019-12-11

## 2019-10-04 NOTE — PROGRESS NOTES
Past Medical History:   Diagnosis Date    Corns and callosities     Deep vein thrombosis     GERD (gastroesophageal reflux disease)     Gout     H/O bladder problems     uses pessary    Hyperlipidemia     Hypertension     Pulmonary embolism        Past Surgical History:   Procedure Laterality Date    APPENDECTOMY  AGE 12    BLADDER SURGERY      Twice    BREAST CYST EXCISION      Benign per patient    HAND SURGERY Left     HYSTERECTOMY      Not related to cancer per patient.    OOPHORECTOMY         Current Outpatient Medications   Medication Sig    ALPRAZolam (XANAX) 1 MG tablet Xanax 1 mg tablet   1 po prior to MRI    atorvastatin (LIPITOR) 10 MG tablet Take 1 tablet (10 mg total) by mouth once daily.    diclofenac sodium (VOLTAREN) 1 % Gel Apply 2 g topically 4 (four) times daily.    estradiol (ESTRACE) 0.5 MG tablet TAKE 1 TABLET(0.5 MG) BY MOUTH EVERY DAY    eszopiclone (LUNESTA) 2 MG Tab Lunesta 2 mg tablet   1/2 - 1 po q pm prn insomnia    fenofibrate 160 MG Tab Take 1 tablet (160 mg total) by mouth once daily.    ibuprofen (ADVIL,MOTRIN) 600 MG tablet ibuprofen 600 mg tablet   Take 1 tablet 3 times a day by oral route.    ondansetron (ZOFRAN) 4 MG tablet Take 1 tablet (4 mg total) by mouth every 8 (eight) hours as needed for Nausea.    sertraline (ZOLOFT) 100 MG tablet TAKE 1.5 TABLETS (150 MG TOTAL) BY MOUTH ONCE DAILY.    sumatriptan (IMITREX) 100 MG tablet 1 tab po at start of headache, may repeat in 2 hours X1 in 24 hours. (Patient taking differently: as needed. 1 tab po at start of headache, may repeat in 2 hours X1 in 24 hours.)    topiramate (TOPAMAX) 25 MG tablet topiramate 25 mg tablet   2 po bid    traMADol (ULTRAM) 50 mg tablet Ultram 50 mg tablet   1-2 po tid. No more than 6/day    traMADol (ULTRAM) 50 mg tablet Take 1 tablet (50 mg total) by mouth every 6 (six) hours as needed for Pain.    triamterene-hydrochlorothiazide 37.5-25 mg (MAXZIDE-25) 37.5-25 mg per tablet  "TAKE 1 TABLET BY MOUTH ONCE DAILY.    valACYclovir (VALTREX) 500 MG tablet TAKE 2 TABLETS BY MOUTH TWICE DAILY    cephALEXin (KEFLEX) 500 MG capsule Take 1 capsule (500 mg total) by mouth every 8 (eight) hours. for 5 days     No current facility-administered medications for this visit.        Review of patient's allergies indicates:   Allergen Reactions    Sulfa dyne Other (See Comments)     Blisters in mouth    Gabapentin     Hydrocodone-acetaminophen     Nitrofurantoin Other (See Comments)     Deathly ill , headaches, weakness, 'wiped out"    Sulfa (sulfonamide antibiotics)        Family History   Problem Relation Age of Onset    Cancer Brother         bladder    Breast cancer Neg Hx     Colon cancer Neg Hx     Ovarian cancer Neg Hx        Social History     Socioeconomic History    Marital status:      Spouse name: Not on file    Number of children: Not on file    Years of education: Not on file    Highest education level: Not on file   Occupational History    Not on file   Social Needs    Financial resource strain: Not on file    Food insecurity:     Worry: Not on file     Inability: Not on file    Transportation needs:     Medical: Not on file     Non-medical: Not on file   Tobacco Use    Smoking status: Never Smoker    Smokeless tobacco: Never Used   Substance and Sexual Activity    Alcohol use: No    Drug use: No    Sexual activity: Never   Lifestyle    Physical activity:     Days per week: Not on file     Minutes per session: Not on file    Stress: Not on file   Relationships    Social connections:     Talks on phone: Not on file     Gets together: Not on file     Attends Yarsani service: Not on file     Active member of club or organization: Not on file     Attends meetings of clubs or organizations: Not on file     Relationship status: Not on file   Other Topics Concern    Not on file   Social History Narrative    Not on file       Chief Complaint:   Chief Complaint " "  Patient presents with    Left Ankle - Pain       Consulting Physician: Timur Juan MD    History of present illness:    This is a 74 y.o. year old female who complains of left ankle pain and swelling for approximately 3 weeks.  She has a history of gout.  She also has a history of a left foot fracture 15 years ago.  She denies any recent injury.  She puts her pain at a 5/10 and worse with activities.  She is in a stirrup splint which she states helps.    Review of Systems:    Constitution: Denies chills, fever, and sweats.  HENT: Denies headaches or blurry vision.  Cardiovascular: Denies chest pain or irregular heart beat.  Respiratory: Denies cough or shortness of breath.  Gastrointestinal: Denies abdominal pain, nausea, or vomiting.  Musculoskeletal:  Denies muscle cramps.  Neurological: Denies dizziness or focal weakness.  Psychiatric/Behavioral: Normal mental status.  Hematologic/Lymphatic: Denies bleeding problem or easy bruising/bleeding.  Skin: Denies rash or suspicious lesions.    Examination:    Vital Signs:    Vitals:    10/01/19 0904   BP: (!) 162/72   Pulse: 61   Weight: 72.4 kg (159 lb 9.8 oz)   Height: 5' 5" (1.651 m)   PainSc:   5   PainLoc: Ankle       Body mass index is 26.56 kg/m².    This a well-developed, well nourished patient in no acute distress.    Alert and oriented x 3 and cooperative to examination.       Physical Exam: Left Ankle Exam     Gait:   Antalgic    Skin  Scars:   None  Rashes:  None    Inspection   Deformity:   None  Erythema:   None  Bruising:   None   Swelling:   Mild  Lymphadenopathy: None    Instability:  None    Range of Motion   Ankle Joint   Dorsiflexion:   Normal   Plantar flexion:  Normal   Subtalar Joint   Inversion:   Normal   Eversion:  Normal     Muscle Strength   Strength:  5/5    Tests   Anterior drawer:  Negative  Varus tilt:  Negative    Other   Ankle Crepitus:  None  Sensation:   Normal  Achilles:  Normal  Forefoot:  Normal  Tenderness:  Lateral joint " line    Vascular Exam   Dorsalis Pedis:       Palpable  Capillary refill:    Normal          Imaging: X-rays ordered and images interpreted today personally by me of left ankle appear normal.         Assessment: Sprain of anterior talofibular ligament of left ankle, initial encounter  -     Ambulatory referral/consult to Orthopedics        Plan:  She has some ankle swelling.  This could be gout or arthritis.  Will give her an injection into the ankle today and have her continue to use her splint.  Also given her prescription for Duexis.  We will see her back as needed.      DISCLAIMER: This note may have been dictated using voice recognition software and may contain grammatical errors.     NOTE: Consult report sent to referring provider via [employer] EMR.

## 2019-10-14 ENCOUNTER — TELEPHONE (OUTPATIENT)
Dept: FAMILY MEDICINE | Facility: CLINIC | Age: 74
End: 2019-10-14

## 2019-10-14 NOTE — TELEPHONE ENCOUNTER
----- Message from Yennifer Patel sent at 10/14/2019  9:08 AM CDT -----  Contact: pt [phone]  Patient called she is asking  To see you this week for [place] leg Cramps. Please call back to schedule.

## 2019-10-16 ENCOUNTER — OFFICE VISIT (OUTPATIENT)
Dept: FAMILY MEDICINE | Facility: CLINIC | Age: 74
End: 2019-10-16
Payer: MEDICARE

## 2019-10-16 ENCOUNTER — LAB VISIT (OUTPATIENT)
Dept: LAB | Facility: HOSPITAL | Age: 74
End: 2019-10-16
Attending: INTERNAL MEDICINE
Payer: MEDICARE

## 2019-10-16 ENCOUNTER — DOCUMENTATION ONLY (OUTPATIENT)
Dept: FAMILY MEDICINE | Facility: CLINIC | Age: 74
End: 2019-10-16

## 2019-10-16 VITALS
WEIGHT: 157.19 LBS | OXYGEN SATURATION: 95 % | TEMPERATURE: 98 F | DIASTOLIC BLOOD PRESSURE: 82 MMHG | HEART RATE: 71 BPM | SYSTOLIC BLOOD PRESSURE: 120 MMHG | HEIGHT: 65 IN | BODY MASS INDEX: 26.19 KG/M2 | RESPIRATION RATE: 16 BRPM

## 2019-10-16 DIAGNOSIS — R25.2 NOCTURNAL MUSCLE CRAMPS: Primary | ICD-10-CM

## 2019-10-16 DIAGNOSIS — I10 HTN (HYPERTENSION), BENIGN: ICD-10-CM

## 2019-10-16 DIAGNOSIS — R25.2 NOCTURNAL MUSCLE CRAMPS: ICD-10-CM

## 2019-10-16 PROCEDURE — 82550 ASSAY OF CK (CPK): CPT

## 2019-10-16 PROCEDURE — 99214 OFFICE O/P EST MOD 30 MIN: CPT | Mod: S$GLB,,, | Performed by: INTERNAL MEDICINE

## 2019-10-16 PROCEDURE — 80053 COMPREHEN METABOLIC PANEL: CPT

## 2019-10-16 PROCEDURE — 36415 COLL VENOUS BLD VENIPUNCTURE: CPT | Mod: PO

## 2019-10-16 PROCEDURE — 99214 PR OFFICE/OUTPT VISIT, EST, LEVL IV, 30-39 MIN: ICD-10-PCS | Mod: S$GLB,,, | Performed by: INTERNAL MEDICINE

## 2019-10-16 RX ORDER — LANOLIN ALCOHOL/MO/W.PET/CERES
400 CREAM (GRAM) TOPICAL DAILY
Refills: 0 | COMMUNITY
Start: 2019-10-16 | End: 2019-12-11

## 2019-10-16 RX ORDER — TRIAMTERENE/HYDROCHLOROTHIAZID 37.5-25 MG
TABLET ORAL
Qty: 90 TABLET | Refills: 0 | Status: SHIPPED | OUTPATIENT
Start: 2019-10-16 | End: 2020-11-16 | Stop reason: SDUPTHER

## 2019-10-16 NOTE — PROGRESS NOTES
"Subjective:      2:25 PM     Patient ID: Jazz Martinez is a 74 y.o. female.    Chief Complaint: leg cramps (discuss meds,no refills)          CHIEF COMPLAINT: leg cramps.  HPI:  Was happening about every hour and then she 4 days ago she stopped her Lipitor.  She had 3 days with no cramps then last night she had cramps for 3 episodes    ONSET/TIMING: Onset   chronic  ago.     DURATION:      QUALITY/COURSE:  Much worse over last 2 weeks.     LOCATION: . calves and thighs bilaterally          Radiation: none    INTENSITY/SEVERITY:  severity  8  (on a 1-10 scale).    MODIFIERS/TREATMENTS:  Taking_medications:.     SYMPTOMS/RELATED: Possible medication side effects include:     The following symptoms/statements are positive if BOLD, negative otherwise.        CONTEXT/WHEN: Similar_problems. Nocturnal. .   Activity.  Sudden.. Trauma .           Medication_not_effective_now.  Prior_ultrasound.. . Prior _abs.  .  Heat_exposure.       REVIEW OF SYSTEMS : Sharp_pain. Dull_pain. Burning_pain. Fever. Swelling.       On_diuretics . Muscle_strain . Increase_in_caffeine_or_energy_drinks.  vomiting . spider bite . Pregnancy .    30 years ago the patient had her veins stripped.  Now since that time she has had difficulty with any touch to her legs.  The due to better if she wears tight stockings    HPI  Review of Systems      Objective:      Vitals:    10/16/19 1415   BP: 120/82   Pulse: 71   Resp: 16   Temp: 98.2 °F (36.8 °C)   TempSrc: Oral   SpO2: 95%   Weight: 71.3 kg (157 lb 3 oz)   Height: 5' 5" (1.651 m)   PainSc: 0-No pain     Physical Exam   Constitutional: She appears well-developed and well-nourished.   Cardiovascular: Normal rate, regular rhythm and normal heart sounds.   Pulmonary/Chest: Effort normal and breath sounds normal.   Abdominal: Soft. There is no tenderness.   Musculoskeletal:   Legs are tender bilaterally both thighs and ankles.   Neurological: She is alert.   Psychiatric: She has a normal mood and " affect. Her behavior is normal. Thought content normal.   Nursing note and vitals reviewed.        Assessment:       1. Nocturnal muscle cramps    2. HTN (hypertension), benign          Plan:   Patient has a fit test given to her 3 weeks ago but has not performed yet    Nocturnal muscle cramps  -     magnesium oxide (MAG-OX) 400 mg (241.3 mg magnesium) tablet; Take 1 tablet (400 mg total) by mouth once daily.; Refill: 0  -     Comprehensive metabolic panel; Future; Expected date: 10/16/2019  -     CK; Future; Expected date: 10/16/2019    HTN (hypertension), benign  -     triamterene-hydrochlorothiazide 37.5-25 mg (MAXZIDE-25) 37.5-25 mg per tablet; 1/2 p.o. q.d.  Dispense: 90 tablet; Refill: 0      Follow up for if you are not better return in 2 weeks.

## 2019-10-16 NOTE — PATIENT INSTRUCTIONS
Stop Lipitor until you have had no muscle cramps for week.  You may then restarted and stop it if the muscle cramps returned.  Please let me know that happens    Cut the maxzide down to half a day      If being on off Lipitor and taking the magnesium oxide isn't helping you can take quinine water 1 tbsp at bedtime or [place] leg cramps elixer  Thank you for choosing [facility].     Please fill out the patient experience survey.

## 2019-10-17 LAB
ALBUMIN SERPL BCP-MCNC: 4.3 G/DL (ref 3.5–5.2)
ALP SERPL-CCNC: 69 U/L (ref 55–135)
ALT SERPL W/O P-5'-P-CCNC: 21 U/L (ref 10–44)
ANION GAP SERPL CALC-SCNC: 8 MMOL/L (ref 8–16)
AST SERPL-CCNC: 27 U/L (ref 10–40)
BILIRUB SERPL-MCNC: 0.3 MG/DL (ref 0.1–1)
BUN SERPL-MCNC: 21 MG/DL (ref 8–23)
CALCIUM SERPL-MCNC: 10 MG/DL (ref 8.7–10.5)
CHLORIDE SERPL-SCNC: 102 MMOL/L (ref 95–110)
CK SERPL-CCNC: 86 U/L (ref 20–180)
CO2 SERPL-SCNC: 28 MMOL/L (ref 23–29)
CREAT SERPL-MCNC: 0.8 MG/DL (ref 0.5–1.4)
EST. GFR  (AFRICAN AMERICAN): >60 ML/MIN/1.73 M^2
EST. GFR  (NON AFRICAN AMERICAN): >60 ML/MIN/1.73 M^2
GLUCOSE SERPL-MCNC: 91 MG/DL (ref 70–110)
POTASSIUM SERPL-SCNC: 4.6 MMOL/L (ref 3.5–5.1)
PROT SERPL-MCNC: 7.4 G/DL (ref 6–8.4)
SODIUM SERPL-SCNC: 138 MMOL/L (ref 136–145)

## 2019-10-18 ENCOUNTER — PATIENT MESSAGE (OUTPATIENT)
Dept: FAMILY MEDICINE | Facility: CLINIC | Age: 74
End: 2019-10-18

## 2019-10-21 ENCOUNTER — OFFICE VISIT (OUTPATIENT)
Dept: HEMATOLOGY/ONCOLOGY | Facility: CLINIC | Age: 74
End: 2019-10-21
Payer: MEDICARE

## 2019-10-21 VITALS
RESPIRATION RATE: 20 BRPM | BODY MASS INDEX: 25.89 KG/M2 | WEIGHT: 155.63 LBS | SYSTOLIC BLOOD PRESSURE: 134 MMHG | HEART RATE: 66 BPM | TEMPERATURE: 98 F | DIASTOLIC BLOOD PRESSURE: 77 MMHG

## 2019-10-21 DIAGNOSIS — Z86.718 HISTORY OF DVT IN ADULTHOOD: ICD-10-CM

## 2019-10-21 PROCEDURE — 99213 PR OFFICE/OUTPT VISIT, EST, LEVL III, 20-29 MIN: ICD-10-PCS | Mod: S$GLB,,, | Performed by: INTERNAL MEDICINE

## 2019-10-21 PROCEDURE — 99213 OFFICE O/P EST LOW 20 MIN: CPT | Mod: S$GLB,,, | Performed by: INTERNAL MEDICINE

## 2019-10-21 NOTE — ASSESSMENT & PLAN NOTE
Patient is to have bladder surgery and states a six to eight week recovery for this.  She is not on any anticoagulation at this time and has been doing well.  My suggestion is to place her on Eliquis 2.5mg bid for 4 weeks after the surgery and discussed this in detail today.  I would like to see her 3 weeks after the surgery and discussed this as well.

## 2019-10-22 ENCOUNTER — PATIENT MESSAGE (OUTPATIENT)
Dept: FAMILY MEDICINE | Facility: CLINIC | Age: 74
End: 2019-10-22

## 2019-10-23 ENCOUNTER — PROCEDURE VISIT (OUTPATIENT)
Dept: UROGYNECOLOGY | Facility: CLINIC | Age: 74
End: 2019-10-23
Payer: MEDICARE

## 2019-10-23 VITALS
HEART RATE: 79 BPM | BODY MASS INDEX: 25.93 KG/M2 | SYSTOLIC BLOOD PRESSURE: 137 MMHG | DIASTOLIC BLOOD PRESSURE: 78 MMHG | HEIGHT: 65 IN | WEIGHT: 155.63 LBS

## 2019-10-23 DIAGNOSIS — R35.0 URINARY FREQUENCY: ICD-10-CM

## 2019-10-23 DIAGNOSIS — N89.8 VAGINAL IRRITATION FROM PESSARY: ICD-10-CM

## 2019-10-23 DIAGNOSIS — T83.89XA VAGINAL IRRITATION FROM PESSARY: ICD-10-CM

## 2019-10-23 DIAGNOSIS — N99.3 PROLAPSE OF VAGINAL VAULT AFTER HYSTERECTOMY: ICD-10-CM

## 2019-10-23 DIAGNOSIS — N81.11 CYSTOCELE, MIDLINE: ICD-10-CM

## 2019-10-23 PROCEDURE — 52000 CYSTOURETHROSCOPY: CPT | Mod: PBBFAC,PO | Performed by: OBSTETRICS & GYNECOLOGY

## 2019-10-23 PROCEDURE — 51741 ELECTRO-UROFLOWMETRY FIRST: CPT | Mod: PBBFAC,PO | Performed by: OBSTETRICS & GYNECOLOGY

## 2019-10-23 PROCEDURE — 99214 PR OFFICE/OUTPT VISIT, EST, LEVL IV, 30-39 MIN: ICD-10-PCS | Mod: S$PBB,25,, | Performed by: OBSTETRICS & GYNECOLOGY

## 2019-10-23 PROCEDURE — 51797 PR VOIDING PRESS STUDY INTRA-ABDOMINAL VOID: ICD-10-PCS | Mod: 26,S$PBB,, | Performed by: OBSTETRICS & GYNECOLOGY

## 2019-10-23 PROCEDURE — 51797 INTRAABDOMINAL PRESSURE TEST: CPT | Mod: PBBFAC,PO | Performed by: OBSTETRICS & GYNECOLOGY

## 2019-10-23 PROCEDURE — 99214 OFFICE O/P EST MOD 30 MIN: CPT | Mod: S$PBB,25,, | Performed by: OBSTETRICS & GYNECOLOGY

## 2019-10-23 PROCEDURE — 51797 INTRAABDOMINAL PRESSURE TEST: CPT | Mod: 26,S$PBB,, | Performed by: OBSTETRICS & GYNECOLOGY

## 2019-10-23 PROCEDURE — 52000 PR CYSTOURETHROSCOPY: ICD-10-PCS | Mod: S$PBB,59,, | Performed by: OBSTETRICS & GYNECOLOGY

## 2019-10-23 PROCEDURE — 51741 PR UROFLOWMETRY, COMPLEX: ICD-10-PCS | Mod: 26,S$PBB,51, | Performed by: OBSTETRICS & GYNECOLOGY

## 2019-10-23 PROCEDURE — 51728 CYSTOMETROGRAM W/VP: CPT | Mod: PBBFAC,PO | Performed by: OBSTETRICS & GYNECOLOGY

## 2019-10-23 PROCEDURE — 51741 ELECTRO-UROFLOWMETRY FIRST: CPT | Mod: 26,S$PBB,51, | Performed by: OBSTETRICS & GYNECOLOGY

## 2019-10-23 PROCEDURE — 51728 CYSTOMETROGRAM W/VP: CPT | Mod: 26,S$PBB,, | Performed by: OBSTETRICS & GYNECOLOGY

## 2019-10-23 PROCEDURE — 51784 ANAL/URINARY MUSCLE STUDY: CPT | Mod: PBBFAC,PO | Performed by: OBSTETRICS & GYNECOLOGY

## 2019-10-23 PROCEDURE — 51784 PR ANAL/URINARY MUSCLE STUDY: ICD-10-PCS | Mod: 26,S$PBB,51, | Performed by: OBSTETRICS & GYNECOLOGY

## 2019-10-23 PROCEDURE — 51784 ANAL/URINARY MUSCLE STUDY: CPT | Mod: 26,S$PBB,51, | Performed by: OBSTETRICS & GYNECOLOGY

## 2019-10-23 PROCEDURE — 52000 CYSTOURETHROSCOPY: CPT | Mod: S$PBB,59,, | Performed by: OBSTETRICS & GYNECOLOGY

## 2019-10-23 PROCEDURE — 51728 PR COMPLEX CYSTOMETROGRAM VOIDING PRESSURE STUDIES: ICD-10-PCS | Mod: 26,S$PBB,, | Performed by: OBSTETRICS & GYNECOLOGY

## 2019-10-23 NOTE — PROCEDURES
Procedures     74-year-old para 100 1 times  history of hysterectomy along with bladder suspension x2 secondary to prolapse after the last 1 9 years ago patient developed blood clots since then patient has been trying to manage her recurrent prolapse with pessary however now she has started wearing pessary in fact there has been trauma to the vaginal epithelium secondary to pessary utilization in she just does not want to utilize any more.  At additionally patient was substantial leakage of urine with coughing and sneezing as well as frequent urination.  The review of the electronic medical record does not show the OR case a patient states that it was done at an ambulatory surgery center 9 years ago that at this place stool and business an open that patient will obtain operative record form 80 cc        I am looking at the operative note.  November 15, 2010 patient underwent a  1.  Anterior and posterior colporrhaphy  2.  Apical vault suspension with a send  3.  Cystotomy repair  The send is not a mesh kit from what I can see but a deep pass through the pararectal space through the sacral spinous ligament with Prolene sutures to fixate the graft.        The preoperative diagnosis was.  1.  Urinary incontinence with ISD  2.  Cystocele  Rectocele  3.  Vault prolapse.      Patient a had a a cystotomy.  As result the entire ascend was not utilized I cannot find if the send used a significant piece of graft but the system itself does have a role for new anterior approach also allowing for apical support.  So I think that the graft portion might of been aborted with simple apical fixation to the sacral spinous ligament fixation.      Physical Exam   Genitourinary: Pelvic exam was performed with patient supine. Skenes normal. Right labia normal. Urethra exhibits no urethral caruncle, no urethral diverticulum, no urethral mass and no hypermobility. There is a cystocele (BpH to +1) and unspecified prolapse of vaginal  walls ( pulled MCC to the introitus) in the vagina. No rectocele in the vagina. Right adnexum displays no mass. Left adnexum displays no mass. Cervix exhibits absence. Uterus is absent.   POP-Q  Aa: -1 Ba: 1 C: -4   GH: 2 PB: 2 TVL: 9   Ap: -2 Bp:  D:       After examination I used [employer] interactive web site to diagram prolapse  I then discussed her options.  Utilizing patient leaflets we did discuss sacral colpopexy in relation to native tissue repair given graft augmentation represents may be a less chance of yet another recurrence I am going to move recommend with sacral colpopexy patient does not want to lose the ability for possible coital activity in future for that reason colpocleisis will not be covered      Will be carrying out urodynamics and cysto as well as hormone supplementation transvaginal UDS insistent will take place in the next 6 weeks patient understanding pressure of the time spent with her today.                SURGEONS NARRATIVE:  A time out was performed in which the patient identity and procedure were confirmed.  Urodynamic evaluation was performed using a computerized system ([employer], [employer]  Uroflowmetry was performed on the patient in the sitting position without catheters in place.  Subsequent urodynamic testing was performed with the patient in the lithotomy position at 45 degrees. Air charged catheters were used with sterile water as the infusion medium. Vesical and abdominal (rectal) pressures were measured, and detrusor pressure was calculated. EMG activity was recorded with surface electrodes. During filling, room temperature sterile water was infused at a rate of 30 cubic centimeters per minute. The patient was asked cough after instillation of each 100cc volume. Two Valsalva leak point pressures and two cough leak point pressures were performed with the catheters in place at 300 cubic centimeters and again at maximum capacity. Valsalva leak point  pressure was defined as the difference between vesical pressure at which leakage was noted (visualized at the external urethral meatus) and the baseline vesical pressure. Following urodynamic testing, a pressure flow study was performed with the patient in the sitting position. Vesical and abdominal pressures were monitored and detrusor pressures were calculated. After the pressure flow study, the catheters were then removed. The patient tolerated the procedure well.     Urine dipstick: neg    1.  VOIDING PHASE:      a.  Uroflowmetry:   Prolapse reduction: No   Voided volume:  50 mL    Voiding time:   approp seconds   Max flow: not cap   Avg flow:   Not cap   PVR:   10 mL    The overall configuration of this uroflow study was  such small void a and not captured and really can't make any diagnostic evaluations based on such a small volume I think..      b.  Pressure flow:   Prolapse reduction: No   Voided volume:   306 mL   Voiding time:   39 seconds   Peak flow:  15.40 mL/s    Avg flow:  8.630 mL/s   Max det pressure:  45.80  cm H20   Det pressure at max flow: 40.40 cm H20   Void initiated by  detrusor.     Urethral relaxation (EMG):  apparent.     PVR (calculated):  3 mL    The overall configuration of this pressure flow study was beautiful continuous max velocity within the 1st 3rd surprisingly good detrusor function..      2.  FILLING PHASE:   1st desire: 42 mL   Normal desire:  77 mL   Strong desire:  103 mL   Urgency:  311 mL   Compliance (calculated)  compliant mL/cm H20   EMG activity during filling:   approp   Detrusor contractions observed: No.      3.  URETHRAL FUNCTION/STORAGE PHASE:    a.  WITHOUT prolapse reduction:  Very interesting study    This patient at capacity of 312 cc I got a leak point pressure of 20 and I believe it.  However the patient did not leak on stress or Valsalva at 1:50 a.m..  Bactrim because of the obstruction and because of the catheter with.    Patient is  definitely a mixed incontinence picture but I do not think that she has been emptying her bladder well and that when there is large volumes there is spontaneous detrusor or substantial FLEX.  Was interesting to note is 9 years ago the op note dictated states that that there was significant leakage of urine through the urethra throughout the surgery.  In this was after surgical correction so I do believe that I SD we will make itself visible at the time of so prolapse correction.  This patient has excellent bladder detrusor function.  An is a me able to a mid urethral sling at the time of prolapse repair.        These findings are consistent with Positive urodynamic stress incontinence .    Assessment:  Unmasking of this profound incontinence at the time of prolapse repair is quite profound.  M U.S. will be offered only if we are able to carry out deep dissection all the way down to the junction of the bladder and urethra deep dissection into the vesicovaginal space.  If unable to do this then I will have to do an interval approach as opposed to a concomitant        Title of Operation:   Cystourethroscopy.     INDICATIONS:  Recurrent prolapse    PREOPERATIVE DIAGNOSIS  Recurrent prolapse with mixed incontinence    POSTOPERATIVE DIAGNOSIS:   Same    Anesthesia:   2% Xylocaine gel.    Specimen (Bacteriological, Pathological or other):   None.     Prosthetic Device/Implant:   None.     Surgeons Narrative:     After informed consent was obtained, the patient was placed in the lithotomy position. The urethral meatus was prepped with Betadine and 10 cubic centimeters of 2% Xylocaine gel were introduced into the urethra. A flexible cystourethroscope was introduced into the bladder. The bladder was distended with approximately 300 cubic centimeters of sterile water. A systematic survey was performed in which the bladder was surveyed using multiple sequential passes in a clockwise fashion from the bladder dome to the bladder  base to the urethrovesical junction. The trigone and ureteral orifices were observed. The scope was then flipped back on itself, and the urethrovesical junction was viewed. A vaginal examining finger was then placed with pressure suburethrally at the urethrovesical junction as the telescope was withdrawn in order to perform positive pressure urethroscopy.  Standard maneuvers of cough, squeeze and Valsalva were performed. The telescope was then completely withdrawn.     Findings: Urethroscopy:  Normal.  Cystoscopy:  Normal bladder mucosa, bilateral ureteral flow was noted.      Assessment: Essentially normal cystourethroscopy.      Plan:     After the carrying out of the UDS I spent a long time explaining outcome.  I talked about operative note that she brought to me.  We talked about surgical correction as post with sacral colpopexy as opposed to native tissue repair.  If I am able to successfully navigate to the appropriate points and have complete correction is this is a rather profound distal defect and we will concur do a concomitant sling.  If not patient understands this will be of interval approach.  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  Total time of this visit was 20 min in addition to the carrying out of the urodynamics and interpretation of cystoscopy this time was spent vital formation to the patient good healthcare decisions

## 2019-11-04 ENCOUNTER — TELEPHONE (OUTPATIENT)
Dept: FAMILY MEDICINE | Facility: CLINIC | Age: 74
End: 2019-11-04

## 2019-11-04 DIAGNOSIS — S93.492A SPRAIN OF ANTERIOR TALOFIBULAR LIGAMENT OF LEFT ANKLE, INITIAL ENCOUNTER: Primary | ICD-10-CM

## 2019-11-04 RX ORDER — HYDROCODONE BITARTRATE AND ACETAMINOPHEN 5; 325 MG/1; MG/1
1 TABLET ORAL EVERY 4 HOURS PRN
Qty: 30 TABLET | Refills: 0 | Status: SHIPPED | OUTPATIENT
Start: 2019-11-04 | End: 2019-11-14

## 2019-12-11 ENCOUNTER — TELEPHONE (OUTPATIENT)
Dept: FAMILY MEDICINE | Facility: CLINIC | Age: 74
End: 2019-12-11

## 2019-12-11 ENCOUNTER — OFFICE VISIT (OUTPATIENT)
Dept: UROGYNECOLOGY | Facility: CLINIC | Age: 74
End: 2019-12-11
Payer: MEDICARE

## 2019-12-11 ENCOUNTER — HOSPITAL ENCOUNTER (OUTPATIENT)
Dept: PREADMISSION TESTING | Facility: HOSPITAL | Age: 74
Discharge: HOME | End: 2019-12-11
Attending: OBSTETRICS & GYNECOLOGY
Payer: MEDICARE

## 2019-12-11 VITALS
SYSTOLIC BLOOD PRESSURE: 136 MMHG | HEART RATE: 68 BPM | DIASTOLIC BLOOD PRESSURE: 85 MMHG | WEIGHT: 155.44 LBS | BODY MASS INDEX: 25.9 KG/M2 | HEIGHT: 65 IN

## 2019-12-11 DIAGNOSIS — Z01.818 PRE-OP TESTING: ICD-10-CM

## 2019-12-11 DIAGNOSIS — N81.11 CYSTOCELE, MIDLINE: ICD-10-CM

## 2019-12-11 DIAGNOSIS — N81.83 INCOMPETENCE OF RECTOVAGINAL TISSUE: ICD-10-CM

## 2019-12-11 DIAGNOSIS — Z01.818 PRE-OP TESTING: Primary | ICD-10-CM

## 2019-12-11 DIAGNOSIS — N99.3 PROLAPSE OF VAGINAL VAULT AFTER HYSTERECTOMY: Primary | ICD-10-CM

## 2019-12-11 DIAGNOSIS — N39.3 SUI (STRESS URINARY INCONTINENCE, FEMALE): ICD-10-CM

## 2019-12-11 LAB
ANION GAP SERPL CALC-SCNC: 11 MMOL/L (ref 8–16)
BASOPHILS # BLD AUTO: 0.1 K/UL (ref 0–0.2)
BASOPHILS NFR BLD: 1.1 % (ref 0–1.9)
BUN SERPL-MCNC: 20 MG/DL (ref 8–23)
CALCIUM SERPL-MCNC: 10.1 MG/DL (ref 8.7–10.5)
CHLORIDE SERPL-SCNC: 103 MMOL/L (ref 95–110)
CO2 SERPL-SCNC: 29 MMOL/L (ref 23–29)
CREAT SERPL-MCNC: 0.8 MG/DL (ref 0.5–1.4)
DIFFERENTIAL METHOD: ABNORMAL
EOSINOPHIL # BLD AUTO: 0.1 K/UL (ref 0–0.5)
EOSINOPHIL NFR BLD: 1.3 % (ref 0–8)
ERYTHROCYTE [DISTWIDTH] IN BLOOD BY AUTOMATED COUNT: 13 % (ref 11.5–14.5)
EST. GFR  (AFRICAN AMERICAN): >60 ML/MIN/1.73 M^2
EST. GFR  (NON AFRICAN AMERICAN): >60 ML/MIN/1.73 M^2
GLUCOSE SERPL-MCNC: 87 MG/DL (ref 70–110)
HCT VFR BLD AUTO: 45.3 % (ref 37–48.5)
HGB BLD-MCNC: 14.8 G/DL (ref 12–16)
IMM GRANULOCYTES # BLD AUTO: 0.03 K/UL (ref 0–0.04)
LYMPHOCYTES # BLD AUTO: 2.3 K/UL (ref 1–4.8)
LYMPHOCYTES NFR BLD: 24.7 % (ref 18–48)
MCH RBC QN AUTO: 31.5 PG (ref 27–31)
MCHC RBC AUTO-ENTMCNC: 32.7 G/DL (ref 32–36)
MCV RBC AUTO: 96 FL (ref 82–98)
MONOCYTES # BLD AUTO: 0.8 K/UL (ref 0.3–1)
MONOCYTES NFR BLD: 8.4 % (ref 4–15)
NEUTROPHILS # BLD AUTO: 6 K/UL (ref 1.8–7.7)
NEUTROPHILS NFR BLD: 64.2 % (ref 38–73)
NRBC BLD-RTO: 0 /100 WBC
PLATELET # BLD AUTO: 330 K/UL (ref 150–350)
PMV BLD AUTO: 10.1 FL (ref 9.2–12.9)
POTASSIUM SERPL-SCNC: 4.3 MMOL/L (ref 3.5–5.1)
RBC # BLD AUTO: 4.7 M/UL (ref 4–5.4)
SODIUM SERPL-SCNC: 143 MMOL/L (ref 136–145)
WBC # BLD AUTO: 9.31 K/UL (ref 3.9–12.7)

## 2019-12-11 PROCEDURE — 93005 ELECTROCARDIOGRAM TRACING: CPT

## 2019-12-11 PROCEDURE — 93010 EKG 12-LEAD: ICD-10-PCS | Mod: ,,, | Performed by: INTERNAL MEDICINE

## 2019-12-11 PROCEDURE — 93010 ELECTROCARDIOGRAM REPORT: CPT | Mod: ,,, | Performed by: INTERNAL MEDICINE

## 2019-12-11 PROCEDURE — 85025 COMPLETE CBC W/AUTO DIFF WBC: CPT

## 2019-12-11 PROCEDURE — 99214 OFFICE O/P EST MOD 30 MIN: CPT | Mod: S$PBB,,, | Performed by: OBSTETRICS & GYNECOLOGY

## 2019-12-11 PROCEDURE — 99999 PR PBB SHADOW E&M-EST. PATIENT-LVL IV: CPT | Mod: PBBFAC,,, | Performed by: OBSTETRICS & GYNECOLOGY

## 2019-12-11 PROCEDURE — 99900103 DSU ONLY-NO CHARGE-INITIAL HR (STAT)

## 2019-12-11 PROCEDURE — 99900104 DSU ONLY-NO CHARGE-EA ADD'L HR (STAT)

## 2019-12-11 PROCEDURE — 36415 COLL VENOUS BLD VENIPUNCTURE: CPT

## 2019-12-11 PROCEDURE — 1126F AMNT PAIN NOTED NONE PRSNT: CPT | Mod: ,,, | Performed by: OBSTETRICS & GYNECOLOGY

## 2019-12-11 PROCEDURE — 99214 OFFICE O/P EST MOD 30 MIN: CPT | Mod: PBBFAC,PO | Performed by: OBSTETRICS & GYNECOLOGY

## 2019-12-11 PROCEDURE — 99999 PR PBB SHADOW E&M-EST. PATIENT-LVL IV: ICD-10-PCS | Mod: PBBFAC,,, | Performed by: OBSTETRICS & GYNECOLOGY

## 2019-12-11 PROCEDURE — 1126F PR PAIN SEVERITY QUANTIFIED, NO PAIN PRESENT: ICD-10-PCS | Mod: ,,, | Performed by: OBSTETRICS & GYNECOLOGY

## 2019-12-11 PROCEDURE — 1159F MED LIST DOCD IN RCRD: CPT | Mod: ,,, | Performed by: OBSTETRICS & GYNECOLOGY

## 2019-12-11 PROCEDURE — 1159F PR MEDICATION LIST DOCUMENTED IN MEDICAL RECORD: ICD-10-PCS | Mod: ,,, | Performed by: OBSTETRICS & GYNECOLOGY

## 2019-12-11 PROCEDURE — 80048 BASIC METABOLIC PNL TOTAL CA: CPT

## 2019-12-11 PROCEDURE — 99214 PR OFFICE/OUTPT VISIT, EST, LEVL IV, 30-39 MIN: ICD-10-PCS | Mod: S$PBB,,, | Performed by: OBSTETRICS & GYNECOLOGY

## 2019-12-11 RX ORDER — CARBAMAZEPINE 200 MG/1
TABLET, EXTENDED RELEASE ORAL
COMMUNITY
End: 2019-12-11

## 2019-12-11 RX ORDER — TRAMADOL HYDROCHLORIDE 50 MG/1
50 TABLET ORAL EVERY 8 HOURS PRN
Refills: 5 | COMMUNITY
Start: 2019-12-05 | End: 2024-03-14 | Stop reason: SDUPTHER

## 2019-12-11 NOTE — H&P (VIEW-ONLY)
Subjective:      Patient ID: Jazz Martinez is a 74 y.o. female.    Chief Complaint:  Pre-op Exam      History of Present Illness  Below is a summary of all of the aspects of this patient's case this is a patient with prior substantially urogenital reconstruction with recurrence.  Patient understands from previous urodynamics that we will either do then concomitant mid urethral sling of objectives are achieved if we are not able to then will have to do an interval approach patient noted understanding.  Patient appreciative the time spent with her today              74-year-old para 100 1 times  history of hysterectomy along with bladder suspension x2 secondary to prolapse after the last 1 9 years ago patient developed blood clots since then patient has been trying to manage her recurrent prolapse with pessary however now she has started wearing pessary in fact there has been trauma to the vaginal epithelium secondary to pessary utilization in she just does not want to utilize any more.  At additionally patient was substantial leakage of urine with coughing and sneezing as well as frequent urination.  The review of the electronic medical record does not show the OR case a patient states that it was done at an ambulatory surgery center 9 years ago that at this place stool and business an open that patient will obtain operative record form 80 cc           I am looking at the operative note.  November 15, 2010 patient underwent a  1.  Anterior and posterior colporrhaphy  2.  Apical vault suspension with a send  3.  Cystotomy repair  The send is not a mesh kit from what I can see but a deep pass through the pararectal space through the sacral spinous ligament with Prolene sutures to fixate the graft.           The preoperative diagnosis was.  1.  Urinary incontinence with ISD  2.  Cystocele  Rectocele  3.  Vault prolapse.        Patient a had a a cystotomy.  As result the entire ascend was not utilized I cannot  find if the send used a significant piece of graft but the system itself does have a role for new anterior approach also allowing for apical support.  So I think that the graft portion might of been aborted with simple apical fixation to the sacral spinous ligament fixation.        Physical Exam   Genitourinary: Pelvic exam was performed with patient supine. Skenes normal. Right labia normal. Urethra exhibits no urethral caruncle, no urethral diverticulum, no urethral mass and no hypermobility. There is a cystocele (BpH to +1) and unspecified prolapse of vaginal walls ( pulled retirement to the introitus) in the vagina. No rectocele in the vagina. Right adnexum displays no mass. Left adnexum displays no mass. Cervix exhibits absence. Uterus is absent.   POP-Q  Aa: -1 Ba: 1 C: -4  GH: 2 PB: 2 TVL: 9  Ap: -2 Bp:  D:      After examination I used [employer] interactive web site to diagram prolapse  I then discussed her options.  Utilizing patient leaflets we did discuss sacral colpopexy in relation to native tissue repair given graft augmentation represents may be a less chance of yet another recurrence I am going to move recommend with sacral colpopexy patient does not want to lose the ability for possible coital activity in future for that reason colpocleisis will not be covered      Will be carrying out urodynamics and cysto as well as hormone supplementation transvaginal UDS insistent will take place in the next 6 weeks patient understanding pressure of the time spent with her today.                       SURGEONS NARRATIVE:  A time out was performed in which the patient identity and procedure were confirmed.  Urodynamic evaluation was performed using a computerized system ([employer]  Uroflowmetry was performed on the patient in the sitting position without catheters in place.  Subsequent urodynamic testing was performed with the patient in the lithotomy position at 45 degrees. Air  charged catheters were used with sterile water as the infusion medium. Vesical and abdominal (rectal) pressures were measured, and detrusor pressure was calculated. EMG activity was recorded with surface electrodes. During filling, room temperature sterile water was infused at a rate of 30 cubic centimeters per minute. The patient was asked cough after instillation of each 100cc volume. Two Valsalva leak point pressures and two cough leak point pressures were performed with the catheters in place at 300 cubic centimeters and again at maximum capacity. Valsalva leak point pressure was defined as the difference between vesical pressure at which leakage was noted (visualized at the external urethral meatus) and the baseline vesical pressure. Following urodynamic testing, a pressure flow study was performed with the patient in the sitting position. Vesical and abdominal pressures were monitored and detrusor pressures were calculated. After the pressure flow study, the catheters were then removed. The patient tolerated the procedure well.      Urine dipstick: neg     1.  VOIDING PHASE:       a.  Uroflowmetry:  · Prolapse reduction: No  · Voided volume:  50 mL   · Voiding time:   approp seconds  · Max flow: not cap  · Avg flow:   Not cap  · PVR:   10 mL     The overall configuration of this uroflow study was  such small void a and not captured and really can't make any diagnostic evaluations based on such a small volume I think..       b.  Pressure flow:  · Prolapse reduction: No  · Voided volume:   306 mL  · Voiding time:   39 seconds  · Peak flow:  15.40 mL/s   · Avg flow:  8.630 mL/s  · Max det pressure:  45.80  cm H20  · Det pressure at max flow: 40.40 cm H20  · Void initiated by  detrusor.    · Urethral relaxation (EMG):  apparent.    · PVR (calculated):  3 mL     The overall configuration of this pressure flow study was beautiful continuous max velocity within the 1st 3rd surprisingly good detrusor function..        2.  FILLING PHASE:  · 1st desire: 42 mL  · Normal desire:  77 mL  · Strong desire:  103 mL  · Urgency:  311 mL  · Compliance (calculated)  compliant mL/cm H20  · EMG activity during filling:   approp  · Detrusor contractions observed: No.       3.  URETHRAL FUNCTION/STORAGE PHASE:     a.  WITHOUT prolapse reduction:  Very interesting study     This patient at capacity of 312 cc I got a leak point pressure of 20 and I believe it.  However the patient did not leak on stress or Valsalva at 1:50 a.m..  Bactrim because of the obstruction and because of the catheter with.     Patient is definitely a mixed incontinence picture but I do not think that she has been emptying her bladder well and that when there is large volumes there is spontaneous detrusor or substantial FLEX.  Was interesting to note is 9 years ago the op note dictated states that that there was significant leakage of urine through the urethra throughout the surgery.  In this was after surgical correction so I do believe that I SD we will make itself visible at the time of so prolapse correction.  This patient has excellent bladder detrusor function.  An is a me able to a mid urethral sling at the time of prolapse repair.           These findings are consistent with Positive urodynamic stress incontinence .     Assessment:  Unmasking of this profound incontinence at the time of prolapse repair is quite profound.  M U.S. will be offered only if we are able to carry out deep dissection all the way down to the junction of the bladder and urethra deep dissection into the vesicovaginal space.  If unable to do this then I will have to do an interval approach as opposed to a concomitant           Title of Operation:   Cystourethroscopy.     INDICATIONS:  Recurrent prolapse     PREOPERATIVE DIAGNOSIS  Recurrent prolapse with mixed incontinence     POSTOPERATIVE DIAGNOSIS:   Same    Anesthesia:   2% Xylocaine gel.    Specimen (Bacteriological, Pathological or  other):   None.     Prosthetic Device/Implant:   None.     Surgeons Narrative:     After informed consent was obtained, the patient was placed in the lithotomy position. The urethral meatus was prepped with Betadine and 10 cubic centimeters of 2% Xylocaine gel were introduced into the urethra. A flexible cystourethroscope was introduced into the bladder. The bladder was distended with approximately 300 cubic centimeters of sterile water. A systematic survey was performed in which the bladder was surveyed using multiple sequential passes in a clockwise fashion from the bladder dome to the bladder base to the urethrovesical junction. The trigone and ureteral orifices were observed. The scope was then flipped back on itself, and the urethrovesical junction was viewed. A vaginal examining finger was then placed with pressure suburethrally at the urethrovesical junction as the telescope was withdrawn in order to perform positive pressure urethroscopy.  Standard maneuvers of cough, squeeze and Valsalva were performed. The telescope was then completely withdrawn.     Findings: Urethroscopy:  Normal.  Cystoscopy:  Normal bladder mucosa, bilateral ureteral flow was noted.      Assessment: Essentially normal cystourethroscopy.      Plan:      After the carrying out of the UDS I spent a long time explaining outcome.  I talked about operative note that she brought to me.  We talked about surgical correction as post with sacral colpopexy as opposed to native tissue repair.  If I am able to successfully navigate to the appropriate points and have complete correction is this is a rather profound distal defect and we will concur do a concomitant sling.            Past Medical History:   Diagnosis Date    Corns and callosities     Deep vein thrombosis     GERD (gastroesophageal reflux disease)     Gout     H/O bladder problems     uses pessary    Hyperlipidemia     Hypertension     Pulmonary embolism        Past Surgical  History:   Procedure Laterality Date    APPENDECTOMY  AGE 12    BLADDER SURGERY      Twice    BREAST CYST EXCISION      Benign per patient    HAND SURGERY Left     HYSTERECTOMY      Not related to cancer per patient.    OOPHORECTOMY         GYN & OB History  No LMP recorded. Patient has had a hysterectomy.   Date of Last Pap: No result found    OB History    Para Term  AB Living   1 1 1     1   SAB TAB Ectopic Multiple Live Births                  # Outcome Date GA Lbr Cesar/2nd Weight Sex Delivery Anes PTL Lv   1 Term               Obstetric Comments          Health Maintenance       Date Due Completion Date    TETANUS VACCINE 1961 ---    Sign Pain Contract 1961 ---    Complete Opioid Risk Tool 1961 ---    Colonoscopy 2019    Shingles Vaccine (2 of 3) 2019 10/14/2019    Lipid Panel 2020    DEXA SCAN 2021 3/27/2018    Override on 10/14/2014: Done ([facility]          Family History   Problem Relation Age of Onset    Cancer Brother         bladder    Breast cancer Neg Hx     Colon cancer Neg Hx     Ovarian cancer Neg Hx        Social History     Socioeconomic History    Marital status:      Spouse name: Not on file    Number of children: Not on file    Years of education: Not on file    Highest education level: Not on file   Occupational History    Not on file   Social Needs    Financial resource strain: Not on file    Food insecurity:     Worry: Not on file     Inability: Not on file    Transportation needs:     Medical: Not on file     Non-medical: Not on file   Tobacco Use    Smoking status: Never Smoker    Smokeless tobacco: Never Used   Substance and Sexual Activity    Alcohol use: No    Drug use: No    Sexual activity: Never   Lifestyle    Physical activity:     Days per week: Not on file     Minutes per session: Not on file    Stress: Not on file   Relationships    Social  "connections:     Talks on phone: Not on file     Gets together: Not on file     Attends [employer] service: Not on file     Active member of club or organization: Not on file     Attends meetings of clubs or organizations: Not on file     Relationship status: Not on file   Other Topics Concern    Not on file   Social History Narrative    Not on file       Review of Systems  Review of Systems  Pressure incontinence     Objective:   /85   Pulse 68   Ht 5' 5" (1.651 m)   Wt 70.5 kg (155 lb 6.8 oz)   BMI 25.86 kg/m²     Physical Exam     Physical Exam   Genitourinary: Pelvic exam was performed with patient supine. Skenes normal. Right labia normal. Urethra exhibits no urethral caruncle, no urethral diverticulum, no urethral mass and no hypermobility. There is a cystocele (BpH to +1) and unspecified prolapse of vaginal walls ( pulled USP to the introitus) in the vagina. No rectocele in the vagina. Right adnexum displays no mass. Left adnexum displays no mass. Cervix exhibits absence. Uterus is absent.   POP-Q  Aa: -1 Ba: 1 C: -4   GH: 2 PB: 2 TVL: 9   Ap: -2 Bp:  D:           Assessment:     1. Prolapse of vaginal vault after hysterectomy    2. Cystocele, midline    3. Incompetence of rectovaginal tissue    4. FLEX (stress urinary incontinence, female)            Plan:     1. Prolapse of vaginal vault after hysterectomy    2. Cystocele, midline    3. Incompetence of rectovaginal tissue    4. FLEX (stress urinary incontinence, female)        Patient presents today with her list of questions.  All of these questions were answered in direct face-to-face manner.  Patient many questions regarding the graft patient many questions with all postoperative care all of this was explained patient wanted to know once again on her options were this was reviewed patient is definitively requesting we move forward with robotic sacral colpopexy as outlined once again risks benefits alternatives discussed total time this " consultation today was 20 min greater than 50% time 15 min in direct discussion answering questions as stated and once again reviewing objectives patient understands if we are able to navigate in achieved surgical be the objectives we will do mid urethral sling otherwise it will have to be done in an interval fashion patient understands the implications of this        There are no Patient Instructions on file for this visit.

## 2019-12-11 NOTE — TELEPHONE ENCOUNTER
----- Message from Kendra Serrano LPN sent at 12/11/2019 10:11 AM CST -----  Patient is having sacrocolpopexy on 12/17/19. Did not see clearance on chart?

## 2019-12-11 NOTE — DISCHARGE INSTRUCTIONS
To confirm, Your doctor has instructed you that surgery is scheduled for: 12/17/19   CHANDRAKANT  [phone]    Please report to [facility] the morning of surgery. You must check-in and receive a wristband before going to your procedure.    Pre-Op will call the afternoon prior to surgery between 1:00 and 6:00 PM with the final arrival time.  Phone number: [phone]    PLEASE NOTE:  The surgery schedule has many variables which may affect the time of your surgery case.  Family members should be available if your surgery time changes.  Plan to be here the day of your procedure between 4-6 hours.    MEDICATIONS:  TAKE ONLY THESE MEDICATIONS WITH A SMALL SIP OF WATER THE MORNING OF YOUR PROCEDURE:  ZOLOFT, PAIN PILL-IF NEEDED    DO NOT TAKE THESE MEDICATIONS 5-7 DAYS PRIOR to your procedure or per your surgeon's request: ASPIRIN, ALEVE, ADVIL, IBUPROFEN, FISH OIL VITAMIN E, HERBALS - LAST DOSE 12/4 19                                    (May take Tylenol)    ONLY if you are prescribed any types of blood thinners such as:  Aspirin, Coumadin, Plavix, Pradaxa, Xarelto, Aggrenox, Effient, Eliquis, Savasya, Brilinta, or any other, ask your surgeon whether you should stop taking them and how long before surgery you should stop.  You may also need to verify with the prescribing physician if it is ok to stop your medication.      INSTRUCTIONS IMPORTANT!!  · Do not eat or drink anything between midnight and the time of your procedure- this includes gum, mints, and candy.  · Do not smoke or drink alcoholic beverages 24 hours prior to your procedure.  · Shower the night before AND the morning of your procedure with a Chlorhexidine wash such as Hibiclens or Dial antibacterial soap from the neck down.  Do not get it on your face or in your eyes.  You may use your own shampoo and face wash. This helps your skin to be as bacteria free as possible.    · If you wear contact lenses, dentures, hearing  aids or glasses, bring a container to put them in during surgery and give to a family member for safe keeping.  Please leave all jewelry, piercing's and valuables at home.   · DO NOT remove hair from the surgery site.  Do not shave the incision site unless you are given specific instructions to do so.    · ONLY if you have been diagnosed with sleep apnea please bring your C-PAP machine.  · ONLY if you wear home oxygen please bring your portable oxygen tank the day of your procedure.  · ONLY if you have a history of OPEN HEART SURGERY you will need a clearance from your Cardiologist per Anesthesia.      · ONLY for patients requiring bowel prep, written instructions will be given by your doctor's office.  · ONLY if you have a neuro stimulator, please bring the controller with you the morning of surgery  · ONLY if a type and screen test is needed before surgery, please return:  · If your doctor has scheduled you for an overnight stay, bring a small overnight bag with any personal items you need.  · Make arrangements in advance for transportation home by a responsible adult.  It is not safe to drive a vehicle during the 24 hours after anesthesia.      · Visiting hours are 10:00AM to 8:30PM.  For the safety of all patients, visitors under the age of 12 are not allowed above the first floor of the hospital.    · All [facility] facilities and properties are tobacco free.  Smoking is NOT allowed.       If you have any questions about these instructions, call Pre-Op Admit  Nursing at [phone] or the Pre-Op Day Surgery Unit at [phone].

## 2019-12-11 NOTE — PROGRESS NOTES
Subjective:      Patient ID: Jazz Martinez is a 74 y.o. female.    Chief Complaint:  Pre-op Exam      History of Present Illness  Below is a summary of all of the aspects of this patient's case this is a patient with prior substantially urogenital reconstruction with recurrence.  Patient understands from previous urodynamics that we will either do then concomitant mid urethral sling of objectives are achieved if we are not able to then will have to do an interval approach patient noted understanding.  Patient appreciative the time spent with her today              74-year-old para 100 1 times  history of hysterectomy along with bladder suspension x2 secondary to prolapse after the last 1 9 years ago patient developed blood clots since then patient has been trying to manage her recurrent prolapse with pessary however now she has started wearing pessary in fact there has been trauma to the vaginal epithelium secondary to pessary utilization in she just does not want to utilize any more.  At additionally patient was substantial leakage of urine with coughing and sneezing as well as frequent urination.  The review of the electronic medical record does not show the OR case a patient states that it was done at an ambulatory surgery center 9 years ago that at this place stool and business an open that patient will obtain operative record form 80 cc           I am looking at the operative note.  November 15, 2010 patient underwent a  1.  Anterior and posterior colporrhaphy  2.  Apical vault suspension with a send  3.  Cystotomy repair  The send is not a mesh kit from what I can see but a deep pass through the pararectal space through the sacral spinous ligament with Prolene sutures to fixate the graft.           The preoperative diagnosis was.  1.  Urinary incontinence with ISD  2.  Cystocele  Rectocele  3.  Vault prolapse.        Patient a had a a cystotomy.  As result the entire ascend was not utilized I cannot  find if the send used a significant piece of graft but the system itself does have a role for new anterior approach also allowing for apical support.  So I think that the graft portion might of been aborted with simple apical fixation to the sacral spinous ligament fixation.        Physical Exam   Genitourinary: Pelvic exam was performed with patient supine. Skenes normal. Right labia normal. Urethra exhibits no urethral caruncle, no urethral diverticulum, no urethral mass and no hypermobility. There is a cystocele (BpH to +1) and unspecified prolapse of vaginal walls ( pulled CHCF to the introitus) in the vagina. No rectocele in the vagina. Right adnexum displays no mass. Left adnexum displays no mass. Cervix exhibits absence. Uterus is absent.   POP-Q  Aa: -1 Ba: 1 C: -4  GH: 2 PB: 2 TVL: 9  Ap: -2 Bp:  D:      After examination I used [employer] interactive web site to diagram prolapse  I then discussed her options.  Utilizing patient leaflets we did discuss sacral colpopexy in relation to native tissue repair given graft augmentation represents may be a less chance of yet another recurrence I am going to move recommend with sacral colpopexy patient does not want to lose the ability for possible coital activity in future for that reason colpocleisis will not be covered      Will be carrying out urodynamics and cysto as well as hormone supplementation transvaginal UDS insistent will take place in the next 6 weeks patient understanding pressure of the time spent with her today.                       SURGEONS NARRATIVE:  A time out was performed in which the patient identity and procedure were confirmed.  Urodynamic evaluation was performed using a computerized system ([employer]  Uroflowmetry was performed on the patient in the sitting position without catheters in place.  Subsequent urodynamic testing was performed with the patient in the lithotomy position at 45 degrees. Air  charged catheters were used with sterile water as the infusion medium. Vesical and abdominal (rectal) pressures were measured, and detrusor pressure was calculated. EMG activity was recorded with surface electrodes. During filling, room temperature sterile water was infused at a rate of 30 cubic centimeters per minute. The patient was asked cough after instillation of each 100cc volume. Two Valsalva leak point pressures and two cough leak point pressures were performed with the catheters in place at 300 cubic centimeters and again at maximum capacity. Valsalva leak point pressure was defined as the difference between vesical pressure at which leakage was noted (visualized at the external urethral meatus) and the baseline vesical pressure. Following urodynamic testing, a pressure flow study was performed with the patient in the sitting position. Vesical and abdominal pressures were monitored and detrusor pressures were calculated. After the pressure flow study, the catheters were then removed. The patient tolerated the procedure well.      Urine dipstick: neg     1.  VOIDING PHASE:       a.  Uroflowmetry:  · Prolapse reduction: No  · Voided volume:  50 mL   · Voiding time:   approp seconds  · Max flow: not cap  · Avg flow:   Not cap  · PVR:   10 mL     The overall configuration of this uroflow study was  such small void a and not captured and really can't make any diagnostic evaluations based on such a small volume I think..       b.  Pressure flow:  · Prolapse reduction: No  · Voided volume:   306 mL  · Voiding time:   39 seconds  · Peak flow:  15.40 mL/s   · Avg flow:  8.630 mL/s  · Max det pressure:  45.80  cm H20  · Det pressure at max flow: 40.40 cm H20  · Void initiated by  detrusor.    · Urethral relaxation (EMG):  apparent.    · PVR (calculated):  3 mL     The overall configuration of this pressure flow study was beautiful continuous max velocity within the 1st 3rd surprisingly good detrusor function..        2.  FILLING PHASE:  · 1st desire: 42 mL  · Normal desire:  77 mL  · Strong desire:  103 mL  · Urgency:  311 mL  · Compliance (calculated)  compliant mL/cm H20  · EMG activity during filling:   approp  · Detrusor contractions observed: No.       3.  URETHRAL FUNCTION/STORAGE PHASE:     a.  WITHOUT prolapse reduction:  Very interesting study     This patient at capacity of 312 cc I got a leak point pressure of 20 and I believe it.  However the patient did not leak on stress or Valsalva at 1:50 a.m..  Bactrim because of the obstruction and because of the catheter with.     Patient is definitely a mixed incontinence picture but I do not think that she has been emptying her bladder well and that when there is large volumes there is spontaneous detrusor or substantial FLEX.  Was interesting to note is 9 years ago the op note dictated states that that there was significant leakage of urine through the urethra throughout the surgery.  In this was after surgical correction so I do believe that I SD we will make itself visible at the time of so prolapse correction.  This patient has excellent bladder detrusor function.  An is a me able to a mid urethral sling at the time of prolapse repair.           These findings are consistent with Positive urodynamic stress incontinence .     Assessment:  Unmasking of this profound incontinence at the time of prolapse repair is quite profound.  M U.S. will be offered only if we are able to carry out deep dissection all the way down to the junction of the bladder and urethra deep dissection into the vesicovaginal space.  If unable to do this then I will have to do an interval approach as opposed to a concomitant           Title of Operation:   Cystourethroscopy.     INDICATIONS:  Recurrent prolapse     PREOPERATIVE DIAGNOSIS  Recurrent prolapse with mixed incontinence     POSTOPERATIVE DIAGNOSIS:   Same    Anesthesia:   2% Xylocaine gel.    Specimen (Bacteriological, Pathological or  other):   None.     Prosthetic Device/Implant:   None.     Surgeons Narrative:     After informed consent was obtained, the patient was placed in the lithotomy position. The urethral meatus was prepped with Betadine and 10 cubic centimeters of 2% Xylocaine gel were introduced into the urethra. A flexible cystourethroscope was introduced into the bladder. The bladder was distended with approximately 300 cubic centimeters of sterile water. A systematic survey was performed in which the bladder was surveyed using multiple sequential passes in a clockwise fashion from the bladder dome to the bladder base to the urethrovesical junction. The trigone and ureteral orifices were observed. The scope was then flipped back on itself, and the urethrovesical junction was viewed. A vaginal examining finger was then placed with pressure suburethrally at the urethrovesical junction as the telescope was withdrawn in order to perform positive pressure urethroscopy.  Standard maneuvers of cough, squeeze and Valsalva were performed. The telescope was then completely withdrawn.     Findings: Urethroscopy:  Normal.  Cystoscopy:  Normal bladder mucosa, bilateral ureteral flow was noted.      Assessment: Essentially normal cystourethroscopy.      Plan:      After the carrying out of the UDS I spent a long time explaining outcome.  I talked about operative note that she brought to me.  We talked about surgical correction as post with sacral colpopexy as opposed to native tissue repair.  If I am able to successfully navigate to the appropriate points and have complete correction is this is a rather profound distal defect and we will concur do a concomitant sling.            Past Medical History:   Diagnosis Date    Corns and callosities     Deep vein thrombosis     GERD (gastroesophageal reflux disease)     Gout     H/O bladder problems     uses pessary    Hyperlipidemia     Hypertension     Pulmonary embolism        Past Surgical  History:   Procedure Laterality Date    APPENDECTOMY  AGE 12    BLADDER SURGERY      Twice    BREAST CYST EXCISION      Benign per patient    HAND SURGERY Left     HYSTERECTOMY      Not related to cancer per patient.    OOPHORECTOMY         GYN & OB History  No LMP recorded. Patient has had a hysterectomy.   Date of Last Pap: No result found    OB History    Para Term  AB Living   1 1 1     1   SAB TAB Ectopic Multiple Live Births                  # Outcome Date GA Lbr Cesar/2nd Weight Sex Delivery Anes PTL Lv   1 Term               Obstetric Comments          Health Maintenance       Date Due Completion Date    TETANUS VACCINE 1961 ---    Sign Pain Contract 1961 ---    Complete Opioid Risk Tool 1961 ---    Colonoscopy 2019    Shingles Vaccine (2 of 3) 2019 10/14/2019    Lipid Panel 2020    DEXA SCAN 2021 3/27/2018    Override on 10/14/2014: [facility] ([facility]          Family History   Problem Relation Age of Onset    Cancer Brother         bladder    Breast cancer Neg Hx     Colon cancer Neg Hx     Ovarian cancer Neg Hx        Social History     Socioeconomic History    Marital status:      Spouse name: Not on file    Number of children: Not on file    Years of education: Not on file    Highest education level: Not on file   Occupational History    Not on file   Social Needs    Financial resource strain: Not on file    Food insecurity:     Worry: Not on file     Inability: Not on file    Transportation needs:     Medical: Not on file     Non-medical: Not on file   Tobacco Use    Smoking status: Never Smoker    Smokeless tobacco: Never Used   Substance and Sexual Activity    Alcohol use: No    Drug use: No    Sexual activity: Never   Lifestyle    Physical activity:     Days per week: Not on file     Minutes per session: Not on file    Stress: Not on file   Relationships    Social  "connections:     Talks on phone: Not on file     Gets together: Not on file     Attends Mandaen service: Not on file     Active member of club or organization: Not on file     Attends meetings of clubs or organizations: Not on file     Relationship status: Not on file   Other Topics Concern    Not on file   Social History Narrative    Not on file       Review of Systems  Review of Systems  Pressure incontinence     Objective:   /85   Pulse 68   Ht 5' 5" (1.651 m)   Wt 70.5 kg (155 lb 6.8 oz)   BMI 25.86 kg/m²     Physical Exam     Physical Exam   Genitourinary: Pelvic exam was performed with patient supine. Skenes normal. Right labia normal. Urethra exhibits no urethral caruncle, no urethral diverticulum, no urethral mass and no hypermobility. There is a cystocele (BpH to +1) and unspecified prolapse of vaginal walls ( pulled longterm to the introitus) in the vagina. No rectocele in the vagina. Right adnexum displays no mass. Left adnexum displays no mass. Cervix exhibits absence. Uterus is absent.   POP-Q  Aa: -1 Ba: 1 C: -4   GH: 2 PB: 2 TVL: 9   Ap: -2 Bp:  D:           Assessment:     1. Prolapse of vaginal vault after hysterectomy    2. Cystocele, midline    3. Incompetence of rectovaginal tissue    4. FLEX (stress urinary incontinence, female)            Plan:     1. Prolapse of vaginal vault after hysterectomy    2. Cystocele, midline    3. Incompetence of rectovaginal tissue    4. FLEX (stress urinary incontinence, female)        Patient presents today with her list of questions.  All of these questions were answered in direct face-to-face manner.  Patient many questions regarding the graft patient many questions with all postoperative care all of this was explained patient wanted to know once again on her options were this was reviewed patient is definitively requesting we move forward with robotic sacral colpopexy as outlined once again risks benefits alternatives discussed total time this " consultation today was 20 min greater than 50% time 15 min in direct discussion answering questions as stated and once again reviewing objectives patient understands if we are able to navigate in achieved surgical be the objectives we will do mid urethral sling otherwise it will have to be done in an interval fashion patient understands the implications of this        There are no Patient Instructions on file for this visit.

## 2019-12-12 ENCOUNTER — OFFICE VISIT (OUTPATIENT)
Dept: FAMILY MEDICINE | Facility: CLINIC | Age: 74
End: 2019-12-12
Payer: MEDICARE

## 2019-12-12 VITALS
SYSTOLIC BLOOD PRESSURE: 138 MMHG | BODY MASS INDEX: 25.97 KG/M2 | DIASTOLIC BLOOD PRESSURE: 64 MMHG | HEIGHT: 65 IN | HEART RATE: 72 BPM | OXYGEN SATURATION: 96 % | WEIGHT: 155.88 LBS | TEMPERATURE: 98 F

## 2019-12-12 DIAGNOSIS — R30.0 DYSURIA: ICD-10-CM

## 2019-12-12 DIAGNOSIS — R94.31 ABNORMAL EKG: ICD-10-CM

## 2019-12-12 DIAGNOSIS — Z86.718 HISTORY OF DVT IN ADULTHOOD: ICD-10-CM

## 2019-12-12 DIAGNOSIS — Z01.818 PRE-OP EVALUATION: Primary | ICD-10-CM

## 2019-12-12 PROCEDURE — 99214 PR OFFICE/OUTPT VISIT, EST, LEVL IV, 30-39 MIN: ICD-10-PCS | Mod: S$GLB,,, | Performed by: NURSE PRACTITIONER

## 2019-12-12 PROCEDURE — 99214 OFFICE O/P EST MOD 30 MIN: CPT | Mod: S$GLB,,, | Performed by: NURSE PRACTITIONER

## 2019-12-12 NOTE — Clinical Note
Patient has new changes to EKG which is borderline abnormal. Referred to cardiologist for clearance. Has appointment Monday 12/16/19 to see cardiologist. 
Small bowel
Pts mouth noted to be dry, is a mouth breather

## 2019-12-12 NOTE — PROGRESS NOTES
Patient ID: Jazz Martinez is a 74 y.o. female.    Chief Complaint: Surgical Consult      Jazz Martinez is in the office for bladder surgery. The surgery is going to be done 12/17/19. Patient has a history of DVT and PE post op. She denies any chest pain or shortness of breath in the past 6 months. She has seen heme/onc and has been ordered eliquis for after surgery.     HPI    Past Medical History:   Diagnosis Date    Corns and callosities     Deep vein thrombosis     GERD (gastroesophageal reflux disease)     Gout     H/O bladder problems     uses pessary    Hyperlipidemia     Hypertension     Pulmonary embolism        STOP BANG Questionnaire  BMI (Calculated): 25.9        Current Outpatient Medications:     apixaban (ELIQUIS) 2.5 mg Tab, Take 1 tablet (2.5 mg total) by mouth 2 (two) times daily., Disp: 60 tablet, Rfl: 0    atorvastatin (LIPITOR) 10 MG tablet, Take 1 tablet (10 mg total) by mouth once daily., Disp: 90 tablet, Rfl: 2    diclofenac sodium (VOLTAREN) 1 % Gel, Apply 2 g topically 4 (four) times daily., Disp: 100 g, Rfl: 5    ibuprofen (ADVIL,MOTRIN) 600 MG tablet, ibuprofen 600 mg tablet  Take 1 tablet 3 times a day by oral route., Disp: , Rfl:     ondansetron (ZOFRAN) 4 MG tablet, Take 1 tablet (4 mg total) by mouth every 8 (eight) hours as needed for Nausea., Disp: 20 tablet, Rfl: 1    sertraline (ZOLOFT) 100 MG tablet, TAKE 1.5 TABLETS (150 MG TOTAL) BY MOUTH ONCE DAILY., Disp: 135 tablet, Rfl: 1    sumatriptan (IMITREX) 100 MG tablet, 1 tab po at start of headache, may repeat in 2 hours X1 in 24 hours. (Patient taking differently: as needed. 1 tab po at start of headache, may repeat in 2 hours X1 in 24 hours.), Disp: 10 tablet, Rfl: 11    traMADol (ULTRAM) 50 mg tablet, , Disp: , Rfl: 5    triamterene-hydrochlorothiazide 37.5-25 mg (MAXZIDE-25) 37.5-25 mg per tablet, 1/2 p.o. q.d., Disp: 90 tablet, Rfl: 0    valACYclovir (VALTREX) 500 MG tablet, TAKE 2 TABLETS BY MOUTH  TWICE DAILY, Disp: 12 tablet, Rfl: 3    fenofibrate 160 MG Tab, Take 1 tablet (160 mg total) by mouth once daily., Disp: 90 tablet, Rfl: 3    The 10-year ASCVD risk score (Maria G TURNER Jr., et al., 2013) is: 26.6%    Values used to calculate the score:      Age: 74 years      Sex: Female      Is Non- : No      Diabetic: No      Tobacco smoker: No      Systolic Blood Pressure: 138 mmHg      Is BP treated: Yes      HDL Cholesterol: 65 mg/dL      Total Cholesterol: 182 mg/dL     Wt Readings from Last 3 Encounters:   12/12/19 70.7 kg (155 lb 13.8 oz)   12/11/19 70.5 kg (155 lb 6.8 oz)   10/23/19 70.6 kg (155 lb 10.3 oz)     Temp Readings from Last 3 Encounters:   12/12/19 98.1 °F (36.7 °C) (Oral)   10/21/19 97.8 °F (36.6 °C) (Oral)   10/16/19 98.2 °F (36.8 °C) (Oral)     BP Readings from Last 3 Encounters:   12/12/19 138/64   12/11/19 136/85   10/23/19 137/78     Pulse Readings from Last 3 Encounters:   12/12/19 72   12/11/19 68   10/23/19 79     Resp Readings from Last 3 Encounters:   10/21/19 20   10/16/19 16   09/26/19 16     PF Readings from Last 3 Encounters:   No data found for PF     SpO2 Readings from Last 3 Encounters:   12/12/19 96%   10/16/19 95%   09/26/19 98%        No results found for: HGBA1C  Lab Results   Component Value Date    LDLCALC 89.0 07/29/2019    CREATININE 0.8 12/11/2019       Review of Systems   Constitutional: Negative for fatigue, fever and unexpected weight change.   HENT: Negative for congestion, hearing loss and sore throat.    Eyes: Negative for pain, redness and visual disturbance.   Respiratory: Negative for cough and shortness of breath.    Cardiovascular: Negative for chest pain and leg swelling.   Gastrointestinal: Negative for constipation, diarrhea, nausea and vomiting.   Endocrine: Negative for cold intolerance and heat intolerance.   Genitourinary: Negative for dysuria and hematuria.   Musculoskeletal: Negative for arthralgias and myalgias.   Skin: Negative  for pallor and rash.   Neurological: Negative for dizziness and headaches.   Psychiatric/Behavioral: Negative for dysphoric mood. The patient is not nervous/anxious.            Objective:      Physical Exam   Constitutional: She is oriented to person, place, and time. She appears well-developed and well-nourished. No distress.   HENT:   Head: Normocephalic and atraumatic.   Eyes: Conjunctivae are normal. Right eye exhibits no discharge. Left eye exhibits no discharge. No scleral icterus.   Cardiovascular: Normal rate, regular rhythm and normal heart sounds. Exam reveals no gallop and no friction rub.   No murmur heard.  Pulmonary/Chest: Effort normal and breath sounds normal. No stridor. No respiratory distress. She has no wheezes. She has no rales.   Musculoskeletal: She exhibits no edema.   Neurological: She is alert and oriented to person, place, and time.   Skin: Skin is warm and dry. No rash noted. She is not diaphoretic. No pallor.   Psychiatric: She has a normal mood and affect. Her behavior is normal.   Nursing note and vitals reviewed.    EKG 12-lead   Order: [id number]   Status:  Final result   Visible to patient:  No (Not Released) Next appt:  12/16/2019 at 08:00 AM in Cardiology (Alfred Ghosh MD) Dx:  Pre-op testing      Narrative   Performed by: GERARD   Test Reason : Z01.818,Z01.818,    Vent. Rate : 064 BPM     Atrial Rate : 064 BPM     P-R Int : 150 ms          QRS Dur : 082 ms      QT Int : 386 ms       P-R-T Axes : 068 031 110 degrees     QTc Int : 398 ms    Normal sinus rhythm  Possible Inferior infarct ,age undetermined  Abnormal ECG  When compared with ECG of 21-JUN-2016 12:50,  Borderline criteria for Inferior infarct are now Present  T wave inversion now evident in Lateral leads  Confirmed by Gabriel Daniel MD ([id number]) on 12/11/2019 4:12:57 PM    Referred By:             Confirmed By:Gabriel Daniel MD      Specimen Collected: 12/11/19 10:57               Lab Results   Component Value Date     WBC 9.31 12/11/2019    HGB 14.8 12/11/2019    HCT 45.3 12/11/2019    MCV 96 12/11/2019     12/11/2019       CMP  Sodium   Date Value Ref Range Status   12/11/2019 143 136 - 145 mmol/L Final     Potassium   Date Value Ref Range Status   12/11/2019 4.3 3.5 - 5.1 mmol/L Final     Chloride   Date Value Ref Range Status   12/11/2019 103 95 - 110 mmol/L Final     CO2   Date Value Ref Range Status   12/11/2019 29 23 - 29 mmol/L Final     Glucose   Date Value Ref Range Status   12/11/2019 87 70 - 110 mg/dL Final     BUN, Bld   Date Value Ref Range Status   12/11/2019 20 8 - 23 mg/dL Final     Creatinine   Date Value Ref Range Status   12/11/2019 0.8 0.5 - 1.4 mg/dL Final     Calcium   Date Value Ref Range Status   12/11/2019 10.1 8.7 - 10.5 mg/dL Final     Total Protein   Date Value Ref Range Status   10/16/2019 7.4 6.0 - 8.4 g/dL Final     Albumin   Date Value Ref Range Status   10/16/2019 4.3 3.5 - 5.2 g/dL Final     Total Bilirubin   Date Value Ref Range Status   10/16/2019 0.3 0.1 - 1.0 mg/dL Final     Comment:     For infants and newborns, interpretation of results should be based  on gestational age, weight and in agreement with clinical  observations.  Premature Infant recommended reference ranges:  Up to 24 hours.............<8.0 mg/dL  Up to 48 hours............<12.0 mg/dL  3-5 days..................<15.0 mg/dL  6-29 days.................<15.0 mg/dL       Alkaline Phosphatase   Date Value Ref Range Status   10/16/2019 69 55 - 135 U/L Final     AST   Date Value Ref Range Status   10/16/2019 27 10 - 40 U/L Final     ALT   Date Value Ref Range Status   10/16/2019 21 10 - 44 U/L Final     Anion Gap   Date Value Ref Range Status   12/11/2019 11 8 - 16 mmol/L Final     eGFR if    Date Value Ref Range Status   12/11/2019 >60 >60 mL/min/1.73 m^2 Final     eGFR if non    Date Value Ref Range Status   12/11/2019 >60 >60 mL/min/1.73 m^2 Final     Comment:     Calculation used to obtain  the estimated glomerular filtration  rate (eGFR) is the CKD-EPI equation.        Screening recommendations appropriate to age and health status were reviewed.    Pre-op evaluation    Abnormal EKG  -     Ambulatory referral to Cardiology    History of DVT in adulthood    Dysuria  -     POCT urine dipstick without microscope        RCRI risk factors include: History of DVT and PE.  Risk of a recurrent DVT and PE is very high. Will need extra precautions including alternating compression to both legs, ambulation as soon as possible and blood thinners post op.   Overall this patient can be considered high risk for this intermediate risk procedure. No further cardiac testing is recommended at this time.     Patient denies any symptoms (as per HPI) concerning for undiagnosed lung disease including KVNG. She has no history of cardiac disease but did have an abnormal EKG. Would not recommend obtaining chest X-ray, sleep study, or PFTs at this time, but needs cardiology clearance as a result.  Patient is a non-smoker. We discussed the benefits of early mobilization and deep breathing after surgery.      Screened patient for alcohol misuse, use of illicit drugs, and personal or family history of anesthetic complications or bleeding diathesis and no substantial concerns were identified. No surgical complications except DVT are noted per patient.     All current medications were reviewed and at this time no changes to medications are recommended prior to surgery. Patient advised to avoid all NSAIDS and states she has not been taking advil for a few weeks. Noted that all SSRIs can cause blood thinning, but not advised to stop sertraline at this time.     I recommend use of standard pre-op and post-op precautions for this patient with the added DVT precautions as noted (alternating compression to legs, lovenox and eliquis). Surgeon should also consider that patient is taking an SSRI currently.  In my opinion, she is medically  optimized for this procedure, but will need a cardiac clearance.

## 2019-12-15 ENCOUNTER — PATIENT OUTREACH (OUTPATIENT)
Dept: ADMINISTRATIVE | Facility: OTHER | Age: 74
End: 2019-12-15

## 2019-12-16 ENCOUNTER — ANESTHESIA EVENT (OUTPATIENT)
Dept: SURGERY | Facility: HOSPITAL | Age: 74
End: 2019-12-16
Payer: MEDICARE

## 2019-12-16 ENCOUNTER — OFFICE VISIT (OUTPATIENT)
Dept: CARDIOLOGY | Facility: CLINIC | Age: 74
End: 2019-12-16
Payer: MEDICARE

## 2019-12-16 VITALS
HEART RATE: 62 BPM | SYSTOLIC BLOOD PRESSURE: 130 MMHG | OXYGEN SATURATION: 97 % | WEIGHT: 160.5 LBS | BODY MASS INDEX: 26.74 KG/M2 | DIASTOLIC BLOOD PRESSURE: 80 MMHG | HEIGHT: 65 IN

## 2019-12-16 DIAGNOSIS — Z86.718 HISTORY OF DVT IN ADULTHOOD: Primary | ICD-10-CM

## 2019-12-16 DIAGNOSIS — R94.31 NONSPECIFIC ABNORMAL ELECTROCARDIOGRAM (ECG) (EKG): ICD-10-CM

## 2019-12-16 PROCEDURE — 1159F MED LIST DOCD IN RCRD: CPT | Mod: ,,, | Performed by: INTERNAL MEDICINE

## 2019-12-16 PROCEDURE — 99999 PR PBB SHADOW E&M-EST. PATIENT-LVL IV: CPT | Mod: PBBFAC,,, | Performed by: INTERNAL MEDICINE

## 2019-12-16 PROCEDURE — 1126F PR PAIN SEVERITY QUANTIFIED, NO PAIN PRESENT: ICD-10-PCS | Mod: ,,, | Performed by: INTERNAL MEDICINE

## 2019-12-16 PROCEDURE — 99203 PR OFFICE/OUTPT VISIT, NEW, LEVL III, 30-44 MIN: ICD-10-PCS | Mod: S$PBB,,, | Performed by: INTERNAL MEDICINE

## 2019-12-16 PROCEDURE — 99214 OFFICE O/P EST MOD 30 MIN: CPT | Mod: PBBFAC | Performed by: INTERNAL MEDICINE

## 2019-12-16 PROCEDURE — 1126F AMNT PAIN NOTED NONE PRSNT: CPT | Mod: ,,, | Performed by: INTERNAL MEDICINE

## 2019-12-16 PROCEDURE — 99203 OFFICE O/P NEW LOW 30 MIN: CPT | Mod: S$PBB,,, | Performed by: INTERNAL MEDICINE

## 2019-12-16 PROCEDURE — 99999 PR PBB SHADOW E&M-EST. PATIENT-LVL IV: ICD-10-PCS | Mod: PBBFAC,,, | Performed by: INTERNAL MEDICINE

## 2019-12-16 PROCEDURE — 1159F PR MEDICATION LIST DOCUMENTED IN MEDICAL RECORD: ICD-10-PCS | Mod: ,,, | Performed by: INTERNAL MEDICINE

## 2019-12-16 NOTE — LETTER
December 16, 2019      Erika Dewey, APRN  [id number] [address] [address]           [facility]  [address]  [place] [address]  Phone: [phone]          Patient: Jazz Martinez   MR Number: [id number]   YOB: 1945   Date of Visit: 12/16/2019       Dear Erika Dewey:    Thank you for referring Jazz Martinez to me for evaluation. Attached you will find relevant portions of my assessment and plan of care.    If you have questions, please do not hesitate to call me. I look forward to following Jazz Martinez along with you.    Sincerely,    Alfred Ghosh MD    Enclosure  CC:  No Recipients    If you would like to receive this communication electronically, please contact [email or web address] or [phone] to request more information on [facility] access.    For providers and/or their staff who would like to refer a patient to [facility], please contact us through our one-stop-shop provider referral line, Payal Patrick, at [phone].    If you feel you have received this communication in error or would no longer like to receive these types of communications, please e-mail [email or web address]

## 2019-12-16 NOTE — PROGRESS NOTES
Subjective:    Patient ID:  Jazz Martinez is a 74 y.o. female who presents for evaluation of abnormal pre-op EKG    HPI   The patient is a 74 year of female was referred to evaluate an abnormal EKG as part of a pre-op evaluation for bladder surgery. She has a past history of hypertension, hyperlipidemia , DVT and PE [ post op].. The EKG 12/11/19 revealed a Q in lead 3 and non specific T wave abnormalities where inferior MI could not be ruled out. These changes were not present on EKG in 2016.. She has no history of heart disease, chest pain or SOB.  She is very active and can exercise at a level greater than 4 METS.  Lab Results   Component Value Date     12/11/2019    K 4.3 12/11/2019     12/11/2019    CO2 29 12/11/2019    BUN 20 12/11/2019    CREATININE 0.8 12/11/2019    GLU 87 12/11/2019    MG 2.1 02/25/2016    AST 27 10/16/2019    ALT 21 10/16/2019    ALBUMIN 4.3 10/16/2019    PROT 7.4 10/16/2019    BILITOT 0.3 10/16/2019    WBC 9.31 12/11/2019    HGB 14.8 12/11/2019    HCT 45.3 12/11/2019    MCV 96 12/11/2019     12/11/2019    TSH 1.306 02/25/2016         Lab Results   Component Value Date    CHOL 182 07/29/2019    HDL 65 07/29/2019    TRIG 140 07/29/2019       Lab Results   Component Value Date    LDLCALC 89.0 07/29/2019       Past Medical History:   Diagnosis Date    Corns and callosities     Deep vein thrombosis     GERD (gastroesophageal reflux disease)     Gout     H/O bladder problems     uses pessary    Hyperlipidemia     Hypertension     Pulmonary embolism        Current Outpatient Medications:     atorvastatin (LIPITOR) 10 MG tablet, Take 1 tablet (10 mg total) by mouth once daily., Disp: 90 tablet, Rfl: 2    diclofenac sodium (VOLTAREN) 1 % Gel, Apply 2 g topically 4 (four) times daily., Disp: 100 g, Rfl: 5    ibuprofen (ADVIL,MOTRIN) 600 MG tablet, ibuprofen 600 mg tablet  Take 1 tablet 3 times a day by oral route., Disp: , Rfl:     sertraline (ZOLOFT) 100 MG  tablet, TAKE 1.5 TABLETS (150 MG TOTAL) BY MOUTH ONCE DAILY., Disp: 135 tablet, Rfl: 1    sumatriptan (IMITREX) 100 MG tablet, 1 tab po at start of headache, may repeat in 2 hours X1 in 24 hours. (Patient taking differently: as needed. 1 tab po at start of headache, may repeat in 2 hours X1 in 24 hours.), Disp: 10 tablet, Rfl: 11    traMADol (ULTRAM) 50 mg tablet, , Disp: , Rfl: 5    triamterene-hydrochlorothiazide 37.5-25 mg (MAXZIDE-25) 37.5-25 mg per tablet, 1/2 p.o. q.d., Disp: 90 tablet, Rfl: 0    valACYclovir (VALTREX) 500 MG tablet, TAKE 2 TABLETS BY MOUTH TWICE DAILY, Disp: 12 tablet, Rfl: 3    apixaban (ELIQUIS) 2.5 mg Tab, Take 1 tablet (2.5 mg total) by mouth 2 (two) times daily. (Patient not taking: Reported on 12/16/2019), Disp: 60 tablet, Rfl: 0    fenofibrate 160 MG Tab, Take 1 tablet (160 mg total) by mouth once daily., Disp: 90 tablet, Rfl: 3    ondansetron (ZOFRAN) 4 MG tablet, Take 1 tablet (4 mg total) by mouth every 8 (eight) hours as needed for Nausea. (Patient not taking: Reported on 12/16/2019), Disp: 20 tablet, Rfl: 1          Review of Systems   Constitution: Negative for decreased appetite, diaphoresis, fever, malaise/fatigue, weight gain and weight loss.   HENT: Negative for congestion, ear discharge, ear pain and nosebleeds.    Eyes: Negative for blurred vision, double vision and visual disturbance.   Cardiovascular: Negative for chest pain, claudication, cyanosis, dyspnea on exertion, irregular heartbeat, leg swelling, near-syncope, orthopnea, palpitations, paroxysmal nocturnal dyspnea and syncope.   Respiratory: Negative for cough, hemoptysis, shortness of breath, sleep disturbances due to breathing, snoring, sputum production and wheezing.    Endocrine: Negative for polydipsia, polyphagia and polyuria.   Hematologic/Lymphatic: Negative for adenopathy and bleeding problem. Does not bruise/bleed easily.   Skin: Negative for color change, nail changes, poor wound healing and rash.  "  Musculoskeletal: Negative for muscle cramps and muscle weakness.   Gastrointestinal: Negative for abdominal pain, anorexia, change in bowel habit, hematochezia, nausea and vomiting.   Genitourinary: Positive for bladder incontinence. Negative for dysuria, frequency and hematuria.   Neurological: Negative for brief paralysis, difficulty with concentration, excessive daytime sleepiness, dizziness, focal weakness, headaches, light-headedness, seizures, vertigo and weakness.   Psychiatric/Behavioral: Negative for altered mental status and depression.   Allergic/Immunologic: Negative for persistent infections.        Objective:/80   Pulse 62   Ht 5' 5" (1.651 m)   Wt 72.8 kg (160 lb 7.9 oz)   SpO2 97%   BMI 26.71 kg/m²             Physical Exam   Constitutional: She is oriented to person, place, and time. She appears well-developed and well-nourished.   HENT:   Head: Normocephalic.   Right Ear: External ear normal.   Left Ear: External ear normal.   Nose: Nose normal.   Inspection of lips, teeth and gums normal   Eyes: Pupils are equal, round, and reactive to light. Conjunctivae and EOM are normal. No scleral icterus.   Neck: Normal range of motion. No JVD present. No tracheal deviation present. No thyromegaly present.   Cardiovascular: Normal rate, regular rhythm, normal heart sounds and intact distal pulses. Exam reveals no gallop and no friction rub.   No murmur heard.  Pulses:       Carotid pulses are 2+ on the right side, and 2+ on the left side.       Dorsalis pedis pulses are 2+ on the right side, and 2+ on the left side.        Posterior tibial pulses are 2+ on the right side, and 2+ on the left side.   Pulmonary/Chest: Effort normal and breath sounds normal. No respiratory distress. She has no wheezes. She has no rales. She exhibits no tenderness.   Abdominal: Soft. Bowel sounds are normal. She exhibits no distension. There is no hepatosplenomegaly. There is no tenderness. There is no guarding. "   Musculoskeletal: Normal range of motion. She exhibits no edema or tenderness.   Lymphadenopathy:   Palpation of lymph nodes of neck and groin normal   Neurological: She is oriented to person, place, and time. No cranial nerve deficit. She exhibits normal muscle tone. Coordination normal.   Skin: Skin is warm and dry. No rash noted. No erythema. No pallor.   Palpation of skin normal   Psychiatric: She has a normal mood and affect. Her behavior is normal. Judgment and thought content normal.         Assessment:       1. History of DVT in adulthood    2. Nonspecific abnormal electrocardiogram (ECG) (EKG)         Plan:       Jazz was seen today for pre-op exam.    Diagnoses and all orders for this visit:    History of DVT in adulthood    Nonspecific abnormal electrocardiogram (ECG) (EKG)    no CV contraindications for planned surgery

## 2019-12-17 ENCOUNTER — HOSPITAL ENCOUNTER (OUTPATIENT)
Facility: HOSPITAL | Age: 74
Discharge: HOME | End: 2019-12-18
Attending: OBSTETRICS & GYNECOLOGY | Admitting: OBSTETRICS & GYNECOLOGY
Payer: MEDICARE

## 2019-12-17 ENCOUNTER — ANESTHESIA (OUTPATIENT)
Dept: SURGERY | Facility: HOSPITAL | Age: 74
End: 2019-12-17
Payer: MEDICARE

## 2019-12-17 DIAGNOSIS — N99.3 VAGINAL VAULT PROLAPSE AFTER HYSTERECTOMY: Primary | ICD-10-CM

## 2019-12-17 DIAGNOSIS — N99.3 PROLAPSE OF VAGINAL VAULT AFTER HYSTERECTOMY: ICD-10-CM

## 2019-12-17 PROBLEM — M17.9 OSTEOARTHRITIS OF KNEE: Status: ACTIVE | Noted: 2019-12-17

## 2019-12-17 PROBLEM — M54.16 LUMBAR RADICULOPATHY: Status: ACTIVE | Noted: 2019-12-17

## 2019-12-17 LAB
ABO + RH BLD: NORMAL
BILIRUB UR QL STRIP: NEGATIVE
BLD GP AB SCN CELLS X3 SERPL QL: NORMAL
CLARITY UR: CLEAR
COLOR UR: YELLOW
GLUCOSE UR QL STRIP: NEGATIVE
HGB UR QL STRIP: NEGATIVE
KETONES UR QL STRIP: NEGATIVE
LEUKOCYTE ESTERASE UR QL STRIP: ABNORMAL
MICROSCOPIC COMMENT: ABNORMAL
NITRITE UR QL STRIP: NEGATIVE
PH UR STRIP: 8 [PH] (ref 5–8)
POCT GLUCOSE: 83 MG/DL (ref 70–110)
PROT UR QL STRIP: NEGATIVE
RBC #/AREA URNS HPF: 1 /HPF (ref 0–4)
SP GR UR STRIP: 1.01 (ref 1–1.03)
SQUAMOUS #/AREA URNS HPF: 2 /HPF
URN SPEC COLLECT METH UR: ABNORMAL
UROBILINOGEN UR STRIP-ACNC: NEGATIVE EU/DL
WBC #/AREA URNS HPF: 11 /HPF (ref 0–5)
WBC CLUMPS URNS QL MICRO: ABNORMAL

## 2019-12-17 PROCEDURE — 87186 SC STD MICRODIL/AGAR DIL: CPT

## 2019-12-17 PROCEDURE — 63600175 PHARM REV CODE 636 W HCPCS: Performed by: OBSTETRICS & GYNECOLOGY

## 2019-12-17 PROCEDURE — 25000003 PHARM REV CODE 250: Performed by: OBSTETRICS & GYNECOLOGY

## 2019-12-17 PROCEDURE — 27201423 OPTIME MED/SURG SUP & DEVICES STERILE SUPPLY: Performed by: OBSTETRICS & GYNECOLOGY

## 2019-12-17 PROCEDURE — 81000 URINALYSIS NONAUTO W/SCOPE: CPT

## 2019-12-17 PROCEDURE — 86850 RBC ANTIBODY SCREEN: CPT

## 2019-12-17 PROCEDURE — 94799 UNLISTED PULMONARY SVC/PX: CPT

## 2019-12-17 PROCEDURE — 94761 N-INVAS EAR/PLS OXIMETRY MLT: CPT

## 2019-12-17 PROCEDURE — D9220A PRA ANESTHESIA: Mod: ANES,,, | Performed by: ANESTHESIOLOGY

## 2019-12-17 PROCEDURE — 71000033 HC RECOVERY, INTIAL HOUR: Performed by: OBSTETRICS & GYNECOLOGY

## 2019-12-17 PROCEDURE — 36000712 HC OR TIME LEV V 1ST 15 MIN: Performed by: OBSTETRICS & GYNECOLOGY

## 2019-12-17 PROCEDURE — S0028 INJECTION, FAMOTIDINE, 20 MG: HCPCS | Performed by: OBSTETRICS & GYNECOLOGY

## 2019-12-17 PROCEDURE — D9220A PRA ANESTHESIA: ICD-10-PCS | Mod: ANES,,, | Performed by: ANESTHESIOLOGY

## 2019-12-17 PROCEDURE — 27000221 HC OXYGEN, UP TO 24 HOURS

## 2019-12-17 PROCEDURE — 63600175 PHARM REV CODE 636 W HCPCS: Performed by: ANESTHESIOLOGY

## 2019-12-17 PROCEDURE — 71000039 HC RECOVERY, EACH ADD'L HOUR: Performed by: OBSTETRICS & GYNECOLOGY

## 2019-12-17 PROCEDURE — C1771 REP DEV, URINARY, W/SLING: HCPCS | Performed by: OBSTETRICS & GYNECOLOGY

## 2019-12-17 PROCEDURE — 57288 REPAIR BLADDER DEFECT: CPT | Mod: 51,,, | Performed by: OBSTETRICS & GYNECOLOGY

## 2019-12-17 PROCEDURE — 57425 LAPAROSCOPY SURG COLPOPEXY: CPT | Mod: ,,, | Performed by: OBSTETRICS & GYNECOLOGY

## 2019-12-17 PROCEDURE — 87088 URINE BACTERIA CULTURE: CPT

## 2019-12-17 PROCEDURE — 87077 CULTURE AEROBIC IDENTIFY: CPT

## 2019-12-17 PROCEDURE — 63600175 PHARM REV CODE 636 W HCPCS: Performed by: NURSE ANESTHETIST, CERTIFIED REGISTERED

## 2019-12-17 PROCEDURE — 87086 URINE CULTURE/COLONY COUNT: CPT

## 2019-12-17 PROCEDURE — D9220A PRA ANESTHESIA: Mod: CRNA,,, | Performed by: NURSE ANESTHETIST, CERTIFIED REGISTERED

## 2019-12-17 PROCEDURE — 57288 PR SLING OPER STRES INCONTINENCE: ICD-10-PCS | Mod: 51,,, | Performed by: OBSTETRICS & GYNECOLOGY

## 2019-12-17 PROCEDURE — 36415 COLL VENOUS BLD VENIPUNCTURE: CPT

## 2019-12-17 PROCEDURE — 25000003 PHARM REV CODE 250: Performed by: ANESTHESIOLOGY

## 2019-12-17 PROCEDURE — 57425 PR LAPAROSCOPY, SURG, COLPOPEXY: ICD-10-PCS | Mod: ,,, | Performed by: OBSTETRICS & GYNECOLOGY

## 2019-12-17 PROCEDURE — C1781 MESH (IMPLANTABLE): HCPCS | Performed by: OBSTETRICS & GYNECOLOGY

## 2019-12-17 PROCEDURE — D9220A PRA ANESTHESIA: ICD-10-PCS | Mod: CRNA,,, | Performed by: NURSE ANESTHETIST, CERTIFIED REGISTERED

## 2019-12-17 PROCEDURE — 25000003 PHARM REV CODE 250: Performed by: NURSE ANESTHETIST, CERTIFIED REGISTERED

## 2019-12-17 PROCEDURE — 25000003 PHARM REV CODE 250: Performed by: INTERNAL MEDICINE

## 2019-12-17 PROCEDURE — 36000713 HC OR TIME LEV V EA ADD 15 MIN: Performed by: OBSTETRICS & GYNECOLOGY

## 2019-12-17 PROCEDURE — 37000009 HC ANESTHESIA EA ADD 15 MINS: Performed by: OBSTETRICS & GYNECOLOGY

## 2019-12-17 PROCEDURE — 37000008 HC ANESTHESIA 1ST 15 MINUTES: Performed by: OBSTETRICS & GYNECOLOGY

## 2019-12-17 DEVICE — MESH RESTORELLE Y 24X4CM: Type: IMPLANTABLE DEVICE | Site: PELVIS | Status: FUNCTIONAL

## 2019-12-17 DEVICE — SLING FIT ADVANTAGE: Type: IMPLANTABLE DEVICE | Site: VAGINA | Status: FUNCTIONAL

## 2019-12-17 RX ORDER — BISACODYL 10 MG
10 SUPPOSITORY, RECTAL RECTAL DAILY PRN
Status: DISCONTINUED | OUTPATIENT
Start: 2019-12-17 | End: 2019-12-18 | Stop reason: HOSPADM

## 2019-12-17 RX ORDER — ONDANSETRON 8 MG/1
8 TABLET, ORALLY DISINTEGRATING ORAL EVERY 8 HOURS PRN
Status: DISCONTINUED | OUTPATIENT
Start: 2019-12-17 | End: 2019-12-18 | Stop reason: HOSPADM

## 2019-12-17 RX ORDER — OXYCODONE AND ACETAMINOPHEN 10; 325 MG/1; MG/1
1 TABLET ORAL EVERY 4 HOURS PRN
Status: DISCONTINUED | OUTPATIENT
Start: 2019-12-17 | End: 2019-12-18 | Stop reason: HOSPADM

## 2019-12-17 RX ORDER — SODIUM CHLORIDE, SODIUM LACTATE, POTASSIUM CHLORIDE, CALCIUM CHLORIDE 600; 310; 30; 20 MG/100ML; MG/100ML; MG/100ML; MG/100ML
INJECTION, SOLUTION INTRAVENOUS CONTINUOUS
Status: DISCONTINUED | OUTPATIENT
Start: 2019-12-17 | End: 2019-12-18 | Stop reason: HOSPADM

## 2019-12-17 RX ORDER — LIDOCAINE HCL/PF 100 MG/5ML
SYRINGE (ML) INTRAVENOUS
Status: DISCONTINUED | OUTPATIENT
Start: 2019-12-17 | End: 2019-12-17

## 2019-12-17 RX ORDER — OXYCODONE AND ACETAMINOPHEN 5; 325 MG/1; MG/1
1 TABLET ORAL EVERY 4 HOURS PRN
Status: DISCONTINUED | OUTPATIENT
Start: 2019-12-17 | End: 2019-12-18 | Stop reason: HOSPADM

## 2019-12-17 RX ORDER — IBUPROFEN 600 MG/1
600 TABLET ORAL EVERY 6 HOURS
Status: DISCONTINUED | OUTPATIENT
Start: 2019-12-18 | End: 2019-12-18 | Stop reason: HOSPADM

## 2019-12-17 RX ORDER — SODIUM CHLORIDE 0.9 % (FLUSH) 0.9 %
3 SYRINGE (ML) INJECTION
Status: DISCONTINUED | OUTPATIENT
Start: 2019-12-17 | End: 2019-12-17 | Stop reason: HOSPADM

## 2019-12-17 RX ORDER — ENOXAPARIN SODIUM 100 MG/ML
40 INJECTION SUBCUTANEOUS EVERY 24 HOURS
Status: DISCONTINUED | OUTPATIENT
Start: 2019-12-17 | End: 2019-12-18 | Stop reason: HOSPADM

## 2019-12-17 RX ORDER — ROCURONIUM BROMIDE 10 MG/ML
INJECTION, SOLUTION INTRAVENOUS
Status: DISCONTINUED | OUTPATIENT
Start: 2019-12-17 | End: 2019-12-17

## 2019-12-17 RX ORDER — MEPERIDINE HYDROCHLORIDE 50 MG/ML
12.5 INJECTION INTRAMUSCULAR; INTRAVENOUS; SUBCUTANEOUS ONCE AS NEEDED
Status: DISCONTINUED | OUTPATIENT
Start: 2019-12-17 | End: 2019-12-17 | Stop reason: HOSPADM

## 2019-12-17 RX ORDER — MIDAZOLAM HYDROCHLORIDE 1 MG/ML
INJECTION, SOLUTION INTRAMUSCULAR; INTRAVENOUS
Status: DISCONTINUED | OUTPATIENT
Start: 2019-12-17 | End: 2019-12-17

## 2019-12-17 RX ORDER — CEFAZOLIN SODIUM 2 G/50ML
2 SOLUTION INTRAVENOUS
Status: COMPLETED | OUTPATIENT
Start: 2019-12-17 | End: 2019-12-17

## 2019-12-17 RX ORDER — FENOFIBRATE 160 MG/1
160 TABLET ORAL DAILY
Status: DISCONTINUED | OUTPATIENT
Start: 2019-12-18 | End: 2019-12-18 | Stop reason: HOSPADM

## 2019-12-17 RX ORDER — DIPHENHYDRAMINE HYDROCHLORIDE 50 MG/ML
25 INJECTION INTRAMUSCULAR; INTRAVENOUS EVERY 6 HOURS PRN
Status: DISCONTINUED | OUTPATIENT
Start: 2019-12-17 | End: 2019-12-17 | Stop reason: HOSPADM

## 2019-12-17 RX ORDER — LIDOCAINE HYDROCHLORIDE 10 MG/ML
1 INJECTION, SOLUTION EPIDURAL; INFILTRATION; INTRACAUDAL; PERINEURAL ONCE
Status: COMPLETED | OUTPATIENT
Start: 2019-12-17 | End: 2019-12-17

## 2019-12-17 RX ORDER — KETOROLAC TROMETHAMINE 30 MG/ML
INJECTION, SOLUTION INTRAMUSCULAR; INTRAVENOUS
Status: DISCONTINUED | OUTPATIENT
Start: 2019-12-17 | End: 2019-12-17

## 2019-12-17 RX ORDER — KETOROLAC TROMETHAMINE 30 MG/ML
15 INJECTION, SOLUTION INTRAMUSCULAR; INTRAVENOUS EVERY 6 HOURS
Status: COMPLETED | OUTPATIENT
Start: 2019-12-17 | End: 2019-12-18

## 2019-12-17 RX ORDER — HYDROMORPHONE HYDROCHLORIDE 1 MG/ML
1 INJECTION, SOLUTION INTRAMUSCULAR; INTRAVENOUS; SUBCUTANEOUS EVERY 4 HOURS PRN
Status: DISCONTINUED | OUTPATIENT
Start: 2019-12-17 | End: 2019-12-18 | Stop reason: HOSPADM

## 2019-12-17 RX ORDER — TRIAMTERENE/HYDROCHLOROTHIAZID 37.5-25 MG
1 TABLET ORAL DAILY
Status: DISCONTINUED | OUTPATIENT
Start: 2019-12-18 | End: 2019-12-18 | Stop reason: HOSPADM

## 2019-12-17 RX ORDER — DIPHENHYDRAMINE HYDROCHLORIDE 50 MG/ML
25 INJECTION INTRAMUSCULAR; INTRAVENOUS EVERY 4 HOURS PRN
Status: DISCONTINUED | OUTPATIENT
Start: 2019-12-17 | End: 2019-12-18 | Stop reason: HOSPADM

## 2019-12-17 RX ORDER — LIDOCAINE HYDROCHLORIDE 20 MG/ML
JELLY TOPICAL
Status: DISCONTINUED | OUTPATIENT
Start: 2019-12-17 | End: 2019-12-17 | Stop reason: HOSPADM

## 2019-12-17 RX ORDER — SODIUM CHLORIDE, SODIUM LACTATE, POTASSIUM CHLORIDE, CALCIUM CHLORIDE 600; 310; 30; 20 MG/100ML; MG/100ML; MG/100ML; MG/100ML
INJECTION, SOLUTION INTRAVENOUS CONTINUOUS
Status: DISCONTINUED | OUTPATIENT
Start: 2019-12-17 | End: 2019-12-17

## 2019-12-17 RX ORDER — SODIUM CHLORIDE, SODIUM LACTATE, POTASSIUM CHLORIDE, CALCIUM CHLORIDE 600; 310; 30; 20 MG/100ML; MG/100ML; MG/100ML; MG/100ML
75 INJECTION, SOLUTION INTRAVENOUS CONTINUOUS
Status: DISCONTINUED | OUTPATIENT
Start: 2019-12-17 | End: 2019-12-17

## 2019-12-17 RX ORDER — POLYETHYLENE GLYCOL 3350 17 G/17G
17 POWDER, FOR SOLUTION ORAL DAILY
Status: DISCONTINUED | OUTPATIENT
Start: 2019-12-17 | End: 2019-12-18 | Stop reason: HOSPADM

## 2019-12-17 RX ORDER — LIDOCAINE 50 MG/G
1 PATCH TOPICAL
Status: DISCONTINUED | OUTPATIENT
Start: 2019-12-17 | End: 2019-12-18 | Stop reason: HOSPADM

## 2019-12-17 RX ORDER — FAMOTIDINE 10 MG/ML
20 INJECTION INTRAVENOUS EVERY 12 HOURS
Status: DISCONTINUED | OUTPATIENT
Start: 2019-12-17 | End: 2019-12-18 | Stop reason: HOSPADM

## 2019-12-17 RX ORDER — NEOSTIGMINE METHYLSULFATE 1 MG/ML
INJECTION, SOLUTION INTRAVENOUS
Status: DISCONTINUED | OUTPATIENT
Start: 2019-12-17 | End: 2019-12-17

## 2019-12-17 RX ORDER — FENTANYL CITRATE 50 UG/ML
25 INJECTION, SOLUTION INTRAMUSCULAR; INTRAVENOUS EVERY 5 MIN PRN
Status: COMPLETED | OUTPATIENT
Start: 2019-12-17 | End: 2019-12-17

## 2019-12-17 RX ORDER — MUPIROCIN 20 MG/G
1 OINTMENT TOPICAL 2 TIMES DAILY
Status: DISCONTINUED | OUTPATIENT
Start: 2019-12-17 | End: 2019-12-18 | Stop reason: HOSPADM

## 2019-12-17 RX ORDER — DEXAMETHASONE SODIUM PHOSPHATE 4 MG/ML
INJECTION, SOLUTION INTRA-ARTICULAR; INTRALESIONAL; INTRAMUSCULAR; INTRAVENOUS; SOFT TISSUE
Status: DISCONTINUED | OUTPATIENT
Start: 2019-12-17 | End: 2019-12-17

## 2019-12-17 RX ORDER — ONDANSETRON HYDROCHLORIDE 2 MG/ML
INJECTION, SOLUTION INTRAMUSCULAR; INTRAVENOUS
Status: DISCONTINUED | OUTPATIENT
Start: 2019-12-17 | End: 2019-12-17

## 2019-12-17 RX ORDER — SERTRALINE HYDROCHLORIDE 50 MG/1
150 TABLET, FILM COATED ORAL DAILY
Status: DISCONTINUED | OUTPATIENT
Start: 2019-12-18 | End: 2019-12-18 | Stop reason: HOSPADM

## 2019-12-17 RX ORDER — EPHEDRINE SULFATE 50 MG/ML
INJECTION, SOLUTION INTRAVENOUS
Status: DISCONTINUED | OUTPATIENT
Start: 2019-12-17 | End: 2019-12-17

## 2019-12-17 RX ORDER — DIPHENHYDRAMINE HCL 25 MG
25 CAPSULE ORAL EVERY 4 HOURS PRN
Status: DISCONTINUED | OUTPATIENT
Start: 2019-12-17 | End: 2019-12-18 | Stop reason: HOSPADM

## 2019-12-17 RX ORDER — GLYCOPYRROLATE 0.2 MG/ML
INJECTION INTRAMUSCULAR; INTRAVENOUS
Status: DISCONTINUED | OUTPATIENT
Start: 2019-12-17 | End: 2019-12-17

## 2019-12-17 RX ORDER — ACETAMINOPHEN 10 MG/ML
INJECTION, SOLUTION INTRAVENOUS
Status: DISCONTINUED | OUTPATIENT
Start: 2019-12-17 | End: 2019-12-17

## 2019-12-17 RX ORDER — FENTANYL CITRATE 50 UG/ML
INJECTION, SOLUTION INTRAMUSCULAR; INTRAVENOUS
Status: DISCONTINUED | OUTPATIENT
Start: 2019-12-17 | End: 2019-12-17

## 2019-12-17 RX ORDER — PROPOFOL 10 MG/ML
VIAL (ML) INTRAVENOUS
Status: DISCONTINUED | OUTPATIENT
Start: 2019-12-17 | End: 2019-12-17

## 2019-12-17 RX ORDER — ONDANSETRON 2 MG/ML
4 INJECTION INTRAMUSCULAR; INTRAVENOUS ONCE AS NEEDED
Status: DISCONTINUED | OUTPATIENT
Start: 2019-12-17 | End: 2019-12-17 | Stop reason: HOSPADM

## 2019-12-17 RX ORDER — POLYETHYLENE GLYCOL 3350 17 G/17G
17 POWDER, FOR SOLUTION ORAL DAILY
Status: DISCONTINUED | OUTPATIENT
Start: 2019-12-17 | End: 2019-12-17 | Stop reason: SDUPTHER

## 2019-12-17 RX ORDER — OXYCODONE HYDROCHLORIDE 5 MG/1
5 TABLET ORAL
Status: DISCONTINUED | OUTPATIENT
Start: 2019-12-17 | End: 2019-12-17 | Stop reason: HOSPADM

## 2019-12-17 RX ORDER — ATORVASTATIN CALCIUM 10 MG/1
10 TABLET, FILM COATED ORAL DAILY
Status: DISCONTINUED | OUTPATIENT
Start: 2019-12-18 | End: 2019-12-18 | Stop reason: HOSPADM

## 2019-12-17 RX ORDER — SUCCINYLCHOLINE CHLORIDE 20 MG/ML
INJECTION INTRAMUSCULAR; INTRAVENOUS
Status: DISCONTINUED | OUTPATIENT
Start: 2019-12-17 | End: 2019-12-17

## 2019-12-17 RX ORDER — HYDROMORPHONE HYDROCHLORIDE 2 MG/ML
0.2 INJECTION, SOLUTION INTRAMUSCULAR; INTRAVENOUS; SUBCUTANEOUS EVERY 5 MIN PRN
Status: DISCONTINUED | OUTPATIENT
Start: 2019-12-17 | End: 2019-12-17 | Stop reason: HOSPADM

## 2019-12-17 RX ADMIN — FENTANYL CITRATE 25 MCG: 0.05 INJECTION, SOLUTION INTRAMUSCULAR; INTRAVENOUS at 12:12

## 2019-12-17 RX ADMIN — ROCURONIUM BROMIDE 5 MG: 10 INJECTION, SOLUTION INTRAVENOUS at 07:12

## 2019-12-17 RX ADMIN — ROCURONIUM BROMIDE 10 MG: 10 INJECTION, SOLUTION INTRAVENOUS at 08:12

## 2019-12-17 RX ADMIN — ENOXAPARIN SODIUM 40 MG: 100 INJECTION SUBCUTANEOUS at 04:12

## 2019-12-17 RX ADMIN — FAMOTIDINE 20 MG: 10 INJECTION INTRAVENOUS at 09:12

## 2019-12-17 RX ADMIN — MUPIROCIN 1 G: 20 OINTMENT TOPICAL at 09:12

## 2019-12-17 RX ADMIN — ACETAMINOPHEN 1000 MG: 10 INJECTION, SOLUTION INTRAVENOUS at 07:12

## 2019-12-17 RX ADMIN — ROCURONIUM BROMIDE 25 MG: 10 INJECTION, SOLUTION INTRAVENOUS at 07:12

## 2019-12-17 RX ADMIN — FENTANYL CITRATE 100 MCG: 50 INJECTION, SOLUTION INTRAMUSCULAR; INTRAVENOUS at 07:12

## 2019-12-17 RX ADMIN — EPHEDRINE SULFATE 25 MG: 50 INJECTION, SOLUTION INTRAMUSCULAR; INTRAVENOUS; SUBCUTANEOUS at 07:12

## 2019-12-17 RX ADMIN — SODIUM CHLORIDE, SODIUM LACTATE, POTASSIUM CHLORIDE, AND CALCIUM CHLORIDE: .6; .31; .03; .02 INJECTION, SOLUTION INTRAVENOUS at 12:12

## 2019-12-17 RX ADMIN — ONDANSETRON 4 MG: 2 INJECTION, SOLUTION INTRAMUSCULAR; INTRAVENOUS at 07:12

## 2019-12-17 RX ADMIN — SODIUM CHLORIDE, POTASSIUM CHLORIDE, SODIUM LACTATE AND CALCIUM CHLORIDE: 600; 310; 30; 20 INJECTION, SOLUTION INTRAVENOUS at 08:12

## 2019-12-17 RX ADMIN — KETOROLAC TROMETHAMINE 15 MG: 30 INJECTION, SOLUTION INTRAMUSCULAR at 04:12

## 2019-12-17 RX ADMIN — LIDOCAINE 1 PATCH: 50 PATCH TOPICAL at 06:12

## 2019-12-17 RX ADMIN — KETOROLAC TROMETHAMINE 15 MG: 30 INJECTION, SOLUTION INTRAMUSCULAR at 09:12

## 2019-12-17 RX ADMIN — MIDAZOLAM 2 MG: 1 INJECTION INTRAMUSCULAR; INTRAVENOUS at 06:12

## 2019-12-17 RX ADMIN — ROCURONIUM BROMIDE 10 MG: 10 INJECTION, SOLUTION INTRAVENOUS at 09:12

## 2019-12-17 RX ADMIN — FENTANYL CITRATE 50 MCG: 50 INJECTION, SOLUTION INTRAMUSCULAR; INTRAVENOUS at 10:12

## 2019-12-17 RX ADMIN — OXYCODONE AND ACETAMINOPHEN 1 TABLET: 5; 325 TABLET ORAL at 07:12

## 2019-12-17 RX ADMIN — CEFAZOLIN SODIUM 2 G: 2 SOLUTION INTRAVENOUS at 07:12

## 2019-12-17 RX ADMIN — MUPIROCIN 1 G: 20 OINTMENT TOPICAL at 04:12

## 2019-12-17 RX ADMIN — PROPOFOL 120 MG: 10 INJECTION, EMULSION INTRAVENOUS at 07:12

## 2019-12-17 RX ADMIN — FAMOTIDINE 20 MG: 10 INJECTION INTRAVENOUS at 12:12

## 2019-12-17 RX ADMIN — LIDOCAINE HYDROCHLORIDE 10 MG: 10 INJECTION, SOLUTION EPIDURAL; INFILTRATION; INTRACAUDAL; PERINEURAL at 06:12

## 2019-12-17 RX ADMIN — LIDOCAINE HYDROCHLORIDE 100 MG: 20 INJECTION, SOLUTION INTRAVENOUS at 07:12

## 2019-12-17 RX ADMIN — GLYCOPYRROLATE 0.4 MG: 0.2 INJECTION, SOLUTION INTRAMUSCULAR; INTRAVENOUS at 10:12

## 2019-12-17 RX ADMIN — OXYCODONE HYDROCHLORIDE 5 MG: 5 TABLET ORAL at 12:12

## 2019-12-17 RX ADMIN — POLYETHYLENE GLYCOL (3350) 17 G: 17 POWDER, FOR SOLUTION ORAL at 04:12

## 2019-12-17 RX ADMIN — SODIUM CHLORIDE, POTASSIUM CHLORIDE, SODIUM LACTATE AND CALCIUM CHLORIDE: 600; 310; 30; 20 INJECTION, SOLUTION INTRAVENOUS at 06:12

## 2019-12-17 RX ADMIN — DEXAMETHASONE SODIUM PHOSPHATE 4 MG: 4 INJECTION, SOLUTION INTRAMUSCULAR; INTRAVENOUS at 07:12

## 2019-12-17 RX ADMIN — NEOSTIGMINE METHYLSULFATE 3 MG: 1 INJECTION INTRAVENOUS at 10:12

## 2019-12-17 RX ADMIN — KETOROLAC TROMETHAMINE 30 MG: 30 INJECTION, SOLUTION INTRAMUSCULAR; INTRAVENOUS at 10:12

## 2019-12-17 RX ADMIN — SUCCINYLCHOLINE CHLORIDE 120 MG: 20 INJECTION, SOLUTION INTRAMUSCULAR; INTRAVENOUS at 07:12

## 2019-12-17 NOTE — NURSING
Arrived to 423. AAO. VSS. Oriented to room, educated on call light. Lurdes. IVF. Hong/scd. Pain 3/10. Lunch tray ordered. Dr Terry made aware of admit. Bed locked. monitoring

## 2019-12-17 NOTE — PLAN OF CARE
"Dr Hunter released from pacu to room 423   v/s stable skin w+d soria cath intact  Draining well clr yellow urine 75 cc pain controlled discussed allergies at length pt statesshe can take norco and she can take percocet not sure how all this got on her list "  Anesthesia made aware and pharmacy peripad dry intact pt pain a 4/10 tolerable " for her explained to pt how some is related to gas insufflation and to move a round in bed instr not to get oob without nurse to walk her    "

## 2019-12-17 NOTE — ANESTHESIA POSTPROCEDURE EVALUATION
Anesthesia Post Evaluation    Patient: Jazz Martinez    Procedure(s) Performed: Procedure(s) (LRB):  XI ROBOTIC SACROCOLPOPEXY, ABDOMINAL (N/A)  SURGICAL PROCEDURE, USING TENSION FREE VAGINAL TAPE, FOR STRESS INCONTINENCE (N/A)  CYSTOSCOPY (N/A)    Final Anesthesia Type: general    Patient location during evaluation: PACU  Patient participation: Yes- Able to Participate  Level of consciousness: awake and alert and oriented  Post-procedure vital signs: reviewed and stable  Pain management: adequate  Airway patency: patent    PONV status at discharge: No PONV  Anesthetic complications: no      Cardiovascular status: blood pressure returned to baseline  Respiratory status: unassisted, spontaneous ventilation and room air  Hydration status: euvolemic  Follow-up not needed.          Vitals Value Taken Time   /74 12/17/2019 11:14 AM   Temp 36.5 °C (97.7 °F) 12/17/2019  6:15 AM   Pulse 86 12/17/2019 11:16 AM   Resp 9 12/17/2019 11:16 AM   SpO2 100 % 12/17/2019 11:16 AM   Vitals shown include unvalidated device data.      No case tracking events are documented in the log.      Pain/Tita Score: No data recorded

## 2019-12-17 NOTE — TRANSFER OF CARE
"Anesthesia Transfer of Care Note    Patient: Jazz Martinez    Procedure(s) Performed: Procedure(s) (LRB):  XI ROBOTIC SACROCOLPOPEXY, ABDOMINAL (N/A)  SURGICAL PROCEDURE, USING TENSION FREE VAGINAL TAPE, FOR STRESS INCONTINENCE (N/A)  CYSTOSCOPY (N/A)    Patient location: PACU    Anesthesia Type: general    Transport from OR: Transported from OR on 2-3 L/min O2 by NC with adequate spontaneous ventilation    Post pain: adequate analgesia    Post assessment: no apparent anesthetic complications and tolerated procedure well    Post vital signs: stable    Level of consciousness: responds to stimulation and sedated    Nausea/Vomiting: no nausea/vomiting    Complications: none    Transfer of care protocol was followed      Last vitals:   Visit Vitals  /66 (BP Location: Left arm, Patient Position: Sitting)   Pulse 61   Temp 36.5 °C (97.7 °F) (Skin)   Resp 16   Ht 5' 5" (1.651 m)   Wt 72.6 kg (160 lb)   SpO2 96%   Breastfeeding? No   BMI 26.63 kg/m²     "

## 2019-12-17 NOTE — ANESTHESIA PREPROCEDURE EVALUATION
12/17/2019  Jazz Martinez is a 74 y.o., female.    Anesthesia Evaluation    I have reviewed the Patient Summary Reports.    I have reviewed the Nursing Notes.   I have reviewed the Medications.     Review of Systems  Cardiovascular:   Hypertension    Neurological:   Headaches        Physical Exam  General:  Well nourished    Airway/Jaw/Neck:  Airway Findings: Mouth Opening: Normal Tongue: Normal  General Airway Assessment: Adult, Good  Mallampati: II  Improves to II with phonation.  TM Distance: 4-6 cm      Dental:  Dental Findings: In tact   Chest/Lungs:  Chest/Lungs Findings: Clear to auscultation, Normal Respiratory Rate     Heart/Vascular:  Heart Findings: Rate: Normal  Rhythm: Regular Rhythm  Sounds: Normal  Heart murmur: negative       Mental Status:  Mental Status Findings:  Cooperative, Alert and Oriented         Anesthesia Plan  Type of Anesthesia, risks & benefits discussed:  Anesthesia Type:  general  Patient's Preference:   Intra-op Monitoring Plan: standard ASA monitors  Intra-op Monitoring Plan Comments:   Post Op Pain Control Plan:   Post Op Pain Control Plan Comments:   Induction:   IV  Beta Blocker:  Patient is not currently on a Beta-Blocker (No further documentation required).       Informed Consent: Patient understands risks and agrees with Anesthesia plan.  Questions answered. Anesthesia consent signed with patient.  ASA Score: 3     Day of Surgery Review of History & Physical: I have interviewed and examined the patient. I have reviewed the patient's H&P dated:  There are no significant changes.  H&P update referred to the surgeon.  H&P completed by Anesthesiologist.       Ready For Surgery From Anesthesia Perspective.

## 2019-12-17 NOTE — OP NOTE
Operative Note       Surgery Date: 12/17/2019     Surgeon(s) and Role:     * Javier Putnam MD - Primary    Pre-op Diagnosis:  Cystocele, midline [N81.11]  Vaginal irritation from pessary [N89.9]  Prolapse of vaginal vault after hysterectomy [N99.3]  Postmenopause atrophic vaginitis [N95.2]    Post-op Diagnosis: Post-Op Diagnosis Codes:     * Cystocele, midline [N81.11]     * Vaginal irritation from pessary [N89.9]     * Prolapse of vaginal vault after hysterectomy [N99.3]     * Postmenopause atrophic vaginitis [N95.2]    Procedure(s) (LRB):  XI ROBOTIC SACROCOLPOPEXY, ABDOMINAL (N/A)  SURGICAL PROCEDURE, USING TENSION FREE VAGINAL TAPE, FOR STRESS INCONTINENCE (N/A)  CYSTOSCOPY (N/A)    Anesthesia: General    Procedure in Detail/Findings:  The patient was identified in the preoperative area where informed consent was confirmed, and she was taken to the operating room where an adequate level of general anesthesia was obtained. The patient was positioned in high lithotomy position with legs in yellow fin stirrups. Care was taken to avoid joint hyperflexion or hyperextension, and all extremity surfaces were carefully padded so as to minimize risk of neurologic injury. Intravenous antibiotics were administered preoperatively. Sequential compression devices were applied to the patient's lower extremities preoperatively VTE prophylaxis. Surgical time-out was performed, where the patient was identified and procedures confirmed. An examination under anesthesia was performed with findings described as above. The patient's abdomen, perineum, and vagina were sterilely prepped and draped. A soria catheter was placed in the bladder for drainage.   Exam under anesthesia revealed stage 2 apical prolapse and stage 3 anterior vaginal wall prolapse. Attention was then turned to the abdomen. A 10-mm incision suprafraumbilical incision was made with a scalpel. A 5 mm laparoscoped 0 degree was placed into a 5 mm trocar under direct  visualization the abdomen was entered. Insufflation was activated. Appropriate intraperitoneal pressure returned. We insufflated to 15 mm of mercury. Reinserted the laparoscoped safe entry confirmed.   The camera was inserted through this port for visualization of all subsequent port placement. Two 8 mm ports were placed on either side of the supraumbilical port, each approximately 8 cm lateral, along the mid clavicular line. Each 8 mm trocar was inserted under direct visualization without significant trauma, at least 2 cm cephalad and medial to the anterior superior iliac spine. A third 8 mm port was placed in the left upper quadrant, 2 cm below the costal margin in his lateral as possible. A 12 mm assistant port was placed in the righ upper quadrant, cephalad to and midway between the umbilical and 8 mm right lower quadrant port in a triangulated fashion. There were no complications with these port placements. At this point, a total of 5 ports ( 4 robotic) had been placed, including the umbilical port for the camera. The robot was then docked.  Additional lysis of omental adhesions was performed until the omentum was completely freed from the anterior abdominal wall. Filmy adhesions were noted along the posterior cul-de sac and sigmoid colon. These were easily lysed.   In the lower right quadrant that port is designated as arm 1. And we placed a monopolar scissors through that under direct visualization  In the lower left quadrant that port is designated as arm number 3 arm 2. Will be holding the camera through the super umbilical port. In the upper left quadrant that will be designated as the 4th port for the 4th robotic arm.  Through the 3rd robotic port a [place] bipolar is placed under direct visualization arm, the 4th robotic port Has a robotic cardier placed under direct visualization.        The [facility] positioning system was deployed with the RYLAN manipulator with small SACROTIP ATTACHED. and  the peritoneum dissected off the vagina anterior and posteriorly. Great care and attention was put forth to maintain as much of the peritoneum as possible. Thus we had an anterior leaflet developed and posterior leaflet.  From that point we were then able to use anti version while we had the peritoneum of the posterior aspect tented in that opposite direction through this tension encounter tension were able to easily gain into the rectovaginal space and only take that down about a 6 cm this allows forced reach our designated point with keeping a about a 1 cm graft off cuff posteriorly were then able to retro overt the vaginal axis with manipulation device tent in the opposite direction of the peritoneum see the reflection of the bladder and through traction countertraction get into the vesicovaginal space in take that to the outline of the Chatman bulb this in of itself was about 6 cm.  We able to be reached this point  Attention was then turned to the sacral promontory, which was identified by visualization and palpation. Again, the course of the right ureter as well as the location of the sigmoid colon was identified. The peritoneum overlying the sacral promontory was then elevated and incised using the robotic scissors. Gentle blunt dissection was then undertaken to reveal presacral fascia again with hemostasis noted. A Channel was created along the right aspect of the pelvic sidewall making sure to remain medial to the right ureter and lateral to the sigmoid.   The mesh was cut to size. The mesh was introduced into the abdomen and the mesh was sutured in place with CV 4 Cortex suturesl.The anterior and posterior arms of the mesh were attached to the anterior and posterior aspects of the vagina With such suture. Posteriorly 9 sutures anteriorly 9 sutures.  The anterior and posterior leaves of the peritoneum of the vagina that were referenced above were then pursestring together utilizing a single 0 Monocryl on a CT  1 needle. The suture was then parked safely. The sacro tip was oriented towards the presacral space and exposed anterior longitudinal ligament.  The tail of the Y graft under appropriate tension was then fixated to the anterior longitudinal ligament without difficulty using 0 Ethibond suture x3. Visual and tactile check showed excellent tension. The peritoneal channel was then closed with the same suture that was picked back up and then brought to the apex of the incision that was utilized to enter the presacral space  The long arm of the mesh was then brought through the tunnel. The long arm of the mesh was then tensioned appropriately and attached to the sacral promontory with 2 sutures of 0 Ethibond. Excess mesh was again trimmed. The peritoneum overlying the sacral promontory was reapproximated with 0 Vicryl in a pursestring fashion. The anterior and posterior leafs of the peritoneum in the pelvis were reapproximated using 0 Vicryl in a pursestring fashion as well, essentially covering the entire mesh. Excellent hemostasis was noted in the pelvis.   Cystourethrsocopy: normal bladder mucosa, small area at the base of the bladder with erythema no active bleeding. The bladder mucosa without stones or diverticulum. Bilateral ureteral jets were noted. Normal urethra. The Chatman catheter was reinserted.   Attention was again turned to the abdomen, close inspection of the pelvis revealed excellent hemostasis. All instruments were then removed from the abdomen and the pelvis. A suture East device was deployed to close the 12 mm air seal port an endoscopic fashion in the appropriate manner. The remainder of the skin incisions were reapproximated with 4.0 Monocryl, Steri-strips, 2x2s, and Tegaderm.The patient tolerated the procedure well. Needle, sponge lap, and instrument counts were correct x2.   We then proceeded with placement of the Advantage Fit midurethral sling. The skin was marked just above the symphysis pubis, 2  cm laterally on either side of the midline indicating the exit sites for the trocars. A lone star retractor with blue stays was used. The Chatman catheter was palpated at the urethrovesical junction, and 2 Allis clamps were placed at the anterior vaginal wall along the midurethra. The Chatman catheter was removed from the patient's bladder. The vaginal epithelium between the two Allis clamps was injected with the 1% lidocaine with epinephrine. Metzenbaum scissors were used to dissect the vaginal epithelium off the underlying pubocervical muscular tissue in the direction of the angle formed between the ischiopubic ramus and the pubic bone bilaterally. The rigid catheter guide was used to deviate the bladder neck and urethra to the patient's left while the right trocar was advanced to the dissected tract in the patient's right side. Once the urogenital membrane was perforated, the trocar and handle were reoriented in the sagittal plane and the tip of the trocar was advanced through the retropubic space in intimate proximity to the pubic bone. The trocar was then advanced through the skin approximately 2 cm to the right of the midline just above the pubic symphysis. The passage of the Advantage Fit trocar was repeated on the patient's left hand side in a similar fashion, using the catheter guide to deviate the bladder neck to the right. At this point, cystourethroscopy was performed. Cystoscopy was negative for injury after infusion of at least 300 mL in the bladder. The ureteral orifices were noted to have good efflux bilaterally. The bladder was then drained. With a #10 Hegar dilator placed between the sling mesh and the urethra, the arms of the sling were elevated above the pubic symphysis and the plastic sheaths were removed from the mesh arms. Excess mesh was trimmed beneath the suprapubic skin. The Hegar dilator ensured that the mesh sling was placed in a tension-free manner. The vaginal mucosa overlying the sling  mesh was closed with a several mattress stitches of 2-0 Vicryl with excellent hemostasis noted. The suprapubic incisions were closed with a 4-0 monocryl and sealed with dermabond.  The patient was transferred to recovery in stable condition. The vagina was packed with guaze.    Estimated Blood Loss: * No values recorded between 12/17/2019  7:28 AM and 12/17/2019 11:04 AM *         Very minimal blood loss  Specimens (From admission, onward)    None        Implants:   Implant Name Type Inv. Item Serial No.  Lot No. LRB No. Used   MESH RESTORELLE Y 24X4CM - [id number]  MESH RESTORELLE Y 24X4CM  MPATHY [id number] N/A 1   SLING FIT ADVANTAGE - [id number]  SLING FIT ADVANTAGE  [employer] [id number] N/A 1              Disposition: PACU - hemodynamically stable.           Condition: Good    Attestation:  I was present and scrubbed for the entire procedure.           Discharge Note    Admit Date: 12/17/2019    Attending Physician: Javier Putnam MD     Discharge Physician: Javier Putnam MD    Final Diagnosis: Post-Op Diagnosis Codes:     * Cystocele, midline [N81.11]     * Vaginal irritation from pessary [N89.9]     * Prolapse of vaginal vault after hysterectomy [N99.3]     * Postmenopause atrophic vaginitis [N95.2]    Disposition: Home or Self Care    Patient Instructions:   Current Discharge Medication List      CONTINUE these medications which have NOT CHANGED    Details   atorvastatin (LIPITOR) 10 MG tablet Take 1 tablet (10 mg total) by mouth once daily.  Qty: 90 tablet, Refills: 2    Associated Diagnoses: Hyperlipidemia, unspecified hyperlipidemia type      ibuprofen (ADVIL,MOTRIN) 600 MG tablet ibuprofen 600 mg tablet   Take 1 tablet 3 times a day by oral route.      sertraline (ZOLOFT) 100 MG tablet TAKE 1.5 TABLETS (150 MG TOTAL) BY MOUTH ONCE DAILY.  Qty: 135 tablet, Refills: 1    Associated Diagnoses: Menopausal symptoms; Depression, unspecified depression type      sumatriptan (IMITREX) 100 MG  tablet 1 tab po at start of headache, may repeat in 2 hours X1 in 24 hours.  Qty: 10 tablet, Refills: 11      traMADol (ULTRAM) 50 mg tablet Refills: 5    Comments:       triamterene-hydrochlorothiazide 37.5-25 mg (MAXZIDE-25) 37.5-25 mg per tablet 1/2 p.o. q.d.  Qty: 90 tablet, Refills: 0    Associated Diagnoses: HTN (hypertension), benign      valACYclovir (VALTREX) 500 MG tablet TAKE 2 TABLETS BY MOUTH TWICE DAILY  Qty: 12 tablet, Refills: 3    Associated Diagnoses: Urinary frequency      apixaban (ELIQUIS) 2.5 mg Tab Take 1 tablet (2.5 mg total) by mouth 2 (two) times daily.  Qty: 60 tablet, Refills: 0    Associated Diagnoses: History of DVT in adulthood      diclofenac sodium (VOLTAREN) 1 % Gel Apply 2 g topically 4 (four) times daily.  Qty: 100 g, Refills: 5    Associated Diagnoses: Chronic bilateral low back pain without sciatica      fenofibrate 160 MG Tab Take 1 tablet (160 mg total) by mouth once daily.  Qty: 90 tablet, Refills: 3    Associated Diagnoses: Mixed hyperlipidemia      ondansetron (ZOFRAN) 4 MG tablet Take 1 tablet (4 mg total) by mouth every 8 (eight) hours as needed for Nausea.  Qty: 20 tablet, Refills: 1    Associated Diagnoses: Acute gastroenteritis             Discharge Procedure Orders (must include Diet, Follow-up, Activity)  No discharge procedures on file.     Discharge Date: No discharge date for patient encounter.

## 2019-12-18 LAB
BASOPHILS # BLD AUTO: 0.05 K/UL (ref 0–0.2)
BASOPHILS NFR BLD: 0.5 % (ref 0–1.9)
DIFFERENTIAL METHOD: ABNORMAL
EOSINOPHIL # BLD AUTO: 0.1 K/UL (ref 0–0.5)
EOSINOPHIL NFR BLD: 0.6 % (ref 0–8)
ERYTHROCYTE [DISTWIDTH] IN BLOOD BY AUTOMATED COUNT: 13.4 % (ref 11.5–14.5)
HCT VFR BLD AUTO: 37.3 % (ref 37–48.5)
HGB BLD-MCNC: 11.6 G/DL (ref 12–16)
IMM GRANULOCYTES # BLD AUTO: 0.03 K/UL (ref 0–0.04)
LYMPHOCYTES # BLD AUTO: 1.9 K/UL (ref 1–4.8)
LYMPHOCYTES NFR BLD: 18.2 % (ref 18–48)
MCH RBC QN AUTO: 30.4 PG (ref 27–31)
MCHC RBC AUTO-ENTMCNC: 31.1 G/DL (ref 32–36)
MCV RBC AUTO: 98 FL (ref 82–98)
MONOCYTES # BLD AUTO: 0.9 K/UL (ref 0.3–1)
MONOCYTES NFR BLD: 8.9 % (ref 4–15)
NEUTROPHILS # BLD AUTO: 7.3 K/UL (ref 1.8–7.7)
NEUTROPHILS NFR BLD: 71.5 % (ref 38–73)
NRBC BLD-RTO: 0 /100 WBC
PLATELET # BLD AUTO: 219 K/UL (ref 150–350)
PMV BLD AUTO: 9.9 FL (ref 9.2–12.9)
RBC # BLD AUTO: 3.81 M/UL (ref 4–5.4)
WBC # BLD AUTO: 10.17 K/UL (ref 3.9–12.7)

## 2019-12-18 PROCEDURE — 94761 N-INVAS EAR/PLS OXIMETRY MLT: CPT

## 2019-12-18 PROCEDURE — 25000003 PHARM REV CODE 250: Performed by: OBSTETRICS & GYNECOLOGY

## 2019-12-18 PROCEDURE — S0028 INJECTION, FAMOTIDINE, 20 MG: HCPCS | Performed by: OBSTETRICS & GYNECOLOGY

## 2019-12-18 PROCEDURE — 36415 COLL VENOUS BLD VENIPUNCTURE: CPT

## 2019-12-18 PROCEDURE — 63600175 PHARM REV CODE 636 W HCPCS: Performed by: OBSTETRICS & GYNECOLOGY

## 2019-12-18 PROCEDURE — 85025 COMPLETE CBC W/AUTO DIFF WBC: CPT

## 2019-12-18 PROCEDURE — 25000003 PHARM REV CODE 250: Performed by: INTERNAL MEDICINE

## 2019-12-18 PROCEDURE — 63700000 PHARM REV CODE 250 ALT 637 W/O HCPCS: Performed by: INTERNAL MEDICINE

## 2019-12-18 RX ORDER — OXYCODONE AND ACETAMINOPHEN 7.5; 325 MG/1; MG/1
1 TABLET ORAL EVERY 4 HOURS PRN
Qty: 20 TABLET | Refills: 0 | Status: SHIPPED | OUTPATIENT
Start: 2019-12-18 | End: 2019-12-24 | Stop reason: SDUPTHER

## 2019-12-18 RX ADMIN — ATORVASTATIN CALCIUM 10 MG: 10 TABLET, FILM COATED ORAL at 08:12

## 2019-12-18 RX ADMIN — OXYCODONE AND ACETAMINOPHEN 1 TABLET: 5; 325 TABLET ORAL at 08:12

## 2019-12-18 RX ADMIN — KETOROLAC TROMETHAMINE 15 MG: 30 INJECTION, SOLUTION INTRAMUSCULAR at 04:12

## 2019-12-18 RX ADMIN — SERTRALINE HYDROCHLORIDE 150 MG: 50 TABLET ORAL at 08:12

## 2019-12-18 RX ADMIN — TRIAMTERENE AND HYDROCHLOROTHIAZIDE 1 TABLET: 37.5; 25 TABLET ORAL at 08:12

## 2019-12-18 RX ADMIN — MUPIROCIN 1 G: 20 OINTMENT TOPICAL at 08:12

## 2019-12-18 RX ADMIN — OXYCODONE HYDROCHLORIDE AND ACETAMINOPHEN 1 TABLET: 10; 325 TABLET ORAL at 01:12

## 2019-12-18 RX ADMIN — KETOROLAC TROMETHAMINE 15 MG: 30 INJECTION, SOLUTION INTRAMUSCULAR at 10:12

## 2019-12-18 RX ADMIN — POLYETHYLENE GLYCOL (3350) 17 G: 17 POWDER, FOR SOLUTION ORAL at 08:12

## 2019-12-18 RX ADMIN — FENOFIBRATE 160 MG: 160 TABLET ORAL at 08:12

## 2019-12-18 RX ADMIN — FAMOTIDINE 20 MG: 10 INJECTION INTRAVENOUS at 08:12

## 2019-12-18 NOTE — PLAN OF CARE
POC/Meds reviewed, pt verbalized understanding. Vitals stable. Afebrile.  IV fluids infusing per order. Neuro checks performed, no deficits noted.  LUIGI's/SCD's remained on throughout shift. PRN pain medication administered for complaints of pain.  Denies nausea. Reposistions self. Hourly/Q2hr rounding performed, safety maintained. Bed in lowest position, wheels locked, SR up x2, call light in easy reach. No  complaints at this time. Will continue to monitor.

## 2019-12-18 NOTE — PLAN OF CARE
CM met with pt bedside to complete discharge assessment. Pt verified information on facesheet as correct. Denies POA/LW. Reports living at listed address with her daughter, Kristyn. Denies hh/hd/dme. Reports being independent with ADLs. PCP is Dr. Juan. Pharm is [facility] in [place]. Upon discharge, Kristyn will provide transportation home.        12/18/19 1040   Discharge Assessment   Assessment Type Discharge Planning Assessment   Confirmed/corrected address and phone number on facesheet? Yes   Assessment information obtained from? Patient   Communicated expected length of stay with patient/caregiver yes   Prior to hospitilization cognitive status: Alert/Oriented   Prior to hospitalization functional status: Independent   Current cognitive status: Alert/Oriented   Current Functional Status: Independent   Lives With child(rianna), adult   Able to Return to Prior Arrangements yes   Is patient able to care for self after discharge? Yes   Patient's perception of discharge disposition home or selfcare   Readmission Within the Last 30 Days no previous admission in last 30 days   Patient currently being followed by outpatient case management? No   Patient currently receives any other outside agency services? No   Equipment Currently Used at Home none   Do you have any problems affording any of your prescribed medications? No   Is the patient taking medications as prescribed? yes   Does the patient have transportation home? Yes   Transportation Anticipated family or friend will provide   Does the patient receive services at the [facility]? No   Discharge Plan A Home   DME Needed Upon Discharge  none   Patient/Family in Agreement with Plan yes

## 2019-12-18 NOTE — ASSESSMENT & PLAN NOTE
Patient meeting criteria for discharge after robotic assisted sacral colpopexy and mid urethral sling

## 2019-12-18 NOTE — DISCHARGE SUMMARY
[facility] Hospital Medicine  Discharge Summary      Patient Name: Jazz Martinez  MRN: [id number]  Admission Date: 12/17/2019  Hospital Length of Stay: 0 days  Discharge Date and Time:  12/18/2019 1:12 PM  Attending Physician: Javier Putnam MD   Discharging Provider: Edwin Dsouza MD  Primary Care Provider: Timur Juan MD      HPI:   74-year-old with past history of hysterectomy  history of bladder suspension x2 secondary to prolapse, Anterior and posterior colporrhaphy,  Apical vault suspension and   Cystotomy repair was taken to the OR for correction of  1.  Urinary incontinence with ISD 2.  Cystocele Rectocele 3.  Vault prolapse.   Patient underwent XI ROBOTIC SACROCOLPOPEXY, ABDOMINAL, SURGICAL PROCEDURE, USING TENSION FREE VAGINAL TAPE, FOR STRESS INCONTINENCE, CYSTOSCOPY   The patient has history of substantial leakage of urine with coughing and sneezing as well as frequent urination.  Postoperatively patient complains of abdominal pain and surgical site pain               Procedure(s) (LRB):  XI ROBOTIC SACROCOLPOPEXY, ABDOMINAL (N/A)  SURGICAL PROCEDURE, USING TENSION FREE VAGINAL TAPE, FOR STRESS INCONTINENCE (N/A)  CYSTOSCOPY (N/A)      Hospital Course:   Patient with symptomatic prolapse as well as voiding dysfunction and significant incontinence.  Patient presented for elective attempt at definitive gold standard approach in the form of sacral colpopexy with robotic assistance.   minimal blood loss patient tolerated the surgery well and we were able to navigate to the level of the bladder neck and enough distal support posteriorly to achieve all of our objectives.   patient tolerated procedure well met criteria for discharge from the postanesthesia care unit.  Patient was then transferred to routine observation on postoperative floor from that point a throughout the evening and into postoperative day 1.  Patient did well in the morning of postop day 1.  Vaginal packing  as well as Chatman catheter removed patient subsequently went on to pass voiding trial.  Patient is ambulating tolerating p.o. producing appropriate amounts of urine and has passed void trial.  Patient with appropriate level of discomfort from surgical incisions but no toxic symptoms no nausea no vomiting no shortness of breath     Consults:   Consults (From admission, onward)        Status Ordering Provider     Inpatient consult to Hospitalist  Once     Provider:  Edwin Dsouza MD    Acknowledged MARCELLUS PUTNAM          No new Assessment & Plan notes have been filed under this hospital service since the last note was generated.  Service: Hospital Medicine    Final Active Diagnoses:    Diagnosis Date Noted POA    PRINCIPAL PROBLEM:  Vaginal vault prolapse after hysterectomy [N99.3] 12/17/2019 Yes    Lumbar radiculopathy [M54.16] 12/17/2019 Yes    Migraine [G43.909] 09/26/2019 Yes    Esophageal stricture [K22.2] 06/21/2016 Yes    Essential hypertension [I10] 06/21/2016 Yes    Gastroesophageal reflux disease [K21.9] 02/29/2016 Yes    Depression [F32.9] 02/29/2016 Yes      Problems Resolved During this Admission:       Discharged Condition: stable    Disposition: Home or Self Care    Follow Up:  Follow-up Information     Marcellus Putnam MD.    Specialties:  Gynecology, Urology  Contact information:  [address] [place] [address] DRIVE  [address] [address] [phone]                 Patient Instructions:      Diet Adult Regular     Pelvic Rest     Lifting restrictions     Notify your health care provider if you experience any of the following:  temperature >100.4     Notify your health care provider if you experience any of the following:  persistent nausea and vomiting or diarrhea     Notify your health care provider if you experience any of the following:  severe uncontrolled pain     Notify your health care provider if you experience any of the following:  redness, tenderness, or signs of infection  (pain, swelling, redness, odor or green/yellow discharge around incision site)     Notify your health care provider if you experience any of the following:  difficulty breathing or increased cough     Notify your health care provider if you experience any of the following:  severe persistent headache     Notify your health care provider if you experience any of the following:  worsening rash     Notify your health care provider if you experience any of the following:  persistent dizziness, light-headedness, or visual disturbances     Notify your health care provider if you experience any of the following:  increased confusion or weakness     Notify your health care provider if you experience any of the following:     Notify your health care provider if you experience any of the following:  temperature >100.4     Notify your health care provider if you experience any of the following:  severe uncontrolled pain     Activity as tolerated       Significant Diagnostic Studies: Labs:   BMP: No results for input(s): GLU, NA, K, CL, CO2, BUN, CREATININE, CALCIUM, MG in the last 48 hours. and CBC   Recent Labs   Lab 12/18/19  0419   WBC 10.17   HGB 11.6*   HCT 37.3          Pending Diagnostic Studies:     None         Medications:  Reconciled Home Medications:      Medication List      START taking these medications    oxyCODONE-acetaminophen 7.5-325 mg per tablet  Commonly known as:  PERCOCET  Take 1 tablet by mouth every 4 (four) hours as needed for Pain.        CHANGE how you take these medications    sumatriptan 100 MG tablet  Commonly known as:  IMITREX  1 tab po at start of headache, may repeat in 2 hours X1 in 24 hours.  What changed:    · when to take this  · reasons to take this        CONTINUE taking these medications    apixaban 2.5 mg Tab  Commonly known as:  ELIQUIS  Take 1 tablet (2.5 mg total) by mouth 2 (two) times daily.     atorvastatin 10 MG tablet  Commonly known as:  LIPITOR  Take 1 tablet (10 mg  total) by mouth once daily.     diclofenac sodium 1 % Gel  Commonly known as:  VOLTAREN  Apply 2 g topically 4 (four) times daily.     fenofibrate 160 MG Tab  Take 1 tablet (160 mg total) by mouth once daily.     ibuprofen 600 MG tablet  Commonly known as:  ADVIL,MOTRIN  ibuprofen 600 mg tablet   Take 1 tablet 3 times a day by oral route.     ondansetron 4 MG tablet  Commonly known as:  ZOFRAN  Take 1 tablet (4 mg total) by mouth every 8 (eight) hours as needed for Nausea.     sertraline 100 MG tablet  Commonly known as:  ZOLOFT  TAKE 1.5 TABLETS (150 MG TOTAL) BY MOUTH ONCE DAILY.     traMADol 50 mg tablet  Commonly known as:  ULTRAM     triamterene-hydrochlorothiazide 37.5-25 mg 37.5-25 mg per tablet  Commonly known as:  MAXZIDE-25  1/2 p.o. q.d.     valACYclovir 500 MG tablet  Commonly known as:  VALTREX  TAKE 2 TABLETS BY MOUTH TWICE DAILY            Indwelling Lines/Drains at time of discharge:   Lines/Drains/Airways     Surgical Packing                 Surgical Packing 1 day                Time spent on the discharge of patient: 60 minutes  Patient was seen and examined on the date of discharge and determined to be suitable for discharge.         Edwin Dsouza MD  Department of Hospital Medicine  [facility]

## 2019-12-18 NOTE — H&P
"[facility] Medicine  History & Physical    Patient Name: Jazz Martinez  MRN: [id number]  Admission Date: 12/17/2019  Attending Physician: Javier Putnam MD   Primary Care Provider: Timur Juan MD         Patient information was obtained from patient and ER records.     Subjective:     Principal Problem:Vaginal vault prolapse after hysterectomy    Chief Complaint: No chief complaint on file.       HPI: 74-year-old with past history of hysterectomy  history of bladder suspension x2 secondary to prolapse, Anterior and posterior colporrhaphy,  Apical vault suspension and   Cystotomy repair was taken to the OR for correction of  1.  Urinary incontinence with ISD 2.  Cystocele Rectocele 3.  Vault prolapse.   Patient underwent XI ROBOTIC SACROCOLPOPEXY, ABDOMINAL, SURGICAL PROCEDURE, USING TENSION FREE VAGINAL TAPE, FOR STRESS INCONTINENCE, CYSTOSCOPY   The patient has history of substantial leakage of urine with coughing and sneezing as well as frequent urination.  Postoperatively patient complains of abdominal pain and surgical site pain               Past Medical History:   Diagnosis Date    Arthritis     Corns and callosities     Deep vein thrombosis     GERD (gastroesophageal reflux disease)     Gout     H/O bladder problems     uses pessary    Hyperlipidemia     Hypertension     Pulmonary embolism        Past Surgical History:   Procedure Laterality Date    APPENDECTOMY  AGE 12    BLADDER SURGERY      Twice    BREAST CYST EXCISION      Benign per patient    EYE SURGERY      LASER FOR GLAUCOMA    HAND SURGERY Left     HYSTERECTOMY      Not related to cancer per patient.    OOPHORECTOMY      TONSILLECTOMY         Review of patient's allergies indicates:   Allergen Reactions    Sulfa dyne Other (See Comments)     Blisters in mouth    Gabapentin     Hydrocodone-acetaminophen      Pt states she doesn t know how this got on there" no     Nitrofurantoin Other (See " "Comments)     Deathly ill , headaches, weakness, 'wiped out"    Sulfa (sulfonamide antibiotics)        No current facility-administered medications on file prior to encounter.      Current Outpatient Medications on File Prior to Encounter   Medication Sig    atorvastatin (LIPITOR) 10 MG tablet Take 1 tablet (10 mg total) by mouth once daily.    ibuprofen (ADVIL,MOTRIN) 600 MG tablet ibuprofen 600 mg tablet   Take 1 tablet 3 times a day by oral route.    sertraline (ZOLOFT) 100 MG tablet TAKE 1.5 TABLETS (150 MG TOTAL) BY MOUTH ONCE DAILY.    sumatriptan (IMITREX) 100 MG tablet 1 tab po at start of headache, may repeat in 2 hours X1 in 24 hours. (Patient taking differently: as needed. 1 tab po at start of headache, may repeat in 2 hours X1 in 24 hours.)    triamterene-hydrochlorothiazide 37.5-25 mg (MAXZIDE-25) 37.5-25 mg per tablet 1/2 p.o. q.d.    valACYclovir (VALTREX) 500 MG tablet TAKE 2 TABLETS BY MOUTH TWICE DAILY    apixaban (ELIQUIS) 2.5 mg Tab Take 1 tablet (2.5 mg total) by mouth 2 (two) times daily. (Patient not taking: Reported on 12/16/2019)    diclofenac sodium (VOLTAREN) 1 % Gel Apply 2 g topically 4 (four) times daily.    fenofibrate 160 MG Tab Take 1 tablet (160 mg total) by mouth once daily.    ondansetron (ZOFRAN) 4 MG tablet Take 1 tablet (4 mg total) by mouth every 8 (eight) hours as needed for Nausea. (Patient not taking: Reported on 12/16/2019)     Family History     Problem Relation (Age of Onset)    Cancer Brother        Tobacco Use    Smoking status: Never Smoker    Smokeless tobacco: Never Used   Substance and Sexual Activity    Alcohol use: No    Drug use: No    Sexual activity: Never     Review of Systems   Constitutional: Negative for appetite change, chills, fatigue and fever.   HENT: Negative for congestion, hearing loss, rhinorrhea, sore throat, trouble swallowing and voice change.    Respiratory: Negative for cough, chest tightness, shortness of breath and wheezing.  "   Cardiovascular: Negative for chest pain, palpitations and leg swelling.   Gastrointestinal: Positive for abdominal pain. Negative for blood in stool, diarrhea, nausea and vomiting.   Genitourinary: Negative for difficulty urinating, frequency, hematuria and urgency.   Musculoskeletal: Positive for back pain. Negative for joint swelling and neck stiffness.   Skin: Negative for pallor and rash.   Neurological: Negative for tremors, seizures, syncope, speech difficulty, weakness, numbness and headaches.   Hematological: Negative for adenopathy.   Psychiatric/Behavioral: Negative for agitation, behavioral problems, confusion and sleep disturbance.     Objective:     Vital Signs (Most Recent):  Temp: 97.8 °F (36.6 °C) (12/17/19 1516)  Pulse: 74 (12/17/19 1605)  Resp: 18 (12/17/19 1605)  BP: (!) 111/56 (12/17/19 1516)  SpO2: 97 % (12/17/19 1605) Vital Signs (24h Range):  Temp:  [97.7 °F (36.5 °C)-98.8 °F (37.1 °C)] 97.8 °F (36.6 °C)  Pulse:  [61-98] 74  Resp:  [12-18] 18  SpO2:  [90 %-100 %] 97 %  BP: (105-147)/(56-74) 111/56     Weight: 72.6 kg (160 lb)  Body mass index is 26.63 kg/m².    Physical Exam   Constitutional: She is oriented to person, place, and time. She appears well-developed and well-nourished. No distress.   HENT:   Head: Normocephalic and atraumatic.   Right Ear: External ear normal.   Left Ear: External ear normal.   Nose: Nose normal.   Mouth/Throat: Oropharynx is clear and moist.   Eyes: Pupils are equal, round, and reactive to light. Conjunctivae and EOM are normal. Right eye exhibits no discharge. Left eye exhibits no discharge. No scleral icterus.   Neck: Normal range of motion. Neck supple. No thyromegaly present.   Cardiovascular: Normal rate, regular rhythm, normal heart sounds and intact distal pulses.   No murmur heard.  Pulmonary/Chest: Effort normal and breath sounds normal. No stridor. No respiratory distress. She has no wheezes. She has no rales.   Abdominal: Soft. Bowel sounds are  normal. She exhibits no distension. There is tenderness. There is guarding.   Musculoskeletal: She exhibits no edema or tenderness.   Lymphadenopathy:     She has no cervical adenopathy.   Neurological: She is alert and oriented to person, place, and time. No cranial nerve deficit or sensory deficit. She exhibits normal muscle tone. Coordination normal.   Skin: Skin is warm and dry. Capillary refill takes less than 2 seconds. No rash noted. She is not diaphoretic. No erythema.   Psychiatric: She has a normal mood and affect. Her behavior is normal. Judgment and thought content normal.   Nursing note and vitals reviewed.        CRANIAL NERVES     CN III, IV, VI   Pupils are equal, round, and reactive to light.  Extraocular motions are normal.        Significant Labs: BMP: No results for input(s): GLU, NA, K, CL, CO2, BUN, CREATININE, CALCIUM, MG in the last 48 hours.  CBC: No results for input(s): WBC, HGB, HCT, PLT in the last 48 hours.    Significant Imaging: I have reviewed all pertinent imaging results/findings within the past 24 hours.    Assessment/Plan:     * Vaginal vault prolapse after hysterectomy  Procedures done  XI ROBOTIC SACROCOLPOPEXY, ABDOMINAL (N/A)  SURGICAL PROCEDURE, USING TENSION FREE VAGINAL TAPE, FOR STRESS INCONTINENCE  CYSTOSCOPY   Tele-monitoring.  Continue to follow surgeon recommendations.  Needs aggressive incentive spirometry.  Follow hemoglobin and hematocrit closely.  Pain control with IV narcotics and antiemetics as needed.  Observe fall precautions.  Check blood glucose level q AC/HS.  Use Novolog Insulin Sliding Scale as needed.   Continue routine medications as before.   Gastric ulcer prophylaxis with gastric acid suppressant.  Deep venous thrombosis prophylaxis with Lovenox 40 mg SQ daily.  See Orders.    Lumbar radiculopathy  History noted      Migraine  Will continue to monitor for any headaches if needed will start the patient on sumatriptan home medication      Essential  hypertension  Will continue home meds      Esophageal stricture  History noted      Depression  Will continue home meds      Gastroesophageal reflux disease  Will start famotidine      VTE Risk Mitigation (From admission, onward)         Ordered     enoxaparin injection 40 mg  Daily      12/17/19 1250     IP VTE LOW RISK PATIENT  Once      12/17/19 0543     Place sequential compression device  Until discontinued      12/17/19 0543     Place LUIGI hose  Until discontinued      12/17/19 0543                   Edwin Dsouza MD  Department of Hospital Medicine   [facility]

## 2019-12-18 NOTE — HPI
74-year-old with past history of hysterectomy  history of bladder suspension x2 secondary to prolapse, Anterior and posterior colporrhaphy,  Apical vault suspension and   Cystotomy repair was taken to the OR for correction of  1.  Urinary incontinence with ISD 2.  Cystocele Rectocele 3.  Vault prolapse.   Patient underwent XI ROBOTIC SACROCOLPOPEXY, ABDOMINAL, SURGICAL PROCEDURE, USING TENSION FREE VAGINAL TAPE, FOR STRESS INCONTINENCE, CYSTOSCOPY   The patient has history of substantial leakage of urine with coughing and sneezing as well as frequent urination.  Postoperatively patient complains of abdominal pain and surgical site pain

## 2019-12-18 NOTE — NURSING
Pt left unit via wheelchair by transporter. All belongings gathered and are with pt. Pt had family member with her to transport home. D/c info given by primary nurse.

## 2019-12-18 NOTE — NURSING
Pt's soria and packing removed at 0530. Voiding trial to begin now. Educated pt on voiding trial process. Pt verbalized understanding.

## 2019-12-18 NOTE — DISCHARGE INSTRUCTIONS
Pelvic Organ Prolapse: After Surgery    You may go home the day of surgery, or you may stay in the hospital for 1 or more days. The length of your stay is based on the surgery you had.  Catheter, drain, and IV  After surgery, it may be hard for you to urinate for a few days or weeks. A catheter (thin tube) helps drain your bladder. You may have a Chatman catheter, which is put into the bladder through the urethra, or a suprapubic catheter, which is put into the bladder through a small incision in your abdomen. The catheter may be removed while you are in the hospital, or it may stay in place for a few days after you go home. Tubes may drain fluid from your incision. Intravenous (IV) lines give you fluids and medications. If you have a vaginal incision, you may have gauze packing in your vagina for 24 hours.  Managing pain  While in the hospital, a nurse can give you medication to control pain. Or, you may have a PCA (patient-controlled analgesia) pump attached to your IV line. This pump lets you give yourself pain medication. It is normal to feel some pain. But if the pain bothers you, tell your nurse right away. If you go home soon after surgery, you will have a prescription for pain medication to take with you. Call your doctor if this medication doesnt relieve your pain.  Getting out of bed  You may be asked to get up and walk a little before you leave the hospital. Walking keeps your blood moving and helps prevent blood clots. Once you can get out of bed, you may be helped to the bathroom to see if you can urinate on your own. When you can stand on your own, you may be able to take a shower.  Returning home  Your doctor will tell you when you can leave the hospital. An adult friend or family member should drive you home. When you get home, care for yourself as instructed and schedule any needed follow-up visits with your doctor.  Avoid lifting, strenous exercise, and sex for several weeks. As your doctor when  you may resume these activities.   Going home with a catheter  If you are still using the catheter when you leave the hospital, you may be told to:  · Keep the catheter and the skin around it clean.  · Clamp a suprapubic catheter to see whether you can urinate on your own. Then, measure the urine that drains from the catheter after you urinate.   Date Last Reviewed: 5/12/2015  © 5314-5382 [email or web address]. [address] [place] [address], [place] [address]. All rights reserved. This information is not intended as a substitute for professional medical care. Always follow your healthcare professional's instructions.

## 2019-12-18 NOTE — NURSING
PIV removed. D/c instructions given and explained, pt verbalized understanding. rx for pain med was given to pt. Pt educated on dsgs. Pt aware of f/u appt. All questions answered. Pt left unit safely with all belongings via w/c escorted by transport tech.

## 2019-12-18 NOTE — HPI
This is a patient recurrent prolapse after 2 previous attempts.  The last temp was altered and cystotomy which was repaired.  As result of that no graft supplementation could be done at that time.  Patient with symptomatic prolapse as well as voiding dysfunction and significant incontinence.  Patient presented for elective attempt at definitive gold standard approach in the form of sacral colpopexy with robotic assistance.  We able to achieve this surgery with minimal blood loss patient tolerated the surgery well and we were able to navigate to the level of the bladder neck and enough distal support posteriorly to achieve all of our objectives.  As stated patient tolerated procedure well met criteria for discharge from the postanesthesia care unit.  Patient was then transferred to routine observation on postoperative floor from that point a throughout the evening and into postoperative day 1.  Patient did well in the morning of postop day 1.  Vaginal packing as well as Chatman catheter removed patient subsequently went on to pass voiding trial.  Patient is ambulating tolerating p.o. producing appropriate amounts of urine and has passed void trial.  Patient with appropriate level of discomfort from surgical incisions but no toxic symptoms no nausea no vomiting no shortness of breath.

## 2019-12-18 NOTE — ASSESSMENT & PLAN NOTE
Will continue to monitor for any headaches if needed will start the patient on sumatriptan home medication

## 2019-12-18 NOTE — DISCHARGE SUMMARY
[facility]  Urology  Discharge Summary      Patient Name: Jazz Martinez  MRN: [id number]  Admission Date: 12/17/2019  Hospital Length of Stay: 0 days  Discharge Date and Time:  12/18/2019 12:22 PM  Attending Physician: Javier Ptunam MD   Discharging Provider: Javier Putnam MD  Primary Care Physician: Timur Juan MD    HPI: This is a patient recurrent prolapse after 2 previous attempts.  The last temp was altered and cystotomy which was repaired.  As result of that no graft supplementation could be done at that time.  Patient with symptomatic prolapse as well as voiding dysfunction and significant incontinence.  Patient presented for elective attempt at definitive gold standard approach in the form of sacral colpopexy with robotic assistance.  We able to achieve this surgery with minimal blood loss patient tolerated the surgery well and we were able to navigate to the level of the bladder neck and enough distal support posteriorly to achieve all of our objectives.  As stated patient tolerated procedure well met criteria for discharge from the postanesthesia care unit.  Patient was then transferred to routine observation on postoperative floor from that point a throughout the evening and into postoperative day 1.  Patient did well in the morning of postop day 1.  Vaginal packing as well as Chatman catheter removed patient subsequently went on to pass voiding trial.  Patient is ambulating tolerating p.o. producing appropriate amounts of urine and has passed void trial.  Patient with appropriate level of discomfort from surgical incisions but no toxic symptoms no nausea no vomiting no shortness of breath.    Procedure(s) (LRB):  XI ROBOTIC SACROCOLPOPEXY, ABDOMINAL (N/A)  SURGICAL PROCEDURE, USING TENSION FREE VAGINAL TAPE, FOR STRESS INCONTINENCE (N/A)  CYSTOSCOPY (N/A)     Indwelling Lines/Drains at time of discharge:   Lines/Drains/Airways     Surgical Packing                 Surgical  Packing 1 day                Hospital Course (synopsis of major diagnoses, care, treatment, and services provided during the course of the hospital stay):This is a patient recurrent prolapse after 2 previous attempts.  The last temp was altered and cystotomy which was repaired.  As result of that no graft supplementation could be done at that time.  Patient with symptomatic prolapse as well as voiding dysfunction and significant incontinence.  Patient presented for elective attempt at definitive gold standard approach in the form of sacral colpopexy with robotic assistance.  We able to achieve this surgery with minimal blood loss patient tolerated the surgery well and we were able to navigate to the level of the bladder neck and enough distal support posteriorly to achieve all of our objectives.  As stated patient tolerated procedure well met criteria for discharge from the postanesthesia care unit.  Patient was then transferred to routine observation on postoperative floor from that point a throughout the evening and into postoperative day 1.  Patient did well in the morning of postop day 1.  Vaginal packing as well as Chatman catheter removed patient subsequently went on to pass voiding trial.  Patient is ambulating tolerating p.o. producing appropriate amounts of urine and has passed void trial.  Patient with appropriate level of discomfort from surgical incisions but no toxic symptoms no nausea no vomiting no shortness of breath.    Consults:   Consults (From admission, onward)        Status Ordering Provider     Inpatient consult to Hospitalist  Once     Provider:  MD Layne Ma AHMET          Significant Diagnostic Studies: none      Pending Diagnostic Studies:     None          Final Active Diagnoses:    Diagnosis Date Noted POA    PRINCIPAL PROBLEM:  Vaginal vault prolapse after hysterectomy [N99.3] 12/17/2019 Yes    Lumbar radiculopathy [M54.16] 12/17/2019 Yes     Migraine [G43.909] 09/26/2019 Yes    Esophageal stricture [K22.2] 06/21/2016 Yes    Essential hypertension [I10] 06/21/2016 Yes    Gastroesophageal reflux disease [K21.9] 02/29/2016 Yes    Depression [F32.9] 02/29/2016 Yes      Problems Resolved During this Admission:       Discharged Condition: good    Disposition: Home or Self Care    Follow Up:  Follow-up Information     Javier Putnam MD.    Specialties:  Gynecology, Urology  Contact information:  [address] [place] [address] [address]IVE  [address]  [facility] [address] [phone]                 Patient Instructions:      Diet Adult Regular     Pelvic Rest     Lifting restrictions     Notify your health care provider if you experience any of the following:  temperature >100.4     Notify your health care provider if you experience any of the following:  persistent nausea and vomiting or diarrhea     Notify your health care provider if you experience any of the following:  severe uncontrolled pain     Notify your health care provider if you experience any of the following:  redness, tenderness, or signs of infection (pain, swelling, redness, odor or green/yellow discharge around incision site)     Notify your health care provider if you experience any of the following:  difficulty breathing or increased cough     Notify your health care provider if you experience any of the following:  severe persistent headache     Notify your health care provider if you experience any of the following:  worsening rash     Notify your health care provider if you experience any of the following:  persistent dizziness, light-headedness, or visual disturbances     Notify your health care provider if you experience any of the following:  increased confusion or weakness     Notify your health care provider if you experience any of the following:     Medications:  Reconciled Home Medications:      Medication List      START taking these medications    oxyCODONE-acetaminophen 7.5-325 mg per  tablet  Commonly known as:  PERCOCET  Take 1 tablet by mouth every 4 (four) hours as needed for Pain.        CHANGE how you take these medications    sumatriptan 100 MG tablet  Commonly known as:  IMITREX  1 tab po at start of headache, may repeat in 2 hours X1 in 24 hours.  What changed:    · when to take this  · reasons to take this        CONTINUE taking these medications    apixaban 2.5 mg Tab  Commonly known as:  ELIQUIS  Take 1 tablet (2.5 mg total) by mouth 2 (two) times daily.     atorvastatin 10 MG tablet  Commonly known as:  LIPITOR  Take 1 tablet (10 mg total) by mouth once daily.     diclofenac sodium 1 % Gel  Commonly known as:  VOLTAREN  Apply 2 g topically 4 (four) times daily.     fenofibrate 160 MG Tab  Take 1 tablet (160 mg total) by mouth once daily.     ibuprofen 600 MG tablet  Commonly known as:  ADVIL,MOTRIN  ibuprofen 600 mg tablet   Take 1 tablet 3 times a day by oral route.     ondansetron 4 MG tablet  Commonly known as:  ZOFRAN  Take 1 tablet (4 mg total) by mouth every 8 (eight) hours as needed for Nausea.     sertraline 100 MG tablet  Commonly known as:  ZOLOFT  TAKE 1.5 TABLETS (150 MG TOTAL) BY MOUTH ONCE DAILY.     traMADol 50 mg tablet  Commonly known as:  ULTRAM     triamterene-hydrochlorothiazide 37.5-25 mg 37.5-25 mg per tablet  Commonly known as:  MAXZIDE-25  1/2 p.o. q.d.     valACYclovir 500 MG tablet  Commonly known as:  VALTREX  TAKE 2 TABLETS BY MOUTH TWICE DAILY            Time spent on the discharge of patient: 25 minutes    Javier Putnam MD  Urology  [facility]

## 2019-12-18 NOTE — ASSESSMENT & PLAN NOTE
Procedures done  XI ROBOTIC SACROCOLPOPEXY, ABDOMINAL (N/A)  SURGICAL PROCEDURE, USING TENSION FREE VAGINAL TAPE, FOR STRESS INCONTINENCE  CYSTOSCOPY   Tele-monitoring.  Continue to follow surgeon recommendations.  Needs aggressive incentive spirometry.  Follow hemoglobin and hematocrit closely.  Pain control with IV narcotics and antiemetics as needed.  Observe fall precautions.  Check blood glucose level q AC/HS.  Use Novolog Insulin Sliding Scale as needed.   Continue routine medications as before.   Gastric ulcer prophylaxis with gastric acid suppressant.  Deep venous thrombosis prophylaxis with Lovenox 40 mg SQ daily.  See Orders.

## 2019-12-18 NOTE — SUBJECTIVE & OBJECTIVE
Interval History:  Postop day number 1      Review of Systems   All other systems reviewed and are negative.    Objective:     Temp:  [97.2 °F (36.2 °C)-98.2 °F (36.8 °C)] 98 °F (36.7 °C)  Pulse:  [62-80] 62  Resp:  [12-18] 18  SpO2:  [90 %-97 %] 97 %  BP: ()/(53-64) 115/59     Body mass index is 26.63 kg/m².    Date 12/18/19 0700 - 12/19/19 0659   Shift 1367-2690 6609-0994 4911-7658 24 Hour Total   INTAKE   Shift Total(mL/kg)       OUTPUT   Urine(mL/kg/hr) 400   400   Shift Total(mL/kg) 400(5.5)   400(5.5)   Weight (kg) 72.6 72.6 72.6 72.6          Drains     None                 Physical Exam   Abdominal:   Trocar incisions clean dry intact   Genitourinary:   Genitourinary Comments: External genital exam does not show any evidence of any type of hematoma bleeding or infectious process       Significant Labs:    BMP:  No results for input(s): NA, K, CL, CO2, BUN, CREATININE, LABGLOM, GLUCOSE, CALCIUM in the last 168 hours.    CBC:   Recent Labs   Lab 12/18/19  0419   WBC 10.17   HGB 11.6*   HCT 37.3          All pertinent labs results from the past 24 hours have been reviewed.    Significant Imaging:  none

## 2019-12-18 NOTE — CARE UPDATE
12/17/19 2100   Patient Assessment/Suction   Level of Consciousness (AVPU) alert   Respiratory Effort Unlabored   Expansion/Accessory Muscles/Retractions no use of accessory muscles   PRE-TX-O2   O2 Device (Oxygen Therapy) nasal cannula   Flow (L/min) 2   SpO2 (!) 94 %   Pulse Oximetry Type Intermittent   Pulse 80   Resp 16   Incentive Spirometer   $ Incentive Spirometer Charges done with encouragement   Administration (IS) instruction provided, follow-up   Number of Repetitions (IS) 10   Level Incentive Spirometer (mL) 1500   Patient Tolerance (IS) good

## 2019-12-18 NOTE — HOSPITAL COURSE
Patient with symptomatic prolapse as well as voiding dysfunction and significant incontinence.  Patient presented for elective attempt at definitive gold standard approach in the form of sacral colpopexy with robotic assistance.   minimal blood loss patient tolerated the surgery well and we were able to navigate to the level of the bladder neck and enough distal support posteriorly to achieve all of our objectives.   patient tolerated procedure well met criteria for discharge from the postanesthesia care unit.  Patient was then transferred to routine observation on postoperative floor from that point a throughout the evening and into postoperative day 1.  Patient did well in the morning of postop day 1.  Vaginal packing as well as Chatman catheter removed patient subsequently went on to pass voiding trial.  Patient is ambulating tolerating p.o. producing appropriate amounts of urine and has passed void trial.  Patient with appropriate level of discomfort from surgical incisions but no toxic symptoms no nausea no vomiting no shortness of breath

## 2019-12-18 NOTE — PLAN OF CARE
12/18/19 1512   Final Note   Assessment Type Final Discharge Note   Anticipated Discharge Disposition Home

## 2019-12-18 NOTE — PLAN OF CARE
Plan of care reviewed with pt. Pain controlled. IVF infusing. Vaginal packing noted and pad is cdi. Abd lap sites cdi. Tolerating food. Lovenox administered. Voiding trial in AM. Bed locked and low. Call light in reach. Hourly rounding to ensure pt safety.

## 2019-12-18 NOTE — PROGRESS NOTES
[facility]  Urology  Progress Note    Patient Name: Jazz Martinez  MRN: [id number]  Admission Date: 12/17/2019  Hospital Length of Stay: 0 days  Code Status: No Order   Attending Provider: Javier Putnam MD   Primary Care Physician: Timur Juan MD    Subjective:     HPI:  This is a patient recurrent prolapse after 2 previous attempts.  The last temp was altered and cystotomy which was repaired.  As result of that no graft supplementation could be done at that time.  Patient with symptomatic prolapse as well as voiding dysfunction and significant incontinence.  Patient presented for elective attempt at definitive gold standard approach in the form of sacral colpopexy with robotic assistance.  We able to achieve this surgery with minimal blood loss patient tolerated the surgery well and we were able to navigate to the level of the bladder neck and enough distal support posteriorly to achieve all of our objectives.  As stated patient tolerated procedure well met criteria for discharge from the postanesthesia care unit.  Patient was then transferred to routine observation on postoperative floor from that point a throughout the evening and into postoperative day 1.  Patient did well in the morning of postop day 1.  Vaginal packing as well as Chatman catheter removed patient subsequently went on to pass voiding trial.  Patient is ambulating tolerating p.o. producing appropriate amounts of urine and has passed void trial.  Patient with appropriate level of discomfort from surgical incisions but no toxic symptoms no nausea no vomiting no shortness of breath.      Interval History:  Postop day number 1      Review of Systems   All other systems reviewed and are negative.    Objective:     Temp:  [97.2 °F (36.2 °C)-98.2 °F (36.8 °C)] 98 °F (36.7 °C)  Pulse:  [62-80] 62  Resp:  [12-18] 18  SpO2:  [90 %-97 %] 97 %  BP: ()/(53-64) 115/59     Body mass index is 26.63 kg/m².    Date 12/18/19 0700 -  12/19/19 0659   Shift 4106-2741 2249-9925 6466-0939 24 Hour Total   INTAKE   Shift Total(mL/kg)       OUTPUT   Urine(mL/kg/hr) 400   400   Shift Total(mL/kg) 400(5.5)   400(5.5)   Weight (kg) 72.6 72.6 72.6 72.6          Drains     None                 Physical Exam   Abdominal:   Trocar incisions clean dry intact   Genitourinary:   Genitourinary Comments: External genital exam does not show any evidence of any type of hematoma bleeding or infectious process       Significant Labs:    BMP:  No results for input(s): NA, K, CL, CO2, BUN, CREATININE, LABGLOM, GLUCOSE, CALCIUM in the last 168 hours.    CBC:   Recent Labs   Lab 12/18/19  0419   WBC 10.17   HGB 11.6*   HCT 37.3          All pertinent labs results from the past 24 hours have been reviewed.    Significant Imaging:  none                  Assessment/Plan:     * Vaginal vault prolapse after hysterectomy  Patient meeting criteria for discharge after robotic assisted sacral colpopexy and mid urethral sling        VTE Risk Mitigation (From admission, onward)         Ordered     enoxaparin injection 40 mg  Daily      12/17/19 1250     IP VTE LOW RISK PATIENT  Once      12/17/19 0543     Place sequential compression device  Until discontinued      12/17/19 0543     Place LUIGI hose  Until discontinued      12/17/19 0543                Javier Putnam MD  Urology  [facility]

## 2019-12-19 ENCOUNTER — TELEPHONE (OUTPATIENT)
Dept: UROGYNECOLOGY | Facility: CLINIC | Age: 74
End: 2019-12-19

## 2019-12-19 VITALS
RESPIRATION RATE: 18 BRPM | SYSTOLIC BLOOD PRESSURE: 115 MMHG | OXYGEN SATURATION: 97 % | WEIGHT: 160 LBS | BODY MASS INDEX: 26.66 KG/M2 | DIASTOLIC BLOOD PRESSURE: 59 MMHG | HEIGHT: 65 IN | TEMPERATURE: 98 F | HEART RATE: 62 BPM

## 2019-12-19 DIAGNOSIS — E78.2 MIXED HYPERLIPIDEMIA: ICD-10-CM

## 2019-12-19 LAB — BACTERIA UR CULT: ABNORMAL

## 2019-12-19 RX ORDER — CEPHALEXIN 500 MG/1
500 CAPSULE ORAL EVERY 12 HOURS
Qty: 10 CAPSULE | Refills: 0 | Status: SHIPPED | OUTPATIENT
Start: 2019-12-19 | End: 2019-12-26

## 2019-12-19 RX ORDER — FENOFIBRATE 160 MG/1
160 TABLET ORAL DAILY
Qty: 90 TABLET | Refills: 3 | Status: SHIPPED | OUTPATIENT
Start: 2019-12-19 | End: 2020-11-16

## 2019-12-19 NOTE — TELEPHONE ENCOUNTER
Called and spoke to patient and informed that antibiotic was called to pharmacy by Dr amaral for UTI.

## 2019-12-19 NOTE — TELEPHONE ENCOUNTER
----- Message from Javier Putnam MD sent at 12/19/2019 10:19 AM CST -----  Pt with UTI I called in the medicine to her pharm.  To pick it up   Prolia order needs to be printed in order to be faxed to [facility].

## 2019-12-24 ENCOUNTER — TELEPHONE (OUTPATIENT)
Dept: UROGYNECOLOGY | Facility: CLINIC | Age: 74
End: 2019-12-24

## 2019-12-24 DIAGNOSIS — N99.3 VAGINAL VAULT PROLAPSE AFTER HYSTERECTOMY: ICD-10-CM

## 2019-12-24 DIAGNOSIS — N99.3 PROLAPSE OF VAGINAL VAULT AFTER HYSTERECTOMY: Primary | ICD-10-CM

## 2019-12-24 RX ORDER — OXYCODONE AND ACETAMINOPHEN 7.5; 325 MG/1; MG/1
1 TABLET ORAL EVERY 4 HOURS PRN
Qty: 20 TABLET | Refills: 0 | Status: SHIPPED | OUTPATIENT
Start: 2019-12-24 | End: 2020-08-26

## 2019-12-24 RX ORDER — OXYCODONE AND ACETAMINOPHEN 7.5; 325 MG/1; MG/1
1 TABLET ORAL EVERY 4 HOURS PRN
Qty: 20 TABLET | Refills: 0 | Status: CANCELLED | OUTPATIENT
Start: 2019-12-24

## 2019-12-24 RX ORDER — OXYCODONE AND ACETAMINOPHEN 7.5; 325 MG/1; MG/1
1 TABLET ORAL EVERY 4 HOURS PRN
Qty: 20 TABLET | Refills: 0 | Status: SHIPPED | OUTPATIENT
Start: 2019-12-24 | End: 2019-12-24 | Stop reason: SDUPTHER

## 2019-12-24 NOTE — TELEPHONE ENCOUNTER
----- Message from Montse Guzman sent at 12/24/2019  9:54 AM CST -----  Contact: Patient  Patient stated she sent over a request for a refill for pain meds. Had surgery last week. Has appt next Tuesday with Vicki but requesting refill be sent in today b/c she is almost out and in pain.  Her pharmacy also closes at 4:00 today for the holidays also.    Please send:    Medication: oxyCODONE-acetaminophen (PERCOCET) 7.5-325 mg per tablet  Pharmacy: [facility]  [place], [place] - [address]    Patient call back: [phone]

## 2019-12-24 NOTE — TELEPHONE ENCOUNTER
He does a lot of manipulation in the upper right quadrant of the abdomen.  He also uses some gas to inflate the abdomen slightly so he can see what he is doing.    I would also suggest that she take some Gas-x, beano or something of the like.  She can also try a heating pad to the abdomen on the low setting to see if this helps relieve some of the pain.      It looks like she also called Dr. Juan for a refill on the percocet.  I won't refill if he is going to refill it.  We need to try and contact his office to see if he is refilling this for her.

## 2019-12-24 NOTE — TELEPHONE ENCOUNTER
----- Message from Heidi Ryan sent at 12/23/2019  4:46 PM CST -----  Contact: Patient   Type:  RX Refill Request    Who Called:  Patient   Refill or New Rx:Refill  RX Name and Strength:oxyCODONE-acetaminophen (PERCOCET) 7.5-325 mg per tablet   How is the patient currently taking it? (ex. 1XDay): 3XDay   Is this a 30 day or 90 day RX:   Preferred Pharm[facility] with phone number[facility]   [facility] [place] [place] [facility] [address]  [address] [place] [address]  Phone: [phone] Fax: [phone]  Local or Mail Order: Local  Ordering Provider: Jersey Amado Call Back Number: [phone]   Additional Information: Pt states she only have 5 left and requesting a refill. Pt also states she recently had surgery and is in pain. Please call when Rx is sent to the pharmacy  and advise.

## 2019-12-24 NOTE — TELEPHONE ENCOUNTER
Called and spoke to patient and she states she did  Not call Dr Perez office . Informed of Kristynboflora recommendations, informed I would call DR Perez office to make sure he is not feeling the percocet. Message nurse.

## 2019-12-24 NOTE — TELEPHONE ENCOUNTER
Called Dr Juan office, states there is no request for him that it is to DR Putnam ( he is not in today).

## 2019-12-24 NOTE — TELEPHONE ENCOUNTER
Across stomach  Up right side, does not think it is gas. No ibuprofen was sent in she is on Eliquis and can't take it, states that she has been up and moving around, is wanting to get refill before the holiday.

## 2019-12-31 ENCOUNTER — OFFICE VISIT (OUTPATIENT)
Dept: UROGYNECOLOGY | Facility: CLINIC | Age: 74
End: 2019-12-31
Payer: MEDICARE

## 2019-12-31 VITALS
DIASTOLIC BLOOD PRESSURE: 80 MMHG | WEIGHT: 158.94 LBS | SYSTOLIC BLOOD PRESSURE: 125 MMHG | BODY MASS INDEX: 26.48 KG/M2 | HEIGHT: 65 IN | HEART RATE: 81 BPM

## 2019-12-31 DIAGNOSIS — R35.0 URINARY FREQUENCY: Primary | ICD-10-CM

## 2019-12-31 DIAGNOSIS — N39.46 MIXED INCONTINENCE URGE AND STRESS: ICD-10-CM

## 2019-12-31 DIAGNOSIS — N81.9 VAGINAL VAULT PROLAPSE: ICD-10-CM

## 2019-12-31 LAB
BILIRUB SERPL-MCNC: NORMAL MG/DL
BLOOD URINE, POC: NORMAL
COLOR, POC UA: YELLOW
GLUCOSE UR QL STRIP: NORMAL
KETONES UR QL STRIP: NORMAL
LEUKOCYTE ESTERASE URINE, POC: NORMAL
NITRITE, POC UA: NORMAL
PH, POC UA: 6
PROTEIN, POC: NORMAL
SPECIFIC GRAVITY, POC UA: 1.02
UROBILINOGEN, POC UA: NORMAL

## 2019-12-31 PROCEDURE — 99024 PR POST-OP FOLLOW-UP VISIT: ICD-10-PCS | Mod: POP,,, | Performed by: NURSE PRACTITIONER

## 2019-12-31 PROCEDURE — 99213 OFFICE O/P EST LOW 20 MIN: CPT | Mod: PBBFAC,PO | Performed by: NURSE PRACTITIONER

## 2019-12-31 PROCEDURE — 81002 URINALYSIS NONAUTO W/O SCOPE: CPT | Mod: PBBFAC,PO | Performed by: NURSE PRACTITIONER

## 2019-12-31 PROCEDURE — 99999 PR PBB SHADOW E&M-EST. PATIENT-LVL III: ICD-10-PCS | Mod: PBBFAC,,, | Performed by: NURSE PRACTITIONER

## 2019-12-31 PROCEDURE — 99024 POSTOP FOLLOW-UP VISIT: CPT | Mod: POP,,, | Performed by: NURSE PRACTITIONER

## 2019-12-31 PROCEDURE — 99999 PR PBB SHADOW E&M-EST. PATIENT-LVL III: CPT | Mod: PBBFAC,,, | Performed by: NURSE PRACTITIONER

## 2019-12-31 RX ORDER — ESTRADIOL 0.1 MG/G
CREAM VAGINAL DAILY
COMMUNITY
End: 2020-04-29 | Stop reason: SDUPTHER

## 2019-12-31 NOTE — PROGRESS NOTES
Subjective:       Patient ID: Jazz Martinez is a 74 y.o. female.    Chief Complaint: Post-op Evaluation (2 week)      Jazz Martinez is a 74 y.o. female who is 2 weeks post op from XI ROBOTIC SACROCOLPOPEXY, ABDOMINAL (N/A), SURGICAL PROCEDURE, USING TENSION FREE VAGINAL TAPE, FOR STRESS INCONTINENCE (N/A), CYSTOSCOPY (N/A) on 12/17 with Dr. Putnam.  She feels that she is doing ok.  She is still very sore, but this is slowly improving.  She was having frequency about every 2-3 hours during the day and night, but this has improved slightly during the day to q 4-5 hours.  She still has some nocturia x 2-3.  She has UUI, but does not feel that she is having much FLEX.  She denies any feeling of PVF.  She denies any vaginal discharge or bleeding.  She is doing well with BM.  She is concerned about her bladder symptoms as is hoping that they will improve.  She denies any other acute complaints/concerns.  She has been taking it easy at home.    Review of Systems   Constitutional: Negative for activity change, fever and unexpected weight change.   HENT: Negative for hearing loss.    Eyes: Negative for visual disturbance.   Respiratory: Negative for shortness of breath and wheezing.    Cardiovascular: Negative for chest pain, palpitations and leg swelling.   Gastrointestinal: Negative for abdominal pain, constipation and diarrhea.   Genitourinary: Positive for frequency and urgency. Negative for dyspareunia, dysuria, vaginal bleeding and vaginal discharge.   Musculoskeletal: Negative for gait problem and neck pain.   Skin: Negative for rash and wound.   Allergic/Immunologic: Negative for immunocompromised state.   Neurological: Negative for tremors, speech difficulty and weakness.   Hematological: Does not bruise/bleed easily.   Psychiatric/Behavioral: Negative for agitation and confusion.       Objective:      Physical Exam   Constitutional: She is oriented to person, place, and time. She appears well-developed  and well-nourished. No distress.   HENT:   Head: Normocephalic and atraumatic.   Neck: Neck supple. No thyromegaly present.   Pulmonary/Chest: Effort normal. No respiratory distress.   Abdominal: Soft. There is no tenderness. No hernia.   Musculoskeletal: Normal range of motion.   Neurological: She is alert and oriented to person, place, and time.   Skin: Skin is warm and dry. No rash noted.   Abdominal incisions for robotic insertion are well approx. With no drainage or discharge.  Some mild bruising noted in the right quadrant incision sites as well as in the lower abdomen.  Steri strips remain intact.   Psychiatric: She has a normal mood and affect. Her behavior is normal.     Pelvic Exam:  V: No lesions. No palpable nodes.   Va: no discharge or bleeding.  Good length and support.  Meatus:No caruncle or stenosis  Urethra: Non tender. No suburethral masses. No hypermobility.  No FLEX in supine position  Cx/Cuff: Normal   Uterus: surgically absent  Ad: No mass or tenderness.  Levators :Symmetrical. Normal tone.  Tender on the right.  BL: Non tender  RV: No hemorrhoids.      Assessment:       1. Urinary frequency    2. Mixed incontinence urge and stress    3. Vaginal vault prolapse        Plan:       Urinary frequency- monitor.  NP discussed with pt that if her symptoms persist, we will begin bladder medication, but to give her body time to adjust.  -     Cancel: POCT urine dipstick without microscope  -     POCT URINE DIPSTICK WITHOUT MICROSCOPE    Mixed incontinence urge and stress- as noted above    Vaginal vault prolapse- surgically repaired.  Np reviewed post op limitations/restrictions with pt.  Pt verbalized understanding.    RTC 2 weeks if no improvement in symptoms.

## 2020-01-09 ENCOUNTER — OFFICE VISIT (OUTPATIENT)
Dept: HEMATOLOGY/ONCOLOGY | Facility: CLINIC | Age: 75
End: 2020-01-09
Payer: MEDICARE

## 2020-01-09 VITALS
DIASTOLIC BLOOD PRESSURE: 70 MMHG | TEMPERATURE: 98 F | BODY MASS INDEX: 26.74 KG/M2 | HEART RATE: 69 BPM | WEIGHT: 160.69 LBS | SYSTOLIC BLOOD PRESSURE: 122 MMHG | RESPIRATION RATE: 18 BRPM

## 2020-01-09 DIAGNOSIS — Z86.718 HISTORY OF DVT IN ADULTHOOD: ICD-10-CM

## 2020-01-09 PROCEDURE — 99213 OFFICE O/P EST LOW 20 MIN: CPT | Mod: S$GLB,,, | Performed by: INTERNAL MEDICINE

## 2020-01-09 PROCEDURE — 99213 PR OFFICE/OUTPT VISIT, EST, LEVL III, 20-29 MIN: ICD-10-PCS | Mod: S$GLB,,, | Performed by: INTERNAL MEDICINE

## 2020-01-09 PROCEDURE — 1125F PR PAIN SEVERITY QUANTIFIED, PAIN PRESENT: ICD-10-PCS | Mod: S$GLB,,, | Performed by: INTERNAL MEDICINE

## 2020-01-09 PROCEDURE — 1159F MED LIST DOCD IN RCRD: CPT | Mod: S$GLB,,, | Performed by: INTERNAL MEDICINE

## 2020-01-09 PROCEDURE — 1159F PR MEDICATION LIST DOCUMENTED IN MEDICAL RECORD: ICD-10-PCS | Mod: S$GLB,,, | Performed by: INTERNAL MEDICINE

## 2020-01-09 PROCEDURE — 1125F AMNT PAIN NOTED PAIN PRSNT: CPT | Mod: S$GLB,,, | Performed by: INTERNAL MEDICINE

## 2020-01-09 NOTE — PROGRESS NOTES
PROGRESS NOTE    Subjective:       Patient ID: Jazz Martinez is a 74 y.o. female.    Chief Complaint:  No chief complaint on file.  History of DVT    History of Present Illness:   Jazz Martinez is a 74 y.o. female who presents for follow up for DVT history.  This occurred 9 years ago after surgery.     Patient had surgery(prolapse repair), 12/17/2019 and remains on Eliquis prophylaxis.  She is getting around well so far.             Family and Social history reviewed and is unchanged from last visit.       ROS:  Review of Systems   Constitutional: Negative for fever.   Respiratory: Negative for shortness of breath.    Cardiovascular: Negative for chest pain and leg swelling.   Gastrointestinal: Negative for abdominal pain and blood in stool.   Genitourinary: Negative for hematuria.   Skin: Negative for rash.          Current Outpatient Medications:     apixaban (ELIQUIS) 2.5 mg Tab, Take 1 tablet (2.5 mg total) by mouth 2 (two) times daily., Disp: 60 tablet, Rfl: 0    atorvastatin (LIPITOR) 10 MG tablet, Take 1 tablet (10 mg total) by mouth once daily., Disp: 90 tablet, Rfl: 2    diclofenac sodium (VOLTAREN) 1 % Gel, Apply 2 g topically 4 (four) times daily., Disp: 100 g, Rfl: 5    estradiol (ESTRACE) 0.01 % (0.1 mg/gram) vaginal cream, Place vaginally once daily., Disp: , Rfl:     fenofibrate 160 MG Tab, TAKE 1 TABLET (160 MG TOTAL) BY MOUTH ONCE DAILY., Disp: 90 tablet, Rfl: 3    ibuprofen (ADVIL,MOTRIN) 600 MG tablet, ibuprofen 600 mg tablet  Take 1 tablet 3 times a day by oral route., Disp: , Rfl:     ondansetron (ZOFRAN) 4 MG tablet, Take 1 tablet (4 mg total) by mouth every 8 (eight) hours as needed for Nausea., Disp: 20 tablet, Rfl: 1    oxyCODONE-acetaminophen (PERCOCET) 7.5-325 mg per tablet, Take 1 tablet by mouth every 4 (four) hours as needed for Pain., Disp: 20 tablet, Rfl: 0    sertraline (ZOLOFT) 100 MG tablet, TAKE 1.5  TABLETS (150 MG TOTAL) BY MOUTH ONCE DAILY., Disp: 135 tablet, Rfl: 1    sumatriptan (IMITREX) 100 MG tablet, 1 tab po at start of headache, may repeat in 2 hours X1 in 24 hours. (Patient taking differently: as needed. 1 tab po at start of headache, may repeat in 2 hours X1 in 24 hours.), Disp: 10 tablet, Rfl: 11    traMADol (ULTRAM) 50 mg tablet, , Disp: , Rfl: 5    triamterene-hydrochlorothiazide 37.5-25 mg (MAXZIDE-25) 37.5-25 mg per tablet, 1/2 p.o. q.d., Disp: 90 tablet, Rfl: 0    valACYclovir (VALTREX) 500 MG tablet, TAKE 2 TABLETS BY MOUTH TWICE DAILY, Disp: 12 tablet, Rfl: 3        Objective:       Physical Examination:     /70   Pulse 69   Temp 97.9 °F (36.6 °C) (Oral)   Resp 18   Wt 72.9 kg (160 lb 11.2 oz)   BMI 26.74 kg/m²     Physical Exam   Constitutional: She appears well-developed and well-nourished.   HENT:   Head: Normocephalic and atraumatic.   Right Ear: External ear normal.   Left Ear: External ear normal.   Mouth/Throat: Oropharynx is clear and moist.   Eyes: Pupils are equal, round, and reactive to light. Conjunctivae are normal.   Neck: No tracheal deviation present. No thyromegaly present.   Cardiovascular: Normal rate, regular rhythm and normal heart sounds.   Pulmonary/Chest: Effort normal and breath sounds normal.   Abdominal: Soft. Bowel sounds are normal. She exhibits no distension and no mass. There is no tenderness.   Musculoskeletal: She exhibits no edema.   Neurological:   Neuro intact througout   Skin: No rash noted.   Psychiatric: She has a normal mood and affect. Her behavior is normal. Judgment and thought content normal.       Labs:   No results found for this or any previous visit (from the past 336 hour(s)).  CMP  Sodium   Date Value Ref Range Status   12/11/2019 143 136 - 145 mmol/L Final     Potassium   Date Value Ref Range Status   12/11/2019 4.3 3.5 - 5.1 mmol/L Final     Chloride   Date Value Ref Range Status   12/11/2019 103 95 - 110 mmol/L Final     CO2    Date Value Ref Range Status   12/11/2019 29 23 - 29 mmol/L Final     Glucose   Date Value Ref Range Status   12/11/2019 87 70 - 110 mg/dL Final     BUN, Bld   Date Value Ref Range Status   12/11/2019 20 8 - 23 mg/dL Final     Creatinine   Date Value Ref Range Status   12/11/2019 0.8 0.5 - 1.4 mg/dL Final     Calcium   Date Value Ref Range Status   12/11/2019 10.1 8.7 - 10.5 mg/dL Final     Total Protein   Date Value Ref Range Status   10/16/2019 7.4 6.0 - 8.4 g/dL Final     Albumin   Date Value Ref Range Status   10/16/2019 4.3 3.5 - 5.2 g/dL Final     Total Bilirubin   Date Value Ref Range Status   10/16/2019 0.3 0.1 - 1.0 mg/dL Final     Comment:     For infants and newborns, interpretation of results should be based  on gestational age, weight and in agreement with clinical  observations.  Premature Infant recommended reference ranges:  Up to 24 hours.............<8.0 mg/dL  Up to 48 hours............<12.0 mg/dL  3-5 days..................<15.0 mg/dL  6-29 days.................<15.0 mg/dL       Alkaline Phosphatase   Date Value Ref Range Status   10/16/2019 69 55 - 135 U/L Final     AST   Date Value Ref Range Status   10/16/2019 27 10 - 40 U/L Final     ALT   Date Value Ref Range Status   10/16/2019 21 10 - 44 U/L Final     Anion Gap   Date Value Ref Range Status   12/11/2019 11 8 - 16 mmol/L Final     eGFR if    Date Value Ref Range Status   12/11/2019 >60 >60 mL/min/1.73 m^2 Final     eGFR if non    Date Value Ref Range Status   12/11/2019 >60 >60 mL/min/1.73 m^2 Final     Comment:     Calculation used to obtain the estimated glomerular filtration  rate (eGFR) is the CKD-EPI equation.        No results found for: CEA  No results found for: PSA        Assessment/Plan:     Problem List Items Addressed This Visit     History of DVT in adulthood     Patient is s/p bladder surgery and was on Eliquis since that time for protection from blood clot.  She is doing well and I fee can  stop this medication.  I will have her back with me on an as needed basis and will continue to follow with PCP, Dr. Juan.                 Discussion:     Follow up if symptoms worsen or fail to improve.      Electronically signed by Simone Walsh

## 2020-01-09 NOTE — ASSESSMENT & PLAN NOTE
Patient is s/p bladder surgery and was on Eliquis since that time for protection from blood clot.  She is doing well and I fee can stop this medication.  I will have her back with me on an as needed basis and will continue to follow with PCP, Dr. Juan.

## 2020-01-13 DIAGNOSIS — R10.13 EPIGASTRIC PAIN: Primary | ICD-10-CM

## 2020-01-16 ENCOUNTER — CLINICAL SUPPORT (OUTPATIENT)
Dept: UROGYNECOLOGY | Facility: CLINIC | Age: 75
End: 2020-01-16
Payer: MEDICARE

## 2020-01-16 DIAGNOSIS — R35.0 URINARY FREQUENCY: Primary | ICD-10-CM

## 2020-01-16 LAB
BILIRUB SERPL-MCNC: ABNORMAL MG/DL
BLOOD URINE, POC: 250
COLOR, POC UA: ABNORMAL
GLUCOSE UR QL STRIP: ABNORMAL
KETONES UR QL STRIP: ABNORMAL
LEUKOCYTE ESTERASE URINE, POC: ABNORMAL
NITRITE, POC UA: POSITIVE
PH, POC UA: 5
PROTEIN, POC: ABNORMAL
SPECIFIC GRAVITY, POC UA: 1.02
UROBILINOGEN, POC UA: ABNORMAL

## 2020-01-16 PROCEDURE — 81002 URINALYSIS NONAUTO W/O SCOPE: CPT | Mod: PBBFAC,PO

## 2020-01-16 PROCEDURE — 87086 URINE CULTURE/COLONY COUNT: CPT

## 2020-01-16 RX ORDER — CEPHALEXIN 500 MG/1
500 CAPSULE ORAL 3 TIMES DAILY
COMMUNITY
Start: 2020-01-16 | End: 2020-01-20

## 2020-01-16 NOTE — PROGRESS NOTES
Call placed to STACIE SAHNI , informed of results.  Per VORB Keflex 500 mg TID x 5 Days #15 no refills called  To [facility] and called gladys.

## 2020-01-18 ENCOUNTER — HOSPITAL ENCOUNTER (OUTPATIENT)
Dept: RADIOLOGY | Facility: HOSPITAL | Age: 75
Discharge: HOME | End: 2020-01-18
Attending: INTERNAL MEDICINE
Payer: MEDICARE

## 2020-01-18 DIAGNOSIS — R10.13 EPIGASTRIC PAIN: ICD-10-CM

## 2020-01-18 LAB — BACTERIA UR CULT: NORMAL

## 2020-01-18 PROCEDURE — 76705 ECHO EXAM OF ABDOMEN: CPT | Mod: TC

## 2020-01-18 PROCEDURE — 76705 US ABDOMEN LIMITED: ICD-10-PCS | Mod: 26,,, | Performed by: RADIOLOGY

## 2020-01-18 PROCEDURE — 76705 ECHO EXAM OF ABDOMEN: CPT | Mod: 26,,, | Performed by: RADIOLOGY

## 2020-01-29 ENCOUNTER — OFFICE VISIT (OUTPATIENT)
Dept: UROGYNECOLOGY | Facility: CLINIC | Age: 75
End: 2020-01-29
Payer: MEDICARE

## 2020-01-29 VITALS
BODY MASS INDEX: 26.45 KG/M2 | SYSTOLIC BLOOD PRESSURE: 152 MMHG | WEIGHT: 158.75 LBS | HEIGHT: 65 IN | DIASTOLIC BLOOD PRESSURE: 91 MMHG | HEART RATE: 75 BPM

## 2020-01-29 DIAGNOSIS — Z09 POSTOP CHECK: ICD-10-CM

## 2020-01-29 DIAGNOSIS — R35.0 URINARY FREQUENCY: Primary | ICD-10-CM

## 2020-01-29 LAB
BILIRUB SERPL-MCNC: NORMAL MG/DL
BLOOD URINE, POC: NORMAL
COLOR, POC UA: YELLOW
GLUCOSE UR QL STRIP: NORMAL
KETONES UR QL STRIP: NORMAL
LEUKOCYTE ESTERASE URINE, POC: NORMAL
NITRITE, POC UA: NORMAL
PH, POC UA: 6
PROTEIN, POC: NORMAL
SPECIFIC GRAVITY, POC UA: 1.01
UROBILINOGEN, POC UA: NORMAL

## 2020-01-29 PROCEDURE — 99999 PR PBB SHADOW E&M-EST. PATIENT-LVL III: ICD-10-PCS | Mod: PBBFAC,,, | Performed by: OBSTETRICS & GYNECOLOGY

## 2020-01-29 PROCEDURE — 99024 POSTOP FOLLOW-UP VISIT: CPT | Mod: POP,,, | Performed by: OBSTETRICS & GYNECOLOGY

## 2020-01-29 PROCEDURE — 81002 URINALYSIS NONAUTO W/O SCOPE: CPT | Mod: PBBFAC,PO | Performed by: OBSTETRICS & GYNECOLOGY

## 2020-01-29 PROCEDURE — 99999 PR PBB SHADOW E&M-EST. PATIENT-LVL III: CPT | Mod: PBBFAC,,, | Performed by: OBSTETRICS & GYNECOLOGY

## 2020-01-29 PROCEDURE — 99213 OFFICE O/P EST LOW 20 MIN: CPT | Mod: PBBFAC,PO | Performed by: OBSTETRICS & GYNECOLOGY

## 2020-01-29 PROCEDURE — 99024 PR POST-OP FOLLOW-UP VISIT: ICD-10-PCS | Mod: POP,,, | Performed by: OBSTETRICS & GYNECOLOGY

## 2020-01-29 NOTE — PROGRESS NOTES
Subjective:      Patient ID: Jazz Martinez is a 74 y.o. female.    Chief Complaint:  No chief complaint on file.      History of Present Illness  5-1/2 weeks status post robotic sacral colpopexy and tension-free vaginal tape patient is emptying her bladder well however there is urinary urgency and frequency with associated incontinence there is no FLEX patient is bothered by this          Past Medical History:   Diagnosis Date    Arthritis     Corns and callosities     Deep vein thrombosis     GERD (gastroesophageal reflux disease)     Gout     H/O bladder problems     uses pessary    Hyperlipidemia     Hypertension     Pulmonary embolism        Past Surgical History:   Procedure Laterality Date    APPENDECTOMY  AGE 12    BLADDER SURGERY      Twice    BREAST CYST EXCISION      Benign per patient    EYE SURGERY      LASER FOR GLAUCOMA    HAND SURGERY Left     HYSTERECTOMY      Not related to cancer per patient.    OOPHORECTOMY      ROBOT-ASSISTED LAPAROSCOPIC ABDOMINAL SACROCOLPOPEXY USING DA KINGA XI N/A 2019    Procedure: XI ROBOTIC SACROCOLPOPEXY, ABDOMINAL;  Surgeon: Javier Putnam MD;  Location: [facility] OR;  Service: OB/GYN;  Laterality: N/A;  removal of damaged tissue    SURGICAL PROCEDURE FOR STRESS INCONTINENCE USING TENSION FREE VAGINAL TAPE N/A 2019    Procedure: SURGICAL PROCEDURE, USING TENSION FREE VAGINAL TAPE, FOR STRESS INCONTINENCE;  Surgeon: Javier Putnam MD;  Location: [facility] OR;  Service: OB/GYN;  Laterality: N/A;    TONSILLECTOMY         GYN & OB History  No LMP recorded. Patient has had a hysterectomy.   Date of Last Pap: No result found    OB History    Para Term  AB Living   1 1 1     1   SAB TAB Ectopic Multiple Live Births                  # Outcome Date GA Lbr Cesar/2nd Weight Sex Delivery Anes PTL Lv   1 Term               Obstetric Comments          Health Maintenance       Date Due Completion Date    TETANUS VACCINE 1963 ---  "   Colonoscopy 07/14/2019 7/14/2014    Shingles Vaccine (2 of 3) 12/09/2019 10/14/2019    Lipid Panel 07/29/2020 7/29/2019    DEXA SCAN 03/27/2021 3/27/2018    Override on 10/14/2014: Done ([facility])          Family History   Problem Relation Age of Onset    Cancer Brother         bladder    Breast cancer Neg Hx     Colon cancer Neg Hx     Ovarian cancer Neg Hx        Social History     Socioeconomic History    Marital status:      Spouse name: Not on file    Number of children: Not on file    Years of education: Not on file    Highest education level: Not on file   Occupational History    Not on file   Social Needs    Financial resource strain: Not on file    Food insecurity:     Worry: Not on file     Inability: Not on file    Transportation needs:     Medical: Not on file     Non-medical: Not on file   Tobacco Use    Smoking status: Never Smoker    Smokeless tobacco: Never Used   Substance and Sexual Activity    Alcohol use: No    Drug use: No    Sexual activity: Never   Lifestyle    Physical activity:     Days per week: Not on file     Minutes per session: Not on file    Stress: Not on file   Relationships    Social connections:     Talks on phone: Not on file     Gets together: Not on file     Attends [employer] service: Not on file     Active member of club or organization: Not on file     Attends meetings of clubs or organizations: Not on file     Relationship status: Not on file   Other Topics Concern    Not on file   Social History Narrative    Not on file       Review of Systems  Review of Systems   Genitourinary: Positive for urgency.          Objective:   BP (!) 152/91 (BP Location: Right arm, Patient Position: Sitting, BP Method: Medium (Automatic))   Pulse 75   Ht 5' 5" (1.651 m)   Wt 72 kg (158 lb 11.7 oz)   BMI 26.41 kg/m²     Physical Exam     Assessment:     1. Urinary frequency            Plan:     1. Urinary frequency      After examination I " talked with patient regarding my findings excellent anatomy there is no FLEX she is decompressing the bladder appropriately.  The start her off on Myrbetriq 25 sample given additionally we have Myrbetriq at 50 I have also given her a boxes that she will start with the 25 if that finds efficacy by meeting the definition of success 50% reduction symptoms that she will stay at that point if not she will go to the 50 regardless shows she is going to see me in 2 months patient very appreciative.  No other issues patient very thankful experience  There are no Patient Instructions on file for this visit.

## 2020-01-30 ENCOUNTER — TELEPHONE (OUTPATIENT)
Dept: UROGYNECOLOGY | Facility: CLINIC | Age: 75
End: 2020-01-30

## 2020-01-30 ENCOUNTER — PATIENT MESSAGE (OUTPATIENT)
Dept: UROGYNECOLOGY | Facility: CLINIC | Age: 75
End: 2020-01-30

## 2020-01-30 NOTE — TELEPHONE ENCOUNTER
----- Message from Violet Rome sent at 1/30/2020  1:28 PM CST -----  Type: Needs Medical Advice    Who Called:  patient  Best Call Back Number: [phone]  Additional Information: patient requesting a call back in regards to medication that was given to her.

## 2020-01-30 NOTE — TELEPHONE ENCOUNTER
Dear Dr Putnam,   I didn't during my visit following my surgery to thank you for the successful surgery you preformed on me.  I never really thought I had another solution until I met you.  You have also given me so much hope on the other issue by providing for me another solution and I actually don't have enough words to show my appreciation.     Thanks a bunch and you are such a blessing to the medical profession.     Jazz Martinez   A [place] patient.

## 2020-02-12 ENCOUNTER — TELEPHONE (OUTPATIENT)
Dept: UROGYNECOLOGY | Facility: CLINIC | Age: 75
End: 2020-02-12

## 2020-02-12 NOTE — TELEPHONE ENCOUNTER
----- Message from Liza Silva sent at 2/12/2020  8:55 AM CST -----  Contact: self [phone]  Patient is requesting a call back from the nurse concerning her upcoming appt.   Please call the patient upon request at phone number [phone].

## 2020-03-17 ENCOUNTER — PATIENT OUTREACH (OUTPATIENT)
Dept: ADMINISTRATIVE | Facility: OTHER | Age: 75
End: 2020-03-17

## 2020-03-19 ENCOUNTER — OFFICE VISIT (OUTPATIENT)
Dept: UROGYNECOLOGY | Facility: CLINIC | Age: 75
End: 2020-03-19
Payer: MEDICARE

## 2020-03-19 VITALS
HEIGHT: 65 IN | HEART RATE: 74 BPM | SYSTOLIC BLOOD PRESSURE: 131 MMHG | BODY MASS INDEX: 27 KG/M2 | DIASTOLIC BLOOD PRESSURE: 83 MMHG | WEIGHT: 162.06 LBS

## 2020-03-19 DIAGNOSIS — R35.0 URINARY FREQUENCY: Primary | ICD-10-CM

## 2020-03-19 DIAGNOSIS — N39.41 URGE INCONTINENCE: ICD-10-CM

## 2020-03-19 PROCEDURE — 51701 INSERT BLADDER CATHETER: CPT | Mod: PBBFAC,PO | Performed by: OBSTETRICS & GYNECOLOGY

## 2020-03-19 PROCEDURE — 81002 URINALYSIS NONAUTO W/O SCOPE: CPT | Mod: PBBFAC,PO | Performed by: OBSTETRICS & GYNECOLOGY

## 2020-03-19 PROCEDURE — 99213 OFFICE O/P EST LOW 20 MIN: CPT | Mod: 25,S$PBB,, | Performed by: OBSTETRICS & GYNECOLOGY

## 2020-03-19 PROCEDURE — 51701 INSERT BLADDER CATHETER: CPT | Mod: S$PBB,,, | Performed by: OBSTETRICS & GYNECOLOGY

## 2020-03-19 PROCEDURE — 99999 PR PBB SHADOW E&M-EST. PATIENT-LVL III: CPT | Mod: PBBFAC,,, | Performed by: OBSTETRICS & GYNECOLOGY

## 2020-03-19 PROCEDURE — 99999 PR PBB SHADOW E&M-EST. PATIENT-LVL III: ICD-10-PCS | Mod: PBBFAC,,, | Performed by: OBSTETRICS & GYNECOLOGY

## 2020-03-19 PROCEDURE — 99213 PR OFFICE/OUTPT VISIT, EST, LEVL III, 20-29 MIN: ICD-10-PCS | Mod: 25,S$PBB,, | Performed by: OBSTETRICS & GYNECOLOGY

## 2020-03-19 PROCEDURE — 99213 OFFICE O/P EST LOW 20 MIN: CPT | Mod: PBBFAC,PO,25 | Performed by: OBSTETRICS & GYNECOLOGY

## 2020-03-19 PROCEDURE — 51701 PR INSERTION OF NON-INDWELLING BLADDER CATHETERIZATION FOR RESIDUAL UR: ICD-10-PCS | Mod: S$PBB,,, | Performed by: OBSTETRICS & GYNECOLOGY

## 2020-03-19 RX ORDER — OXYBUTYNIN CHLORIDE 10 MG/1
10 TABLET, EXTENDED RELEASE ORAL DAILY
Qty: 30 TABLET | Refills: 12 | Status: SHIPPED | OUTPATIENT
Start: 2020-03-19 | End: 2020-04-21

## 2020-03-19 NOTE — PATIENT INSTRUCTIONS
Treating Incontinence in Women: Special Therapies     During biofeedback, sensors send signals from your pelvic floor muscles to a computer screen.     Your doctor will discuss your options for treating your urinary incontinence. These depend on the cause of your problem and any other health issues you have. Usually behavioral changes are tried first, followed by various medicines. If these methods are unsuccessful, one or more of the therapies described below may be part of your treatment plan.  Biofeedback  This technique is taught by a nurse or physical therapist. During the therapy, a small sensor is placed in your vagina or rectum. Another sensor is placed on your stomach. Other types of sensors are also available. These sensors read signals from the pelvic floor muscles. When you contract or relax your muscles, these signals are shown as images on a computer screen. Using the images, you can learn to relax or contract certain muscles. This can help you strengthen and better control these muscles. And it can help you learn pelvic floor muscle exercises.  Electrical stimulation  This is a painless therapy that uses a tiny amount of electric current. It helps strengthen very weak or damaged pelvic floor muscles. The electric current is sent through the muscles of the pelvic floor and bladder. This causes the muscles to contract. In time, this helps make the muscles stronger.  Stimulator implants  This technique is used to treat urge incontinence. A small device is implanted under the skin near the stomach. This device gives off mild electrical signals. These block extra signals that are being sent to the bladder muscle. This helps the bladder work more normally.  Date Last Reviewed: 1/1/2017 © 2000-2017 [employer]. [address] [place] [address], [place] [address]. All rights reserved. This information is not intended as a substitute for professional medical care. Always follow your healthcare  professional's instructions.

## 2020-03-19 NOTE — PROGRESS NOTES
Subjective:      Patient ID: Jazz Martinez is a 74 y.o. female.    Chief Complaint:  Follow-up (2 month check)      History of Present Illness  Refractory urge incontinence still evident despite Myrbetriq          Past Medical History:   Diagnosis Date    Arthritis     Corns and callosities     Deep vein thrombosis     GERD (gastroesophageal reflux disease)     Gout     H/O bladder problems     uses pessary    Hyperlipidemia     Hypertension     Pulmonary embolism        Past Surgical History:   Procedure Laterality Date    APPENDECTOMY  AGE 12    BLADDER SURGERY      Twice    BREAST CYST EXCISION      Benign per patient    EYE SURGERY      LASER FOR GLAUCOMA    HAND SURGERY Left     HYSTERECTOMY      Not related to cancer per patient.    OOPHORECTOMY      ROBOT-ASSISTED LAPAROSCOPIC ABDOMINAL SACROCOLPOPEXY USING DA KINGA XI N/A 2019    Procedure: XI ROBOTIC SACROCOLPOPEXY, ABDOMINAL;  Surgeon: Javier Putnam MD;  Location: [facility] OR;  Service: OB/GYN;  Laterality: N/A;  removal of damaged tissue    SURGICAL PROCEDURE FOR STRESS INCONTINENCE USING TENSION FREE VAGINAL TAPE N/A 2019    Procedure: SURGICAL PROCEDURE, USING TENSION FREE VAGINAL TAPE, FOR STRESS INCONTINENCE;  Surgeon: Javier Putnam MD;  Location: [facility] OR;  Service: OB/GYN;  Laterality: N/A;    TONSILLECTOMY         GYN & OB History  No LMP recorded. Patient has had a hysterectomy.   Date of Last Pap: No result found    OB History    Para Term  AB Living   1 1 1     1   SAB TAB Ectopic Multiple Live Births                  # Outcome Date GA Lbr Cesar/2nd Weight Sex Delivery Anes PTL Lv   1 Term               Obstetric Comments          Health Maintenance       Date Due Completion Date    TETANUS VACCINE 1961 ---    Colonoscopy 2019    Shingles Vaccine (2 of 3) 2019 10/14/2019    Lipid Panel 2020    DEXA SCAN 2021 3/27/2018    Override on  "10/14/2014: Done ([facility])          Family History   Problem Relation Age of Onset    Cancer Brother         bladder    Breast cancer Neg Hx     Colon cancer Neg Hx     Ovarian cancer Neg Hx        Social History     Socioeconomic History    Marital status:      Spouse name: Not on file    Number of children: Not on file    Years of education: Not on file    Highest education level: Not on file   Occupational History    Not on file   Social Needs    Financial resource strain: Not on file    Food insecurity:     Worry: Not on file     Inability: Not on file    Transportation needs:     Medical: Not on file     Non-medical: Not on file   Tobacco Use    Smoking status: Never Smoker    Smokeless tobacco: Never Used   Substance and Sexual Activity    Alcohol use: No    Drug use: No    Sexual activity: Never   Lifestyle    Physical activity:     Days per week: Not on file     Minutes per session: Not on file    Stress: Not on file   Relationships    Social connections:     Talks on phone: Not on file     Gets together: Not on file     Attends Sabianist service: Not on file     Active member of club or organization: Not on file     Attends meetings of clubs or organizations: Not on file     Relationship status: Not on file   Other Topics Concern    Not on file   Social History Narrative    Not on file       Review of Systems  Review of Systems   Genitourinary: Positive for frequency and urgency.          Objective:   /83   Pulse 74   Ht 5' 5" (1.651 m)   Wt 73.5 kg (162 lb 0.6 oz)   BMI 26.96 kg/m²     Physical Exam   On examination I was able to see the exact anatomy is perfect a equal -3 cuff well supported -12 AP -3.  The issue is the unrelenting her age I was able to catheterize the patient for less than 15 cc patient is decompressing bladder well.  Assessment:     1. Urinary frequency    2. Urge incontinence            Plan:     1. Urinary frequency    2. " Urge incontinence      Patient has failed Myrbetriq at 23 and 48.  I am going to add oxybutynin 10 to this for combined therapy and make arrangements in the next the 30 days to either move forward with chemodenervation or sacral neuromodulation information will be provided to the patient through the portal.  Patient appreciated the time spent with her regarding these issues otherwise she is doing very well is very happy.  Total time this visit 15 min with greater than 50% 10 min in discussion regarding modalities going to be used will be available to her for refractory urge  There are no Patient Instructions on file for this visit.

## 2020-03-26 DIAGNOSIS — N95.1 MENOPAUSAL SYMPTOMS: ICD-10-CM

## 2020-03-26 DIAGNOSIS — F32.A DEPRESSION, UNSPECIFIED DEPRESSION TYPE: ICD-10-CM

## 2020-03-27 RX ORDER — SERTRALINE HYDROCHLORIDE 100 MG/1
150 TABLET, FILM COATED ORAL DAILY
Qty: 135 TABLET | Refills: 1 | Status: SHIPPED | OUTPATIENT
Start: 2020-03-27 | End: 2020-09-23 | Stop reason: SDUPTHER

## 2020-04-21 RX ORDER — OXYBUTYNIN CHLORIDE 10 MG/1
10 TABLET, EXTENDED RELEASE ORAL DAILY
Qty: 30 TABLET | Refills: 12 | Status: CANCELLED | OUTPATIENT
Start: 2020-04-21 | End: 2020-05-21

## 2020-04-21 NOTE — TELEPHONE ENCOUNTER
----- Message from Bonnie Ocasio sent at 4/21/2020 12:29 PM CDT -----  Contact: self  Type:  RX Refill Request    Who Called:  self  Refill or New Rx:  new  RX Name and Strength:  Myrbetriq 50 mg  How is the patient currently taking it? (ex. 1XDay):  1Xday  Is this a 30 day or 90 day RX:  30  Preferred Pharmacy with phone number:    [facility] - [place], [place] - [address]  [address] [place] [address]  Phone: [phone] Fax: [phone]  Local or Mail Order:  local  Ordering Provider:  Dr Jersey Amado Call Back Number: [phone]  Additional Information:  Patient has been using samples and needs a prescription called in if doctor recommends. Patient only has 2 pills left. Please call patient. Thanks!

## 2020-04-21 NOTE — TELEPHONE ENCOUNTER
Fyi,    Refill or New Rx:  new  RX Name and Strength:  Myrbetriq 50 mg  How is the patient currently taking it? (ex. 1XDay):  1Xday  Is this a 30 day or 90 day RX:  30  Preferred Pharmacy with phone number:    [facility] - [place], [place] - [address]  [address]  [place] [address]  Phone: [phone] Fax: [phone]  Local or Mail Order:  local  Ordering Provider:  Dr Putnam  [facility] Call Back Number: [phone]  Additional Information:  Patient has been using samples and needs a prescription called in if doctor recommends. Patient only has 2 pills left. Please call patient. Thanks!

## 2020-04-23 ENCOUNTER — TELEPHONE (OUTPATIENT)
Dept: UROGYNECOLOGY | Facility: CLINIC | Age: 75
End: 2020-04-23

## 2020-04-23 NOTE — TELEPHONE ENCOUNTER
----- Message from Dora Huff MA sent at 4/23/2020  2:32 PM CDT -----  Contact: self/[phone]      ----- Message -----  From: Yessenia Houston MA  Sent: 4/23/2020   2:25 PM CDT  To: Jersey Herrera Staff, #    Who Called patient  Best Call Back :  Medication: mirabegron (MYRBETRIQ) 50 mg Tb24   Pharmacy: [address] p/[phone] (Phone)  Additional Information: pt is requesting a call back to get an alterative medication due to the one listed above per pt is over $200 dollars .  Pt stated she is out of her meds. Please call back to discuss

## 2020-04-23 NOTE — TELEPHONE ENCOUNTER
Called and spoke to pt and have her information on patient asisstance for the myrbetriq as well as a coupon, will come to  now.

## 2020-04-23 NOTE — TELEPHONE ENCOUNTER
----- Message from Yessenia Houston MA sent at 4/23/2020  2:25 PM CDT -----  Contact: self/[phone]  Who Called patient  Best Call Back :  Medication: mirabegron (MYRBETRIQ) 50 mg Tb24   Pharmacy: [facility] [place], [place] - [address] [place] [address] p/[phone] (Phone)  Additional Information: pt is requesting a call back to get an alterative medication due to the one listed above per pt is over $200 dollars .  Pt stated she is out of her meds. Please call back to discuss

## 2020-04-28 ENCOUNTER — PATIENT OUTREACH (OUTPATIENT)
Dept: ADMINISTRATIVE | Facility: OTHER | Age: 75
End: 2020-04-28

## 2020-04-29 ENCOUNTER — OFFICE VISIT (OUTPATIENT)
Dept: UROGYNECOLOGY | Facility: CLINIC | Age: 75
End: 2020-04-29
Payer: MEDICARE

## 2020-04-29 VITALS
HEIGHT: 65 IN | BODY MASS INDEX: 27.36 KG/M2 | HEART RATE: 75 BPM | WEIGHT: 164.25 LBS | DIASTOLIC BLOOD PRESSURE: 83 MMHG | SYSTOLIC BLOOD PRESSURE: 142 MMHG

## 2020-04-29 DIAGNOSIS — N39.41 URGE INCONTINENCE OF URINE: ICD-10-CM

## 2020-04-29 DIAGNOSIS — R35.0 URINARY FREQUENCY: Primary | ICD-10-CM

## 2020-04-29 LAB
BILIRUB SERPL-MCNC: ABNORMAL MG/DL
BLOOD URINE, POC: ABNORMAL
COLOR, POC UA: YELLOW
GLUCOSE UR QL STRIP: ABNORMAL
KETONES UR QL STRIP: ABNORMAL
LEUKOCYTE ESTERASE URINE, POC: ABNORMAL
NITRITE, POC UA: ABNORMAL
PH, POC UA: 7
PROTEIN, POC: ABNORMAL
SPECIFIC GRAVITY, POC UA: 1
UROBILINOGEN, POC UA: ABNORMAL

## 2020-04-29 PROCEDURE — 99999 PR PBB SHADOW E&M-EST. PATIENT-LVL III: CPT | Mod: PBBFAC,,, | Performed by: OBSTETRICS & GYNECOLOGY

## 2020-04-29 PROCEDURE — 99999 PR PBB SHADOW E&M-EST. PATIENT-LVL III: ICD-10-PCS | Mod: PBBFAC,,, | Performed by: OBSTETRICS & GYNECOLOGY

## 2020-04-29 PROCEDURE — 99213 OFFICE O/P EST LOW 20 MIN: CPT | Mod: S$PBB,,, | Performed by: OBSTETRICS & GYNECOLOGY

## 2020-04-29 PROCEDURE — 99213 PR OFFICE/OUTPT VISIT, EST, LEVL III, 20-29 MIN: ICD-10-PCS | Mod: S$PBB,,, | Performed by: OBSTETRICS & GYNECOLOGY

## 2020-04-29 PROCEDURE — 81002 URINALYSIS NONAUTO W/O SCOPE: CPT | Mod: PBBFAC,PO | Performed by: OBSTETRICS & GYNECOLOGY

## 2020-04-29 PROCEDURE — 99213 OFFICE O/P EST LOW 20 MIN: CPT | Mod: PBBFAC,PO | Performed by: OBSTETRICS & GYNECOLOGY

## 2020-04-29 RX ORDER — ESTRADIOL 0.1 MG/G
1 CREAM VAGINAL DAILY
Qty: 1 TUBE | Refills: 3 | Status: SHIPPED | OUTPATIENT
Start: 2020-04-29 | End: 2020-10-13

## 2020-04-29 RX ORDER — OXYBUTYNIN CHLORIDE 15 MG/1
15 TABLET, EXTENDED RELEASE ORAL DAILY
Qty: 30 TABLET | Refills: 12 | Status: SHIPPED | OUTPATIENT
Start: 2020-04-29 | End: 2020-07-30

## 2020-04-29 NOTE — PROGRESS NOTES
Subjective:      Patient ID: Jazz Martinez is a 74 y.o. female.    Chief Complaint:  Follow-up      History of Present Illness  Patient had a robotic sacral colpopexy the then developed urge incontinence we tried Myrbetriq at  [age]  This was not effective I added oxybutynin 10 and that worked very well for her until the last week thing given stressful events she thinks is not working as well would like to return to the level of continence foster by that medication combination.            Past Medical History:   Diagnosis Date    Arthritis     Corns and callosities     Deep vein thrombosis     GERD (gastroesophageal reflux disease)     Gout     H/O bladder problems     uses pessary    Hyperlipidemia     Hypertension     Pulmonary embolism        Past Surgical History:   Procedure Laterality Date    APPENDECTOMY  AGE 12    BLADDER SURGERY      Twice    BREAST CYST EXCISION      Benign per patient    EYE SURGERY      LASER FOR GLAUCOMA    HAND SURGERY Left     HYSTERECTOMY      Not related to cancer per patient.    OOPHORECTOMY      ROBOT-ASSISTED LAPAROSCOPIC ABDOMINAL SACROCOLPOPEXY USING DA KINGA XI N/A 2019    Procedure: XI ROBOTIC SACROCOLPOPEXY, ABDOMINAL;  Surgeon: Javier Putnam MD;  Location: [facility] OR;  Service: OB/GYN;  Laterality: N/A;  removal of damaged tissue    SURGICAL PROCEDURE FOR STRESS INCONTINENCE USING TENSION FREE VAGINAL TAPE N/A 2019    Procedure: SURGICAL PROCEDURE, USING TENSION FREE VAGINAL TAPE, FOR STRESS INCONTINENCE;  Surgeon: Javier Putnam MD;  Location: [facility] OR;  Service: OB/GYN;  Laterality: N/A;    TONSILLECTOMY         GYN & OB History  No LMP recorded. Patient has had a hysterectomy.   Date of Last Pap: No result found    OB History    Para Term  AB Living   1 1 1     1   SAB TAB Ectopic Multiple Live Births                  # Outcome Date GA Lbr Cesar/2nd Weight Sex Delivery Anes PTL Lv   1 Term              "  Obstetric Comments          Health Maintenance       Date Due Completion Date    TETANUS VACCINE 1961 ---    Colonoscopy 2019    Shingles Vaccine (2 of 3) 2019 10/14/2019    Lipid Panel 2020    DEXA SCAN 2021 3/27/2018    Override on 10/14/2014: Done ([facility])          Family History   Problem Relation Age of Onset    Cancer Brother         bladder    Breast cancer Neg Hx     Colon cancer Neg Hx     Ovarian cancer Neg Hx        Social History     Socioeconomic History    Marital status:      Spouse name: Not on file    Number of children: Not on file    Years of education: Not on file    Highest education level: Not on file   Occupational History    Not on file   Social Needs    Financial resource strain: Not on file    Food insecurity:     Worry: Not on file     Inability: Not on file    Transportation needs:     Medical: Not on file     Non-medical: Not on file   Tobacco Use    Smoking status: Never Smoker    Smokeless tobacco: Never Used   Substance and Sexual Activity    Alcohol use: No    Drug use: No    Sexual activity: Never   Lifestyle    Physical activity:     Days per week: Not on file     Minutes per session: Not on file    Stress: Not on file   Relationships    Social connections:     Talks on phone: Not on file     Gets together: Not on file     Attends Faith service: Not on file     Active member of club or organization: Not on file     Attends meetings of clubs or organizations: Not on file     Relationship status: Not on file   Other Topics Concern    Not on file   Social History Narrative    Not on file       Review of Systems  Review of Systems   Genitourinary: Positive for frequency and urgency.          Objective:   BP (!) 142/83   Pulse 75   Ht 5' 5" (1.651 m)   Wt 74.5 kg (164 lb 3.9 oz)   BMI 27.33 kg/m²     Physical Exam   A well-healed excellent anatomy  Assessment:     1. " Urinary frequency            Plan:     1. Urinary frequency      Were going to add the oxybutynin 15 in os and then the medication    This was explained to the patient patient many questions all addressed total minute time of this consultation 15 min  There are no Patient Instructions on file for this visit.

## 2020-05-05 ENCOUNTER — PATIENT MESSAGE (OUTPATIENT)
Dept: ADMINISTRATIVE | Facility: HOSPITAL | Age: 75
End: 2020-05-05

## 2020-05-16 ENCOUNTER — PATIENT OUTREACH (OUTPATIENT)
Dept: ADMINISTRATIVE | Facility: OTHER | Age: 75
End: 2020-05-16

## 2020-05-18 ENCOUNTER — OFFICE VISIT (OUTPATIENT)
Dept: ORTHOPEDICS | Facility: CLINIC | Age: 75
End: 2020-05-18
Payer: MEDICARE

## 2020-05-18 ENCOUNTER — HOSPITAL ENCOUNTER (OUTPATIENT)
Dept: RADIOLOGY | Facility: HOSPITAL | Age: 75
Discharge: HOME | End: 2020-05-18
Attending: ORTHOPAEDIC SURGERY
Payer: MEDICARE

## 2020-05-18 VITALS — HEIGHT: 65 IN | TEMPERATURE: 97 F | BODY MASS INDEX: 27.32 KG/M2 | WEIGHT: 164 LBS | RESPIRATION RATE: 18 BRPM

## 2020-05-18 DIAGNOSIS — M25.562 CHRONIC PAIN OF BOTH KNEES: Primary | ICD-10-CM

## 2020-05-18 DIAGNOSIS — M25.561 CHRONIC PAIN OF BOTH KNEES: Primary | ICD-10-CM

## 2020-05-18 DIAGNOSIS — M25.562 LEFT KNEE PAIN, UNSPECIFIED CHRONICITY: ICD-10-CM

## 2020-05-18 DIAGNOSIS — M25.562 LEFT KNEE PAIN, UNSPECIFIED CHRONICITY: Primary | ICD-10-CM

## 2020-05-18 DIAGNOSIS — G89.29 CHRONIC PAIN OF BOTH KNEES: Primary | ICD-10-CM

## 2020-05-18 PROCEDURE — 99999 PR PBB SHADOW E&M-EST. PATIENT-LVL III: ICD-10-PCS | Mod: PBBFAC,,, | Performed by: ORTHOPAEDIC SURGERY

## 2020-05-18 PROCEDURE — 20610 DRAIN/INJ JOINT/BURSA W/O US: CPT | Mod: 50,PBBFAC,PN | Performed by: ORTHOPAEDIC SURGERY

## 2020-05-18 PROCEDURE — 99214 OFFICE O/P EST MOD 30 MIN: CPT | Mod: S$PBB,25,, | Performed by: ORTHOPAEDIC SURGERY

## 2020-05-18 PROCEDURE — 73564 XR KNEE ORTHO LEFT WITH FLEXION: ICD-10-PCS | Mod: 26,LT,, | Performed by: RADIOLOGY

## 2020-05-18 PROCEDURE — 20610 LARGE JOINT ASPIRATION/INJECTION: BILATERAL KNEE: ICD-10-PCS | Mod: 50,S$PBB,, | Performed by: ORTHOPAEDIC SURGERY

## 2020-05-18 PROCEDURE — 99214 PR OFFICE/OUTPT VISIT, EST, LEVL IV, 30-39 MIN: ICD-10-PCS | Mod: S$PBB,25,, | Performed by: ORTHOPAEDIC SURGERY

## 2020-05-18 PROCEDURE — 73562 X-RAY EXAM OF KNEE 3: CPT | Mod: 26,59,RT, | Performed by: RADIOLOGY

## 2020-05-18 PROCEDURE — 73564 X-RAY EXAM KNEE 4 OR MORE: CPT | Mod: 26,LT,, | Performed by: RADIOLOGY

## 2020-05-18 PROCEDURE — 99213 OFFICE O/P EST LOW 20 MIN: CPT | Mod: PBBFAC,25,PN | Performed by: ORTHOPAEDIC SURGERY

## 2020-05-18 PROCEDURE — 99999 PR PBB SHADOW E&M-EST. PATIENT-LVL III: CPT | Mod: PBBFAC,,, | Performed by: ORTHOPAEDIC SURGERY

## 2020-05-18 PROCEDURE — 73562 XR KNEE ORTHO LEFT WITH FLEXION: ICD-10-PCS | Mod: 26,59,RT, | Performed by: RADIOLOGY

## 2020-05-18 PROCEDURE — 73562 X-RAY EXAM OF KNEE 3: CPT | Mod: TC,PN,RT

## 2020-05-18 RX ORDER — TRIAMCINOLONE ACETONIDE 40 MG/ML
40 INJECTION, SUSPENSION INTRA-ARTICULAR; INTRAMUSCULAR
Status: DISCONTINUED | OUTPATIENT
Start: 2020-05-18 | End: 2020-05-18 | Stop reason: HOSPADM

## 2020-05-18 RX ADMIN — TRIAMCINOLONE ACETONIDE 40 MG: 40 INJECTION, SUSPENSION INTRA-ARTICULAR; INTRAMUSCULAR at 09:05

## 2020-05-18 NOTE — PROGRESS NOTES
Past Medical History:   Diagnosis Date    Arthritis     Corns and callosities     Deep vein thrombosis     GERD (gastroesophageal reflux disease)     Gout     H/O bladder problems     uses pessary    Hyperlipidemia     Hypertension     Pulmonary embolism        Past Surgical History:   Procedure Laterality Date    APPENDECTOMY  AGE 12    BLADDER SURGERY      Twice    BREAST CYST EXCISION      Benign per patient    EYE SURGERY      LASER FOR GLAUCOMA    HAND SURGERY Left     HYSTERECTOMY      Not related to cancer per patient.    OOPHORECTOMY      ROBOT-ASSISTED LAPAROSCOPIC ABDOMINAL SACROCOLPOPEXY USING DA KINGA XI N/A 12/17/2019    Procedure: XI ROBOTIC SACROCOLPOPEXY, ABDOMINAL;  Surgeon: Javier Putnam MD;  Location: [facility] OR;  Service: OB/GYN;  Laterality: N/A;  removal of damaged tissue    SURGICAL PROCEDURE FOR STRESS INCONTINENCE USING TENSION FREE VAGINAL TAPE N/A 12/17/2019    Procedure: SURGICAL PROCEDURE, USING TENSION FREE VAGINAL TAPE, FOR STRESS INCONTINENCE;  Surgeon: Javier Putnam MD;  Location: [facility] OR;  Service: OB/GYN;  Laterality: N/A;    TONSILLECTOMY         Current Outpatient Medications   Medication Sig    apixaban (ELIQUIS) 2.5 mg Tab Take 1 tablet (2.5 mg total) by mouth 2 (two) times daily.    atorvastatin (LIPITOR) 10 MG tablet Take 1 tablet (10 mg total) by mouth once daily.    diclofenac sodium (VOLTAREN) 1 % Gel Apply 2 g topically 4 (four) times daily.    estradioL (ESTRACE) 0.01 % (0.1 mg/gram) vaginal cream Place 1 g vaginally once daily.    fenofibrate 160 MG Tab TAKE 1 TABLET (160 MG TOTAL) BY MOUTH ONCE DAILY.    ibuprofen (ADVIL,MOTRIN) 600 MG tablet ibuprofen 600 mg tablet   Take 1 tablet 3 times a day by oral route.    mirabegron (MYRBETRIQ) 50 mg Tb24 Take 1 tablet (50 mg total) by mouth once daily.    ondansetron (ZOFRAN) 4 MG tablet Take 1 tablet (4 mg total) by mouth every 8 (eight) hours as needed for Nausea.    oxybutynin (DITROPAN XL) 15  "MG TR24 Take 1 tablet (15 mg total) by mouth once daily.    oxyCODONE-acetaminophen (PERCOCET) 7.5-325 mg per tablet Take 1 tablet by mouth every 4 (four) hours as needed for Pain.    sertraline (ZOLOFT) 100 MG tablet TAKE 1.5 TABLETS (150 MG TOTAL) BY MOUTH ONCE DAILY.    sumatriptan (IMITREX) 100 MG tablet 1 tab po at start of headache, may repeat in 2 hours X1 in 24 hours. (Patient taking differently: as needed. 1 tab po at start of headache, may repeat in 2 hours X1 in 24 hours.)    traMADol (ULTRAM) 50 mg tablet     triamterene-hydrochlorothiazide 37.5-25 mg (MAXZIDE-25) 37.5-25 mg per tablet 1/2 p.o. q.d.    valACYclovir (VALTREX) 500 MG tablet TAKE 2 TABLETS BY MOUTH TWICE DAILY     No current facility-administered medications for this visit.        Review of patient's allergies indicates:   Allergen Reactions    Sulfa dyne Other (See Comments)     Blisters in mouth    Gabapentin     Hydrocodone-acetaminophen      Pt states she doesn t know how this got on there" no     Nitrofurantoin Other (See Comments)     Deathly ill , headaches, weakness, 'wiped out"    Sulfa (sulfonamide antibiotics)        Family History   Problem Relation Age of Onset    Cancer Brother         bladder    Breast cancer Neg Hx     Colon cancer Neg Hx     Ovarian cancer Neg Hx        Social History     Socioeconomic History    Marital status:      Spouse name: Not on file    Number of children: Not on file    Years of education: Not on file    Highest education level: Not on file   Occupational History    Not on file   Social Needs    Financial resource strain: Not on file    Food insecurity:     Worry: Not on file     Inability: Not on file    Transportation needs:     Medical: Not on file     Non-medical: Not on file   Tobacco Use    Smoking status: Never Smoker    Smokeless tobacco: Never Used   Substance and Sexual Activity    Alcohol use: No    Drug use: No    Sexual activity: Never   Lifestyle " "   Physical activity:     Days per week: Not on file     Minutes per session: Not on file    Stress: Not on file   Relationships    Social connections:     Talks on phone: Not on file     Gets together: Not on file     Attends Rastafari service: Not on file     Active member of club or organization: Not on file     Attends meetings of clubs or organizations: Not on file     Relationship status: Not on file   Other Topics Concern    Not on file   Social History Narrative    Not on file       Chief Complaint:   Chief Complaint   Patient presents with    Left Knee - Pain       Consulting Physician: No ref. provider found    History of present illness:    This is a 74 y.o. year old female who complains of bilateral knee pain with the left being worse than the right.  She has had this pain for the last several months.  She states the pain is worse when she extends her knee or stands up.  She localizes most of the pain laterally.  She denies any swelling.  She puts her pain at a 9 and 10 worse with activities.    Review of Systems:    Constitution:   Denies chills, fever, and sweats.  HENT:   Denies headaches or blurry vision.  Cardiovascular:  Denies chest pain or irregular heart beat.  Respiratory:   Denies cough or shortness of breath.  Gastrointestinal:  Denies abdominal pain, nausea, or vomiting.  Musculoskeletal:   Denies muscle cramps.  Neurological:   Denies dizziness or focal weakness.  Psychiatric/Behavior: Normal mental status.  Hematology/Lymph:  Denies bleeding problem or easy bruising/bleeding.  Skin:    Denies rash or suspicious lesions.    Examination:    Vital Signs:    Vitals:    05/18/20 0942   Resp: 18   Temp: 97.3 °F (36.3 °C)   Weight: 74.4 kg (164 lb)   Height: 5' 5" (1.651 m)   PainSc:   9       Body mass index is 27.29 kg/m².    Constitution:   Well-developed, well nourished patient in no acute distress.  Neurological:   Alert and oriented x 3 and cooperative to examination.   "   Psychiatric/Behavior: Normal mental status.  Respiratory:   No shortness of breath.  Eyes:    Extraoccular muscles intact  Skin:    No scars, rash or suspicious lesions.    Physical Exam: Left Knee Exam    Gait   Normal    Skin  Rash:   None  Scars:   None    Inspection  Erythema:  None  Bruising:  None  Effusion:  None  Masses:  None  Lymphadenopathy: None  Calf   Soft, non-tender    Range of Motion: 0 to 130° with pain    Medial Joint : Yes  Lateral Joint : Yes    Patellofemoral Tenderness: None  Patellofemoral Crepitus: None    Lachman:  Normal  Anterior Drawer: Normal  Posterior Drawer: Normal    Lashell's:  Negative  Apley's:  Negative    Varus Stress:  Stable  Valgus Stress:  Stable    Strength:  5/5    Pulses:  Palpable distal    Sensation:  Intact      Right Knee Exam    Gait   Normal    Skin  Rash:   None  Scars:   None    Inspection  Erythema:  None  Bruising:  None  Effusion:  None  Masses:  None  Lymphadenopathy: None  Calf   Soft, non-tender    Range of Motion: 0 to 130° with pain    Medial Joint : Yes  Lateral Joint : No    Patellofemoral Tenderness: None  Patellofemoral Crepitus: None    Lachman:  Normal  Anterior Drawer: Normal  Posterior Drawer: Normal    Lashell's:  Negative  Apley's:  Negative    Varus Stress:  Stable  Valgus Stress:  Stable    Strength:  5/5    Pulses:  Palpable distal    Sensation:  Intact          Imaging: X-rays ordered and images interpreted today personally by me of both knees show mild degenerative changes and chondrocalcinosis.         Assessment: Chronic pain of both knees  -     Large Joint Aspiration/Injection: bilateral knee        Plan:  She is full range of motion with tenderness on the joint line in both knees.  Go ahead and give her a steroid injection in both for the chondrocalcinosis and see how she does.      DISCLAIMER: This note may have been dictated using voice recognition software and may contain grammatical  errors.     NOTE: Consult report sent to referring provider via [employer] EMR.

## 2020-05-18 NOTE — PROCEDURES
Large Joint Aspiration/Injection: bilateral knee  Date/Time: 5/18/2020 9:45 AM  Performed by: Walter Tobias MD  Authorized by: Walter Tobias MD     Consent Done?:  Yes (Verbal)  Indications:  Pain  Timeout: prior to procedure the correct patient, procedure, and site was verified    Approach:  Anteromedial  Location:  Knee  Laterality:  Bilateral  Site:  Bilateral knee  Medications (Right):  40 mg triamcinolone acetonide 40 mg/mL  Medications (Left):  40 mg triamcinolone acetonide 40 mg/mL

## 2020-07-13 ENCOUNTER — TELEPHONE (OUTPATIENT)
Dept: FAMILY MEDICINE | Facility: CLINIC | Age: 75
End: 2020-07-13

## 2020-07-17 DIAGNOSIS — Z71.89 COMPLEX CARE COORDINATION: ICD-10-CM

## 2020-07-22 ENCOUNTER — PATIENT OUTREACH (OUTPATIENT)
Dept: ADMINISTRATIVE | Facility: OTHER | Age: 75
End: 2020-07-22

## 2020-07-22 NOTE — PROGRESS NOTES
Chart was reviewed for overdue Proactive [facility] Encounters (RUBY)  topics  Updates were requested from care everywhere  Health Maintenance has been updated

## 2020-07-30 ENCOUNTER — OFFICE VISIT (OUTPATIENT)
Dept: FAMILY MEDICINE | Facility: CLINIC | Age: 75
End: 2020-07-30
Payer: MEDICARE

## 2020-07-30 VITALS
OXYGEN SATURATION: 96 % | BODY MASS INDEX: 27.4 KG/M2 | SYSTOLIC BLOOD PRESSURE: 124 MMHG | HEIGHT: 65 IN | HEART RATE: 82 BPM | TEMPERATURE: 99 F | RESPIRATION RATE: 16 BRPM | DIASTOLIC BLOOD PRESSURE: 86 MMHG | WEIGHT: 164.44 LBS

## 2020-07-30 DIAGNOSIS — M79.10 MUSCLE PAIN: ICD-10-CM

## 2020-07-30 DIAGNOSIS — R61 NIGHT SWEATS: ICD-10-CM

## 2020-07-30 DIAGNOSIS — R53.81 CHRONIC FATIGUE AND MALAISE: Primary | ICD-10-CM

## 2020-07-30 DIAGNOSIS — R51.9 HEAD ACHE: ICD-10-CM

## 2020-07-30 DIAGNOSIS — G44.029 CHRONIC CLUSTER HEADACHE, NOT INTRACTABLE: ICD-10-CM

## 2020-07-30 DIAGNOSIS — Z01.84 ENCOUNTER FOR ANTIBODY RESPONSE EXAMINATION: ICD-10-CM

## 2020-07-30 DIAGNOSIS — R53.82 CHRONIC FATIGUE AND MALAISE: Primary | ICD-10-CM

## 2020-07-30 DIAGNOSIS — D64.9 ANEMIA, UNSPECIFIED TYPE: ICD-10-CM

## 2020-07-30 DIAGNOSIS — R05.9 COUGH: ICD-10-CM

## 2020-07-30 DIAGNOSIS — R68.89 COLD INTOLERANCE: ICD-10-CM

## 2020-07-30 DIAGNOSIS — R05.2 SUBACUTE COUGH: ICD-10-CM

## 2020-07-30 PROCEDURE — 99214 PR OFFICE/OUTPT VISIT, EST, LEVL IV, 30-39 MIN: ICD-10-PCS | Mod: S$GLB,,, | Performed by: INTERNAL MEDICINE

## 2020-07-30 PROCEDURE — 99214 OFFICE O/P EST MOD 30 MIN: CPT | Mod: S$GLB,,, | Performed by: INTERNAL MEDICINE

## 2020-07-30 RX ORDER — VERAPAMIL HYDROCHLORIDE 120 MG/1
120 CAPSULE, EXTENDED RELEASE ORAL DAILY
Qty: 30 CAPSULE | Refills: 11 | Status: SHIPPED | OUTPATIENT
Start: 2020-07-30 | End: 2020-08-26

## 2020-07-30 RX ORDER — FESOTERODINE FUMARATE 4 MG/1
4 TABLET, FILM COATED, EXTENDED RELEASE ORAL DAILY
Qty: 30 TABLET | Refills: 11 | Status: SHIPPED | OUTPATIENT
Start: 2020-07-30 | End: 2020-11-16

## 2020-07-30 RX ORDER — BUPROPION HYDROCHLORIDE 150 MG/1
150 TABLET ORAL DAILY
Qty: 30 TABLET | Refills: 11 | Status: SHIPPED | OUTPATIENT
Start: 2020-07-30 | End: 2020-11-03

## 2020-07-30 NOTE — PROGRESS NOTES
Subjective:      10:33 AM     Patient ID: Jazz Martinez is a 75 y.o. female.    Chief Complaint: Cough (no refills), Fatigue, and Headache    HPI         CHIEF COMPLAINT: Cough(+).  HPI:     ONSET/TIMING:  3 months   ago    DURATION:               Paroxysmal: no .    QUALITY/COURSE:.  Worsening for 3 weeks    INTENSITY/SEVERITY: Severity is #  5 (10 point scale)      The following symptoms/statements are positive if BOLD, negative otherwise.      CONTEXT/WHEN:  Tobacco_use. Smokers_in_home. Seasonal_pattern. Allergies/Hayfever. Sinusitis. Irritant_Exposure(smoke/dust/fumes). Exposure_to_others_with_similar_symptoms.        Similar_problems_in_past.   PAST TREATMENT OR EVALUATION:   previous PPD. Recent_previous_chest_x-ray. Recent_antibiotics.  Associated Symptoms:     sputum production: scant. copious. Hemoptysis.  Medical History: Past_pulmonary_infections.  Cardiovascular_disease.chronic_lung_disease.  tuberculosis. Asthma. AIDS. Gastroesophageal_reflux_disease .    CHIEF COMPLAINT: Headache  HPI:  The headaches are not associated with tearing or swelling on 1 side of the face.  presently has headache: no..    Severity is #  8 (10 point scale).  has a headache      31  days per month.  ONSET:        ago.    QUALITY/COURSE:    Worsening for 3 weeks  DURATION:  20 min to 2 hr several times a day    The following symptoms/statements  are positive if BOLD, negative otherwise.     CHARACTERISTICS: sudden. atypical.   Awakening_the_patient_from_sleep. Worse_on_awakening.. .  Pulsating .     Squeezing.  Stabbing.  LOCATION:  . Right.  Left:. . Forehead. Face. Jaw. Eyes. Frontal. Temporal. Top/Vertex. Posterior. Radiation:   AGGRAVATING FACTORS: head trauma . FH of headache.   anxiety .  pressure points of the face . placing the head lower than the trunk .  PAST TREATMENT OR EVALUATION: medications:   CT scan .  MRI.  ASSOCIATED SYMPTOMS:  N/V .  Changes_in_memory .  Change_in_mentation .  Motor_changes .  "Sensation_changes.  . Stiff_neck . fever. phonophobia .  . prodrome.  Medical History: Any_neurological_disease . migraines . Emotional_problems . Sinus_disease .          Review of Systems   Constitutional: Positive for diaphoresis (Night sweats for years). Negative for fever.   HENT: Positive for postnasal drip. Negative for ear pain, rhinorrhea and sore throat.    Eyes: Positive for photophobia.   Respiratory: Positive for cough. Negative for shortness of breath and wheezing.    Cardiovascular: Negative for chest pain.   Endocrine: Positive for cold intolerance.   Musculoskeletal: Negative for myalgias.   Skin: Negative for rash.   Allergic/Immunologic: Negative for environmental allergies.   Neurological: Positive for headaches.         Objective:      Vitals:    07/30/20 1026   BP: 124/86   Pulse: 82   Resp: 16   Temp: 99.3 °F (37.4 °C)   TempSrc: Oral   SpO2: 96%   Weight: 74.6 kg (164 lb 7.4 oz)   Height: 5' 5" (1.651 m)   PainSc:   5   PainLoc: Head     Physical Exam  Vitals signs and nursing note reviewed.   Constitutional:       Appearance: She is well-developed.   HENT:      Right Ear: Tympanic membrane and external ear normal.      Left Ear: Tympanic membrane and external ear normal.      Mouth/Throat:      Pharynx: No oropharyngeal exudate.   Cardiovascular:      Rate and Rhythm: Normal rate and regular rhythm.      Heart sounds: Normal heart sounds. No murmur. No friction rub.   Pulmonary:      Effort: Pulmonary effort is normal. No respiratory distress.      Breath sounds: Normal breath sounds. No wheezing or rales.   Chest:      Chest wall: No tenderness.   Abdominal:      General: Bowel sounds are normal.      Palpations: Abdomen is soft.      Tenderness: There is no abdominal tenderness.   Lymphadenopathy:      Cervical: No cervical adenopathy.       No results found for this or any previous visit (from the past 1008 hour(s)).       Assessment:       1. Chronic fatigue and malaise    2. Subacute " cough    3. Cold intolerance    4. Night sweats    5. Chronic cluster headache, not intractable    6. Anemia, unspecified type    7. Cough    8. Muscle pain    9. Head ache    10. Encounter for antibody response examination          Plan:       Chronic fatigue and malaise  -     TSH; Future; Expected date: 07/30/2020  -     CBC auto differential; Future; Expected date: 07/30/2020  -     C-Reactive Protein; Future; Expected date: 07/30/2020  -     POCT urine dipstick without microscope  -     Rapid HIV; Future; Expected date: 07/30/2020  -     buPROPion (WELLBUTRIN XL) 150 MG TB24 tablet; Take 1 tablet (150 mg total) by mouth once daily.  Dispense: 30 tablet; Refill: 11    Subacute cough  -     X-Ray Chest PA And Lateral; Future; Expected date: 07/30/2020    Cold intolerance  -     TSH; Future; Expected date: 07/30/2020    Night sweats  -     Quantiferon Gold TB; Future; Expected date: 07/30/2020    Chronic cluster headache, not intractable  -     verapamiL (VERELAN) 120 MG C24P; Take 1 capsule (120 mg total) by mouth once daily.  Dispense: 30 capsule; Refill: 11    Anemia, unspecified type  -     C-Reactive Protein; Future; Expected date: 07/30/2020  -     Iron and TIBC; Future; Expected date: 07/30/2020    Cough  -     COVID-19 Routine Screening    Muscle pain  -     COVID-19 Routine Screening    Head ache  -     COVID-19 Routine Screening    Encounter for antibody response examination  -     COVID-19 (SARS CoV-2) IgG Antibody; Future; Expected date: 07/30/2020    Other orders  -     fesoterodine (TOVIAZ) 4 mg Tb24; Take 1 tablet (4 mg total) by mouth once daily.  Dispense: 30 tablet; Refill: 11      Follow up in about 6 weeks (around 9/10/2020) for if you are not better return in 2 weeks.

## 2020-07-30 NOTE — PATIENT INSTRUCTIONS
Use oxygen from a sporting  good store for 5-10 minutes when you get a bad headache      Stop ditropan      Thank you for choosing [facility].     Please fill out the patient experience survey.

## 2020-07-31 ENCOUNTER — LAB VISIT (OUTPATIENT)
Dept: PRIMARY CARE CLINIC | Facility: CLINIC | Age: 75
End: 2020-07-31
Payer: MEDICARE

## 2020-07-31 ENCOUNTER — HOSPITAL ENCOUNTER (OUTPATIENT)
Dept: RADIOLOGY | Facility: HOSPITAL | Age: 75
Discharge: HOME | End: 2020-07-31
Attending: INTERNAL MEDICINE
Payer: MEDICARE

## 2020-07-31 ENCOUNTER — TELEPHONE (OUTPATIENT)
Dept: UROGYNECOLOGY | Facility: CLINIC | Age: 75
End: 2020-07-31

## 2020-07-31 DIAGNOSIS — R05.9 COUGH: ICD-10-CM

## 2020-07-31 DIAGNOSIS — R51.9 HEAD ACHE: ICD-10-CM

## 2020-07-31 DIAGNOSIS — R05.2 SUBACUTE COUGH: ICD-10-CM

## 2020-07-31 DIAGNOSIS — M79.10 MUSCLE PAIN: ICD-10-CM

## 2020-07-31 PROCEDURE — 71046 XR CHEST PA AND LATERAL: ICD-10-PCS | Mod: 26,,, | Performed by: RADIOLOGY

## 2020-07-31 PROCEDURE — 71046 X-RAY EXAM CHEST 2 VIEWS: CPT | Mod: 26,,, | Performed by: RADIOLOGY

## 2020-07-31 PROCEDURE — 71046 X-RAY EXAM CHEST 2 VIEWS: CPT | Mod: TC,FY

## 2020-07-31 PROCEDURE — U0003 INFECTIOUS AGENT DETECTION BY NUCLEIC ACID (DNA OR RNA); SEVERE ACUTE RESPIRATORY SYNDROME CORONAVIRUS 2 (SARS-COV-2) (CORONAVIRUS DISEASE [COVID-19]), AMPLIFIED PROBE TECHNIQUE, MAKING USE OF HIGH THROUGHPUT TECHNOLOGIES AS DESCRIBED BY CMS-2020-01-R: HCPCS

## 2020-07-31 NOTE — TELEPHONE ENCOUNTER
----- Message from Sherine Chow sent at 7/31/2020 10:16 AM CDT -----  Contact: Pt [phone]  Patient is requesting a call about one of her medications.    Please call and advise.    Thank You

## 2020-08-01 ENCOUNTER — NURSE TRIAGE (OUTPATIENT)
Dept: ADMINISTRATIVE | Facility: CLINIC | Age: 75
End: 2020-08-01

## 2020-08-01 DIAGNOSIS — U07.1 COVID-19 VIRUS DETECTED: ICD-10-CM

## 2020-08-01 LAB — SARS-COV-2 RNA RESP QL NAA+PROBE: DETECTED

## 2020-08-09 ENCOUNTER — PATIENT OUTREACH (OUTPATIENT)
Dept: ADMINISTRATIVE | Facility: OTHER | Age: 75
End: 2020-08-09

## 2020-08-09 NOTE — PROGRESS NOTES
Chart was reviewed for overdue Proactive [facility] Encounters ([facility])  topics  Updates were requested from care everywhere  Health Maintenance has been updated   Hx of colon polyps

## 2020-08-11 ENCOUNTER — TELEPHONE (OUTPATIENT)
Dept: UROGYNECOLOGY | Facility: CLINIC | Age: 75
End: 2020-08-11

## 2020-08-11 NOTE — TELEPHONE ENCOUNTER
Called and informed that if she is not symptomatic and  It has been 10 days then we can see her. States undertsanding.

## 2020-08-11 NOTE — TELEPHONE ENCOUNTER
----- Message from Beatris Shalom sent at 8/11/2020 10:19 AM CDT -----  Regarding: pt was covid positive on 07/31  Contact: Jazz pt  Type: Needs Medical Advice  Who Called:  Jazz pt  Symptoms (please be specific):  she no longer has the symptoms   How long has patient had these symptoms:  July was covid positive  Pharmacy name and phone #:  n/a  Best Call Back Number: [phone]  Additional Information: Pls call pt regarding if its ok for her to still come to the visit tomorrow. She no longer has symptoms and was told to quarantine for 10 days. The 10 days are over. Pls call to adv

## 2020-08-12 ENCOUNTER — OFFICE VISIT (OUTPATIENT)
Dept: UROGYNECOLOGY | Facility: CLINIC | Age: 75
End: 2020-08-12
Payer: MEDICARE

## 2020-08-12 VITALS
HEART RATE: 67 BPM | BODY MASS INDEX: 27.29 KG/M2 | HEIGHT: 65 IN | SYSTOLIC BLOOD PRESSURE: 154 MMHG | WEIGHT: 163.81 LBS | DIASTOLIC BLOOD PRESSURE: 82 MMHG

## 2020-08-12 DIAGNOSIS — N39.41 URGE INCONTINENCE OF URINE: ICD-10-CM

## 2020-08-12 DIAGNOSIS — R35.0 URINARY FREQUENCY: Primary | ICD-10-CM

## 2020-08-12 LAB
BILIRUB SERPL-MCNC: NORMAL MG/DL
BLOOD URINE, POC: NORMAL
CLARITY, POC UA: CLEAR
COLOR, POC UA: YELLOW
GLUCOSE UR QL STRIP: NORMAL
KETONES UR QL STRIP: NORMAL
LEUKOCYTE ESTERASE URINE, POC: NORMAL
NITRITE, POC UA: NORMAL
PH, POC UA: 5
PROTEIN, POC: NORMAL
SPECIFIC GRAVITY, POC UA: 1015
UROBILINOGEN, POC UA: NORMAL

## 2020-08-12 PROCEDURE — 99213 OFFICE O/P EST LOW 20 MIN: CPT | Mod: S$PBB,,, | Performed by: OBSTETRICS & GYNECOLOGY

## 2020-08-12 PROCEDURE — 99213 OFFICE O/P EST LOW 20 MIN: CPT | Mod: PBBFAC,PO | Performed by: OBSTETRICS & GYNECOLOGY

## 2020-08-12 PROCEDURE — 99213 PR OFFICE/OUTPT VISIT, EST, LEVL III, 20-29 MIN: ICD-10-PCS | Mod: S$PBB,,, | Performed by: OBSTETRICS & GYNECOLOGY

## 2020-08-12 PROCEDURE — 99999 PR PBB SHADOW E&M-EST. PATIENT-LVL III: CPT | Mod: PBBFAC,,, | Performed by: OBSTETRICS & GYNECOLOGY

## 2020-08-12 PROCEDURE — 99999 PR PBB SHADOW E&M-EST. PATIENT-LVL III: ICD-10-PCS | Mod: PBBFAC,,, | Performed by: OBSTETRICS & GYNECOLOGY

## 2020-08-12 PROCEDURE — 81002 URINALYSIS NONAUTO W/O SCOPE: CPT | Mod: PBBFAC,PO | Performed by: OBSTETRICS & GYNECOLOGY

## 2020-08-12 NOTE — PROGRESS NOTES
Subjective:      Patient ID: Jazz Martinez is a 75 y.o. female.    Chief Complaint:  f/u visit      History of Present Illness  Myrbetriq and oxybutynin 15 our working maybe 15 is causing a sign some side effects would like to back down to 10.  Otherwise she is well tolerating it well with no other issues          Past Medical History:   Diagnosis Date    Arthritis     Corns and callosities     Deep vein thrombosis     GERD (gastroesophageal reflux disease)     Gout     H/O bladder problems     uses pessary    History of colonic polyps     Hyperlipidemia     Hypertension     Pulmonary embolism        Past Surgical History:   Procedure Laterality Date    APPENDECTOMY  AGE 12    BLADDER SURGERY      Twice    BREAST CYST EXCISION      Benign per patient    EYE SURGERY      LASER FOR GLAUCOMA    HAND SURGERY Left     HYSTERECTOMY      Not related to cancer per patient.    OOPHORECTOMY      ROBOT-ASSISTED LAPAROSCOPIC ABDOMINAL SACROCOLPOPEXY USING DA KINGA XI N/A 2019    Procedure: XI ROBOTIC SACROCOLPOPEXY, ABDOMINAL;  Surgeon: Javier Putnam MD;  Location: [facility] OR;  Service: OB/GYN;  Laterality: N/A;  removal of damaged tissue    SURGICAL PROCEDURE FOR STRESS INCONTINENCE USING TENSION FREE VAGINAL TAPE N/A 2019    Procedure: SURGICAL PROCEDURE, USING TENSION FREE VAGINAL TAPE, FOR STRESS INCONTINENCE;  Surgeon: Javier Putnam MD;  Location: [facility] OR;  Service: OB/GYN;  Laterality: N/A;    TONSILLECTOMY         GYN & OB History  No LMP recorded. Patient has had a hysterectomy.   Date of Last Pap: No result found    OB History    Para Term  AB Living   1 1 1     1   SAB TAB Ectopic Multiple Live Births                  # Outcome Date GA Lbr Cesar/2nd Weight Sex Delivery Anes PTL Lv   1 Term               Obstetric Comments          Health Maintenance       Date Due Completion Date    Hepatitis C Screening 1945 ---    TETANUS VACCINE 1961 ---  "   Colonoscopy 07/14/2019 7/14/2014    Lipid Panel 07/29/2020 7/29/2019    Influenza Vaccine (1) 09/01/2020 9/26/2019    Shingles Vaccine (3 of 3) 09/08/2020 7/14/2020    DEXA SCAN 03/27/2021 3/27/2018    Override on 10/14/2014: Done ([facility])          Family History   Problem Relation Age of Onset    Cancer Brother         bladder    Breast cancer Neg Hx     Colon cancer Neg Hx     Ovarian cancer Neg Hx        Social History     Socioeconomic History    Marital status:      Spouse name: Not on file    Number of children: Not on file    Years of education: Not on file    Highest education level: Not on file   Occupational History    Not on file   Social Needs    Financial resource strain: Not on file    Food insecurity     Worry: Not on file     Inability: Not on file    Transportation needs     Medical: Not on file     Non-medical: Not on file   Tobacco Use    Smoking status: Never Smoker    Smokeless tobacco: Never Used   Substance and Sexual Activity    Alcohol use: No    Drug use: No    Sexual activity: Never   Lifestyle    Physical activity     Days per week: Not on file     Minutes per session: Not on file    Stress: Not on file   Relationships    Social connections     Talks on phone: Not on file     Gets together: Not on file     Attends Christianity service: Not on file     Active member of club or organization: Not on file     Attends meetings of clubs or organizations: Not on file     Relationship status: Not on file   Other Topics Concern    Not on file   Social History Narrative    Not on file       Review of Systems  Review of Systems   All other systems reviewed and are negative.         Objective:   BP (!) 154/82   Pulse 67   Ht 5' 5" (1.651 m)   Wt 74.3 kg (163 lb 12.8 oz)   BMI 27.26 kg/m²     Physical Exam   Excellent support  Assessment:     1. Urinary frequency    2. Urge incontinence of urine            Plan:     1. Urinary frequency    2. Urge " incontinence of urine      Patient is doing very well on medication wishes to remain so.  We did talk about Botox as well sacral neuromodulation patient prefers medication is working so efficacious sleep.  Patient many questions all addressed total time of visit 15 min  With greater than 50% time 10 min in direct face-to-face discussion reviewing medications         There are no Patient Instructions on file for this visit.

## 2020-08-24 ENCOUNTER — TELEPHONE (OUTPATIENT)
Dept: FAMILY MEDICINE | Facility: CLINIC | Age: 75
End: 2020-08-24

## 2020-08-24 NOTE — TELEPHONE ENCOUNTER
Its been 3 wks or more so you are no longer infectious. If you are getting worse we need to retest you.

## 2020-08-24 NOTE — TELEPHONE ENCOUNTER
----- Message from Sera De Anda sent at 8/24/2020 10:39 AM CDT -----  Contact: pt  Pt calling  was Dx on 7/31/20 for Covid 19,she is wanting to be retested. 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 Please call her back at ....[phone]

## 2020-08-25 ENCOUNTER — TELEPHONE (OUTPATIENT)
Dept: FAMILY MEDICINE | Facility: CLINIC | Age: 75
End: 2020-08-25

## 2020-08-25 NOTE — TELEPHONE ENCOUNTER
----- Message from Yennifer Patel sent at 8/25/2020  9:18 AM CDT -----  Regarding: ADVICE  Contact: pt  Patient called yesterday she has not heard back from the office she is asking   For a call back today to give her some advice.  She is asking to be retested for COVID she still has a cough.   The cough just started back up the last couple of day.  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      Call back [phone]

## 2020-08-25 NOTE — TELEPHONE ENCOUNTER
Advised patient of message from provider and she can get otc coricidin to help with her cough. If she feels like she is getting worse to contact us so she can be retested. Patient verbalized understanding.

## 2020-08-25 NOTE — TELEPHONE ENCOUNTER
Spoke with pt and advised her to contact  or go to . We can not give her any medical advise until she becomes a pt.

## 2020-08-25 NOTE — TELEPHONE ENCOUNTER
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  She is calling because she dx with COVID 7/31 with only cough and fatigue. She is starting to have the cough again and is wanting to know if she can move her appt up.  She is willing to see whomever she needs to and would like a recommendation on an OTC cough syrup and a possible retest.    # [phone]

## 2020-08-26 ENCOUNTER — OFFICE VISIT (OUTPATIENT)
Dept: FAMILY MEDICINE | Facility: CLINIC | Age: 75
End: 2020-08-26
Payer: MEDICARE

## 2020-08-26 VITALS
HEIGHT: 65 IN | BODY MASS INDEX: 27.32 KG/M2 | WEIGHT: 164 LBS | OXYGEN SATURATION: 98 % | HEART RATE: 72 BPM | SYSTOLIC BLOOD PRESSURE: 136 MMHG | TEMPERATURE: 99 F | DIASTOLIC BLOOD PRESSURE: 78 MMHG

## 2020-08-26 DIAGNOSIS — N32.81 OAB (OVERACTIVE BLADDER): ICD-10-CM

## 2020-08-26 DIAGNOSIS — Z86.711 HISTORY OF PULMONARY EMBOLUS (PE): ICD-10-CM

## 2020-08-26 DIAGNOSIS — Z12.11 COLON CANCER SCREENING: ICD-10-CM

## 2020-08-26 DIAGNOSIS — U07.1 COVID-19 VIRUS INFECTION: Primary | ICD-10-CM

## 2020-08-26 DIAGNOSIS — R51.9 CHRONIC NONINTRACTABLE HEADACHE, UNSPECIFIED HEADACHE TYPE: ICD-10-CM

## 2020-08-26 DIAGNOSIS — G89.29 CHRONIC NONINTRACTABLE HEADACHE, UNSPECIFIED HEADACHE TYPE: ICD-10-CM

## 2020-08-26 DIAGNOSIS — E78.5 DYSLIPIDEMIA: ICD-10-CM

## 2020-08-26 PROCEDURE — 99204 PR OFFICE/OUTPT VISIT, NEW, LEVL IV, 45-59 MIN: ICD-10-PCS | Mod: S$GLB,,, | Performed by: NURSE PRACTITIONER

## 2020-08-26 PROCEDURE — 99204 OFFICE O/P NEW MOD 45 MIN: CPT | Mod: S$GLB,,, | Performed by: NURSE PRACTITIONER

## 2020-08-26 RX ORDER — BENZONATATE 100 MG/1
100 CAPSULE ORAL 3 TIMES DAILY PRN
Qty: 30 CAPSULE | Refills: 0 | Status: SHIPPED | OUTPATIENT
Start: 2020-08-26 | End: 2020-09-05

## 2020-08-26 RX ORDER — OXYBUTYNIN CHLORIDE 10 MG/1
10 TABLET, EXTENDED RELEASE ORAL DAILY
COMMUNITY
End: 2020-08-26

## 2020-08-26 RX ORDER — TOPIRAMATE 50 MG/1
50 TABLET, FILM COATED ORAL 2 TIMES DAILY
COMMUNITY
End: 2021-01-15 | Stop reason: SDUPTHER

## 2020-08-26 RX ORDER — AZITHROMYCIN 250 MG/1
TABLET, FILM COATED ORAL
Qty: 6 TABLET | Refills: 0 | Status: SHIPPED | OUTPATIENT
Start: 2020-08-26 | End: 2020-08-31

## 2020-08-26 NOTE — PROGRESS NOTES
SUBJECTIVE:    Patient ID: Jazz Martinez is a 75 y.o. female.    Chief Complaint: Establish Care (brought bottles, C-scope referral in// SW)      Pt here for new pt appt. Former pt of Dr. Juan  Reports diagnosed with COVID19 on 7/31- symptoms were severe HA for several weeks prior with coughing and fatigue. No fever/chills or SOB.  Saw Dr. but on 07/31 with her complaints and had labs including COVID19 test performed.  After positive result returned she isolated herself and took OTC meds. Reports overall feeling much better, headaches are only mild and cough had pretty much resolved but reports seemed to start coughing again 2 days ago- describes deep cough, no SOB or wheezing.  Fatigue has improved but still not back to normal.  Reports had seen Dr. Ding in July due to the severe headaches and was started on Topamax.  Again states headaches have improved though has a history of some more chronic milder headaches which persist.  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 Had another bladder surgery in Dec 2019 with Dr. Ken so took eliquis for short time only. Had seen Dr. Walsh in the past but told no need for lifetime anticoagulation.  Reports large volume urinary incontinence much improved since the most recent surgery, still on several bladder meds  Reports was having leg cramps with lipitor so hasn't been on med for several months, had previously tried crestor years ago  Last cscope >5 years ago, states due for f/u but hasn't scheduled d/t COVID.  Last saw Dr. Jean and had EGD early in the year with esophageal dilatation      Office Visit on 08/12/2020   Component Date Value Ref Range Status    Color, UA 08/12/2020 Yellow   Final    Spec Grav UA 08/12/2020 1,015   Final    pH, UA 08/12/2020 5   Final    WBC, UA 08/12/2020 neg   Final    Nitrite, UA 08/12/2020 neg   Final    Protein 08/12/2020 neg   Final    Glucose, UA 08/12/2020 neg   Final     Ketones, UA 08/12/2020 neg'   Final    Urobilinogen, UA 08/12/2020 neg   Final    Bilirubin 08/12/2020 neg   Final    Blood, UA 08/12/2020 neg   Final    Clarity, UA 08/12/2020 Clear   Final   Lab Visit on 07/31/2020   Component Date Value Ref Range Status    SARS-CoV2 (COVID-19) Qualitative P* 07/31/2020 Detected* Not Detected Final   Lab Visit on 07/31/2020   Component Date Value Ref Range Status    TSH 07/31/2020 1.946  0.400 - 4.000 uIU/mL Final    WBC 07/31/2020 9.20  3.90 - 12.70 K/uL Final    RBC 07/31/2020 4.81  4.00 - 5.40 M/uL Final    Hemoglobin 07/31/2020 14.7  12.0 - 16.0 g/dL Final    Hematocrit 07/31/2020 46.3  37.0 - 48.5 % Final    Mean Corpuscular Volume 07/31/2020 96  82 - 98 fL Final    Mean Corpuscular Hemoglobin 07/31/2020 30.6  27.0 - 31.0 pg Final    Mean Corpuscular Hemoglobin Conc 07/31/2020 31.7* 32.0 - 36.0 g/dL Final    RDW 07/31/2020 14.3  11.5 - 14.5 % Final    Platelets 07/31/2020 231  150 - 350 K/uL Final    MPV 07/31/2020 10.0  9.2 - 12.9 fL Final    Immature Granulocytes 07/31/2020 0.3  0.0 - 0.5 % Final    Gran # (ANC) 07/31/2020 6.2  1.8 - 7.7 K/uL Final    Immature Grans (Abs) 07/31/2020 0.03  0.00 - 0.04 K/uL Final    Lymph # 07/31/2020 1.7  1.0 - 4.8 K/uL Final    Mono # 07/31/2020 0.8  0.3 - 1.0 K/uL Final    Eos # 07/31/2020 0.3  0.0 - 0.5 K/uL Final    Baso # 07/31/2020 0.13  0.00 - 0.20 K/uL Final    nRBC 07/31/2020 0  0 /100 WBC Final    Gran% 07/31/2020 67.7  38.0 - 73.0 % Final    Lymph% 07/31/2020 18.8  18.0 - 48.0 % Final    Mono% 07/31/2020 9.0  4.0 - 15.0 % Final    Eosinophil% 07/31/2020 2.8  0.0 - 8.0 % Final    Basophil% 07/31/2020 1.4  0.0 - 1.9 % Final    Differential Method 07/31/2020 Automated   Final    CRP 07/31/2020 1.8  0.0 - 8.2 mg/L Final    Iron 07/31/2020 113  30 - 160 ug/dL Final    Transferrin 07/31/2020 233  200 - 375 mg/dL Final    TIBC 07/31/2020 345  250 - 450 ug/dL Final    Saturated Iron 07/31/2020 33  20 -  50 % Final    HIV Rapid Testing 07/31/2020 Non-Reactive  Negative Final    Antibody SARS CoV-2 07/31/2020 Negative  Negative Final   Lab Visit on 07/31/2020   Component Date Value Ref Range Status    NIL 07/31/2020 0.010  See text IU/mL Final    TB1 - Nil 07/31/2020 0.000  See text IU/mL Final    TB2 - Nil 07/31/2020 0.000  See text IU/mL Final    Mitogen - Nil 07/31/2020 6.430  See text IU/mL Final    TB Gold Plus 07/31/2020 Negative   Final   Office Visit on 04/29/2020   Component Date Value Ref Range Status    Color, UA 04/29/2020 yellow   Final    Spec Grav UA 04/29/2020 1.000   Final    pH, UA 04/29/2020 7   Final    WBC, UA 04/29/2020 neg   Final    Nitrite, UA 04/29/2020 neg   Final    Protein 04/29/2020 neg   Final    Glucose, UA 04/29/2020 neg   Final    Ketones, UA 04/29/2020 neg   Final    Urobilinogen, UA 04/29/2020 neg   Final    Bilirubin 04/29/2020 neg   Final    Blood, UA 04/29/2020 neg   Final   Office Visit on 03/19/2020   Component Date Value Ref Range Status    Color, UA 03/19/2020 yellow   Final    Spec Grav UA 03/19/2020 1.010   Final    pH, UA 03/19/2020 5   Final    WBC, UA 03/19/2020 neg   Final    Nitrite, UA 03/19/2020 neg   Final    Protein 03/19/2020 neg   Final    Glucose, UA 03/19/2020 norm   Final    Ketones, UA 03/19/2020 neg   Final    Urobilinogen, UA 03/19/2020 norm   Final    Bilirubin 03/19/2020 neg   Final    Blood, UA 03/19/2020 neg   Final       Past Medical History:   Diagnosis Date    Arthritis     Corns and callosities     Deep vein thrombosis     GERD (gastroesophageal reflux disease)     Gout     H/O bladder problems     uses pessary    History of colonic polyps 2014    Hyperlipidemia     Hypertension     Pulmonary embolism      Past Surgical History:   Procedure Laterality Date    APPENDECTOMY  AGE 12    BLADDER SURGERY      Twice    BREAST CYST EXCISION      Benign per patient    EYE SURGERY      LASER FOR GLAUCOMA    HAND  "SURGERY Left     HYSTERECTOMY      Not related to cancer per patient.    OOPHORECTOMY      ROBOT-ASSISTED LAPAROSCOPIC ABDOMINAL SACROCOLPOPEXY USING DA KINGA XI N/A 12/17/2019    Procedure: XI ROBOTIC SACROCOLPOPEXY, ABDOMINAL;  Surgeon: Javier Putnam MD;  Location: [facility];  Service: OB/GYN;  Laterality: N/A;  removal of damaged tissue    SURGICAL PROCEDURE FOR STRESS INCONTINENCE USING TENSION FREE VAGINAL TAPE N/A 12/17/2019    Procedure: SURGICAL PROCEDURE, USING TENSION FREE VAGINAL TAPE, FOR STRESS INCONTINENCE;  Surgeon: Javier Putnam MD;  Location: [facility] OR;  Service: OB/GYN;  Laterality: N/A;    TONSILLECTOMY       Family History   Problem Relation Age of Onset    Cancer Brother         bladder    Breast cancer Neg Hx     Colon cancer Neg Hx     Ovarian cancer Neg Hx        Marital Status:   Alcohol History:  reports no history of alcohol use.  Tobacco History:  reports that she has never smoked. She has never used smokeless tobacco.  Drug History:  reports no history of drug use.    Review of patient's allergies indicates:   Allergen Reactions    Sulfa dyne Other (See Comments)     Blisters in mouth    Gabapentin     Hydrocodone-acetaminophen      Pt states she doesn t know how this got on there" no     Nitrofurantoin Other (See Comments)     Deathly ill , headaches, weakness, 'wiped out"    Sulfa (sulfonamide antibiotics)        Current Outpatient Medications:     diclofenac sodium (VOLTAREN) 1 % Gel, Apply 2 g topically 4 (four) times daily., Disp: 100 g, Rfl: 5    estradioL (ESTRACE) 0.01 % (0.1 mg/gram) vaginal cream, Place 1 g vaginally once daily., Disp: 1 Tube, Rfl: 3    fesoterodine (TOVIAZ) 4 mg Tb24, Take 1 tablet (4 mg total) by mouth once daily., Disp: 30 tablet, Rfl: 11    mirabegron (MYRBETRIQ) 50 mg Tb24, Take 1 tablet (50 mg total) by mouth once daily., Disp: 30 tablet, Rfl: 11    sertraline (ZOLOFT) 100 MG tablet, TAKE 1.5 TABLETS (150 MG TOTAL) BY MOUTH " ONCE DAILY. (Patient taking differently: Take 100 mg by mouth once daily. ), Disp: 135 tablet, Rfl: 1    sumatriptan (IMITREX) 100 MG tablet, 1 tab po at start of headache, may repeat in 2 hours X1 in 24 hours. (Patient taking differently: as needed. 1 tab po at start of headache, may repeat in 2 hours X1 in 24 hours.), Disp: 10 tablet, Rfl: 11    topiramate (TOPAMAX) 50 MG tablet, Take 50 mg by mouth 2 (two) times daily., Disp: , Rfl:     traMADol (ULTRAM) 50 mg tablet, , Disp: , Rfl: 5    triamterene-hydrochlorothiazide 37.5-25 mg (MAXZIDE-25) 37.5-25 mg per tablet, 1/2 p.o. q.d., Disp: 90 tablet, Rfl: 0    valACYclovir (VALTREX) 500 MG tablet, TAKE 2 TABLETS BY MOUTH TWICE DAILY, Disp: 12 tablet, Rfl: 3    atorvastatin (LIPITOR) 10 MG tablet, Take 1 tablet (10 mg total) by mouth once daily. (Patient not taking: Reported on 8/26/2020), Disp: 90 tablet, Rfl: 2    azithromycin (Z-ARIANA) 250 MG tablet, Take 2 tablets by mouth on day 1; Take 1 tablet by mouth on days 2-5, Disp: 6 tablet, Rfl: 0    benzonatate (TESSALON) 100 MG capsule, Take 1 capsule (100 mg total) by mouth 3 (three) times daily as needed for Cough., Disp: 30 capsule, Rfl: 0    buPROPion (WELLBUTRIN XL) 150 MG TB24 tablet, Take 1 tablet (150 mg total) by mouth once daily., Disp: 30 tablet, Rfl: 11    fenofibrate 160 MG Tab, TAKE 1 TABLET (160 MG TOTAL) BY MOUTH ONCE DAILY. (Patient not taking: Reported on 8/26/2020), Disp: 90 tablet, Rfl: 3    ibuprofen (ADVIL,MOTRIN) 600 MG tablet, ibuprofen 600 mg tablet  Take 1 tablet 3 times a day by oral route., Disp: , Rfl:     ondansetron (ZOFRAN) 4 MG tablet, Take 1 tablet (4 mg total) by mouth every 8 (eight) hours as needed for Nausea., Disp: 20 tablet, Rfl: 1    Review of Systems   Constitutional: Positive for fatigue (Slowly improving of the last few weeks). Negative for appetite change, chills, fever and unexpected weight change.   Respiratory: Positive for cough. Negative for shortness of  "breath and wheezing.    Cardiovascular: Negative for chest pain, palpitations and leg swelling.   Gastrointestinal: Negative for abdominal pain, constipation and diarrhea.   Genitourinary: Positive for frequency. Negative for dysuria, hematuria and pelvic pain.   Musculoskeletal: Positive for back pain ( chronic lower back pain, will take tramadol occasionally, history of previous ESIs). Negative for gait problem.   Skin: Negative for rash.   Neurological: Positive for headaches ( mild). Negative for dizziness, syncope and numbness.   Psychiatric/Behavioral: Negative for dysphoric mood ( well controlled on sertraline). The patient is not nervous/anxious.           Objective:      Vitals:    08/26/20 1028   BP: 136/78   Pulse: 72   Temp: 98.6 °F (37 °C)   SpO2: 98%   Weight: 74.4 kg (164 lb)   Height: 5' 4.5" (1.638 m)     Physical Exam  Vitals signs and nursing note reviewed.   Constitutional:       General: She is not in acute distress.     Appearance: Normal appearance. She is well-developed and normal weight.   HENT:      Head: Normocephalic and atraumatic.      Right Ear: Tympanic membrane and ear canal normal.      Left Ear: Tympanic membrane and ear canal normal.      Mouth/Throat:      Pharynx: No posterior oropharyngeal erythema.   Neck:      Musculoskeletal: Neck supple.      Vascular: No carotid bruit.   Cardiovascular:      Rate and Rhythm: Normal rate and regular rhythm.      Heart sounds: No murmur. No friction rub. No gallop.    Pulmonary:      Effort: Pulmonary effort is normal. No respiratory distress.      Breath sounds: Normal breath sounds. No wheezing or rales.   Abdominal:      General: There is no distension.      Palpations: Abdomen is soft.      Tenderness: There is no abdominal tenderness.   Musculoskeletal:      Right lower leg: No edema.      Left lower leg: No edema.   Lymphadenopathy:      Cervical: No cervical adenopathy.   Skin:     General: Skin is warm and dry.      Findings: No " rash.   Neurological:      General: No focal deficit present.      Mental Status: She is alert and oriented to person, place, and time.      Gait: Gait normal.   Psychiatric:         Mood and Affect: Mood normal.           Assessment:       1. COVID-19 virus infection    2. Dyslipidemia    3. OAB (overactive bladder)    4. Chronic nonintractable headache, unspecified headache type    5. Colon cancer screening    6. History of pulmonary embolus (PE)           Plan:       COVID-19 virus infection  Comments:  Positive COVID19 test over 3 weeks ago, overall symptoms have improved though cough seems to be returning.  Physical exam and O2 sats normal, will treat with Z-Ariana and Tessalon though cautioned if coughing worsens or she develops shortness of breath or chest pain call and may need CT of the chest given history of PE  Orders:  -     azithromycin (Z-ARIANA) 250 MG tablet; Take 2 tablets by mouth on day 1; Take 1 tablet by mouth on days 2-5  Dispense: 6 tablet; Refill: 0  -     benzonatate (TESSALON) 100 MG capsule; Take 1 capsule (100 mg total) by mouth 3 (three) times daily as needed for Cough.  Dispense: 30 capsule; Refill: 0    Dyslipidemia  Comments:  Recheck lipid since off statin and fibrates.  Discussed with patient may need to try statin 3 days a week or switch to different statin depending on lab results  Orders:  -     Lipid Panel; Future; Expected date: 08/26/2020  -     Comprehensive metabolic panel; Future; Expected date: 08/26/2020    OAB (overactive bladder)  Comments:  Stable on med, follow-up with urology as scheduled    Chronic nonintractable headache, unspecified headache type  Comments:  Overall severe headaches improved since recent COVID diagnosis however has chronic mild headaches, recently started on Topamax by Neurology    Colon cancer screening  -     Ambulatory referral/consult to Gastroenterology; Future; Expected date: 09/02/2020    History of pulmonary embolus (PE)  Comments:  History of  PE after bladder surgery over 10 years ago.  No current complaints of shortness of breath or chest pain, no hypoxia      Follow up in about 3 months (around 11/26/2020) for labs to be drawn today.        8/26/2020 Elle Mack NP

## 2020-08-27 LAB
ALBUMIN SERPL-MCNC: 4.4 G/DL (ref 3.6–5.1)
ALBUMIN/GLOB SERPL: 1.9 (CALC) (ref 1–2.5)
ALP SERPL-CCNC: 58 U/L (ref 37–153)
ALT SERPL-CCNC: 11 U/L (ref 6–29)
AST SERPL-CCNC: 17 U/L (ref 10–35)
BILIRUB SERPL-MCNC: 0.5 MG/DL (ref 0.2–1.2)
BUN SERPL-MCNC: 18 MG/DL (ref 7–25)
BUN/CREAT SERPL: NORMAL (CALC) (ref 6–22)
CALCIUM SERPL-MCNC: 9.7 MG/DL (ref 8.6–10.4)
CHLORIDE SERPL-SCNC: 104 MMOL/L (ref 98–110)
CHOLEST SERPL-MCNC: 348 MG/DL
CHOLEST/HDLC SERPL: 7.6 (CALC)
CO2 SERPL-SCNC: 29 MMOL/L (ref 20–32)
CREAT SERPL-MCNC: 0.71 MG/DL (ref 0.6–0.93)
GFRSERPLBLD MDRD-ARVRAT: 82 ML/MIN/1.73M2
GLOBULIN SER CALC-MCNC: 2.3 G/DL (CALC) (ref 1.9–3.7)
GLUCOSE SERPL-MCNC: 87 MG/DL (ref 65–99)
HDLC SERPL-MCNC: 46 MG/DL
LDLC SERPL CALC-MCNC: ABNORMAL MG/DL (CALC)
NONHDLC SERPL-MCNC: 302 MG/DL (CALC)
POTASSIUM SERPL-SCNC: 4.2 MMOL/L (ref 3.5–5.3)
PROT SERPL-MCNC: 6.7 G/DL (ref 6.1–8.1)
SODIUM SERPL-SCNC: 142 MMOL/L (ref 135–146)
TRIGL SERPL-MCNC: 428 MG/DL

## 2020-08-28 ENCOUNTER — PATIENT MESSAGE (OUTPATIENT)
Dept: FAMILY MEDICINE | Facility: CLINIC | Age: 75
End: 2020-08-28

## 2020-08-30 ENCOUNTER — PATIENT MESSAGE (OUTPATIENT)
Dept: FAMILY MEDICINE | Facility: CLINIC | Age: 75
End: 2020-08-30

## 2020-08-30 DIAGNOSIS — E78.5 DYSLIPIDEMIA: Primary | ICD-10-CM

## 2020-08-31 RX ORDER — ROSUVASTATIN CALCIUM 5 MG/1
TABLET, COATED ORAL
Qty: 30 TABLET | Refills: 3 | Status: SHIPPED | OUTPATIENT
Start: 2020-08-31 | End: 2021-02-01 | Stop reason: SDUPTHER

## 2020-09-21 ENCOUNTER — TELEPHONE (OUTPATIENT)
Dept: FAMILY MEDICINE | Facility: CLINIC | Age: 75
End: 2020-09-21

## 2020-09-21 DIAGNOSIS — R05.2 SUBACUTE COUGH: Primary | ICD-10-CM

## 2020-09-21 NOTE — TELEPHONE ENCOUNTER
Spoke to pt regarding message below. Pt stated she has a dry cough, no SOB/wheezing. I was able to get pt scheduled for an appt on 9/23.

## 2020-09-21 NOTE — TELEPHONE ENCOUNTER
Please call and see if her cough is productive and if so what color, any shortness of breath or wheezing?  I would recommend chest x-ray and she really needs to be seen to see what else she might need

## 2020-09-21 NOTE — TELEPHONE ENCOUNTER
----- Message from Jazmin Chato sent at 9/21/2020  9:24 AM CDT -----  Pt calling said her cough is bad and she would like to know what can be done.  She has restarted the otc Mucinex and has weakness but no fever.   # [phone]

## 2020-09-22 ENCOUNTER — HOSPITAL ENCOUNTER (OUTPATIENT)
Dept: RADIOLOGY | Facility: HOSPITAL | Age: 75
Discharge: HOME | End: 2020-09-22
Attending: NURSE PRACTITIONER
Payer: MEDICARE

## 2020-09-22 DIAGNOSIS — R05.2 SUBACUTE COUGH: ICD-10-CM

## 2020-09-22 PROCEDURE — 71046 X-RAY EXAM CHEST 2 VIEWS: CPT | Mod: TC,PO

## 2020-09-23 ENCOUNTER — OFFICE VISIT (OUTPATIENT)
Dept: FAMILY MEDICINE | Facility: CLINIC | Age: 75
End: 2020-09-23
Payer: MEDICARE

## 2020-09-23 VITALS
HEART RATE: 72 BPM | DIASTOLIC BLOOD PRESSURE: 78 MMHG | WEIGHT: 161 LBS | HEIGHT: 65 IN | OXYGEN SATURATION: 98 % | SYSTOLIC BLOOD PRESSURE: 110 MMHG | TEMPERATURE: 98 F | BODY MASS INDEX: 26.82 KG/M2

## 2020-09-23 DIAGNOSIS — R53.83 FATIGUE, UNSPECIFIED TYPE: ICD-10-CM

## 2020-09-23 DIAGNOSIS — R05.9 COUGH: Primary | ICD-10-CM

## 2020-09-23 DIAGNOSIS — F33.0 MILD EPISODE OF RECURRENT MAJOR DEPRESSIVE DISORDER: ICD-10-CM

## 2020-09-23 PROCEDURE — 99213 OFFICE O/P EST LOW 20 MIN: CPT | Mod: 25,S$GLB,, | Performed by: NURSE PRACTITIONER

## 2020-09-23 PROCEDURE — 99213 PR OFFICE/OUTPT VISIT, EST, LEVL III, 20-29 MIN: ICD-10-PCS | Mod: 25,S$GLB,, | Performed by: NURSE PRACTITIONER

## 2020-09-23 PROCEDURE — 96372 THER/PROPH/DIAG INJ SC/IM: CPT | Mod: S$GLB,,, | Performed by: NURSE PRACTITIONER

## 2020-09-23 PROCEDURE — 96372 PR INJECTION,THERAP/PROPH/DIAG2ST, IM OR SUBCUT: ICD-10-PCS | Mod: S$GLB,,, | Performed by: NURSE PRACTITIONER

## 2020-09-23 RX ORDER — DEXAMETHASONE SODIUM PHOSPHATE 4 MG/ML
8 INJECTION, SOLUTION INTRA-ARTICULAR; INTRALESIONAL; INTRAMUSCULAR; INTRAVENOUS; SOFT TISSUE ONCE
Status: COMPLETED | OUTPATIENT
Start: 2020-09-23 | End: 2020-09-23

## 2020-09-23 RX ORDER — BENZONATATE 100 MG/1
100 CAPSULE ORAL 3 TIMES DAILY PRN
Qty: 21 CAPSULE | Refills: 0 | Status: SHIPPED | OUTPATIENT
Start: 2020-09-23 | End: 2020-10-03

## 2020-09-23 RX ORDER — SERTRALINE HYDROCHLORIDE 100 MG/1
100 TABLET, FILM COATED ORAL DAILY
Qty: 90 TABLET | Refills: 1 | Status: SHIPPED | OUTPATIENT
Start: 2020-09-23 | End: 2021-06-04 | Stop reason: SDUPTHER

## 2020-09-23 RX ADMIN — DEXAMETHASONE SODIUM PHOSPHATE 8 MG: 4 INJECTION, SOLUTION INTRA-ARTICULAR; INTRALESIONAL; INTRAMUSCULAR; INTRAVENOUS; SOFT TISSUE at 09:09

## 2020-09-23 NOTE — PROGRESS NOTES
SUBJECTIVE:    Patient ID: Jazz Martinez is a 75 y.o. female.    Chief Complaint: Cough (dry since Friday, weakness since last no bottles// SW)    Pt here for sick visit- reports had COVID infection late July and seen here in August. Reports after our visit overall she started feeling better and cough/fatigue resolved. Reports last week started again with dry cough and some mild fatigue. Denies SOB, CP or palpitations. Reports chronic left ear buzzing seems worse. No n/v/d. Reports still able to do all her housework/errands without fatigue but when she's home she just doesn't have the energy she used to have.       Office Visit on 08/26/2020   Component Date Value Ref Range Status    Cholesterol 08/26/2020 348* <200 mg/dL Final    HDL 08/26/2020 46* > OR = 50 mg/dL Final    Triglycerides 08/26/2020 428* <150 mg/dL Final    LDL Cholesterol 08/26/2020   mg/dL (calc) Final    Hdl/Cholesterol Ratio 08/26/2020 7.6* <5.0 (calc) Final    Non HDL Chol. (LDL+VLDL) 08/26/2020 302* <130 mg/dL (calc) Final    Glucose 08/26/2020 87  65 - 99 mg/dL Final    BUN, Bld 08/26/2020 18  7 - 25 mg/dL Final    Creatinine 08/26/2020 0.71  0.60 - 0.93 mg/dL Final    eGFR if non African American 08/26/2020 82  > OR = 60 mL/min/1.73m2 Final    eGFR if African American 08/26/2020 95  > OR = 60 mL/min/1.73m2 Final    BUN/Creatinine Ratio 08/26/2020 NOT APPLICABLE  6 - 22 (calc) Final    Sodium 08/26/2020 142  135 - 146 mmol/L Final    Potassium 08/26/2020 4.2  3.5 - 5.3 mmol/L Final    Chloride 08/26/2020 104  98 - 110 mmol/L Final    CO2 08/26/2020 29  20 - 32 mmol/L Final    Calcium 08/26/2020 9.7  8.6 - 10.4 mg/dL Final    Total Protein 08/26/2020 6.7  6.1 - 8.1 g/dL Final    Albumin 08/26/2020 4.4  3.6 - 5.1 g/dL Final    Globulin, Total 08/26/2020 2.3  1.9 - 3.7 g/dL (calc) Final    Albumin/Globulin Ratio 08/26/2020 1.9  1.0 - 2.5 (calc) Final    Total Bilirubin 08/26/2020 0.5  0.2 - 1.2 mg/dL Final     Alkaline Phosphatase 08/26/2020 58  37 - 153 U/L Final    AST 08/26/2020 17  10 - 35 U/L Final    ALT 08/26/2020 11  6 - 29 U/L Final   Office Visit on 08/12/2020   Component Date Value Ref Range Status    Color, UA 08/12/2020 Yellow   Final    Spec Grav UA 08/12/2020 1,015   Final    pH, UA 08/12/2020 5   Final    WBC, UA 08/12/2020 neg   Final    Nitrite, UA 08/12/2020 neg   Final    Protein 08/12/2020 neg   Final    Glucose, UA 08/12/2020 neg   Final    Ketones, UA 08/12/2020 neg'   Final    Urobilinogen, UA 08/12/2020 neg   Final    Bilirubin 08/12/2020 neg   Final    Blood, UA 08/12/2020 neg   Final    Clarity, UA 08/12/2020 Clear   Final   Lab Visit on 07/31/2020   Component Date Value Ref Range Status    SARS-CoV2 (COVID-19) Qualitative P* 07/31/2020 Detected* Not Detected Final   Lab Visit on 07/31/2020   Component Date Value Ref Range Status    TSH 07/31/2020 1.946  0.400 - 4.000 uIU/mL Final    WBC 07/31/2020 9.20  3.90 - 12.70 K/uL Final    RBC 07/31/2020 4.81  4.00 - 5.40 M/uL Final    Hemoglobin 07/31/2020 14.7  12.0 - 16.0 g/dL Final    Hematocrit 07/31/2020 46.3  37.0 - 48.5 % Final    Mean Corpuscular Volume 07/31/2020 96  82 - 98 fL Final    Mean Corpuscular Hemoglobin 07/31/2020 30.6  27.0 - 31.0 pg Final    Mean Corpuscular Hemoglobin Conc 07/31/2020 31.7* 32.0 - 36.0 g/dL Final    RDW 07/31/2020 14.3  11.5 - 14.5 % Final    Platelets 07/31/2020 231  150 - 350 K/uL Final    MPV 07/31/2020 10.0  9.2 - 12.9 fL Final    Immature Granulocytes 07/31/2020 0.3  0.0 - 0.5 % Final    Gran # (ANC) 07/31/2020 6.2  1.8 - 7.7 K/uL Final    Immature Grans (Abs) 07/31/2020 0.03  0.00 - 0.04 K/uL Final    Lymph # 07/31/2020 1.7  1.0 - 4.8 K/uL Final    Mono # 07/31/2020 0.8  0.3 - 1.0 K/uL Final    Eos # 07/31/2020 0.3  0.0 - 0.5 K/uL Final    Baso # 07/31/2020 0.13  0.00 - 0.20 K/uL Final    nRBC 07/31/2020 0  0 /100 WBC Final    Gran% 07/31/2020 67.7  38.0 - 73.0 % Final     Lymph% 07/31/2020 18.8  18.0 - 48.0 % Final    Mono% 07/31/2020 9.0  4.0 - 15.0 % Final    Eosinophil% 07/31/2020 2.8  0.0 - 8.0 % Final    Basophil% 07/31/2020 1.4  0.0 - 1.9 % Final    Differential Method 07/31/2020 Automated   Final    CRP 07/31/2020 1.8  0.0 - 8.2 mg/L Final    Iron 07/31/2020 113  30 - 160 ug/dL Final    Transferrin 07/31/2020 233  200 - 375 mg/dL Final    TIBC 07/31/2020 345  250 - 450 ug/dL Final    Saturated Iron 07/31/2020 33  20 - 50 % Final    HIV Rapid Testing 07/31/2020 Non-Reactive  Negative Final    Antibody SARS CoV-2 07/31/2020 Negative  Negative Final   Lab Visit on 07/31/2020   Component Date Value Ref Range Status    NIL 07/31/2020 0.010  See text IU/mL Final    TB1 - Nil 07/31/2020 0.000  See text IU/mL Final    TB2 - Nil 07/31/2020 0.000  See text IU/mL Final    Mitogen - Nil 07/31/2020 6.430  See text IU/mL Final    TB Gold Plus 07/31/2020 Negative   Final   Office Visit on 04/29/2020   Component Date Value Ref Range Status    Color, UA 04/29/2020 yellow   Final    Spec Grav UA 04/29/2020 1.000   Final    pH, UA 04/29/2020 7   Final    WBC, UA 04/29/2020 neg   Final    Nitrite, UA 04/29/2020 neg   Final    Protein 04/29/2020 neg   Final    Glucose, UA 04/29/2020 neg   Final    Ketones, UA 04/29/2020 neg   Final    Urobilinogen, UA 04/29/2020 neg   Final    Bilirubin 04/29/2020 neg   Final    Blood, UA 04/29/2020 neg   Final       Past Medical History:   Diagnosis Date    Arthritis     Corns and callosities     Deep vein thrombosis     GERD (gastroesophageal reflux disease)     Gout     H/O bladder problems     uses pessary    History of colonic polyps 2014    Hyperlipidemia     Hypertension     Pulmonary embolism      Past Surgical History:   Procedure Laterality Date    APPENDECTOMY  AGE 12    BLADDER SURGERY      Twice    BREAST CYST EXCISION      Benign per patient    EYE SURGERY      LASER FOR GLAUCOMA    HAND SURGERY Left      "HYSTERECTOMY      Not related to cancer per patient.    OOPHORECTOMY      ROBOT-ASSISTED LAPAROSCOPIC ABDOMINAL SACROCOLPOPEXY USING DA KINAG XI N/A 12/17/2019    Procedure: XI ROBOTIC SACROCOLPOPEXY, ABDOMINAL;  Surgeon: Javier Putnam MD;  Location: [facility] OR;  Service: OB/GYN;  Laterality: N/A;  removal of damaged tissue    SURGICAL PROCEDURE FOR STRESS INCONTINENCE USING TENSION FREE VAGINAL TAPE N/A 12/17/2019    Procedure: SURGICAL PROCEDURE, USING TENSION FREE VAGINAL TAPE, FOR STRESS INCONTINENCE;  Surgeon: Javier Putnam MD;  Location: [facility] OR;  Service: OB/GYN;  Laterality: N/A;    TONSILLECTOMY       Family History   Problem Relation Age of Onset    Cancer Brother         bladder    Breast cancer Neg Hx     Colon cancer Neg Hx     Ovarian cancer Neg Hx        Marital Status:   Alcohol History:  reports no history of alcohol use.  Tobacco History:  reports that she has never smoked. She has never used smokeless tobacco.  Drug History:  reports no history of drug use.    Health Maintenance Topics with due status: Not Due       Topic Last Completion Date    DEXA SCAN 03/27/2018    Lipid Panel 08/26/2020     Immunization History   Administered Date(s) Administered    Influenza 10/17/2016, 10/01/2017    Influenza - High Dose - PF (65 years and older) 10/03/2013, 10/07/2015, 10/17/2016, 11/08/2017, 10/15/2018, 09/26/2019    Influenza - Intradermal - Quadrivalent - PF 10/15/2018    Influenza - Quadrivalent - PF *Preferred* (6 months and older) 08/31/2011    Pneumococcal Conjugate - 13 Valent 10/17/2016    Pneumococcal Polysaccharide - 23 Valent 08/31/2011, 11/08/2017    Zoster 03/14/2015, 10/14/2019    Zoster Recombinant 10/14/2019, 07/14/2020       Review of patient's allergies indicates:   Allergen Reactions    Sulfa dyne Other (See Comments)     Blisters in mouth    Gabapentin     Hydrocodone-acetaminophen      Pt states she doesn t know how this got on there" no     " "Nitrofurantoin Other (See Comments)     Deathly ill , headaches, weakness, 'wiped out"    Sulfa (sulfonamide antibiotics)        Current Outpatient Medications:     benzonatate (TESSALON) 100 MG capsule, Take 1 capsule (100 mg total) by mouth 3 (three) times daily as needed for Cough., Disp: 21 capsule, Rfl: 0    buPROPion (WELLBUTRIN XL) 150 MG TB24 tablet, Take 1 tablet (150 mg total) by mouth once daily., Disp: 30 tablet, Rfl: 11    diclofenac sodium (VOLTAREN) 1 % Gel, Apply 2 g topically 4 (four) times daily., Disp: 100 g, Rfl: 5    estradioL (ESTRACE) 0.01 % (0.1 mg/gram) vaginal cream, Place 1 g vaginally once daily., Disp: 1 Tube, Rfl: 3    fenofibrate 160 MG Tab, TAKE 1 TABLET (160 MG TOTAL) BY MOUTH ONCE DAILY. (Patient not taking: Reported on 8/26/2020), Disp: 90 tablet, Rfl: 3    fesoterodine (TOVIAZ) 4 mg Tb24, Take 1 tablet (4 mg total) by mouth once daily., Disp: 30 tablet, Rfl: 11    ibuprofen (ADVIL,MOTRIN) 600 MG tablet, ibuprofen 600 mg tablet  Take 1 tablet 3 times a day by oral route., Disp: , Rfl:     mirabegron (MYRBETRIQ) 50 mg Tb24, Take 1 tablet (50 mg total) by mouth once daily., Disp: 30 tablet, Rfl: 11    ondansetron (ZOFRAN) 4 MG tablet, Take 1 tablet (4 mg total) by mouth every 8 (eight) hours as needed for Nausea., Disp: 20 tablet, Rfl: 1    rosuvastatin (CRESTOR) 5 MG tablet, Take 1 tablet on Vlpnav-Dwojwzvfd-Sicyld for 1 month then increase to daily if tolerating medication, Disp: 30 tablet, Rfl: 3    sertraline (ZOLOFT) 100 MG tablet, Take 1 tablet (100 mg total) by mouth once daily., Disp: 90 tablet, Rfl: 1    sumatriptan (IMITREX) 100 MG tablet, 1 tab po at start of headache, may repeat in 2 hours X1 in 24 hours. (Patient taking differently: as needed. 1 tab po at start of headache, may repeat in 2 hours X1 in 24 hours.), Disp: 10 tablet, Rfl: 11    topiramate (TOPAMAX) 50 MG tablet, Take 50 mg by mouth 2 (two) times daily., Disp: , Rfl:     traMADol (ULTRAM) 50 mg " "tablet, , Disp: , Rfl: 5    triamterene-hydrochlorothiazide 37.5-25 mg (MAXZIDE-25) 37.5-25 mg per tablet, 1/2 p.o. q.d., Disp: 90 tablet, Rfl: 0    valACYclovir (VALTREX) 500 MG tablet, TAKE 2 TABLETS BY MOUTH TWICE DAILY, Disp: 12 tablet, Rfl: 3  No current facility-administered medications for this visit.     Review of Systems   Constitutional: Positive for fatigue. Negative for appetite change, chills, fever and unexpected weight change.   Respiratory: Positive for cough (dry). Negative for shortness of breath and wheezing.    Cardiovascular: Negative for chest pain, palpitations and leg swelling.   Gastrointestinal: Negative for abdominal pain, constipation and diarrhea.   Genitourinary: Positive for frequency. Negative for dysuria and hematuria.   Skin: Negative for rash.   Neurological: Negative for dizziness, syncope, speech difficulty, weakness and numbness.          Objective:      Vitals:    09/23/20 0833   BP: 110/78   Pulse: 72   Temp: 98.2 °F (36.8 °C)   SpO2: 98%   Weight: 73 kg (161 lb)   Height: 5' 4.5" (1.638 m)     Physical Exam  Vitals signs and nursing note reviewed.   Constitutional:       General: She is not in acute distress.     Appearance: Normal appearance. She is well-developed and normal weight.   HENT:      Head: Normocephalic and atraumatic.      Right Ear: Tympanic membrane and ear canal normal.      Left Ear: Tympanic membrane and ear canal normal.      Mouth/Throat:      Pharynx: No posterior oropharyngeal erythema.   Neck:      Musculoskeletal: Neck supple.      Vascular: No carotid bruit.   Cardiovascular:      Rate and Rhythm: Normal rate and regular rhythm.      Heart sounds: No murmur. No friction rub. No gallop.    Pulmonary:      Effort: Pulmonary effort is normal. No respiratory distress.      Breath sounds: Normal breath sounds. No wheezing or rales.   Abdominal:      General: There is no distension.      Palpations: Abdomen is soft.      Tenderness: There is no abdominal " "tenderness.   Musculoskeletal:      Right lower leg: No edema.      Left lower leg: No edema.   Lymphadenopathy:      Cervical: No cervical adenopathy.   Skin:     General: Skin is warm and dry.      Findings: No rash.   Neurological:      General: No focal deficit present.      Mental Status: She is alert and oriented to person, place, and time.      Gait: Gait normal.   Psychiatric:         Mood and Affect: Mood normal.           Assessment:       1. Cough    2. Fatigue, unspecified type    3. Mild episode of recurrent major depressive disorder    4. Menopausal symptoms           Plan:       Cough  Comments:  CXR negative yesterday and exam WNL. Advised will treat with one time steroid for possible residual airway inflammation. Cautioned to call for any worsening  Orders:  -     dexamethasone injection 8 mg  -     benzonatate (TESSALON) 100 MG capsule; Take 1 capsule (100 mg total) by mouth 3 (three) times daily as needed for Cough.  Dispense: 21 capsule; Refill: 0    Fatigue, unspecified type  Comments:  pt reports mild fatigue though denies CP, SOB or exertional symptoms. Discussed DDX for fatigue including cardiac though she "doesn't feel it is heart related" Cauitoned to call if no improvement or any worsening and would recommend cardiac eval    Mild episode of recurrent major depressive disorder  Comments:  pt reports stable on 2 meds, denies any worsening but admits may play mild role in energy level. Denies SI  Orders:  -     sertraline (ZOLOFT) 100 MG tablet; Take 1 tablet (100 mg total) by mouth once daily.  Dispense: 90 tablet; Refill: 1      Follow up if symptoms worsen or fail to improve, for as scheduled.        9/23/2020 Elle Mack NP      "

## 2020-10-06 ENCOUNTER — OFFICE VISIT (OUTPATIENT)
Dept: ORTHOPEDICS | Facility: CLINIC | Age: 75
End: 2020-10-06
Payer: MEDICARE

## 2020-10-06 VITALS — BODY MASS INDEX: 26.81 KG/M2 | RESPIRATION RATE: 15 BRPM | WEIGHT: 160.94 LBS | HEIGHT: 65 IN

## 2020-10-06 DIAGNOSIS — M25.561 CHRONIC PAIN OF BOTH KNEES: Primary | ICD-10-CM

## 2020-10-06 DIAGNOSIS — G89.29 CHRONIC PAIN OF BOTH KNEES: Primary | ICD-10-CM

## 2020-10-06 DIAGNOSIS — M25.562 CHRONIC PAIN OF BOTH KNEES: Primary | ICD-10-CM

## 2020-10-06 PROCEDURE — 99213 OFFICE O/P EST LOW 20 MIN: CPT | Mod: S$PBB,25,, | Performed by: ORTHOPAEDIC SURGERY

## 2020-10-06 PROCEDURE — 99999 PR PBB SHADOW E&M-EST. PATIENT-LVL IV: ICD-10-PCS | Mod: PBBFAC,,, | Performed by: ORTHOPAEDIC SURGERY

## 2020-10-06 PROCEDURE — 99999 PR PBB SHADOW E&M-EST. PATIENT-LVL IV: CPT | Mod: PBBFAC,,, | Performed by: ORTHOPAEDIC SURGERY

## 2020-10-06 PROCEDURE — 20610 LARGE JOINT ASPIRATION/INJECTION: BILATERAL KNEE: ICD-10-PCS | Mod: 50,S$PBB,, | Performed by: ORTHOPAEDIC SURGERY

## 2020-10-06 PROCEDURE — 20610 DRAIN/INJ JOINT/BURSA W/O US: CPT | Mod: 50,PBBFAC,PN | Performed by: ORTHOPAEDIC SURGERY

## 2020-10-06 PROCEDURE — 99213 PR OFFICE/OUTPT VISIT, EST, LEVL III, 20-29 MIN: ICD-10-PCS | Mod: S$PBB,25,, | Performed by: ORTHOPAEDIC SURGERY

## 2020-10-06 PROCEDURE — 99214 OFFICE O/P EST MOD 30 MIN: CPT | Mod: PBBFAC,PN | Performed by: ORTHOPAEDIC SURGERY

## 2020-10-06 RX ORDER — TRIAMCINOLONE ACETONIDE 40 MG/ML
40 INJECTION, SUSPENSION INTRA-ARTICULAR; INTRAMUSCULAR
Status: DISCONTINUED | OUTPATIENT
Start: 2020-10-06 | End: 2020-10-06 | Stop reason: HOSPADM

## 2020-10-06 RX ORDER — OXYBUTYNIN CHLORIDE 10 MG/1
10 TABLET, EXTENDED RELEASE ORAL DAILY
COMMUNITY
Start: 2020-09-23 | End: 2021-05-11

## 2020-10-06 RX ADMIN — TRIAMCINOLONE ACETONIDE 40 MG: 40 INJECTION, SUSPENSION INTRA-ARTICULAR; INTRAMUSCULAR at 10:10

## 2020-10-06 NOTE — PROGRESS NOTES
Past Medical History:   Diagnosis Date    Arthritis     Corns and callosities     Deep vein thrombosis     GERD (gastroesophageal reflux disease)     Gout     H/O bladder problems     uses pessary    History of colonic polyps 2014    Hyperlipidemia     Hypertension     Pulmonary embolism        Past Surgical History:   Procedure Laterality Date    APPENDECTOMY  AGE 12    BLADDER SURGERY      Twice    BREAST CYST EXCISION      Benign per patient    EYE SURGERY      LASER FOR GLAUCOMA    HAND SURGERY Left     HYSTERECTOMY      Not related to cancer per patient.    OOPHORECTOMY      ROBOT-ASSISTED LAPAROSCOPIC ABDOMINAL SACROCOLPOPEXY USING DA KINGA XI N/A 12/17/2019    Procedure: XI ROBOTIC SACROCOLPOPEXY, ABDOMINAL;  Surgeon: Javier Putnam MD;  Location: [facility] OR;  Service: OB/GYN;  Laterality: N/A;  removal of damaged tissue    SURGICAL PROCEDURE FOR STRESS INCONTINENCE USING TENSION FREE VAGINAL TAPE N/A 12/17/2019    Procedure: SURGICAL PROCEDURE, USING TENSION FREE VAGINAL TAPE, FOR STRESS INCONTINENCE;  Surgeon: Javier Putnam MD;  Location: [facility] OR;  Service: OB/GYN;  Laterality: N/A;    TONSILLECTOMY         Current Outpatient Medications   Medication Sig    diclofenac sodium (VOLTAREN) 1 % Gel Apply 2 g topically 4 (four) times daily.    estradioL (ESTRACE) 0.01 % (0.1 mg/gram) vaginal cream Place 1 g vaginally once daily.    fenofibrate 160 MG Tab TAKE 1 TABLET (160 MG TOTAL) BY MOUTH ONCE DAILY.    fesoterodine (TOVIAZ) 4 mg Tb24 Take 1 tablet (4 mg total) by mouth once daily.    ibuprofen (ADVIL,MOTRIN) 600 MG tablet ibuprofen 600 mg tablet   Take 1 tablet 3 times a day by oral route.    mirabegron (MYRBETRIQ) 50 mg Tb24 Take 1 tablet (50 mg total) by mouth once daily.    oxybutynin (DITROPAN-XL) 10 MG 24 hr tablet Take 10 mg by mouth once daily.    rosuvastatin (CRESTOR) 5 MG tablet Take 1 tablet on Bvguus-Wyayewzzy-Gwrrrz for 1 month then increase to daily if tolerating  "medication    sertraline (ZOLOFT) 100 MG tablet Take 1 tablet (100 mg total) by mouth once daily.    sumatriptan (IMITREX) 100 MG tablet 1 tab po at start of headache, may repeat in 2 hours X1 in 24 hours. (Patient taking differently: as needed. 1 tab po at start of headache, may repeat in 2 hours X1 in 24 hours.)    topiramate (TOPAMAX) 50 MG tablet Take 50 mg by mouth 2 (two) times daily.    traMADol (ULTRAM) 50 mg tablet     triamterene-hydrochlorothiazide 37.5-25 mg (MAXZIDE-25) 37.5-25 mg per tablet 1/2 p.o. q.d.    valACYclovir (VALTREX) 500 MG tablet TAKE 2 TABLETS BY MOUTH TWICE DAILY    buPROPion (WELLBUTRIN XL) 150 MG TB24 tablet Take 1 tablet (150 mg total) by mouth once daily.    ondansetron (ZOFRAN) 4 MG tablet Take 1 tablet (4 mg total) by mouth every 8 (eight) hours as needed for Nausea. (Patient not taking: Reported on 10/6/2020)     No current facility-administered medications for this visit.        Review of patient's allergies indicates:   Allergen Reactions    Sulfa dyne Other (See Comments)     Blisters in mouth    Gabapentin     Hydrocodone-acetaminophen      Pt states she doesn t know how this got on there" no     Nitrofurantoin Other (See Comments)     Deathly ill , headaches, weakness, 'wiped out"    Sulfa (sulfonamide antibiotics)        Family History   Problem Relation Age of Onset    Cancer Brother         bladder    Breast cancer Neg Hx     Colon cancer Neg Hx     Ovarian cancer Neg Hx        Social History     Socioeconomic History    Marital status:      Spouse name: Not on file    Number of children: Not on file    Years of education: Not on file    Highest education level: Not on file   Occupational History    Not on file   Social Needs    Financial resource strain: Somewhat hard    Food insecurity     Worry: Sometimes true     Inability: Never true    Transportation needs     Medical: No     Non-medical: No   Tobacco Use    Smoking status: Never " "Smoker    Smokeless tobacco: Never Used   Substance and Sexual Activity    Alcohol use: No     Frequency: Never     Binge frequency: Never    Drug use: No    Sexual activity: Never   Lifestyle    Physical activity     Days per week: 2 days     Minutes per session: 30 min    Stress: Very much   Relationships    Social connections     Talks on phone: More than three times a week     Gets together: More than three times a week     Attends [employer] service: Not on file     Active member of club or organization: No     Attends meetings of clubs or organizations: Never     Relationship status:    Other Topics Concern    Not on file   Social History Narrative    Not on file       Chief Complaint:   Chief Complaint   Patient presents with    Left Knee - Pain    Left Hip - Pain    Right Knee - Pain    Right Hip - Pain       Consulting Physician: No ref. provider found    History of present illness:    This is a 75 y.o. year old female who complains of bilateral knee pain with the left being worse than the right.  She states the pain has gotten worse in the last week or so since she over did on the weekend.  She denies any injury.  She puts her pain at a 8/1010 and worse with activities.    Review of Systems:    Constitution:   Denies chills, fever, and sweats.  HENT:   Denies headaches or blurry vision.  Cardiovascular:  Denies chest pain or irregular heart beat.  Respiratory:   Denies cough or shortness of breath.  Gastrointestinal:  Denies abdominal pain, nausea, or vomiting.  Musculoskeletal:   Denies muscle cramps.  Neurological:   Denies dizziness or focal weakness.  Psychiatric/Behavior: Normal mental status.  Hematology/Lymph:  Denies bleeding problem or easy bruising/bleeding.  Skin:    Denies rash or suspicious lesions.    Examination:    Vital Signs:    Vitals:    10/06/20 1035   Resp: 15   Weight: 73 kg (160 lb 15 oz)   Height: 5' 4.5" (1.638 m)   PainSc:   8   PainLoc: Generalized       Body " mass index is 27.2 kg/m².    Constitution:   Well-developed, well nourished patient in no acute distress.  Neurological:   Alert and oriented x 3 and cooperative to examination.     Psychiatric/Behavior: Normal mental status.  Respiratory:   No shortness of breath.  Eyes:    Extraoccular muscles intact  Skin:    No scars, rash or suspicious lesions.    Physical Exam: Left Knee Exam    Gait   Normal    Skin  Rash:   None  Scars:   None    Inspection  Erythema:  None  Bruising:  None  Effusion:  None  Masses:  None  Lymphadenopathy: None  Calf   Soft, non-tender    Range of Motion: 0 to 120° with pain    Medial Joint : Yes  Lateral Joint : Yes    Patellofemoral Tenderness: None  Patellofemoral Crepitus: None    Lachman:  Normal  Anterior Drawer: Normal  Posterior Drawer: Normal    Lashell's:  Negative  Apley's:  Negative    Varus Stress:  Stable  Valgus Stress:  Stable    Strength:  5/5    Pulses:  Palpable distal    Sensation:  Intact      Right Knee Exam    Gait   Normal    Skin  Rash:   None  Scars:   None    Inspection  Erythema:  None  Bruising:  None  Effusion:  None  Masses:  None  Lymphadenopathy: None  Calf   Soft, non-tender    Range of Motion: 0 to 130° with pain    Medial Joint : Yes  Lateral Joint : No    Patellofemoral Tenderness: None  Patellofemoral Crepitus: None    Lachman:  Normal  Anterior Drawer: Normal  Posterior Drawer: Normal    Lashell's:  Negative  Apley's:  Negative    Varus Stress:  Stable  Valgus Stress:  Stable    Strength:  5/5    Pulses:  Palpable distal    Sensation:  Intact          Imaging: X-rays of both knees show mild degenerative changes and chondrocalcinosis.         Assessment: Chronic pain of both knees  -     Large Joint Aspiration/Injection: bilateral knee        Plan:  Her pain is returned.  Her last injection was very helpful.  We discussed treatment options she would like to do another injection today as she is getting ready  to go out of town.  Set her up for therapy when she gets back.  We will see her back as needed.    DISCLAIMER: This note may have been dictated using voice recognition software and may contain grammatical errors.     NOTE: Consult report sent to referring provider via [employer] EMR.

## 2020-10-06 NOTE — PROCEDURES
Large Joint Aspiration/Injection: bilateral knee    Date/Time: 10/6/2020 10:15 AM  Performed by: Walter Tobias MD  Authorized by: Walter Tobias MD     Consent Done?:  Yes (Verbal)  Indications:  Pain  Timeout: prior to procedure the correct patient, procedure, and site was verified    Approach:  Anteromedial  Location:  Knee  Laterality:  Bilateral  Site:  Bilateral knee  Medications (Right):  40 mg triamcinolone acetonide 40 mg/mL  Medications (Left):  40 mg triamcinolone acetonide 40 mg/mL

## 2020-11-03 ENCOUNTER — OFFICE VISIT (OUTPATIENT)
Dept: DERMATOLOGY | Facility: CLINIC | Age: 75
End: 2020-11-03
Payer: MEDICARE

## 2020-11-03 DIAGNOSIS — D18.01 CHERRY ANGIOMA: ICD-10-CM

## 2020-11-03 DIAGNOSIS — L72.0 MILIA: ICD-10-CM

## 2020-11-03 DIAGNOSIS — D22.9 MULTIPLE BENIGN NEVI: ICD-10-CM

## 2020-11-03 DIAGNOSIS — D69.2 SOLAR PURPURA: ICD-10-CM

## 2020-11-03 DIAGNOSIS — M67.449 DIGITAL MUCOUS CYST: Primary | ICD-10-CM

## 2020-11-03 DIAGNOSIS — D23.9 DERMATOFIBROMA: ICD-10-CM

## 2020-11-03 PROCEDURE — 99999 PR PBB SHADOW E&M-EST. PATIENT-LVL III: CPT | Mod: PBBFAC,,, | Performed by: DERMATOLOGY

## 2020-11-03 PROCEDURE — 99999 PR PBB SHADOW E&M-EST. PATIENT-LVL III: ICD-10-PCS | Mod: PBBFAC,,, | Performed by: DERMATOLOGY

## 2020-11-03 PROCEDURE — 99213 OFFICE O/P EST LOW 20 MIN: CPT | Mod: PBBFAC,PO | Performed by: DERMATOLOGY

## 2020-11-03 PROCEDURE — 99203 OFFICE O/P NEW LOW 30 MIN: CPT | Mod: S$PBB,,, | Performed by: DERMATOLOGY

## 2020-11-03 PROCEDURE — 99203 PR OFFICE/OUTPT VISIT, NEW, LEVL III, 30-44 MIN: ICD-10-PCS | Mod: S$PBB,,, | Performed by: DERMATOLOGY

## 2020-11-03 NOTE — PROGRESS NOTES
"  Subjective:       Patient ID:  Jazz Martinez is a 75 y.o. female who presents for   Chief Complaint   Patient presents with    Cyst     R thigh     New patient   Saw Dr Verma many years ago     Here today for skin check   C/o cyst R thigh, changes size, no pain  C/o white bumps on face and near eyes  C/o blister L middle finger, no pain, wont go away.    Derm hx   denies phx nmsc or mm  fhx nmsc - brother   Spends time outdoors, wears sunscreen occasionally.     H/o "repeat shingles" on lower back, episodic valtrex      Current Outpatient Medications:     diclofenac sodium (VOLTAREN) 1 % Gel, Apply 2 g topically 4 (four) times daily., Disp: 100 g, Rfl: 5    estradioL (ESTRACE) 0.01 % (0.1 mg/gram) vaginal cream, INSERT 1 GM VAGINALLY MONDAY,WEDNESDAY,AND FRIDAY AS DIRECTED, Disp: 42.5 g, Rfl: 7    fenofibrate 160 MG Tab, TAKE 1 TABLET (160 MG TOTAL) BY MOUTH ONCE DAILY., Disp: 90 tablet, Rfl: 3    fesoterodine (TOVIAZ) 4 mg Tb24, Take 1 tablet (4 mg total) by mouth once daily., Disp: 30 tablet, Rfl: 11    ibuprofen (ADVIL,MOTRIN) 600 MG tablet, ibuprofen 600 mg tablet  Take 1 tablet 3 times a day by oral route., Disp: , Rfl:     mirabegron (MYRBETRIQ) 50 mg Tb24, Take 1 tablet (50 mg total) by mouth once daily., Disp: 30 tablet, Rfl: 11    ondansetron (ZOFRAN) 4 MG tablet, Take 1 tablet (4 mg total) by mouth every 8 (eight) hours as needed for Nausea., Disp: 20 tablet, Rfl: 1    oxybutynin (DITROPAN-XL) 10 MG 24 hr tablet, Take 10 mg by mouth once daily., Disp: , Rfl:     rosuvastatin (CRESTOR) 5 MG tablet, Take 1 tablet on Ykwnhh-Jtghuqlzo-Comqam for 1 month then increase to daily if tolerating medication, Disp: 30 tablet, Rfl: 3    sertraline (ZOLOFT) 100 MG tablet, Take 1 tablet (100 mg total) by mouth once daily., Disp: 90 tablet, Rfl: 1    sumatriptan (IMITREX) 100 MG tablet, 1 tab po at start of headache, may repeat in 2 hours X1 in 24 hours. (Patient taking differently: as needed. 1 tab po " at start of headache, may repeat in 2 hours X1 in 24 hours.), Disp: 10 tablet, Rfl: 11    topiramate (TOPAMAX) 50 MG tablet, Take 50 mg by mouth 2 (two) times daily., Disp: , Rfl:     traMADol (ULTRAM) 50 mg tablet, , Disp: , Rfl: 5    triamterene-hydrochlorothiazide 37.5-25 mg (MAXZIDE-25) 37.5-25 mg per tablet, 1/2 p.o. q.d., Disp: 90 tablet, Rfl: 0    valACYclovir (VALTREX) 500 MG tablet, TAKE 2 TABLETS BY MOUTH TWICE DAILY, Disp: 12 tablet, Rfl: 3        Review of Systems   Constitutional: Negative for fever and chills.   Respiratory: Negative for cough and shortness of breath.    Skin: Positive for activity-related sunscreen use. Negative for itching, rash and daily sunscreen use.   Hematologic/Lymphatic: Bruises/bleeds easily (bruise).        Objective:    Physical Exam   Constitutional: She appears well-developed and well-nourished. No distress.   Neurological: She is alert and oriented to person, place, and time. She is not disoriented.   Psychiatric: She has a normal mood and affect.   Skin:   Areas Examined (abnormalities noted in diagram):   Scalp / Hair Palpated and Inspected  Head / Face Inspection Performed  Neck Inspection Performed  Chest / Axilla Inspection Performed  Abdomen Inspection Performed  Genitals / Buttocks / Groin Inspection Performed  Back Inspection Performed  RUE Inspected  LUE Inspection Performed  RLE Inspected  LLE Inspection Performed  Nails and Digits Inspection Performed                       Diagram Legend     Erythematous scaling macule/papule c/w actinic keratosis       Vascular papule c/w angioma      Pigmented verrucoid papule/plaque c/w seborrheic keratosis      Yellow umbilicated papule c/w sebaceous hyperplasia      Irregularly shaped tan macule c/w lentigo     1-2 mm smooth white papules consistent with Milia      Movable subcutaneous cyst with punctum c/w epidermal inclusion cyst      Subcutaneous movable cyst c/w pilar cyst      Firm pink to brown papule c/w  dermatofibroma      Pedunculated fleshy papule(s) c/w skin tag(s)      Evenly pigmented macule c/w junctional nevus     Mildly variegated pigmented, slightly irregular-bordered macule c/w mildly atypical nevus      Flesh colored to evenly pigmented papule c/w intradermal nevus       Pink pearly papule/plaque c/w basal cell carcinoma      Erythematous hyperkeratotic cursted plaque c/w SCC      Surgical scar with no sign of skin cancer recurrence      Open and closed comedones      Inflammatory papules and pustules      Verrucoid papule consistent consistent with wart     Erythematous eczematous patches and plaques     Dystrophic onycholytic nail with subungual debris c/w onychomycosis     Umbilicated papule    Erythematous-base heme-crusted tan verrucoid plaque consistent with inflamed seborrheic keratosis     Erythematous Silvery Scaling Plaque c/w Psoriasis     See annotation      Assessment / Plan:        Digital mucous cyst  Reassurance, no treatment necessary  Hand surgeon evaluation IF patient desires excision    Milia  Reassurance given to patient. No treatment is necessary.   Treatment of benign, asymptomatic lesions may be considered cosmetic.    Dermatofibroma  This is a benign scar-like lesion secondary to minor trauma. No treatment required.     Solar purpura  Common finding in aging sun damaged skin    Multiple benign nevi  Discussed ABCDE's of nevi.  Monitor for new mole or moles that are becoming bigger, darker, irritated, or developing irregular borders. Brochure provided.      Cherry angioma  This is a benign vascular lesion. Reassurance given. No treatment required.     Lipoma vs other  R lateral thigh, <1cm soft subcutaneous nodule, no overlying punctum, favor lipoma, small size and benign behavior  gen surg evaluation if size increase is noted or patient wishes to investigate further    Patient instructed in importance in daily sun protection of at least spf 30. Mineral sunscreen ingredients  preferred (Zinc +/- Titanium).   Recommend Elta MD for daily use on face and neck.  Patient encouraged to wear hat for all outdoor exposure.   Also discussed sun avoidance and use of protective clothing.               Follow up if symptoms worsen or fail to improve.

## 2020-11-04 LAB
ALBUMIN SERPL-MCNC: 4.4 G/DL (ref 3.6–5.1)
ALBUMIN/GLOB SERPL: 1.9 (CALC) (ref 1–2.5)
ALP SERPL-CCNC: 54 U/L (ref 37–153)
ALT SERPL-CCNC: 15 U/L (ref 6–29)
AST SERPL-CCNC: 18 U/L (ref 10–35)
BILIRUB SERPL-MCNC: 0.6 MG/DL (ref 0.2–1.2)
BUN SERPL-MCNC: 16 MG/DL (ref 7–25)
BUN/CREAT SERPL: NORMAL (CALC) (ref 6–22)
CALCIUM SERPL-MCNC: 9.6 MG/DL (ref 8.6–10.4)
CHLORIDE SERPL-SCNC: 103 MMOL/L (ref 98–110)
CHOLEST SERPL-MCNC: 174 MG/DL
CHOLEST/HDLC SERPL: 2.5 (CALC)
CO2 SERPL-SCNC: 31 MMOL/L (ref 20–32)
CREAT SERPL-MCNC: 0.69 MG/DL (ref 0.6–0.93)
GFRSERPLBLD MDRD-ARVRAT: 84 ML/MIN/1.73M2
GLOBULIN SER CALC-MCNC: 2.3 G/DL (CALC) (ref 1.9–3.7)
GLUCOSE SERPL-MCNC: 80 MG/DL (ref 65–99)
HDLC SERPL-MCNC: 69 MG/DL
LDLC SERPL CALC-MCNC: 84 MG/DL (CALC)
NONHDLC SERPL-MCNC: 105 MG/DL (CALC)
POTASSIUM SERPL-SCNC: 4.7 MMOL/L (ref 3.5–5.3)
PROT SERPL-MCNC: 6.7 G/DL (ref 6.1–8.1)
SODIUM SERPL-SCNC: 143 MMOL/L (ref 135–146)
TRIGL SERPL-MCNC: 116 MG/DL

## 2020-11-16 ENCOUNTER — OFFICE VISIT (OUTPATIENT)
Dept: FAMILY MEDICINE | Facility: CLINIC | Age: 75
End: 2020-11-16
Payer: MEDICARE

## 2020-11-16 VITALS
DIASTOLIC BLOOD PRESSURE: 80 MMHG | HEIGHT: 65 IN | HEART RATE: 70 BPM | SYSTOLIC BLOOD PRESSURE: 122 MMHG | WEIGHT: 163 LBS | TEMPERATURE: 98 F | BODY MASS INDEX: 27.16 KG/M2

## 2020-11-16 DIAGNOSIS — Z13.820 SCREENING FOR OSTEOPOROSIS: ICD-10-CM

## 2020-11-16 DIAGNOSIS — E78.5 DYSLIPIDEMIA: Primary | ICD-10-CM

## 2020-11-16 DIAGNOSIS — Z78.0 MENOPAUSE: ICD-10-CM

## 2020-11-16 DIAGNOSIS — I10 HTN (HYPERTENSION), BENIGN: ICD-10-CM

## 2020-11-16 DIAGNOSIS — R51.9 CHRONIC NONINTRACTABLE HEADACHE, UNSPECIFIED HEADACHE TYPE: ICD-10-CM

## 2020-11-16 DIAGNOSIS — N32.81 OAB (OVERACTIVE BLADDER): ICD-10-CM

## 2020-11-16 DIAGNOSIS — G89.29 CHRONIC NONINTRACTABLE HEADACHE, UNSPECIFIED HEADACHE TYPE: ICD-10-CM

## 2020-11-16 PROCEDURE — 99214 PR OFFICE/OUTPT VISIT, EST, LEVL IV, 30-39 MIN: ICD-10-PCS | Mod: S$GLB,,, | Performed by: NURSE PRACTITIONER

## 2020-11-16 PROCEDURE — 99214 OFFICE O/P EST MOD 30 MIN: CPT | Mod: S$GLB,,, | Performed by: NURSE PRACTITIONER

## 2020-11-16 RX ORDER — OMEPRAZOLE 20 MG/1
20 CAPSULE, DELAYED RELEASE ORAL DAILY
COMMUNITY
End: 2022-01-18

## 2020-11-16 RX ORDER — TRIAMTERENE/HYDROCHLOROTHIAZID 37.5-25 MG
TABLET ORAL
Qty: 90 TABLET | Refills: 0 | Status: SHIPPED | OUTPATIENT
Start: 2020-11-16 | End: 2022-08-11 | Stop reason: SDUPTHER

## 2020-11-16 RX ORDER — VALACYCLOVIR HYDROCHLORIDE 500 MG/1
1000 TABLET, FILM COATED ORAL 2 TIMES DAILY
Qty: 12 TABLET | Refills: 3 | Status: SHIPPED | OUTPATIENT
Start: 2020-11-16 | End: 2021-11-19 | Stop reason: SDUPTHER

## 2020-11-16 NOTE — PROGRESS NOTES
SUBJECTIVE:    Patient ID: Jazz Martinez is a 75 y.o. female.    Chief Complaint: Follow-up (3 month//brought bottles//refused c-scope tb )    Pt here for regular f/u. Reports overall feeling okay. Feels like she finally got over the COVID illness  Reports previously saw Dr. Ding for chronic Headaches- had been prescribed topamax but didn't start it d/t COVID illness. Just started taking it yesterday d/t daily headaches. Denies speech/vision change, no photophobia or n/v.      Patient Message on 08/30/2020   Component Date Value Ref Range Status    Cholesterol 11/03/2020 174  <200 mg/dL Final    HDL 11/03/2020 69  > OR = 50 mg/dL Final    Triglycerides 11/03/2020 116  <150 mg/dL Final    LDL Cholesterol 11/03/2020 84  mg/dL (calc) Final    HDL/Cholesterol Ratio 11/03/2020 2.5  <5.0 (calc) Final    Non HDL Chol. (LDL+VLDL) 11/03/2020 105  <130 mg/dL (calc) Final    Glucose 11/03/2020 80  65 - 99 mg/dL Final    BUN 11/03/2020 16  7 - 25 mg/dL Final    Creatinine 11/03/2020 0.69  0.60 - 0.93 mg/dL Final    eGFR if non African American 11/03/2020 84  > OR = 60 mL/min/1.73m2 Final    eGFR if African American 11/03/2020 97  > OR = 60 mL/min/1.73m2 Final    BUN/Creatinine Ratio 11/03/2020 NOT APPLICABLE  6 - 22 (calc) Final    Sodium 11/03/2020 143  135 - 146 mmol/L Final    Potassium 11/03/2020 4.7  3.5 - 5.3 mmol/L Final    Chloride 11/03/2020 103  98 - 110 mmol/L Final    CO2 11/03/2020 31  20 - 32 mmol/L Final    Calcium 11/03/2020 9.6  8.6 - 10.4 mg/dL Final    Total Protein 11/03/2020 6.7  6.1 - 8.1 g/dL Final    Albumin 11/03/2020 4.4  3.6 - 5.1 g/dL Final    Globulin, Total 11/03/2020 2.3  1.9 - 3.7 g/dL (calc) Final    Albumin/Globulin Ratio 11/03/2020 1.9  1.0 - 2.5 (calc) Final    Total Bilirubin 11/03/2020 0.6  0.2 - 1.2 mg/dL Final    Alkaline Phosphatase 11/03/2020 54  37 - 153 U/L Final    AST 11/03/2020 18  10 - 35 U/L Final    ALT 11/03/2020 15  6 - 29 U/L Final   Office  Visit on 08/26/2020   Component Date Value Ref Range Status    Cholesterol 08/26/2020 348* <200 mg/dL Final    HDL 08/26/2020 46* > OR = 50 mg/dL Final    Triglycerides 08/26/2020 428* <150 mg/dL Final    LDL Cholesterol 08/26/2020   mg/dL (calc) Final    HDL/Cholesterol Ratio 08/26/2020 7.6* <5.0 (calc) Final    Non HDL Chol. (LDL+VLDL) 08/26/2020 302* <130 mg/dL (calc) Final    Glucose 08/26/2020 87  65 - 99 mg/dL Final    BUN 08/26/2020 18  7 - 25 mg/dL Final    Creatinine 08/26/2020 0.71  0.60 - 0.93 mg/dL Final    eGFR if non African American 08/26/2020 82  > OR = 60 mL/min/1.73m2 Final    eGFR if African American 08/26/2020 95  > OR = 60 mL/min/1.73m2 Final    BUN/Creatinine Ratio 08/26/2020 NOT APPLICABLE  6 - 22 (calc) Final    Sodium 08/26/2020 142  135 - 146 mmol/L Final    Potassium 08/26/2020 4.2  3.5 - 5.3 mmol/L Final    Chloride 08/26/2020 104  98 - 110 mmol/L Final    CO2 08/26/2020 29  20 - 32 mmol/L Final    Calcium 08/26/2020 9.7  8.6 - 10.4 mg/dL Final    Total Protein 08/26/2020 6.7  6.1 - 8.1 g/dL Final    Albumin 08/26/2020 4.4  3.6 - 5.1 g/dL Final    Globulin, Total 08/26/2020 2.3  1.9 - 3.7 g/dL (calc) Final    Albumin/Globulin Ratio 08/26/2020 1.9  1.0 - 2.5 (calc) Final    Total Bilirubin 08/26/2020 0.5  0.2 - 1.2 mg/dL Final    Alkaline Phosphatase 08/26/2020 58  37 - 153 U/L Final    AST 08/26/2020 17  10 - 35 U/L Final    ALT 08/26/2020 11  6 - 29 U/L Final   Office Visit on 08/12/2020   Component Date Value Ref Range Status    Color, UA 08/12/2020 Yellow   Final    Spec Grav UA 08/12/2020 1,015   Final    pH, UA 08/12/2020 5   Final    WBC, UA 08/12/2020 neg   Final    Nitrite, UA 08/12/2020 neg   Final    Protein 08/12/2020 neg   Final    Glucose, UA 08/12/2020 neg   Final    Ketones, UA 08/12/2020 neg'   Final    Urobilinogen, UA 08/12/2020 neg   Final    Bilirubin 08/12/2020 neg   Final    Blood, UA 08/12/2020 neg   Final    Clarity, UA  08/12/2020 Clear   Final   Lab Visit on 07/31/2020   Component Date Value Ref Range Status    SARS-CoV2 (COVID-19) Qualitative P* 07/31/2020 Detected* Not Detected Final   Lab Visit on 07/31/2020   Component Date Value Ref Range Status    TSH 07/31/2020 1.946  0.400 - 4.000 uIU/mL Final    WBC 07/31/2020 9.20  3.90 - 12.70 K/uL Final    RBC 07/31/2020 4.81  4.00 - 5.40 M/uL Final    Hemoglobin 07/31/2020 14.7  12.0 - 16.0 g/dL Final    Hematocrit 07/31/2020 46.3  37.0 - 48.5 % Final    MCV 07/31/2020 96  82 - 98 fL Final    MCH 07/31/2020 30.6  27.0 - 31.0 pg Final    MCHC 07/31/2020 31.7* 32.0 - 36.0 g/dL Final    RDW 07/31/2020 14.3  11.5 - 14.5 % Final    Platelets 07/31/2020 231  150 - 350 K/uL Final    MPV 07/31/2020 10.0  9.2 - 12.9 fL Final    Immature Granulocytes 07/31/2020 0.3  0.0 - 0.5 % Final    Gran # (ANC) 07/31/2020 6.2  1.8 - 7.7 K/uL Final    Immature Grans (Abs) 07/31/2020 0.03  0.00 - 0.04 K/uL Final    Lymph # 07/31/2020 1.7  1.0 - 4.8 K/uL Final    Mono # 07/31/2020 0.8  0.3 - 1.0 K/uL Final    Eos # 07/31/2020 0.3  0.0 - 0.5 K/uL Final    Baso # 07/31/2020 0.13  0.00 - 0.20 K/uL Final    nRBC 07/31/2020 0  0 /100 WBC Final    Gran % 07/31/2020 67.7  38.0 - 73.0 % Final    Lymph % 07/31/2020 18.8  18.0 - 48.0 % Final    Mono % 07/31/2020 9.0  4.0 - 15.0 % Final    Eosinophil % 07/31/2020 2.8  0.0 - 8.0 % Final    Basophil % 07/31/2020 1.4  0.0 - 1.9 % Final    Differential Method 07/31/2020 Automated   Final    CRP 07/31/2020 1.8  0.0 - 8.2 mg/L Final    Iron 07/31/2020 113  30 - 160 ug/dL Final    Transferrin 07/31/2020 233  200 - 375 mg/dL Final    TIBC 07/31/2020 345  250 - 450 ug/dL Final    Saturated Iron 07/31/2020 33  20 - 50 % Final    HIV Rapid Testing 07/31/2020 Non-Reactive  Negative Final    Antibody SARS CoV-2 07/31/2020 Negative  Negative Final   Lab Visit on 07/31/2020   Component Date Value Ref Range Status    NIL 07/31/2020 0.010  See text  IU/mL Final    TB1 - Nil 07/31/2020 0.000  See text IU/mL Final    TB2 - Nil 07/31/2020 0.000  See text IU/mL Final    Mitogen - Nil 07/31/2020 6.430  See text IU/mL Final    TB Gold Plus 07/31/2020 Negative   Final       Past Medical History:   Diagnosis Date    Arthritis     Corns and callosities     Deep vein thrombosis     GERD (gastroesophageal reflux disease)     Gout     H/O bladder problems     uses pessary    History of colonic polyps 2014    Hyperlipidemia     Hypertension     Pulmonary embolism      Past Surgical History:   Procedure Laterality Date    APPENDECTOMY  AGE 12    BLADDER SURGERY      Twice    BREAST CYST EXCISION      Benign per patient    EYE SURGERY      LASER FOR GLAUCOMA    HAND SURGERY Left     HYSTERECTOMY      Not related to cancer per patient.    OOPHORECTOMY      ROBOT-ASSISTED LAPAROSCOPIC ABDOMINAL SACROCOLPOPEXY USING DA KINGA XI N/A 12/17/2019    Procedure: XI ROBOTIC SACROCOLPOPEXY, ABDOMINAL;  Surgeon: Javier Putnam MD;  Location: [facility] OR;  Service: OB/GYN;  Laterality: N/A;  removal of damaged tissue    SURGICAL PROCEDURE FOR STRESS INCONTINENCE USING TENSION FREE VAGINAL TAPE N/A 12/17/2019    Procedure: SURGICAL PROCEDURE, USING TENSION FREE VAGINAL TAPE, FOR STRESS INCONTINENCE;  Surgeon: Javier Putnam MD;  Location: [facility] OR;  Service: OB/GYN;  Laterality: N/A;    TONSILLECTOMY       Family History   Problem Relation Age of Onset    Cancer Brother         bladder    Breast cancer Neg Hx     Colon cancer Neg Hx     Ovarian cancer Neg Hx     Eczema Neg Hx     Lupus Neg Hx     Psoriasis Neg Hx     Melanoma Neg Hx        Marital Status:   Alcohol History:  reports no history of alcohol use.  Tobacco History:  reports that she has never smoked. She has never used smokeless tobacco.  Drug History:  reports no history of drug use.    Health Maintenance Topics with due status: Not Due       Topic Last Completion Date    DEXA SCAN  "03/27/2018    Lipid Panel 11/03/2020     Immunization History   Administered Date(s) Administered    Influenza 10/17/2016, 10/01/2017    Influenza - High Dose - PF (65 years and older) 10/03/2013, 10/07/2015, 10/17/2016, 11/08/2017, 10/15/2018, 09/26/2019    Influenza - Intradermal - Quadrivalent - PF 10/15/2018    Influenza - Quadrivalent - PF *Preferred* (6 months and older) 08/31/2011    Pneumococcal Conjugate - 13 Valent 10/17/2016    Pneumococcal Polysaccharide - 23 Valent 08/31/2011, 11/08/2017    Zoster 03/14/2015, 10/14/2019    Zoster Recombinant 10/14/2019, 07/14/2020       Review of patient's allergies indicates:   Allergen Reactions    Sulfa dyne Other (See Comments)     Blisters in mouth    Gabapentin     Hydrocodone-acetaminophen      Pt states she doesn t know how this got on there" no     Nitrofurantoin Other (See Comments)     Deathly ill , headaches, weakness, 'wiped out"    Sulfa (sulfonamide antibiotics)        Current Outpatient Medications:     ASPIRIN EXTRA STRENGTH ORAL, Take by mouth., Disp: , Rfl:     calcium carbonate/vitamin D3 (VITAMIN D-3 ORAL), Take by mouth., Disp: , Rfl:     diclofenac sodium (VOLTAREN) 1 % Gel, Apply 2 g topically 4 (four) times daily., Disp: 100 g, Rfl: 5    estradioL (ESTRACE) 0.01 % (0.1 mg/gram) vaginal cream, INSERT 1 GM VAGINALLY MONDAY,WEDNESDAY,AND FRIDAY AS DIRECTED, Disp: 42.5 g, Rfl: 7    magnesium gluconate (MAG-G ORAL), Take by mouth., Disp: , Rfl:     mirabegron (MYRBETRIQ) 50 mg Tb24, Take 1 tablet (50 mg total) by mouth once daily., Disp: 30 tablet, Rfl: 11    omeprazole (PRILOSEC) 20 MG capsule, Take 20 mg by mouth once daily., Disp: , Rfl:     oxybutynin (DITROPAN-XL) 10 MG 24 hr tablet, Take 10 mg by mouth once daily., Disp: , Rfl:     rosuvastatin (CRESTOR) 5 MG tablet, Take 1 tablet on Djloip-Iwdfjnjaw-Roxtku for 1 month then increase to daily if tolerating medication, Disp: 30 tablet, Rfl: 3    sertraline (ZOLOFT) 100 MG " "tablet, Take 1 tablet (100 mg total) by mouth once daily., Disp: 90 tablet, Rfl: 1    sumatriptan (IMITREX) 100 MG tablet, 1 tab po at start of headache, may repeat in 2 hours X1 in 24 hours. (Patient taking differently: as needed. 1 tab po at start of headache, may repeat in 2 hours X1 in 24 hours.), Disp: 10 tablet, Rfl: 11    topiramate (TOPAMAX) 50 MG tablet, Take 50 mg by mouth 2 (two) times daily., Disp: , Rfl:     traMADol (ULTRAM) 50 mg tablet, , Disp: , Rfl: 5    triamterene-hydrochlorothiazide 37.5-25 mg (MAXZIDE-25) 37.5-25 mg per tablet, 1/2 p.o. q.d., Disp: 90 tablet, Rfl: 0    valACYclovir (VALTREX) 500 MG tablet, Take 2 tablets (1,000 mg total) by mouth 2 (two) times daily., Disp: 12 tablet, Rfl: 3    ZINC ORAL, Take by mouth., Disp: , Rfl:     Review of Systems   Constitutional: Negative for appetite change, chills, fatigue (improved), fever and unexpected weight change.   HENT: Negative for trouble swallowing.    Eyes: Negative for visual disturbance.   Respiratory: Positive for cough (mild). Negative for shortness of breath and wheezing.    Cardiovascular: Negative for chest pain, palpitations and leg swelling.   Gastrointestinal: Negative for abdominal pain, constipation and diarrhea.   Genitourinary: Negative for dysuria, frequency and hematuria.   Skin: Negative for rash.   Neurological: Positive for headaches (chronic HAs). Negative for dizziness, syncope, speech difficulty, weakness and numbness.          Objective:      Vitals:    11/16/20 0959   BP: 122/80   Pulse: 70   Temp: 97.7 °F (36.5 °C)   Weight: 73.9 kg (163 lb)   Height: 5' 4.5" (1.638 m)     Physical Exam  Vitals signs and nursing note reviewed.   Constitutional:       General: She is not in acute distress.     Appearance: Normal appearance. She is well-developed and normal weight.   HENT:      Head: Normocephalic and atraumatic.      Right Ear: Tympanic membrane and ear canal normal.      Left Ear: Tympanic membrane and ear " canal normal.   Neck:      Musculoskeletal: Neck supple.      Vascular: No carotid bruit.   Cardiovascular:      Rate and Rhythm: Normal rate and regular rhythm.      Heart sounds: No murmur. No friction rub. No gallop.    Pulmonary:      Effort: Pulmonary effort is normal. No respiratory distress.      Breath sounds: Normal breath sounds. No wheezing or rales.   Abdominal:      General: There is no distension.      Palpations: Abdomen is soft.      Tenderness: There is no abdominal tenderness.   Musculoskeletal:      Right lower leg: No edema.      Left lower leg: No edema.   Lymphadenopathy:      Cervical: No cervical adenopathy.   Skin:     General: Skin is warm and dry.      Findings: No rash.   Neurological:      General: No focal deficit present.      Mental Status: She is alert and oriented to person, place, and time.      Gait: Gait normal.   Psychiatric:         Mood and Affect: Mood normal.           Assessment:       1. Dyslipidemia    2. HTN (hypertension), benign    3. Chronic nonintractable headache, unspecified headache type    4. OAB (overactive bladder)    5. Menopause    6. Screening for osteoporosis           Plan:       Dyslipidemia  Comments:  reviewed recent labs with pt- much improved on statin    HTN (hypertension), benign  Comments:  BP well controlled  Orders:  -     triamterene-hydrochlorothiazide 37.5-25 mg (MAXZIDE-25) 37.5-25 mg per tablet; 1/2 p.o. q.d.  Dispense: 90 tablet; Refill: 0    Chronic nonintractable headache, unspecified headache type  Comments:  just started topamax yesterday- advised to call for worsening    OAB (overactive bladder)  Comments:  controlled with oxybutynin/myrbetriq    Menopause  -     DXA Bone Density Spine And Hip; Future; Expected date: 11/23/2020    Screening for osteoporosis  -     DXA Bone Density Spine And Hip; Future; Expected date: 11/23/2020    Other orders  -     valACYclovir (VALTREX) 500 MG tablet; Take 2 tablets (1,000 mg total) by mouth 2  (two) times daily.  Dispense: 12 tablet; Refill: 3      Follow up in about 6 months (around 5/16/2021) for lipids, HTN, headaches.        11/16/2020 Elle Mack NP

## 2020-12-07 ENCOUNTER — HOSPITAL ENCOUNTER (OUTPATIENT)
Dept: RADIOLOGY | Facility: HOSPITAL | Age: 75
Discharge: HOME | End: 2020-12-07
Attending: NURSE PRACTITIONER
Payer: MEDICARE

## 2020-12-07 DIAGNOSIS — Z78.0 MENOPAUSE: ICD-10-CM

## 2020-12-07 DIAGNOSIS — Z13.820 SCREENING FOR OSTEOPOROSIS: ICD-10-CM

## 2020-12-07 PROCEDURE — 77080 DXA BONE DENSITY AXIAL: CPT | Mod: TC,PO

## 2021-01-14 ENCOUNTER — TELEPHONE (OUTPATIENT)
Dept: UROGYNECOLOGY | Facility: CLINIC | Age: 76
End: 2021-01-14

## 2021-01-14 NOTE — TELEPHONE ENCOUNTER
----- Message from Butch GONZALES Frisard sent at 1/14/2021 11:01 AM CST -----  Contact: patient  Type:  RX Refill Request    Who Called:  patient  Refill or New Rx:  new    estradioL (ESTRACE) 0.01 % (0.1 mg/gram) vaginal cream 42.5 g 7 10/13/2020 Sig: INSERT 1 GM VAGINALLY MONDAY,WEDNESDAY,AND FRIDAY AS DIRECTED    mirabegron (MYRBETRIQ) 50 mg Tb24 30 tablet 11 4/22/2020 4/22/2021 --  Sig - Route: Take 1 tablet (50 mg total) by mouth once daily. - Oral      Preferred Pharmacy with phone number:      [facility]  Fax number: [phone]  Customer Number: [id number] (must be on Rx)  Email Address: [email or web address]    Ordering Provider:  [place]  Best Call Back Number: [phone]  Additional Information:  #patient wanted to see if Dr. Putnam had any samples of her Rx for Myrbetriq because if not patient has to pay a $500 deductible.  This would be to hold her over until her Rx's came in from [facility].#

## 2021-01-15 ENCOUNTER — PATIENT MESSAGE (OUTPATIENT)
Dept: FAMILY MEDICINE | Facility: CLINIC | Age: 76
End: 2021-01-15

## 2021-01-15 RX ORDER — TOPIRAMATE 50 MG/1
50 TABLET, FILM COATED ORAL 2 TIMES DAILY
Qty: 180 TABLET | Refills: 1 | Status: SHIPPED | OUTPATIENT
Start: 2021-01-15

## 2021-01-16 ENCOUNTER — PATIENT MESSAGE (OUTPATIENT)
Dept: FAMILY MEDICINE | Facility: CLINIC | Age: 76
End: 2021-01-16

## 2021-01-19 ENCOUNTER — TELEPHONE (OUTPATIENT)
Dept: UROGYNECOLOGY | Facility: CLINIC | Age: 76
End: 2021-01-19

## 2021-01-19 RX ORDER — ESTRADIOL 0.1 MG/G
1 CREAM VAGINAL DAILY
Qty: 42.5 G | Refills: 3 | Status: SHIPPED | OUTPATIENT
Start: 2021-01-19 | End: 2021-12-20

## 2021-01-19 RX ORDER — MIRABEGRON 50 MG/1
1 TABLET, FILM COATED, EXTENDED RELEASE ORAL DAILY
Qty: 90 TABLET | Refills: 3 | Status: SHIPPED | OUTPATIENT
Start: 2021-01-19 | End: 2022-04-05 | Stop reason: CLARIF

## 2021-02-01 DIAGNOSIS — E78.5 DYSLIPIDEMIA: ICD-10-CM

## 2021-02-01 RX ORDER — ROSUVASTATIN CALCIUM 5 MG/1
5 TABLET, COATED ORAL NIGHTLY
Qty: 90 TABLET | Refills: 1 | Status: SHIPPED | OUTPATIENT
Start: 2021-02-01 | End: 2021-11-08 | Stop reason: SDUPTHER

## 2021-02-12 ENCOUNTER — HOSPITAL ENCOUNTER (OUTPATIENT)
Dept: RADIOLOGY | Facility: HOSPITAL | Age: 76
Discharge: HOME | End: 2021-02-12
Attending: INTERNAL MEDICINE
Payer: MEDICARE

## 2021-02-12 DIAGNOSIS — K57.32 DIVERTICULITIS OF LARGE INTESTINE WITHOUT PERFORATION OR ABSCESS WITHOUT BLEEDING: ICD-10-CM

## 2021-02-12 DIAGNOSIS — K57.32 DIVERTICULITIS OF LARGE INTESTINE WITHOUT PERFORATION OR ABSCESS WITHOUT BLEEDING: Primary | ICD-10-CM

## 2021-02-12 PROCEDURE — 25500020 PHARM REV CODE 255: Performed by: INTERNAL MEDICINE

## 2021-02-12 PROCEDURE — 74177 CT ABD & PELVIS W/CONTRAST: CPT | Mod: TC

## 2021-02-12 RX ADMIN — IOHEXOL 100 ML: 350 INJECTION, SOLUTION INTRAVENOUS at 01:02

## 2021-03-18 ENCOUNTER — OFFICE VISIT (OUTPATIENT)
Dept: UROGYNECOLOGY | Facility: CLINIC | Age: 76
End: 2021-03-18
Payer: MEDICARE

## 2021-03-18 VITALS
SYSTOLIC BLOOD PRESSURE: 148 MMHG | BODY MASS INDEX: 26.76 KG/M2 | WEIGHT: 156.75 LBS | HEIGHT: 64 IN | DIASTOLIC BLOOD PRESSURE: 87 MMHG | HEART RATE: 74 BPM

## 2021-03-18 DIAGNOSIS — R35.0 URINARY FREQUENCY: Primary | ICD-10-CM

## 2021-03-18 DIAGNOSIS — K59.00 CONSTIPATION, UNSPECIFIED CONSTIPATION TYPE: ICD-10-CM

## 2021-03-18 LAB
BILIRUB SERPL-MCNC: NORMAL MG/DL
BLOOD URINE, POC: NORMAL
CLARITY, POC UA: CLEAR
COLOR, POC UA: YELLOW
GLUCOSE UR QL STRIP: NORMAL
KETONES UR QL STRIP: NORMAL
LEUKOCYTE ESTERASE URINE, POC: NORMAL
NITRITE, POC UA: NORMAL
PH, POC UA: 5
PROTEIN, POC: NORMAL
SPECIFIC GRAVITY, POC UA: 1020
UROBILINOGEN, POC UA: NORMAL

## 2021-03-18 PROCEDURE — 99999 PR PBB SHADOW E&M-EST. PATIENT-LVL III: CPT | Mod: PBBFAC,,, | Performed by: OBSTETRICS & GYNECOLOGY

## 2021-03-18 PROCEDURE — 81002 URINALYSIS NONAUTO W/O SCOPE: CPT | Mod: PBBFAC,PO | Performed by: OBSTETRICS & GYNECOLOGY

## 2021-03-18 PROCEDURE — 99213 PR OFFICE/OUTPT VISIT, EST, LEVL III, 20-29 MIN: ICD-10-PCS | Mod: S$PBB,,, | Performed by: OBSTETRICS & GYNECOLOGY

## 2021-03-18 PROCEDURE — 99999 PR PBB SHADOW E&M-EST. PATIENT-LVL III: ICD-10-PCS | Mod: PBBFAC,,, | Performed by: OBSTETRICS & GYNECOLOGY

## 2021-03-18 PROCEDURE — 99213 OFFICE O/P EST LOW 20 MIN: CPT | Mod: S$PBB,,, | Performed by: OBSTETRICS & GYNECOLOGY

## 2021-03-18 PROCEDURE — 99213 OFFICE O/P EST LOW 20 MIN: CPT | Mod: PBBFAC,PO | Performed by: OBSTETRICS & GYNECOLOGY

## 2021-05-17 ENCOUNTER — TELEPHONE (OUTPATIENT)
Dept: FAMILY MEDICINE | Facility: CLINIC | Age: 76
End: 2021-05-17

## 2021-05-19 ENCOUNTER — TELEPHONE (OUTPATIENT)
Dept: FAMILY MEDICINE | Facility: CLINIC | Age: 76
End: 2021-05-19

## 2021-05-19 DIAGNOSIS — I10 HTN (HYPERTENSION), BENIGN: ICD-10-CM

## 2021-05-19 DIAGNOSIS — E78.5 DYSLIPIDEMIA: ICD-10-CM

## 2021-05-19 DIAGNOSIS — I10 ESSENTIAL HYPERTENSION: Primary | ICD-10-CM

## 2021-05-20 ENCOUNTER — TELEPHONE (OUTPATIENT)
Dept: FAMILY MEDICINE | Facility: CLINIC | Age: 76
End: 2021-05-20

## 2021-05-20 LAB
ALBUMIN SERPL-MCNC: 4.4 G/DL (ref 3.6–5.1)
ALBUMIN/GLOB SERPL: 1.8 (CALC) (ref 1–2.5)
ALP SERPL-CCNC: 65 U/L (ref 37–153)
ALT SERPL-CCNC: 11 U/L (ref 6–29)
APPEARANCE UR: CLEAR
AST SERPL-CCNC: 17 U/L (ref 10–35)
BACTERIA #/AREA URNS HPF: ABNORMAL /HPF
BACTERIA UR CULT: ABNORMAL
BASOPHILS # BLD AUTO: 110 CELLS/UL (ref 0–200)
BASOPHILS NFR BLD AUTO: 1.6 %
BILIRUB SERPL-MCNC: 0.4 MG/DL (ref 0.2–1.2)
BILIRUB UR QL STRIP: NEGATIVE
BUN SERPL-MCNC: 14 MG/DL (ref 7–25)
BUN/CREAT SERPL: ABNORMAL (CALC) (ref 6–22)
CALCIUM SERPL-MCNC: 9.4 MG/DL (ref 8.6–10.4)
CHLORIDE SERPL-SCNC: 103 MMOL/L (ref 98–110)
CHOLEST SERPL-MCNC: 194 MG/DL
CHOLEST/HDLC SERPL: 3.3 (CALC)
CO2 SERPL-SCNC: 34 MMOL/L (ref 20–32)
COLOR UR: YELLOW
CREAT SERPL-MCNC: 0.65 MG/DL (ref 0.6–0.93)
EOSINOPHIL # BLD AUTO: 179 CELLS/UL (ref 15–500)
EOSINOPHIL NFR BLD AUTO: 2.6 %
ERYTHROCYTE [DISTWIDTH] IN BLOOD BY AUTOMATED COUNT: 13 % (ref 11–15)
GLOBULIN SER CALC-MCNC: 2.4 G/DL (CALC) (ref 1.9–3.7)
GLUCOSE SERPL-MCNC: 88 MG/DL (ref 65–99)
GLUCOSE UR QL STRIP: NEGATIVE
HCT VFR BLD AUTO: 45 % (ref 35–45)
HDLC SERPL-MCNC: 59 MG/DL
HGB BLD-MCNC: 14.9 G/DL (ref 11.7–15.5)
HGB UR QL STRIP: NEGATIVE
HYALINE CASTS #/AREA URNS LPF: ABNORMAL /LPF
KETONES UR QL STRIP: NEGATIVE
LDLC SERPL CALC-MCNC: 101 MG/DL (CALC)
LEUKOCYTE ESTERASE UR QL STRIP: NEGATIVE
LYMPHOCYTES # BLD AUTO: 1525 CELLS/UL (ref 850–3900)
LYMPHOCYTES NFR BLD AUTO: 22.1 %
MCH RBC QN AUTO: 30.8 PG (ref 27–33)
MCHC RBC AUTO-ENTMCNC: 33.1 G/DL (ref 32–36)
MCV RBC AUTO: 93.2 FL (ref 80–100)
MONOCYTES # BLD AUTO: 635 CELLS/UL (ref 200–950)
MONOCYTES NFR BLD AUTO: 9.2 %
NEUTROPHILS # BLD AUTO: 4451 CELLS/UL (ref 1500–7800)
NEUTROPHILS NFR BLD AUTO: 64.5 %
NITRITE UR QL STRIP: POSITIVE
NONHDLC SERPL-MCNC: 135 MG/DL (CALC)
PH UR STRIP: ABNORMAL [PH] (ref 5–8)
PLATELET # BLD AUTO: 246 THOUSAND/UL (ref 140–400)
PMV BLD REES-ECKER: 10.6 FL (ref 7.5–12.5)
POTASSIUM SERPL-SCNC: 4.6 MMOL/L (ref 3.5–5.3)
PROT SERPL-MCNC: 6.8 G/DL (ref 6.1–8.1)
PROT UR QL STRIP: NEGATIVE
RBC # BLD AUTO: 4.83 MILLION/UL (ref 3.8–5.1)
RBC #/AREA URNS HPF: ABNORMAL /HPF
SODIUM SERPL-SCNC: 143 MMOL/L (ref 135–146)
SP GR UR STRIP: 1.01 (ref 1–1.03)
SQUAMOUS #/AREA URNS HPF: ABNORMAL /HPF
TRIGL SERPL-MCNC: 219 MG/DL
TSH SERPL-ACNC: 2.05 MIU/L (ref 0.4–4.5)
WBC # BLD AUTO: 6.9 THOUSAND/UL (ref 3.8–10.8)
WBC #/AREA URNS HPF: ABNORMAL /HPF

## 2021-05-24 ENCOUNTER — PATIENT MESSAGE (OUTPATIENT)
Dept: FAMILY MEDICINE | Facility: CLINIC | Age: 76
End: 2021-05-24

## 2021-05-24 RX ORDER — CEFUROXIME AXETIL 500 MG/1
500 TABLET ORAL 2 TIMES DAILY
Qty: 14 TABLET | Refills: 0 | Status: SHIPPED | OUTPATIENT
Start: 2021-05-24 | End: 2021-05-31

## 2021-06-04 ENCOUNTER — OFFICE VISIT (OUTPATIENT)
Dept: FAMILY MEDICINE | Facility: CLINIC | Age: 76
End: 2021-06-04
Payer: MEDICARE

## 2021-06-04 VITALS
SYSTOLIC BLOOD PRESSURE: 128 MMHG | HEART RATE: 60 BPM | WEIGHT: 157 LBS | BODY MASS INDEX: 26.8 KG/M2 | DIASTOLIC BLOOD PRESSURE: 78 MMHG | HEIGHT: 64 IN

## 2021-06-04 DIAGNOSIS — D69.2 SOLAR PURPURA: ICD-10-CM

## 2021-06-04 DIAGNOSIS — G89.29 CHRONIC NONINTRACTABLE HEADACHE, UNSPECIFIED HEADACHE TYPE: ICD-10-CM

## 2021-06-04 DIAGNOSIS — F33.0 MILD EPISODE OF RECURRENT MAJOR DEPRESSIVE DISORDER: ICD-10-CM

## 2021-06-04 DIAGNOSIS — N32.81 OAB (OVERACTIVE BLADDER): ICD-10-CM

## 2021-06-04 DIAGNOSIS — E78.5 DYSLIPIDEMIA: Primary | ICD-10-CM

## 2021-06-04 DIAGNOSIS — R51.9 CHRONIC NONINTRACTABLE HEADACHE, UNSPECIFIED HEADACHE TYPE: ICD-10-CM

## 2021-06-04 DIAGNOSIS — I10 ESSENTIAL HYPERTENSION: ICD-10-CM

## 2021-06-04 PROCEDURE — 99214 OFFICE O/P EST MOD 30 MIN: CPT | Mod: S$GLB,,, | Performed by: NURSE PRACTITIONER

## 2021-06-04 PROCEDURE — 99214 PR OFFICE/OUTPT VISIT, EST, LEVL IV, 30-39 MIN: ICD-10-PCS | Mod: S$GLB,,, | Performed by: NURSE PRACTITIONER

## 2021-06-04 RX ORDER — SERTRALINE HYDROCHLORIDE 100 MG/1
150 TABLET, FILM COATED ORAL DAILY
Qty: 135 TABLET | Refills: 3 | Status: SHIPPED | OUTPATIENT
Start: 2021-06-04 | End: 2022-11-18 | Stop reason: SDUPTHER

## 2021-06-04 RX ORDER — CICLOPIROX 80 MG/ML
SOLUTION TOPICAL
COMMUNITY
Start: 2021-05-05 | End: 2022-01-18

## 2021-06-04 RX ORDER — DOCUSATE CALCIUM 240 MG
240 CAPSULE ORAL 2 TIMES DAILY
COMMUNITY

## 2021-06-17 ENCOUNTER — OFFICE VISIT (OUTPATIENT)
Dept: FAMILY MEDICINE | Facility: CLINIC | Age: 76
End: 2021-06-17
Payer: MEDICARE

## 2021-06-17 ENCOUNTER — TELEPHONE (OUTPATIENT)
Dept: PSYCHIATRY | Facility: CLINIC | Age: 76
End: 2021-06-17

## 2021-06-17 VITALS
BODY MASS INDEX: 27.03 KG/M2 | TEMPERATURE: 98 F | OXYGEN SATURATION: 97 % | DIASTOLIC BLOOD PRESSURE: 72 MMHG | WEIGHT: 158.31 LBS | SYSTOLIC BLOOD PRESSURE: 124 MMHG | HEART RATE: 75 BPM | HEIGHT: 64 IN

## 2021-06-17 DIAGNOSIS — F33.0 MILD EPISODE OF RECURRENT MAJOR DEPRESSIVE DISORDER: ICD-10-CM

## 2021-06-17 DIAGNOSIS — R26.9 ABNORMALITY OF GAIT AND MOBILITY: ICD-10-CM

## 2021-06-17 DIAGNOSIS — Z00.00 ENCOUNTER FOR PREVENTIVE HEALTH EXAMINATION: Primary | ICD-10-CM

## 2021-06-17 DIAGNOSIS — J84.10 CALCIFIED GRANULOMA OF LUNG: ICD-10-CM

## 2021-06-17 DIAGNOSIS — I10 ESSENTIAL HYPERTENSION: ICD-10-CM

## 2021-06-17 DIAGNOSIS — E78.00 HYPERCHOLESTEREMIA: ICD-10-CM

## 2021-06-17 DIAGNOSIS — D69.2 SOLAR PURPURA: ICD-10-CM

## 2021-06-17 PROCEDURE — G0439 PPPS, SUBSEQ VISIT: HCPCS | Mod: S$GLB,,, | Performed by: NURSE PRACTITIONER

## 2021-06-17 PROCEDURE — G0439 PR MEDICARE ANNUAL WELLNESS SUBSEQUENT VISIT: ICD-10-PCS | Mod: S$GLB,,, | Performed by: NURSE PRACTITIONER

## 2021-06-21 ENCOUNTER — TELEPHONE (OUTPATIENT)
Dept: FAMILY MEDICINE | Facility: CLINIC | Age: 76
End: 2021-06-21

## 2021-06-24 ENCOUNTER — PATIENT MESSAGE (OUTPATIENT)
Dept: PSYCHIATRY | Facility: CLINIC | Age: 76
End: 2021-06-24

## 2021-06-28 ENCOUNTER — TELEPHONE (OUTPATIENT)
Dept: FAMILY MEDICINE | Facility: CLINIC | Age: 76
End: 2021-06-28

## 2021-06-29 ENCOUNTER — TELEPHONE (OUTPATIENT)
Dept: FAMILY MEDICINE | Facility: CLINIC | Age: 76
End: 2021-06-29

## 2021-06-29 ENCOUNTER — OFFICE VISIT (OUTPATIENT)
Dept: FAMILY MEDICINE | Facility: CLINIC | Age: 76
End: 2021-06-29
Payer: MEDICARE

## 2021-06-29 VITALS
WEIGHT: 161 LBS | DIASTOLIC BLOOD PRESSURE: 80 MMHG | HEIGHT: 64 IN | HEART RATE: 74 BPM | BODY MASS INDEX: 27.49 KG/M2 | SYSTOLIC BLOOD PRESSURE: 120 MMHG | OXYGEN SATURATION: 98 %

## 2021-06-29 DIAGNOSIS — J40 BRONCHITIS: Primary | ICD-10-CM

## 2021-06-29 DIAGNOSIS — F33.0 MILD EPISODE OF RECURRENT MAJOR DEPRESSIVE DISORDER: ICD-10-CM

## 2021-06-29 PROCEDURE — 99213 PR OFFICE/OUTPT VISIT, EST, LEVL III, 20-29 MIN: ICD-10-PCS | Mod: S$GLB,,, | Performed by: NURSE PRACTITIONER

## 2021-06-29 PROCEDURE — 99213 OFFICE O/P EST LOW 20 MIN: CPT | Mod: S$GLB,,, | Performed by: NURSE PRACTITIONER

## 2021-06-29 RX ORDER — METHYLPREDNISOLONE 4 MG/1
TABLET ORAL
Qty: 1 PACKAGE | Refills: 0 | Status: SHIPPED | OUTPATIENT
Start: 2021-06-29 | End: 2021-07-20

## 2021-08-02 ENCOUNTER — HOSPITAL ENCOUNTER (OUTPATIENT)
Dept: RADIOLOGY | Facility: HOSPITAL | Age: 76
Discharge: HOME | End: 2021-08-02
Attending: NURSE PRACTITIONER
Payer: MEDICARE

## 2021-08-02 ENCOUNTER — OFFICE VISIT (OUTPATIENT)
Dept: FAMILY MEDICINE | Facility: CLINIC | Age: 76
End: 2021-08-02
Payer: MEDICARE

## 2021-08-02 VITALS
SYSTOLIC BLOOD PRESSURE: 122 MMHG | HEART RATE: 68 BPM | OXYGEN SATURATION: 97 % | HEIGHT: 64 IN | WEIGHT: 158 LBS | BODY MASS INDEX: 26.98 KG/M2 | DIASTOLIC BLOOD PRESSURE: 84 MMHG

## 2021-08-02 DIAGNOSIS — R05.9 COUGH: Primary | ICD-10-CM

## 2021-08-02 DIAGNOSIS — R05.9 COUGH: ICD-10-CM

## 2021-08-02 PROCEDURE — 99213 PR OFFICE/OUTPT VISIT, EST, LEVL III, 20-29 MIN: ICD-10-PCS | Mod: S$GLB,,, | Performed by: NURSE PRACTITIONER

## 2021-08-02 PROCEDURE — 71046 X-RAY EXAM CHEST 2 VIEWS: CPT | Mod: TC,PO

## 2021-08-02 PROCEDURE — 99213 OFFICE O/P EST LOW 20 MIN: CPT | Mod: S$GLB,,, | Performed by: NURSE PRACTITIONER

## 2021-08-02 RX ORDER — DOXYCYCLINE 100 MG/1
100 CAPSULE ORAL EVERY 12 HOURS
Qty: 14 CAPSULE | Refills: 0 | Status: SHIPPED | OUTPATIENT
Start: 2021-08-02 | End: 2021-10-26

## 2021-08-02 RX ORDER — FLUTICASONE PROPIONATE 50 MCG
1 SPRAY, SUSPENSION (ML) NASAL DAILY
Qty: 16 G | Refills: 2 | Status: SHIPPED | OUTPATIENT
Start: 2021-08-02

## 2021-08-02 RX ORDER — BENZONATATE 100 MG/1
100 CAPSULE ORAL 3 TIMES DAILY PRN
Qty: 30 CAPSULE | Refills: 1 | Status: SHIPPED | OUTPATIENT
Start: 2021-08-02 | End: 2021-08-12

## 2021-08-02 RX ORDER — TRIAMCINOLONE ACETONIDE 0.25 MG/G
CREAM TOPICAL
COMMUNITY
Start: 2021-07-28 | End: 2022-12-27

## 2021-09-13 DIAGNOSIS — M25.561 PAIN IN BOTH KNEES, UNSPECIFIED CHRONICITY: Primary | ICD-10-CM

## 2021-09-13 DIAGNOSIS — M25.562 PAIN IN BOTH KNEES, UNSPECIFIED CHRONICITY: Primary | ICD-10-CM

## 2021-09-14 ENCOUNTER — HOSPITAL ENCOUNTER (OUTPATIENT)
Dept: RADIOLOGY | Facility: HOSPITAL | Age: 76
Discharge: HOME | End: 2021-09-14
Attending: ORTHOPAEDIC SURGERY
Payer: MEDICARE

## 2021-09-14 ENCOUNTER — OFFICE VISIT (OUTPATIENT)
Dept: ORTHOPEDICS | Facility: CLINIC | Age: 76
End: 2021-09-14
Payer: MEDICARE

## 2021-09-14 VITALS — BODY MASS INDEX: 28 KG/M2 | HEIGHT: 63 IN | RESPIRATION RATE: 17 BRPM | WEIGHT: 158 LBS

## 2021-09-14 DIAGNOSIS — M17.12 ARTHRITIS OF LEFT KNEE: Primary | ICD-10-CM

## 2021-09-14 DIAGNOSIS — M25.561 PAIN IN BOTH KNEES, UNSPECIFIED CHRONICITY: ICD-10-CM

## 2021-09-14 DIAGNOSIS — M79.641 RIGHT HAND PAIN: ICD-10-CM

## 2021-09-14 DIAGNOSIS — M25.562 PAIN IN BOTH KNEES, UNSPECIFIED CHRONICITY: ICD-10-CM

## 2021-09-14 DIAGNOSIS — M17.11 ARTHRITIS OF KNEE, RIGHT: ICD-10-CM

## 2021-09-14 DIAGNOSIS — M19.041 OSTEOARTHRITIS OF RIGHT HAND, UNSPECIFIED OSTEOARTHRITIS TYPE: Primary | ICD-10-CM

## 2021-09-14 PROCEDURE — 73130 X-RAY EXAM OF HAND: CPT | Mod: TC,PN,RT

## 2021-09-14 PROCEDURE — 99999 PR PBB SHADOW E&M-EST. PATIENT-LVL IV: ICD-10-PCS | Mod: PBBFAC,,, | Performed by: ORTHOPAEDIC SURGERY

## 2021-09-14 PROCEDURE — 73564 X-RAY EXAM KNEE 4 OR MORE: CPT | Mod: 26,50,, | Performed by: RADIOLOGY

## 2021-09-14 PROCEDURE — 20610 DRAIN/INJ JOINT/BURSA W/O US: CPT | Mod: 50,PBBFAC,PN | Performed by: ORTHOPAEDIC SURGERY

## 2021-09-14 PROCEDURE — 99999 PR PBB SHADOW E&M-EST. PATIENT-LVL IV: CPT | Mod: PBBFAC,,, | Performed by: ORTHOPAEDIC SURGERY

## 2021-09-14 PROCEDURE — 99214 PR OFFICE/OUTPT VISIT, EST, LEVL IV, 30-39 MIN: ICD-10-PCS | Mod: S$PBB,25,, | Performed by: ORTHOPAEDIC SURGERY

## 2021-09-14 PROCEDURE — 99214 OFFICE O/P EST MOD 30 MIN: CPT | Mod: PBBFAC,PN,25 | Performed by: ORTHOPAEDIC SURGERY

## 2021-09-14 PROCEDURE — 20610 LARGE JOINT ASPIRATION/INJECTION: BILATERAL KNEE: ICD-10-PCS | Mod: 50,S$PBB,, | Performed by: ORTHOPAEDIC SURGERY

## 2021-09-14 PROCEDURE — 73130 X-RAY EXAM OF HAND: CPT | Mod: 26,RT,, | Performed by: RADIOLOGY

## 2021-09-14 PROCEDURE — 73564 XR KNEE ORTHO BILAT WITH FLEXION: ICD-10-PCS | Mod: 26,50,, | Performed by: RADIOLOGY

## 2021-09-14 PROCEDURE — 73564 X-RAY EXAM KNEE 4 OR MORE: CPT | Mod: TC,50,PN

## 2021-09-14 PROCEDURE — 99214 OFFICE O/P EST MOD 30 MIN: CPT | Mod: S$PBB,25,, | Performed by: ORTHOPAEDIC SURGERY

## 2021-09-14 PROCEDURE — 73130 XR HAND COMPLETE 3 VIEW RIGHT: ICD-10-PCS | Mod: 26,RT,, | Performed by: RADIOLOGY

## 2021-09-14 RX ORDER — TRIAMCINOLONE ACETONIDE 40 MG/ML
40 INJECTION, SUSPENSION INTRA-ARTICULAR; INTRAMUSCULAR
Status: DISCONTINUED | OUTPATIENT
Start: 2021-09-14 | End: 2021-09-14 | Stop reason: HOSPADM

## 2021-09-14 RX ADMIN — TRIAMCINOLONE ACETONIDE 40 MG: 40 INJECTION, SUSPENSION INTRA-ARTICULAR; INTRAMUSCULAR at 10:09

## 2021-09-29 ENCOUNTER — OFFICE VISIT (OUTPATIENT)
Dept: UROGYNECOLOGY | Facility: CLINIC | Age: 76
End: 2021-09-29
Payer: MEDICARE

## 2021-09-29 VITALS
HEIGHT: 63 IN | WEIGHT: 158.06 LBS | BODY MASS INDEX: 28 KG/M2 | HEART RATE: 62 BPM | SYSTOLIC BLOOD PRESSURE: 149 MMHG | DIASTOLIC BLOOD PRESSURE: 77 MMHG

## 2021-09-29 DIAGNOSIS — N39.41 URGE INCONTINENCE: Primary | ICD-10-CM

## 2021-09-29 PROCEDURE — 99999 PR PBB SHADOW E&M-EST. PATIENT-LVL III: CPT | Mod: PBBFAC,,, | Performed by: OBSTETRICS & GYNECOLOGY

## 2021-09-29 PROCEDURE — 99212 OFFICE O/P EST SF 10 MIN: CPT | Mod: S$PBB,,, | Performed by: OBSTETRICS & GYNECOLOGY

## 2021-09-29 PROCEDURE — 99212 PR OFFICE/OUTPT VISIT, EST, LEVL II, 10-19 MIN: ICD-10-PCS | Mod: S$PBB,,, | Performed by: OBSTETRICS & GYNECOLOGY

## 2021-09-29 PROCEDURE — 99213 OFFICE O/P EST LOW 20 MIN: CPT | Mod: PBBFAC,PO | Performed by: OBSTETRICS & GYNECOLOGY

## 2021-09-29 PROCEDURE — 99999 PR PBB SHADOW E&M-EST. PATIENT-LVL III: ICD-10-PCS | Mod: PBBFAC,,, | Performed by: OBSTETRICS & GYNECOLOGY

## 2021-09-30 ENCOUNTER — PATIENT MESSAGE (OUTPATIENT)
Dept: UROGYNECOLOGY | Facility: CLINIC | Age: 76
End: 2021-09-30

## 2021-10-01 ENCOUNTER — PATIENT MESSAGE (OUTPATIENT)
Dept: UROGYNECOLOGY | Facility: CLINIC | Age: 76
End: 2021-10-01

## 2021-10-26 ENCOUNTER — PATIENT MESSAGE (OUTPATIENT)
Dept: UROGYNECOLOGY | Facility: CLINIC | Age: 76
End: 2021-10-26
Payer: MEDICARE

## 2021-10-26 ENCOUNTER — OFFICE VISIT (OUTPATIENT)
Dept: FAMILY MEDICINE | Facility: CLINIC | Age: 76
End: 2021-10-26
Payer: MEDICARE

## 2021-10-26 VITALS
HEART RATE: 76 BPM | SYSTOLIC BLOOD PRESSURE: 122 MMHG | BODY MASS INDEX: 27 KG/M2 | WEIGHT: 152.38 LBS | DIASTOLIC BLOOD PRESSURE: 72 MMHG | HEIGHT: 63 IN

## 2021-10-26 DIAGNOSIS — Z01.818 PREOPERATIVE CLEARANCE: Primary | ICD-10-CM

## 2021-10-26 DIAGNOSIS — N32.81 OAB (OVERACTIVE BLADDER): ICD-10-CM

## 2021-10-26 DIAGNOSIS — I10 ESSENTIAL HYPERTENSION: ICD-10-CM

## 2021-10-26 PROCEDURE — 99214 PR OFFICE/OUTPT VISIT, EST, LEVL IV, 30-39 MIN: ICD-10-PCS | Mod: S$GLB,,, | Performed by: NURSE PRACTITIONER

## 2021-10-26 PROCEDURE — 99214 OFFICE O/P EST MOD 30 MIN: CPT | Mod: S$GLB,,, | Performed by: NURSE PRACTITIONER

## 2021-11-01 ENCOUNTER — TELEPHONE (OUTPATIENT)
Dept: UROLOGY | Facility: CLINIC | Age: 76
End: 2021-11-01
Payer: MEDICARE

## 2021-11-03 ENCOUNTER — PATIENT MESSAGE (OUTPATIENT)
Dept: UROLOGY | Facility: CLINIC | Age: 76
End: 2021-11-03
Payer: MEDICARE

## 2021-11-03 ENCOUNTER — TELEPHONE (OUTPATIENT)
Dept: UROLOGY | Facility: CLINIC | Age: 76
End: 2021-11-03
Payer: MEDICARE

## 2021-11-05 ENCOUNTER — TELEPHONE (OUTPATIENT)
Dept: UROLOGY | Facility: CLINIC | Age: 76
End: 2021-11-05
Payer: MEDICARE

## 2021-11-08 ENCOUNTER — TELEPHONE (OUTPATIENT)
Dept: UROLOGY | Facility: CLINIC | Age: 76
End: 2021-11-08

## 2021-11-08 ENCOUNTER — OFFICE VISIT (OUTPATIENT)
Dept: UROLOGY | Facility: CLINIC | Age: 76
End: 2021-11-08
Payer: MEDICARE

## 2021-11-08 VITALS
SYSTOLIC BLOOD PRESSURE: 131 MMHG | BODY MASS INDEX: 24.16 KG/M2 | HEART RATE: 68 BPM | HEIGHT: 65 IN | WEIGHT: 145 LBS | DIASTOLIC BLOOD PRESSURE: 79 MMHG

## 2021-11-08 DIAGNOSIS — N39.41 URGENCY INCONTINENCE: Primary | ICD-10-CM

## 2021-11-08 DIAGNOSIS — E78.5 DYSLIPIDEMIA: ICD-10-CM

## 2021-11-08 PROCEDURE — 99204 OFFICE O/P NEW MOD 45 MIN: CPT | Mod: S$PBB,,, | Performed by: UROLOGY

## 2021-11-08 PROCEDURE — 99204 PR OFFICE/OUTPT VISIT, NEW, LEVL IV, 45-59 MIN: ICD-10-PCS | Mod: S$PBB,,, | Performed by: UROLOGY

## 2021-11-08 PROCEDURE — 87086 URINE CULTURE/COLONY COUNT: CPT | Performed by: UROLOGY

## 2021-11-08 PROCEDURE — 99214 OFFICE O/P EST MOD 30 MIN: CPT | Mod: PBBFAC | Performed by: UROLOGY

## 2021-11-08 PROCEDURE — 99999 PR PBB SHADOW E&M-EST. PATIENT-LVL IV: ICD-10-PCS | Mod: PBBFAC,,, | Performed by: UROLOGY

## 2021-11-08 PROCEDURE — 99999 PR PBB SHADOW E&M-EST. PATIENT-LVL IV: CPT | Mod: PBBFAC,,, | Performed by: UROLOGY

## 2021-11-08 RX ORDER — ROSUVASTATIN CALCIUM 5 MG/1
5 TABLET, COATED ORAL NIGHTLY
Qty: 90 TABLET | Refills: 1 | Status: SHIPPED | OUTPATIENT
Start: 2021-11-08 | End: 2022-10-24 | Stop reason: SDUPTHER

## 2021-11-09 ENCOUNTER — TELEPHONE (OUTPATIENT)
Dept: UROLOGY | Facility: CLINIC | Age: 76
End: 2021-11-09
Payer: MEDICARE

## 2021-11-10 LAB
BACTERIA UR CULT: NORMAL
BACTERIA UR CULT: NORMAL

## 2021-12-09 ENCOUNTER — PATIENT MESSAGE (OUTPATIENT)
Dept: UROLOGY | Facility: CLINIC | Age: 76
End: 2021-12-09
Payer: MEDICARE

## 2022-01-12 ENCOUNTER — TELEPHONE (OUTPATIENT)
Dept: FAMILY MEDICINE | Facility: CLINIC | Age: 77
End: 2022-01-12
Payer: MEDICARE

## 2022-01-18 ENCOUNTER — OFFICE VISIT (OUTPATIENT)
Dept: FAMILY MEDICINE | Facility: CLINIC | Age: 77
End: 2022-01-18
Payer: MEDICARE

## 2022-01-18 VITALS
HEIGHT: 65 IN | WEIGHT: 154 LBS | SYSTOLIC BLOOD PRESSURE: 124 MMHG | HEART RATE: 70 BPM | BODY MASS INDEX: 25.66 KG/M2 | DIASTOLIC BLOOD PRESSURE: 80 MMHG

## 2022-01-18 DIAGNOSIS — I83.813 VARICOSE VEINS OF BOTH LOWER EXTREMITIES WITH PAIN: ICD-10-CM

## 2022-01-18 DIAGNOSIS — E78.5 DYSLIPIDEMIA: ICD-10-CM

## 2022-01-18 DIAGNOSIS — F33.0 MILD EPISODE OF RECURRENT MAJOR DEPRESSIVE DISORDER: ICD-10-CM

## 2022-01-18 DIAGNOSIS — N39.41 URGE INCONTINENCE OF URINE: Primary | ICD-10-CM

## 2022-01-18 PROCEDURE — 99214 PR OFFICE/OUTPT VISIT, EST, LEVL IV, 30-39 MIN: ICD-10-PCS | Mod: S$GLB,,, | Performed by: NURSE PRACTITIONER

## 2022-01-18 PROCEDURE — 99214 OFFICE O/P EST MOD 30 MIN: CPT | Mod: S$GLB,,, | Performed by: NURSE PRACTITIONER

## 2022-01-18 NOTE — PROGRESS NOTES
SUBJECTIVE:    Patient ID: Jazz Martinez is a 76 y.o. female.    Chief Complaint: Follow-up (No bottles, reviewed from list, vaccines taken//dp)    Pt here for regular f/u- lipids  Reports continues with large volume of urinary incontinence which is really affecting her quality of life. Dr. Ken referred her to specialist Dr. Hernandez, [facility] and she saw him in November- now scheduled for botox injections later this month. Denies any recent UTIs though reports has been having some intermittent dysuria the past week. Has been taking AZO prn  Has scheduled appt with Dr. Martinez, vein specialist in [place] for her varicose veins- has had several procedures in the past by Dr. Seth for varicose veins. C/o's of aching pain to bilat calfs, mild swelling at times, no ulcer/redness      Office Visit on 11/08/2021   Component Date Value Ref Range Status    Urine Culture, Routine 11/08/2021 Multiple organisms isolated. None in predominance.  Repeat if   Final    Urine Culture, Routine 11/08/2021 clinically necessary.   Final       Past Medical History:   Diagnosis Date    Arthritis     Corns and callosities     Deep vein thrombosis     GERD (gastroesophageal reflux disease)     Gout     H/O bladder problems     History of colonic polyps 2014    Hyperlipidemia     Hypertension     Pulmonary embolism      Past Surgical History:   Procedure Laterality Date    APPENDECTOMY  AGE 12    BLADDER SURGERY      Twice    BREAST CYST EXCISION      Benign per patient    EYE SURGERY      LASER FOR GLAUCOMA    HAND SURGERY Left     HYSTERECTOMY      Not related to cancer per patient.    OOPHORECTOMY      ROBOT-ASSISTED LAPAROSCOPIC ABDOMINAL SACROCOLPOPEXY USING DA KINGA XI N/A 12/17/2019    Procedure: XI ROBOTIC SACROCOLPOPEXY, ABDOMINAL;  Surgeon: Javier Putnam MD;  Location: [facility];  Service: OB/GYN;  Laterality: N/A;  removal of damaged tissue    SURGICAL PROCEDURE FOR STRESS  INCONTINENCE USING TENSION FREE VAGINAL TAPE N/A 12/17/2019    Procedure: SURGICAL PROCEDURE, USING TENSION FREE VAGINAL TAPE, FOR STRESS INCONTINENCE;  Surgeon: Javier Putnam MD;  Location: [facility];  Service: OB/GYN;  Laterality: N/A;    TONSILLECTOMY       Family History   Problem Relation Age of Onset    Cancer Brother         bladder    Breast cancer Neg Hx     Colon cancer Neg Hx     Ovarian cancer Neg Hx     Eczema Neg Hx     Lupus Neg Hx     Psoriasis Neg Hx     Melanoma Neg Hx        The CVD Risk score (Liliaino, et al., 2008) failed to calculate for the following reasons:    The 2008 CVD risk score is only valid for ages 30 to 74    The patient has a prior MI, stroke, CHF, or peripheral vascular disease diagnosis     Marital Status:   Alcohol History:  reports no history of alcohol use.  Tobacco History:  reports that she has never smoked. She has never used smokeless tobacco.  Drug History:  reports no history of drug use.    Health Maintenance Topics with due status: Not Due       Topic Last Completion Date    DEXA SCAN 12/07/2020    Colonoscopy 03/16/2021    Lipid Panel 05/19/2021     Immunization History   Administered Date(s) Administered    COVID-19, MRNA, LN-S, PF (MODERNA FULL 0.5 ML DOSE) 01/22/2021, 02/19/2021    Influenza 10/17/2016, 10/01/2017, 10/15/2020    Influenza - High Dose - PF (65 years and older) 10/03/2013, 10/07/2015, 10/17/2016, 11/08/2017, 10/15/2018, 09/26/2019, 10/13/2020    Influenza - Intradermal - Quadrivalent - PF 10/15/2018    Influenza - Quadrivalent - PF *Preferred* (6 months and older) 08/31/2011    Pneumococcal Conjugate - 13 Valent 10/17/2016    Pneumococcal Polysaccharide - 23 Valent 08/31/2011, 11/08/2017    Zoster 03/14/2015, 10/14/2019    Zoster Recombinant 10/14/2019, 07/14/2020       Review of patient's allergies indicates:   Allergen Reactions    Sulfa dyne Other (See Comments)     Blisters in mouth    Gabapentin      "Hydrocodone-acetaminophen      Pt states she doesn t know how this got on there" no     Nitrofurantoin Other (See Comments)     Deathly ill , headaches, weakness, 'wiped out"    Sulfa (sulfonamide antibiotics)        Current Outpatient Medications:     ASPIRIN EXTRA STRENGTH ORAL, Take by mouth., Disp: , Rfl:     calcium carbonate/vitamin D3 (VITAMIN D-3 ORAL), Take by mouth., Disp: , Rfl:     docusate calcium (SURFAK) 240 mg capsule, Take 240 mg by mouth 2 (two) times daily., Disp: , Rfl:     estradioL (ESTRACE) 0.01 % (0.1 mg/gram) vaginal cream, INSERT 1 GM VAGINALLY MONDAY,WEDNESDAY,AND FRIDAY AS DIRECTED, Disp: 42.5 g, Rfl: 7    fluticasone propionate (FLONASE) 50 mcg/actuation nasal spray, 1 spray (50 mcg total) by Each Nostril route once daily., Disp: 16 g, Rfl: 2    magnesium gluconate (MAG-G ORAL), Take by mouth., Disp: , Rfl:     rosuvastatin (CRESTOR) 5 MG tablet, Take 1 tablet (5 mg total) by mouth every evening., Disp: 90 tablet, Rfl: 1    sertraline (ZOLOFT) 100 MG tablet, Take 1.5 tablets (150 mg total) by mouth once daily. (Patient taking differently: Take 100 mg by mouth once daily.), Disp: 135 tablet, Rfl: 3    sumatriptan (IMITREX) 100 MG tablet, 1 tab po at start of headache, may repeat in 2 hours X1 in 24 hours. (Patient taking differently: as needed. 1 tab po at start of headache, may repeat in 2 hours X1 in 24 hours.), Disp: 10 tablet, Rfl: 11    topiramate (TOPAMAX) 50 MG tablet, Take 1 tablet (50 mg total) by mouth 2 (two) times daily., Disp: 180 tablet, Rfl: 1    traMADol (ULTRAM) 50 mg tablet, , Disp: , Rfl: 5    triamcinolone acetonide 0.025% (KENALOG) 0.025 % cream, , Disp: , Rfl:     triamterene-hydrochlorothiazide 37.5-25 mg (MAXZIDE-25) 37.5-25 mg per tablet, 1/2 p.o. q.d., Disp: 90 tablet, Rfl: 0    mirabegron (MYRBETRIQ) 50 mg Tb24, Take 1 tablet (50 mg total) by mouth once daily. (Patient not taking: Reported on 1/18/2022), Disp: 90 tablet, Rfl: 3    Review of " "Systems   Constitutional: Negative for appetite change, chills, fatigue, fever and unexpected weight change.   HENT: Negative for postnasal drip, sinus pain, sore throat and trouble swallowing.    Eyes: Negative for visual disturbance.   Respiratory: Negative for cough, shortness of breath and wheezing.    Cardiovascular: Positive for leg swelling (minimal). Negative for chest pain and palpitations.   Gastrointestinal: Negative for abdominal pain, constipation and diarrhea.   Genitourinary: Positive for dysuria and frequency. Negative for flank pain, hematuria and pelvic pain.        Large volume incontinence, has to wear thick pads   Musculoskeletal: Positive for myalgias (bilat calf aching type pain).   Skin: Negative for rash.   Neurological: Positive for headaches (chronic HAs improved on topamax). Negative for dizziness, syncope and weakness.          Objective:      Vitals:    01/18/22 0939   BP: 124/80   Pulse: 70   Weight: 69.9 kg (154 lb)   Height: 5' 5" (1.651 m)     Physical Exam  Vitals reviewed.   Constitutional:       General: She is not in acute distress.     Appearance: Normal appearance. She is well-developed and normal weight.      Comments: Well dressed WF   HENT:      Head: Normocephalic and atraumatic.      Right Ear: Tympanic membrane and ear canal normal.      Left Ear: Tympanic membrane and ear canal normal.   Neck:      Vascular: No carotid bruit.   Cardiovascular:      Rate and Rhythm: Normal rate and regular rhythm.      Heart sounds: No murmur heard.  No friction rub. No gallop.    Pulmonary:      Effort: Pulmonary effort is normal. No respiratory distress.      Breath sounds: Normal breath sounds. No wheezing or rales.   Abdominal:      Palpations: Abdomen is soft.      Tenderness: There is no abdominal tenderness.   Musculoskeletal:      Cervical back: Neck supple.      Right lower leg: No edema.      Left lower leg: No edema.   Lymphadenopathy:      Cervical: No cervical adenopathy. "   Skin:     General: Skin is warm and dry.      Findings: No rash.   Neurological:      General: No focal deficit present.      Mental Status: She is alert and oriented to person, place, and time.      Gait: Gait normal.   Psychiatric:         Mood and Affect: Mood normal.           Assessment:       1. Urge incontinence of urine    2. Dyslipidemia    3. Varicose veins of both lower extremities with pain    4. Mild episode of recurrent major depressive disorder           Plan:       Urge incontinence of urine  Comments:  scheduled for botox injections with urology later this month, will check urine today and call with results    Dyslipidemia  Comments:  having labs drawn today    Varicose veins of both lower extremities with pain  Comments:  scheduled to see vascular soon    Mild episode of recurrent major depressive disorder  Comments:  reports stable on med but urinary issues are large part of her depression, hopeful botox will help some      Follow up in about 6 months (around 7/18/2022) for labs to be drawn today.          Counseled on age and gender appropriate medical preventative services, including cancer screenings, immunizations, overall nutritional health, need for a consistent exercise regimen and an overall push towards maintaining a vigorous and active lifestyle.      1/18/2022 Elle Mack NP

## 2022-01-20 ENCOUNTER — PATIENT MESSAGE (OUTPATIENT)
Dept: FAMILY MEDICINE | Facility: CLINIC | Age: 77
End: 2022-01-20
Payer: MEDICARE

## 2022-01-20 ENCOUNTER — TELEPHONE (OUTPATIENT)
Dept: FAMILY MEDICINE | Facility: CLINIC | Age: 77
End: 2022-01-20
Payer: MEDICARE

## 2022-01-20 LAB
ALBUMIN SERPL-MCNC: 4.4 G/DL (ref 3.6–5.1)
ALBUMIN/CREAT UR: 16 MCG/MG CREAT
ALBUMIN/GLOB SERPL: 2 (CALC) (ref 1–2.5)
ALP SERPL-CCNC: 65 U/L (ref 37–153)
ALT SERPL-CCNC: 10 U/L (ref 6–29)
APPEARANCE UR: CLEAR
AST SERPL-CCNC: 14 U/L (ref 10–35)
BACTERIA #/AREA URNS HPF: ABNORMAL /HPF
BACTERIA UR CULT: ABNORMAL
BACTERIA UR CULT: ABNORMAL
BASOPHILS # BLD AUTO: 128 CELLS/UL (ref 0–200)
BASOPHILS NFR BLD AUTO: 2.1 %
BILIRUB SERPL-MCNC: 0.4 MG/DL (ref 0.2–1.2)
BILIRUB UR QL STRIP: NEGATIVE
BUN SERPL-MCNC: 22 MG/DL (ref 7–25)
BUN/CREAT SERPL: NORMAL (CALC) (ref 6–22)
CALCIUM SERPL-MCNC: 10 MG/DL (ref 8.6–10.4)
CHLORIDE SERPL-SCNC: 105 MMOL/L (ref 98–110)
CHOLEST SERPL-MCNC: 169 MG/DL
CHOLEST/HDLC SERPL: 2.8 (CALC)
CO2 SERPL-SCNC: 27 MMOL/L (ref 20–32)
COLOR UR: YELLOW
CREAT SERPL-MCNC: 0.78 MG/DL (ref 0.6–0.93)
CREAT UR-MCNC: 43 MG/DL (ref 20–275)
EOSINOPHIL # BLD AUTO: 220 CELLS/UL (ref 15–500)
EOSINOPHIL NFR BLD AUTO: 3.6 %
ERYTHROCYTE [DISTWIDTH] IN BLOOD BY AUTOMATED COUNT: 13.1 % (ref 11–15)
GLOBULIN SER CALC-MCNC: 2.2 G/DL (CALC) (ref 1.9–3.7)
GLUCOSE SERPL-MCNC: 90 MG/DL (ref 65–99)
GLUCOSE UR QL STRIP: NEGATIVE
HCT VFR BLD AUTO: 41.8 % (ref 35–45)
HDLC SERPL-MCNC: 60 MG/DL
HGB BLD-MCNC: 13.4 G/DL (ref 11.7–15.5)
HGB UR QL STRIP: NEGATIVE
HYALINE CASTS #/AREA URNS LPF: ABNORMAL /LPF
KETONES UR QL STRIP: NEGATIVE
LDLC SERPL CALC-MCNC: 84 MG/DL (CALC)
LEUKOCYTE ESTERASE UR QL STRIP: ABNORMAL
LYMPHOCYTES # BLD AUTO: 1671 CELLS/UL (ref 850–3900)
LYMPHOCYTES NFR BLD AUTO: 27.4 %
MCH RBC QN AUTO: 30.9 PG (ref 27–33)
MCHC RBC AUTO-ENTMCNC: 32.1 G/DL (ref 32–36)
MCV RBC AUTO: 96.3 FL (ref 80–100)
MICROALBUMIN UR-MCNC: 0.7 MG/DL
MONOCYTES # BLD AUTO: 555 CELLS/UL (ref 200–950)
MONOCYTES NFR BLD AUTO: 9.1 %
NEUTROPHILS # BLD AUTO: 3526 CELLS/UL (ref 1500–7800)
NEUTROPHILS NFR BLD AUTO: 57.8 %
NITRITE UR QL STRIP: NEGATIVE
NONHDLC SERPL-MCNC: 109 MG/DL (CALC)
PH UR STRIP: 6.5 [PH] (ref 5–8)
PLATELET # BLD AUTO: 210 THOUSAND/UL (ref 140–400)
PMV BLD REES-ECKER: 10.7 FL (ref 7.5–12.5)
POTASSIUM SERPL-SCNC: 4.3 MMOL/L (ref 3.5–5.3)
PROT SERPL-MCNC: 6.6 G/DL (ref 6.1–8.1)
PROT UR QL STRIP: NEGATIVE
RBC # BLD AUTO: 4.34 MILLION/UL (ref 3.8–5.1)
RBC #/AREA URNS HPF: ABNORMAL /HPF
SODIUM SERPL-SCNC: 140 MMOL/L (ref 135–146)
SP GR UR STRIP: 1.01 (ref 1–1.03)
SQUAMOUS #/AREA URNS HPF: ABNORMAL /HPF
TRIGL SERPL-MCNC: 158 MG/DL
TSH SERPL-ACNC: 2.12 MIU/L (ref 0.4–4.5)
WBC # BLD AUTO: 6.1 THOUSAND/UL (ref 3.8–10.8)
WBC #/AREA URNS HPF: ABNORMAL /HPF

## 2022-01-20 NOTE — TELEPHONE ENCOUNTER
----- Message from Elle Mack NP sent at 1/19/2022 10:00 PM CST -----  Please call pt and let her know her labs look good.  Cholesterol levels are well controlled and improved slightly from last labs- triglycerides down to 158 from 219 and bad cholesterol LDL down to 84 from 101- continue current medication.  Blood sugar, kidney, liver, thyroid and blood count within normal range.

## 2022-01-21 ENCOUNTER — TELEPHONE (OUTPATIENT)
Dept: FAMILY MEDICINE | Facility: CLINIC | Age: 77
End: 2022-01-21
Payer: MEDICARE

## 2022-01-21 DIAGNOSIS — N30.00 ACUTE CYSTITIS WITHOUT HEMATURIA: Primary | ICD-10-CM

## 2022-01-21 RX ORDER — CIPROFLOXACIN 500 MG/1
500 TABLET ORAL 2 TIMES DAILY
Qty: 10 TABLET | Refills: 0 | Status: SHIPPED | OUTPATIENT
Start: 2022-01-21 | End: 2022-01-26

## 2022-01-21 NOTE — TELEPHONE ENCOUNTER
Spoke with pt she has urgency and pain. Taking OTC AZO. Allergies verified. Please send something to pharmacy for pt.

## 2022-01-25 ENCOUNTER — TELEPHONE (OUTPATIENT)
Dept: UROLOGY | Facility: CLINIC | Age: 77
End: 2022-01-25
Payer: MEDICARE

## 2022-01-25 NOTE — PRE-PROCEDURE INSTRUCTIONS
Preop instructions(bathing/wear loose fitting clothing/fasting/directions/location of surgery/ and preop medication instructions reviewed with patient). Clear liquids are allowed up to 2 hours before procedure.Clear liquids are:water,apple juice, jello, gatorade & powerade. Patient instructed to hold/stop all blood thinning medications, prior to surgery, following the pre-surgery recommended guidelines. Instructed to follow the surgeon's instructions if they differ from these.    Patient verbalized understanding.    Denies any  history of side effects or issues with anesthesia or sedation.    Patient advised of the updated visitor policy.  Patient aware of the need to have someone drive them home following same -day surgery.      Patient given arrival time of 0715 to [facility]

## 2022-01-25 NOTE — TELEPHONE ENCOUNTER
Called pt to confirm arrival time of 715am for procedure on 1-26. Gave pt NPO instructions and gave pt opportunity to ask questions. Pt verbalized understanding.     Pt was informed that only 1 person would be allowed to accompany them the morning of surgery.  Pt verbalized understanding.

## 2022-01-26 ENCOUNTER — ANESTHESIA EVENT (OUTPATIENT)
Dept: SURGERY | Facility: HOSPITAL | Age: 77
End: 2022-01-26
Payer: MEDICARE

## 2022-01-26 ENCOUNTER — ANESTHESIA (OUTPATIENT)
Dept: SURGERY | Facility: HOSPITAL | Age: 77
End: 2022-01-26
Payer: MEDICARE

## 2022-01-26 ENCOUNTER — HOSPITAL ENCOUNTER (OUTPATIENT)
Facility: HOSPITAL | Age: 77
Discharge: HOME | End: 2022-01-26
Attending: UROLOGY | Admitting: UROLOGY
Payer: MEDICARE

## 2022-01-26 VITALS
SYSTOLIC BLOOD PRESSURE: 129 MMHG | DIASTOLIC BLOOD PRESSURE: 66 MMHG | RESPIRATION RATE: 19 BRPM | TEMPERATURE: 98 F | BODY MASS INDEX: 24.99 KG/M2 | WEIGHT: 150 LBS | OXYGEN SATURATION: 98 % | HEIGHT: 65 IN | HEART RATE: 62 BPM

## 2022-01-26 DIAGNOSIS — N32.81 OVERACTIVE BLADDER: Primary | ICD-10-CM

## 2022-01-26 LAB
CTP QC/QA: YES
SARS-COV-2 AG RESP QL IA.RAPID: NEGATIVE

## 2022-01-26 PROCEDURE — D9220A PRA ANESTHESIA: ICD-10-PCS | Mod: ANES,,, | Performed by: ANESTHESIOLOGY

## 2022-01-26 PROCEDURE — 37000009 HC ANESTHESIA EA ADD 15 MINS: Performed by: UROLOGY

## 2022-01-26 PROCEDURE — D9220A PRA ANESTHESIA: Mod: CRNA,,, | Performed by: NURSE ANESTHETIST, CERTIFIED REGISTERED

## 2022-01-26 PROCEDURE — 25000003 PHARM REV CODE 250: Performed by: NURSE ANESTHETIST, CERTIFIED REGISTERED

## 2022-01-26 PROCEDURE — 63600175 PHARM REV CODE 636 W HCPCS: Mod: JG | Performed by: UROLOGY

## 2022-01-26 PROCEDURE — 52287 CYSTOSCOPY CHEMODENERVATION: CPT | Mod: ,,, | Performed by: UROLOGY

## 2022-01-26 PROCEDURE — D9220A PRA ANESTHESIA: ICD-10-PCS | Mod: CRNA,,, | Performed by: NURSE ANESTHETIST, CERTIFIED REGISTERED

## 2022-01-26 PROCEDURE — D9220A PRA ANESTHESIA: Mod: ANES,,, | Performed by: ANESTHESIOLOGY

## 2022-01-26 PROCEDURE — 36000707: Performed by: UROLOGY

## 2022-01-26 PROCEDURE — 71000044 HC DOSC ROUTINE RECOVERY FIRST HOUR: Performed by: UROLOGY

## 2022-01-26 PROCEDURE — 52287 PR CYSTOURETHROSCOPY WITH INJ FOR CHEMODENERVATION: ICD-10-PCS | Mod: ,,, | Performed by: UROLOGY

## 2022-01-26 PROCEDURE — 36000706: Performed by: UROLOGY

## 2022-01-26 PROCEDURE — 37000008 HC ANESTHESIA 1ST 15 MINUTES: Performed by: UROLOGY

## 2022-01-26 PROCEDURE — 71000015 HC POSTOP RECOV 1ST HR: Performed by: UROLOGY

## 2022-01-26 PROCEDURE — 63600175 PHARM REV CODE 636 W HCPCS: Performed by: NURSE ANESTHETIST, CERTIFIED REGISTERED

## 2022-01-26 PROCEDURE — 87086 URINE CULTURE/COLONY COUNT: CPT | Performed by: UROLOGY

## 2022-01-26 RX ORDER — FENTANYL CITRATE 50 UG/ML
25 INJECTION, SOLUTION INTRAMUSCULAR; INTRAVENOUS EVERY 5 MIN PRN
Status: DISCONTINUED | OUTPATIENT
Start: 2022-01-26 | End: 2022-01-26 | Stop reason: HOSPADM

## 2022-01-26 RX ORDER — LIDOCAINE HYDROCHLORIDE 20 MG/ML
INJECTION, SOLUTION EPIDURAL; INFILTRATION; INTRACAUDAL; PERINEURAL
Status: DISCONTINUED | OUTPATIENT
Start: 2022-01-26 | End: 2022-01-26

## 2022-01-26 RX ORDER — CEFAZOLIN SODIUM 1 G/3ML
INJECTION, POWDER, FOR SOLUTION INTRAMUSCULAR; INTRAVENOUS
Status: DISCONTINUED | OUTPATIENT
Start: 2022-01-26 | End: 2022-01-26

## 2022-01-26 RX ORDER — OXYCODONE HYDROCHLORIDE 5 MG/1
5 TABLET ORAL
Status: DISCONTINUED | OUTPATIENT
Start: 2022-01-26 | End: 2022-01-26 | Stop reason: HOSPADM

## 2022-01-26 RX ORDER — FENTANYL CITRATE 50 UG/ML
INJECTION, SOLUTION INTRAMUSCULAR; INTRAVENOUS
Status: DISCONTINUED | OUTPATIENT
Start: 2022-01-26 | End: 2022-01-26

## 2022-01-26 RX ORDER — PROCHLORPERAZINE EDISYLATE 5 MG/ML
5 INJECTION INTRAMUSCULAR; INTRAVENOUS EVERY 30 MIN PRN
Status: DISCONTINUED | OUTPATIENT
Start: 2022-01-26 | End: 2022-01-26 | Stop reason: HOSPADM

## 2022-01-26 RX ORDER — CEPHALEXIN 500 MG/1
500 CAPSULE ORAL 4 TIMES DAILY
Qty: 8 CAPSULE | Refills: 0 | Status: SHIPPED | OUTPATIENT
Start: 2022-01-27 | End: 2022-01-29

## 2022-01-26 RX ORDER — PROPOFOL 10 MG/ML
VIAL (ML) INTRAVENOUS CONTINUOUS PRN
Status: DISCONTINUED | OUTPATIENT
Start: 2022-01-26 | End: 2022-01-26

## 2022-01-26 RX ORDER — MIDAZOLAM HYDROCHLORIDE 1 MG/ML
INJECTION, SOLUTION INTRAMUSCULAR; INTRAVENOUS
Status: DISCONTINUED | OUTPATIENT
Start: 2022-01-26 | End: 2022-01-26

## 2022-01-26 RX ORDER — CEFAZOLIN SODIUM/D5W 2 G/100 ML
2 PLASTIC BAG, INJECTION (ML) INTRAVENOUS
Status: DISCONTINUED | OUTPATIENT
Start: 2022-01-26 | End: 2022-01-26 | Stop reason: HOSPADM

## 2022-01-26 RX ORDER — HYDROMORPHONE HYDROCHLORIDE 1 MG/ML
0.2 INJECTION, SOLUTION INTRAMUSCULAR; INTRAVENOUS; SUBCUTANEOUS EVERY 5 MIN PRN
Status: DISCONTINUED | OUTPATIENT
Start: 2022-01-26 | End: 2022-01-26 | Stop reason: HOSPADM

## 2022-01-26 RX ADMIN — PROPOFOL 20 MG: 10 INJECTION, EMULSION INTRAVENOUS at 08:01

## 2022-01-26 RX ADMIN — SODIUM CHLORIDE: 0.9 INJECTION, SOLUTION INTRAVENOUS at 08:01

## 2022-01-26 RX ADMIN — FENTANYL CITRATE 50 MCG: 50 INJECTION, SOLUTION INTRAMUSCULAR; INTRAVENOUS at 08:01

## 2022-01-26 RX ADMIN — LIDOCAINE HYDROCHLORIDE 100 MG: 20 INJECTION, SOLUTION EPIDURAL; INFILTRATION; INTRACAUDAL; PERINEURAL at 08:01

## 2022-01-26 RX ADMIN — MIDAZOLAM HYDROCHLORIDE 2 MG: 1 INJECTION, SOLUTION INTRAMUSCULAR; INTRAVENOUS at 08:01

## 2022-01-26 RX ADMIN — CEFAZOLIN SODIUM 2 G: 1 INJECTION, POWDER, FOR SOLUTION INTRAMUSCULAR; INTRAVENOUS at 08:01

## 2022-01-26 RX ADMIN — PROPOFOL 150 MCG/KG/MIN: 10 INJECTION, EMULSION INTRAVENOUS at 08:01

## 2022-01-26 NOTE — DISCHARGE INSTRUCTIONS
Post Cystoscopy Instructions  Do not strain to have a bowel movement  No strenuous exercise x 7 days  No driving while you are on narcotic pain medications or if your soria  catheter is in place     You can expect:  To pass stone fragments if you had a stone procedure  Have pain when you void from your stent if you have a stent in place  See blood in your urine if you have a stent in place     If you have a catheter, please return to the ER if your catheter stops draining or you are having abdominal pain.     Call the doctor if:  · Temperature is greater than 101F  · Persistent vomiting and inability to keep food down  · Inability to void if you do not have a catheter

## 2022-01-26 NOTE — ANESTHESIA PREPROCEDURE EVALUATION
01/26/2022  Jazz Martinez is a 76 y.o., female.    Anesthesia Evaluation    I have reviewed the Patient Summary Reports.   I have reviewed the NPO Status.      Review of Systems  Anesthesia Hx:  No problems with previous Anesthesia  History of prior surgery of interest to airway management or planning: Previous anesthesia: General Denies Family Hx of Anesthesia complications.   Denies Personal Hx of Anesthesia complications.   Social:  Non-Smoker, No Alcohol Use    Cardiovascular:   Hypertension  Deep Venous Thrombosis (DVT), Hx of DVT  Hypertension    Hepatic/GI:   GERD    Musculoskeletal:   Arthritis     Neurological:   Neuromuscular Disease, (lumbar radiculapthy ) Headaches    Psych:   Psychiatric History          Physical Exam  General:  Well nourished    Airway/Jaw/Neck:  Airway Findings: Mouth Opening: Normal Tongue: Normal  General Airway Assessment: Adult  Mallampati: II      Dental:  DENTAL FINDINGS: Normal   Chest/Lungs:  Chest/Lungs Findings: Clear to auscultation, Normal Respiratory Rate     Heart/Vascular:  Heart Findings: Rate: Normal  Rhythm: Regular Rhythm        Mental Status:  Mental Status Findings:  Cooperative, Alert and Oriented         Anesthesia Plan  Type of Anesthesia, risks & benefits discussed:  Anesthesia Type:  general, MAC    Patient's Preference:   Plan Factors:          Intra-op Monitoring Plan: standard ASA monitors  Intra-op Monitoring Plan Comments:   Post Op Pain Control Plan:   Post Op Pain Control Plan Comments:     Induction:   IV  Beta Blocker:  Patient is not currently on a Beta-Blocker (No further documentation required).       Informed Consent: Patient understands risks and agrees with Anesthesia plan.  Questions answered. Anesthesia consent signed with patient.  ASA Score: 3     Day of Surgery Review of History & Physical:    H&P update referred to the  surgeon.         Ready For Surgery From Anesthesia Perspective.

## 2022-01-26 NOTE — ANESTHESIA POSTPROCEDURE EVALUATION
Anesthesia Post Evaluation    Patient: Jazz Martinez    Procedure(s) Performed: Procedure(s) (LRB):  CYSTOSCOPY (N/A)  INJECTION, BOTULINUM TOXIN, 100units    Final Anesthesia Type: general      Patient location during evaluation: PACU  Patient participation: Yes- Able to Participate  Level of consciousness: awake and alert  Post-procedure vital signs: reviewed and stable  Pain management: adequate  Airway patency: patent    PONV status at discharge: No PONV  Anesthetic complications: no      Cardiovascular status: hemodynamically stable  Respiratory status: room air, spontaneous ventilation and unassisted  Hydration status: euvolemic  Follow-up not needed.          Vitals Value Taken Time   /66 01/26/22 1005   Temp 36.7 °C (98 °F) 01/26/22 1005   Pulse 62 01/26/22 1005   Resp 19 01/26/22 1005   SpO2 98 % 01/26/22 1005         No case tracking events are documented in the log.      Pain/Tita Score: Tita Score: 10 (1/26/2022 10:05 AM)

## 2022-01-26 NOTE — TRANSFER OF CARE
"Anesthesia Transfer of Care Note    Patient: Jazz Martinez    Procedure(s) Performed: Procedure(s) (LRB):  CYSTOSCOPY (N/A)  INJECTION, BOTULINUM TOXIN, 100units    Patient location: PACU    Anesthesia Type: general    Transport from OR: Transported from OR on 100% O2 by closed face mask with adequate spontaneous ventilation    Post pain: adequate analgesia    Post assessment: no apparent anesthetic complications and tolerated procedure well    Post vital signs: stable    Level of consciousness: sedated and responds to stimulation    Nausea/Vomiting: no nausea/vomiting    Complications: none    Transfer of care protocol was followed      Last vitals:   Visit Vitals  /60 (BP Location: Left arm, Patient Position: Lying)   Pulse 67   Temp 36.5 °C (97.7 °F) (Oral)   Resp 20   Ht 5' 5" (1.651 m)   Wt 68 kg (150 lb)   SpO2 100%   Breastfeeding No   BMI 24.96 kg/m²     "

## 2022-01-26 NOTE — H&P
[facility]        New Overactive Bladder (OAB) Note     11/8/2021     Referred by:  Javier Putnam MD     HPI: Jazz Martinez is a very pleasant 76 y.o. female who is a new patient to our department referred for evaluation of urinary incontinence of several years duration. She reports bother is associated without urinary daytime frequency (4-5x daily), with nocturia (3-4x per night) and with urgency that results in urinary incontinence 5 x daily. She reports no stress urinary incontinence associated with exertion. She requires daily pads (4 pads/day).  She has decreased overall fluid intake.  She reports urinary incontinence is equally bothersome during the day and night.      She denies symptoms of irritative voiding including dysuria. She denies symptoms of obstructive voiding including decreased stream, hesitancy, intermittency, post void dribbling and sense of incomplete emptying. Bladder scan PVR was 40 mL. Her history includes pelvic organ prolapse repair as detailed in Dr. Putnam's notes.      Interval history:  No interval changes in medical history. No recent symptoms of UTI. No fevers, chills, nausea, vomiting. Urine negative for all tested components.    Previous Incontinence Treatment:  · Medication:   § Currently: Myrbetriq 50 mg and Oxybutynin 10 mg (10/29-present)  § Gemtesa  75 mg (9/29/21-10/29/21)   § Myrbetriq 25/50 mg (01/20-03/20, 2021) Reason for d/c: therapy not effective  § Oxybutynin 10 mg  (11/13-01/14, 2020) Reason for d/c: memory issues  § Solifenacin 10 mg  (4/12-11/13) Reason for d/c: cost prohibitive  § Trospium 20 mg (1/20/14- 10/14)     · Dietary modification (less caffeine/soda)   · PFE  · Pessary: ([id number]) irritation, bleeding, infections  · PTNS: 2018- Therapy not effective  · Surgery: Sacrocolpopexy, bladder suspensions     A review of 10+ systems was conducted with pertinent positive and negative findings documented in HPI with all other systems  reviewed and negative.     Past medical, family, surgical and social history reviewed as documented in chart with pertinent positive medical, family, surgical and social history detailed in HPI.     Exam Findings:     Const: no acute distress, conversant and alert  Eyes: anicteric, extraocular muscles intact  ENMT: normocephalic, Nl oral membranes  Cardio: no cyanosis, nl cap refill  Pulm: no tachypnea; no resp distress  Abd: soft, no tenderness  Musc: no laceration, no tenderness  Neuro: alert; oriented x 3  Skin: warm, dry; no petichiae  Psych: no anxiety; normal speech      Assessment/Plan:     Overactive Bladder with Urgency Incontinence (new, addt'l workup): Her history is suggestive of Overactive Bladder (OAB) with predominantly Urgency Urinary Incontinence (UUI). The presence or absence of incontinence as well as the relative contribution of stress or urgency incontinence can be further clarified with a 3-day volume-based voiding/incontinence diary provided today. This will also help to screen for polyuria and help to guide us on further counseling on behavioral therapy including timed voiding and bladder training. We will review this on her return visit.      Her reported symptoms today are relatively severe and therefore, I believe it would be appropriate to consider alternatives to pharmacologic therapy in addition to behavioral therapies.      · After discussing risks and benefits of advanced (third-line therapies) she would prefer to proceed with Botox 100 units injected with sedation. Will send urine for culture and schedule.   To OR for cystoscopy, bladder botox injection.

## 2022-01-26 NOTE — OP NOTE
[facility]  Operative Note    Date: 01/26/2022    Pre-Op Diagnosis:   - Overactive bladder  Patient Active Problem List    Diagnosis Date Noted    Solar purpura 06/04/2021    Vaginal vault prolapse after hysterectomy 12/17/2019    Osteoarthritis of knee 12/17/2019    Lumbar radiculopathy 12/17/2019    Nonspecific abnormal electrocardiogram (ECG) (EKG) 12/16/2019    History of DVT in adulthood 10/21/2019    Migraine 09/26/2019    Precordial pain 06/21/2016    Hypercholesteremia, baseline .4, 7/2015 06/21/2016    Cardiovascular event risk, ASCVD 10-year risk 16.2%, 2015, on 10 mg of atorvastatin 06/21/2016    Abdominal obesity 06/21/2016    Esophageal stricture 06/21/2016    Essential hypertension 06/21/2016    Prerenal azotemia 06/21/2016    Varicose vein 06/21/2016    Gastroesophageal reflux disease 02/29/2016    Depression 02/29/2016    Pessary maintenance 06/03/2015       Post-Op Diagnosis: same    Procedure(s) Performed:   1.  Cystoscopy with bladder botox injection    Specimen(s): Urine for culture    Staff Surgeon:  Sánchez Salcido MD    Assistant Surgeon: Edward Cardenas MD    Anesthesia: Monitored Local Anesthesia with Sedation    Indications: Jazz Martinez is a 76 y.o. female with OAB refractory to medical therapy. She presents today for cystoscopy with bladder botox injection.    Findings:   Diffuse cystic, erythematous patches of the bladder without any obvious lesions or tumors suspicious for malignancy    Estimated Blood Loss: min    Drains:   None    Procedure in Detail:  After informed consent was obtained the patient was brought to the cystoscopy suite and placed in the supine position.  SCDs were applied and working.  Anesthesia was administered.  When the patient was adequately sedated she was placed in the dorsal lithotomy position and prepped and draped in the usual sterile fashion.      A rigid cystoscope in a 22 Fr sheath was introduced into the  patients's bladder via the urethra. This passed easily. Formal cystoscopy was performed which revealed the ureteral orifices in their normal anatomic position bilaterally. No bladder masses, trabeculations, stones or diverticula were seen.  There was noted diffuse cystic, erythematous patches of the bladder without any obvious lesions or tumors suspicious for malignancy. The bladder was emptied and urine was passed off the field for culture.    100 units of botox was injected into the detrusor muscle throughout the bladder. Good wheals were raised. The patient's bladder was drained and the cystoscope was removed.      The patient tolerated the procedure well and was transferred to the recovery room in stable condition.      Disposition:  The patient will follow up with Dr. Salcido in 6 weeks.  She was given prescriptions for keflex post-operatively    Edward Cardenas MD

## 2022-01-26 NOTE — DISCHARGE SUMMARY
[facility]  Discharge Note  Short Stay    Admit Date: 1/26/2022    Discharge Date and Time: 01/26/2022 9:19 AM      Attending Physician: Sánchez Salcido MD     Discharge Provider: Edward Cardenas    Diagnoses:  Past Medical History:   Diagnosis Date    Arthritis     Corns and callosities     Deep vein thrombosis     GERD (gastroesophageal reflux disease)     Gout     H/O bladder problems     History of colonic polyps 2014    Hyperlipidemia     Hypertension     Pulmonary embolism        Discharged Condition: good    Hospital Course: Patient was admitted for bladder botox injection and tolerated the procedure well with no complications. The patient was discharged home in good condition on the same day.       Final Diagnoses: Same as principal problem.    Disposition: Home or Self Care    Follow up/Patient Instructions:    Medications:  Reconciled Home Medications:   Current Discharge Medication List      START taking these medications    Details   cephALEXin (KEFLEX) 500 MG capsule Take 1 capsule (500 mg total) by mouth 4 (four) times daily. for 2 days  Qty: 8 capsule, Refills: 0         CONTINUE these medications which have NOT CHANGED    Details   ASPIRIN EXTRA STRENGTH ORAL Take by mouth.      calcium carbonate/vitamin D3 (VITAMIN D-3 ORAL) Take by mouth.      ciprofloxacin HCl (CIPRO) 500 MG tablet Take 1 tablet (500 mg total) by mouth 2 (two) times daily. for 5 days  Qty: 10 tablet, Refills: 0    Associated Diagnoses: Acute cystitis without hematuria      docusate calcium (SURFAK) 240 mg capsule Take 240 mg by mouth 2 (two) times daily.      estradioL (ESTRACE) 0.01 % (0.1 mg/gram) vaginal cream INSERT 1 GM VAGINALLY MONDAY,WEDNESDAY,AND FRIDAY AS DIRECTED  Qty: 42.5 g, Refills: 7      fluticasone propionate (FLONASE) 50 mcg/actuation nasal spray 1 spray (50 mcg total) by Each Nostril route once daily.  Qty: 16 g, Refills: 2    Associated Diagnoses: Cough      rosuvastatin (CRESTOR) 5 MG  tablet Take 1 tablet (5 mg total) by mouth every evening.  Qty: 90 tablet, Refills: 1    Associated Diagnoses: Dyslipidemia      sertraline (ZOLOFT) 100 MG tablet Take 1.5 tablets (150 mg total) by mouth once daily.  Qty: 135 tablet, Refills: 3    Associated Diagnoses: Mild episode of recurrent major depressive disorder      topiramate (TOPAMAX) 50 MG tablet Take 1 tablet (50 mg total) by mouth 2 (two) times daily.  Qty: 180 tablet, Refills: 1      traMADol (ULTRAM) 50 mg tablet Take 50 mg by mouth every 8 (eight) hours as needed for Pain.  Refills: 5    Comments:       triamterene-hydrochlorothiazide 37.5-25 mg (MAXZIDE-25) 37.5-25 mg per tablet 1/2 p.o. q.d.  Qty: 90 tablet, Refills: 0    Comments: .  Associated Diagnoses: HTN (hypertension), benign      magnesium gluconate (MAG-G ORAL) Take by mouth.      mirabegron (MYRBETRIQ) 50 mg Tb24 Take 1 tablet (50 mg total) by mouth once daily.  Qty: 90 tablet, Refills: 3      sumatriptan (IMITREX) 100 MG tablet 1 tab po at start of headache, may repeat in 2 hours X1 in 24 hours.  Qty: 10 tablet, Refills: 11      triamcinolone acetonide 0.025% (KENALOG) 0.025 % cream            Discharge Procedure Orders   Notify your health care provider if you experience any of the following:  temperature >100.4     Notify your health care provider if you experience any of the following:  persistent nausea and vomiting or diarrhea     Notify your health care provider if you experience any of the following:  severe uncontrolled pain     Notify your health care provider if you experience any of the following:  difficulty breathing or increased cough     Notify your health care provider if you experience any of the following:  severe persistent headache     Notify your health care provider if you experience any of the following:  persistent dizziness, light-headedness, or visual disturbances     Notify your health care provider if you experience any of the following:  increased confusion  or weakness      Follow-up Information     Sánchez Salcido MD In 6 weeks.    Specialty: Urology  Contact information:  [address]  [facility] [id number]  [phone]                         Discharge Procedure Orders (must include Diet, Follow-up, Activity):   Discharge Procedure Orders (must include Diet, Follow-up, Activity)   Notify your health care provider if you experience any of the following:  temperature >100.4     Notify your health care provider if you experience any of the following:  persistent nausea and vomiting or diarrhea     Notify your health care provider if you experience any of the following:  severe uncontrolled pain     Notify your health care provider if you experience any of the following:  difficulty breathing or increased cough     Notify your health care provider if you experience any of the following:  severe persistent headache     Notify your health care provider if you experience any of the following:  persistent dizziness, light-headedness, or visual disturbances     Notify your health care provider if you experience any of the following:  increased confusion or weakness

## 2022-01-27 LAB — BACTERIA UR CULT: NO GROWTH

## 2022-01-31 ENCOUNTER — TELEPHONE (OUTPATIENT)
Dept: UROLOGY | Facility: CLINIC | Age: 77
End: 2022-01-31
Payer: MEDICARE

## 2022-01-31 NOTE — TELEPHONE ENCOUNTER
----- Message from Isabel Bang RN sent at 1/31/2022  5:15 PM CST -----  Regarding: FW: Call back requesting  Contact: pt    ----- Message -----  From: Bekah Barber  Sent: 1/31/2022   4:51 PM CST  To: Isabel Jones MA, Haley Nolan RN, #  Subject: Call back requesting                             Pt requesting call back states she keeps calling and no one is returning any of her call.    Pt @[phone]

## 2022-01-31 NOTE — TELEPHONE ENCOUNTER
Called pt, she asking why her calls aren't returned promptly. I apologized, that we were in very busy clinics Friday and today. Asked that I utilize My [facility] to stay in touch with her.

## 2022-02-01 ENCOUNTER — PATIENT MESSAGE (OUTPATIENT)
Dept: UROLOGY | Facility: CLINIC | Age: 77
End: 2022-02-01
Payer: MEDICARE

## 2022-02-02 ENCOUNTER — TELEPHONE (OUTPATIENT)
Dept: UROLOGY | Facility: CLINIC | Age: 77
End: 2022-02-02
Payer: MEDICARE

## 2022-02-02 NOTE — TELEPHONE ENCOUNTER
Called patient and apologized for the delay in response. Patient reports all of her questions are answered.    ----- Message from Bekah Barber sent at 1/31/2022  4:49 PM CST -----  Regarding: Call back requesting  Contact: pt  Pt requesting call back states she keeps calling and no one is returning any of her call.    Pt @[phone]

## 2022-02-09 ENCOUNTER — TELEPHONE (OUTPATIENT)
Dept: FAMILY MEDICINE | Facility: CLINIC | Age: 77
End: 2022-02-09
Payer: MEDICARE

## 2022-03-01 ENCOUNTER — PATIENT MESSAGE (OUTPATIENT)
Dept: FAMILY MEDICINE | Facility: CLINIC | Age: 77
End: 2022-03-01
Payer: MEDICARE

## 2022-03-02 RX ORDER — VALACYCLOVIR HYDROCHLORIDE 500 MG/1
1000 TABLET, FILM COATED ORAL 2 TIMES DAILY
Qty: 12 TABLET | Refills: 3 | Status: SHIPPED | OUTPATIENT
Start: 2022-03-02 | End: 2022-11-04 | Stop reason: SDUPTHER

## 2022-03-14 ENCOUNTER — OFFICE VISIT (OUTPATIENT)
Dept: UROLOGY | Facility: CLINIC | Age: 77
End: 2022-03-14
Payer: MEDICARE

## 2022-03-14 VITALS
SYSTOLIC BLOOD PRESSURE: 149 MMHG | BODY MASS INDEX: 25.34 KG/M2 | WEIGHT: 152.13 LBS | DIASTOLIC BLOOD PRESSURE: 75 MMHG | HEIGHT: 65 IN | HEART RATE: 63 BPM

## 2022-03-14 DIAGNOSIS — N32.81 OAB (OVERACTIVE BLADDER): Primary | ICD-10-CM

## 2022-03-14 PROCEDURE — 99999 PR PBB SHADOW E&M-EST. PATIENT-LVL III: ICD-10-PCS | Mod: PBBFAC,,, | Performed by: UROLOGY

## 2022-03-14 PROCEDURE — 99213 OFFICE O/P EST LOW 20 MIN: CPT | Mod: PBBFAC | Performed by: UROLOGY

## 2022-03-14 PROCEDURE — 99214 OFFICE O/P EST MOD 30 MIN: CPT | Mod: S$PBB,,, | Performed by: UROLOGY

## 2022-03-14 PROCEDURE — 99999 PR PBB SHADOW E&M-EST. PATIENT-LVL III: CPT | Mod: PBBFAC,,, | Performed by: UROLOGY

## 2022-03-14 PROCEDURE — 99214 PR OFFICE/OUTPT VISIT, EST, LEVL IV, 30-39 MIN: ICD-10-PCS | Mod: S$PBB,,, | Performed by: UROLOGY

## 2022-03-14 NOTE — PROGRESS NOTES
[facility]         Overactive Bladder (OAB) Return Note     3/14/2022     Referred by:  Javier Putnam MD     HPI: Jazz Martinez is a very pleasant 76 y.o. female who is a return patient to our department 6 weeks after Botox 100 units were injected referred for evaluation of urinary incontinence of several years duration. She reports bother is associated without urinary daytime frequency (4-5x daily), with nocturia (3-4x per night) and with urgency that results in urinary incontinence 2-5 x daily. She reports no stress urinary incontinence associated with exertion. She requires daily pads (1-4 pads/day).  She has decreased overall fluid intake.  She reports urinary incontinence is equally bothersome during the day and night.      She denies symptoms of irritative voiding including dysuria. She denies symptoms of obstructive voiding including decreased stream, hesitancy, intermittency, post void dribbling and sense of incomplete emptying. Bladder scan PVR was 0 mL. Her history includes pelvic organ prolapse repair as detailed in Dr. Putnam's notes.        Previous Incontinence Treatment:  · Medication:   § Currently: Myrbetriq 50 mg and Oxybutynin 10 mg (10/29-present)  § Gemtesa  75 mg (9/29/21-10/29/21)   § Myrbetriq 25/50 mg (01/20-03/20, 2021) Reason for d/c: therapy not effective  § Oxybutynin 10 mg  (11/13-01/14, 2020) Reason for d/c: memory issues  § Solifenacin 10 mg  (4/12-11/13) Reason for d/c: cost prohibitive  § Trospium 20 mg (1/20/14- 10/14)     · Dietary modification (less caffeine/soda)   · PFE  · Pessary: ([id number]) irritation, bleeding, infections  · PTNS: 2018- Therapy not effective  · Surgery: Sacrocolpopexy, bladder suspensions     A review of 10+ systems was conducted with pertinent positive and negative findings documented in HPI with all other systems reviewed and negative.     Past medical, family, surgical and social history reviewed as documented in chart with  pertinent positive medical, family, surgical and social history detailed in HPI.     Exam Findings:     Const: no acute distress, conversant and alert  Eyes: anicteric, extraocular muscles intact  ENMT: normocephalic, Nl oral membranes  Cardio: no cyanosis, nl cap refill  Pulm: no tachypnea; no resp distress  Abd: soft, no tenderness  Musc: no laceration, no tenderness  Neuro: alert; oriented x 3  Skin: warm, dry; no petichiae  Psych: no anxiety; normal speech      Assessment/Plan:     Overactive Bladder with Urgency Incontinence (established,goals not met): Six weeks after injection of 100 units of Botox, she still has incontinence episodes 2-5x daily. On average, this represents an improvement but she is still quite bothered. She has no voiding difficulty and PVR today is 0 mL. She would like to inject additional 100 units of Botox for additional improvement.

## 2022-03-14 NOTE — H&P (VIEW-ONLY)
[facility]         Overactive Bladder (OAB) Return Note     3/14/2022     Referred by:  Javier Putnam MD     HPI: Jazz Martinez is a very pleasant 76 y.o. female who is a return patient to our department 6 weeks after Botox 100 units were injected referred for evaluation of urinary incontinence of several years duration. She reports bother is associated without urinary daytime frequency (4-5x daily), with nocturia (3-4x per night) and with urgency that results in urinary incontinence 2-5 x daily. She reports no stress urinary incontinence associated with exertion. She requires daily pads (1-4 pads/day).  She has decreased overall fluid intake.  She reports urinary incontinence is equally bothersome during the day and night.      She denies symptoms of irritative voiding including dysuria. She denies symptoms of obstructive voiding including decreased stream, hesitancy, intermittency, post void dribbling and sense of incomplete emptying. Bladder scan PVR was 0 mL. Her history includes pelvic organ prolapse repair as detailed in Dr. Putnam's notes.        Previous Incontinence Treatment:  · Medication:   § Currently: Myrbetriq 50 mg and Oxybutynin 10 mg (10/29-present)  § Gemtesa  75 mg (9/29/21-10/29/21)   § Myrbetriq 25/50 mg (01/20-03/20, 2021) Reason for d/c: therapy not effective  § Oxybutynin 10 mg  (11/13-01/14, 2020) Reason for d/c: memory issues  § Solifenacin 10 mg  (4/12-11/13) Reason for d/c: cost prohibitive  § Trospium 20 mg (1/20/14- 10/14)     · Dietary modification (less caffeine/soda)   · PFE  · Pessary: ([id number]) irritation, bleeding, infections  · PTNS: 2018- Therapy not effective  · Surgery: Sacrocolpopexy, bladder suspensions     A review of 10+ systems was conducted with pertinent positive and negative findings documented in HPI with all other systems reviewed and negative.     Past medical, family, surgical and social history reviewed as documented in chart with  pertinent positive medical, family, surgical and social history detailed in HPI.     Exam Findings:     Const: no acute distress, conversant and alert  Eyes: anicteric, extraocular muscles intact  ENMT: normocephalic, Nl oral membranes  Cardio: no cyanosis, nl cap refill  Pulm: no tachypnea; no resp distress  Abd: soft, no tenderness  Musc: no laceration, no tenderness  Neuro: alert; oriented x 3  Skin: warm, dry; no petichiae  Psych: no anxiety; normal speech      Assessment/Plan:     Overactive Bladder with Urgency Incontinence (established,goals not met): Six weeks after injection of 100 units of Botox, she still has incontinence episodes 2-5x daily. On average, this represents an improvement but she is still quite bothered. She has no voiding difficulty and PVR today is 0 mL. She would like to inject additional 100 units of Botox for additional improvement.

## 2022-03-15 ENCOUNTER — TELEPHONE (OUTPATIENT)
Dept: UROLOGY | Facility: CLINIC | Age: 77
End: 2022-03-15
Payer: MEDICARE

## 2022-03-15 DIAGNOSIS — N39.41 URGENCY INCONTINENCE: Primary | ICD-10-CM

## 2022-04-05 NOTE — ANESTHESIA PAT ROS NOTE
04/05/2022  Jazz Martinez is a 76 y.o., female.      Pre-op Assessment          Review of Systems  Anesthesia Hx:  No problems with previous Anesthesia  Denies Family Hx of Anesthesia complications.   Denies Personal Hx of Anesthesia complications.   Social:  Non-Smoker, No Alcohol Use    Hematology/Oncology:  Hematology Normal   Oncology Normal     EENT/Dental:   Wears glasses/S/P-Phaco with IOL/S/P-Glaucoma Sx   Cardiovascular:   Exercise tolerance: good Hypertension PVD hyperlipidemia Varicose veins/ Walking daily/Housework/Walking on job/ Cares for brother daily   Pulmonary:  Pulmonary Normal    Renal/:   Urge incontinence/ X2 months ago-UTI-treated with antibx-resloved   Hepatic/GI:   GERD    Musculoskeletal:   Arthritis  Generalized   Neurological:   Neuromuscular Disease, Headaches Migraine/Lumbar radiculopathy   Endocrine:  Endocrine Normal    Psych:   Psychiatric History anxiety depression      Alejandra Silva RN  4/5/22       Anesthesia Assessment: Preoperative EQUATION    Planned Procedure: Procedure(s) (LRB):  CYSTOSCOPY,WITH BOTULINUM TOXIN INJECTION 100 units (N/A)  Requested Anesthesia Type:Monitor Anesthesia Care  Surgeon: Sánchez Salcido MD  Service: Urology  Known or anticipated Date of Surgery:4/12/2022    Surgeon notes: reviewed and Urgency incontinence    Previous anesthesia records:1/26/22-Cystoscopy Injection, Botulinum Toxin, 100units-General-no apparent anesthetic complications and tolerated procedure well-no nausea/vomiting    Anesthesia Hx:  No problems with previous Anesthesia History of prior surgery of interest to airway management or planning: Previous anesthesia: General Denies Family Hx of Anesthesia complications. Denies Personal Hx of Anesthesia complications.       Airway/Jaw/Neck:  Airway Findings: Mouth Opening: Normal Tongue: Normal  General Airway  Assessment: Adult  Mallampati: II         Last PCP note: outside [facility] , Linda T. Melerine, NP-General-OS PCP-[facility]  Subspecialty notes: Cardiology: General, Dermatology, Hematology/Oncology, Ortho, Urogynecology visit    Other important co-morbidities: GERD, HLD, HTN and Urgency incontinence    Medical History    Diagnosis Date Comment Source   Arthritis      Corns and callosities      Deep vein thrombosis      GERD (gastroesophageal reflux disease)      Gout      H/O bladder problems      History of colonic polyps 2014     Hyperlipidemia      Hypertension      Pulmonary embolism          Tests already available:  Results have been reviewed. Labs-1/26/22-SARS-COV-2 test/ CXR-8/2/21      Plan:Phone pending      Testing:  BMP and Hematology Profile      Patient  has previously scheduled Medical Appointment:None    Navigation:Phone Completed   Tests Scheduled. BMP and Hematology Profile on 4/8/22-pending             Consults scheduled.None needed at this time.             Results will be tracked by Preop Clinic.       Alejandra Silva. DEN  4/5/22       Addendum: Labs-BMP & Hem Prof done on 4/8/22 & reviewed by Dr. Chang. Alejandra Silva, RN  4/11/22

## 2022-04-06 ENCOUNTER — TELEPHONE (OUTPATIENT)
Dept: PREADMISSION TESTING | Facility: HOSPITAL | Age: 77
End: 2022-04-06
Payer: MEDICARE

## 2022-04-06 NOTE — TELEPHONE ENCOUNTER
----- Message from Alejandra Silva RN sent at 4/5/2022  5:04 PM CDT -----  Regarding: preop appt.  Sx date-4/12/22-Need:Labs-Hem Prof/BMP(ordered), prior to the surgery date. Thank you. PJ

## 2022-04-08 ENCOUNTER — LAB VISIT (OUTPATIENT)
Dept: LAB | Facility: HOSPITAL | Age: 77
End: 2022-04-08
Attending: ANESTHESIOLOGY
Payer: MEDICARE

## 2022-04-08 DIAGNOSIS — Z01.818 PREOP TESTING: ICD-10-CM

## 2022-04-08 LAB
ANION GAP SERPL CALC-SCNC: 8 MMOL/L (ref 8–16)
BUN SERPL-MCNC: 13 MG/DL (ref 8–23)
CALCIUM SERPL-MCNC: 9.1 MG/DL (ref 8.7–10.5)
CHLORIDE SERPL-SCNC: 107 MMOL/L (ref 95–110)
CO2 SERPL-SCNC: 27 MMOL/L (ref 23–29)
CREAT SERPL-MCNC: 0.7 MG/DL (ref 0.5–1.4)
ERYTHROCYTE [DISTWIDTH] IN BLOOD BY AUTOMATED COUNT: 13.4 % (ref 11.5–14.5)
EST. GFR  (AFRICAN AMERICAN): >60 ML/MIN/1.73 M^2
EST. GFR  (NON AFRICAN AMERICAN): >60 ML/MIN/1.73 M^2
GLUCOSE SERPL-MCNC: 98 MG/DL (ref 70–110)
HCT VFR BLD AUTO: 42.4 % (ref 37–48.5)
HGB BLD-MCNC: 14 G/DL (ref 12–16)
MCH RBC QN AUTO: 31.3 PG (ref 27–31)
MCHC RBC AUTO-ENTMCNC: 33 G/DL (ref 32–36)
MCV RBC AUTO: 95 FL (ref 82–98)
PLATELET # BLD AUTO: 211 K/UL (ref 150–450)
PMV BLD AUTO: 9.5 FL (ref 9.2–12.9)
POTASSIUM SERPL-SCNC: 4 MMOL/L (ref 3.5–5.1)
RBC # BLD AUTO: 4.47 M/UL (ref 4–5.4)
SODIUM SERPL-SCNC: 142 MMOL/L (ref 136–145)
WBC # BLD AUTO: 5.68 K/UL (ref 3.9–12.7)

## 2022-04-08 PROCEDURE — 80048 BASIC METABOLIC PNL TOTAL CA: CPT | Performed by: ANESTHESIOLOGY

## 2022-04-08 PROCEDURE — 36415 COLL VENOUS BLD VENIPUNCTURE: CPT | Performed by: ANESTHESIOLOGY

## 2022-04-08 PROCEDURE — 85027 COMPLETE CBC AUTOMATED: CPT | Performed by: ANESTHESIOLOGY

## 2022-04-11 ENCOUNTER — TELEPHONE (OUTPATIENT)
Dept: UROLOGY | Facility: CLINIC | Age: 77
End: 2022-04-11
Payer: MEDICARE

## 2022-04-11 NOTE — TELEPHONE ENCOUNTER
Called pt to confirm arrival time of 745am for procedure on 4-12. Gave pt NPO instructions and gave pt opportunity to ask questions. Pt verbalized understanding.     Pt was informed that only 1 person would be allowed to accompany them the morning of surgery.  Pt verbalized understanding.

## 2022-04-12 ENCOUNTER — ANESTHESIA EVENT (OUTPATIENT)
Dept: SURGERY | Facility: HOSPITAL | Age: 77
End: 2022-04-12
Payer: MEDICARE

## 2022-04-12 ENCOUNTER — ANESTHESIA (OUTPATIENT)
Dept: SURGERY | Facility: HOSPITAL | Age: 77
End: 2022-04-12
Payer: MEDICARE

## 2022-04-12 ENCOUNTER — HOSPITAL ENCOUNTER (OUTPATIENT)
Facility: HOSPITAL | Age: 77
Discharge: HOME | End: 2022-04-12
Attending: UROLOGY | Admitting: UROLOGY
Payer: MEDICARE

## 2022-04-12 VITALS
SYSTOLIC BLOOD PRESSURE: 142 MMHG | OXYGEN SATURATION: 99 % | HEART RATE: 68 BPM | RESPIRATION RATE: 18 BRPM | DIASTOLIC BLOOD PRESSURE: 75 MMHG | TEMPERATURE: 98 F | WEIGHT: 147.94 LBS | BODY MASS INDEX: 24.65 KG/M2 | HEIGHT: 65 IN

## 2022-04-12 DIAGNOSIS — N32.81 OAB (OVERACTIVE BLADDER): Primary | ICD-10-CM

## 2022-04-12 PROCEDURE — D9220A PRA ANESTHESIA: Mod: ANES,,, | Performed by: ANESTHESIOLOGY

## 2022-04-12 PROCEDURE — 36000707: Performed by: UROLOGY

## 2022-04-12 PROCEDURE — 71000044 HC DOSC ROUTINE RECOVERY FIRST HOUR: Performed by: UROLOGY

## 2022-04-12 PROCEDURE — 87086 URINE CULTURE/COLONY COUNT: CPT | Performed by: UROLOGY

## 2022-04-12 PROCEDURE — D9220A PRA ANESTHESIA: Mod: CRNA,,, | Performed by: STUDENT IN AN ORGANIZED HEALTH CARE EDUCATION/TRAINING PROGRAM

## 2022-04-12 PROCEDURE — 63600175 PHARM REV CODE 636 W HCPCS: Performed by: STUDENT IN AN ORGANIZED HEALTH CARE EDUCATION/TRAINING PROGRAM

## 2022-04-12 PROCEDURE — 71000015 HC POSTOP RECOV 1ST HR: Performed by: UROLOGY

## 2022-04-12 PROCEDURE — 25000003 PHARM REV CODE 250: Performed by: STUDENT IN AN ORGANIZED HEALTH CARE EDUCATION/TRAINING PROGRAM

## 2022-04-12 PROCEDURE — 63600175 PHARM REV CODE 636 W HCPCS: Mod: JG | Performed by: UROLOGY

## 2022-04-12 PROCEDURE — 52287 CYSTOSCOPY CHEMODENERVATION: CPT | Mod: ,,, | Performed by: UROLOGY

## 2022-04-12 PROCEDURE — 52287 PR CYSTOURETHROSCOPY WITH INJ FOR CHEMODENERVATION: ICD-10-PCS | Mod: ,,, | Performed by: UROLOGY

## 2022-04-12 PROCEDURE — 36000706: Performed by: UROLOGY

## 2022-04-12 PROCEDURE — 37000008 HC ANESTHESIA 1ST 15 MINUTES: Performed by: UROLOGY

## 2022-04-12 PROCEDURE — 87077 CULTURE AEROBIC IDENTIFY: CPT | Performed by: UROLOGY

## 2022-04-12 PROCEDURE — 87186 SC STD MICRODIL/AGAR DIL: CPT | Performed by: UROLOGY

## 2022-04-12 PROCEDURE — 87088 URINE BACTERIA CULTURE: CPT | Performed by: UROLOGY

## 2022-04-12 PROCEDURE — D9220A PRA ANESTHESIA: ICD-10-PCS | Mod: ANES,,, | Performed by: ANESTHESIOLOGY

## 2022-04-12 PROCEDURE — 37000009 HC ANESTHESIA EA ADD 15 MINS: Performed by: UROLOGY

## 2022-04-12 PROCEDURE — D9220A PRA ANESTHESIA: ICD-10-PCS | Mod: CRNA,,, | Performed by: STUDENT IN AN ORGANIZED HEALTH CARE EDUCATION/TRAINING PROGRAM

## 2022-04-12 RX ORDER — ONDANSETRON 2 MG/ML
4 INJECTION INTRAMUSCULAR; INTRAVENOUS DAILY PRN
Status: DISCONTINUED | OUTPATIENT
Start: 2022-04-12 | End: 2022-04-12 | Stop reason: HOSPADM

## 2022-04-12 RX ORDER — CEFAZOLIN SODIUM/WATER 2 G/20 ML
2 SYRINGE (ML) INTRAVENOUS
Status: COMPLETED | OUTPATIENT
Start: 2022-04-12 | End: 2022-04-12

## 2022-04-12 RX ORDER — LIDOCAINE HYDROCHLORIDE 10 MG/ML
INJECTION INFILTRATION; PERINEURAL
Status: DISCONTINUED
Start: 2022-04-12 | End: 2022-04-12 | Stop reason: HOSPADM

## 2022-04-12 RX ORDER — PROPOFOL 10 MG/ML
VIAL (ML) INTRAVENOUS CONTINUOUS PRN
Status: DISCONTINUED | OUTPATIENT
Start: 2022-04-12 | End: 2022-04-12

## 2022-04-12 RX ORDER — CEPHALEXIN 500 MG/1
500 CAPSULE ORAL EVERY 12 HOURS
Qty: 4 CAPSULE | Refills: 0 | Status: SHIPPED | OUTPATIENT
Start: 2022-04-12 | End: 2022-04-14

## 2022-04-12 RX ORDER — ONDANSETRON 2 MG/ML
INJECTION INTRAMUSCULAR; INTRAVENOUS
Status: DISCONTINUED | OUTPATIENT
Start: 2022-04-12 | End: 2022-04-12

## 2022-04-12 RX ORDER — DEXAMETHASONE SODIUM PHOSPHATE 4 MG/ML
INJECTION, SOLUTION INTRA-ARTICULAR; INTRALESIONAL; INTRAMUSCULAR; INTRAVENOUS; SOFT TISSUE
Status: DISCONTINUED | OUTPATIENT
Start: 2022-04-12 | End: 2022-04-12

## 2022-04-12 RX ORDER — FENTANYL CITRATE 50 UG/ML
INJECTION, SOLUTION INTRAMUSCULAR; INTRAVENOUS
Status: DISCONTINUED | OUTPATIENT
Start: 2022-04-12 | End: 2022-04-12

## 2022-04-12 RX ORDER — FENTANYL CITRATE 50 UG/ML
25 INJECTION, SOLUTION INTRAMUSCULAR; INTRAVENOUS EVERY 5 MIN PRN
Status: DISCONTINUED | OUTPATIENT
Start: 2022-04-12 | End: 2022-04-12 | Stop reason: HOSPADM

## 2022-04-12 RX ORDER — LIDOCAINE HYDROCHLORIDE 20 MG/ML
INJECTION INTRAVENOUS
Status: DISCONTINUED | OUTPATIENT
Start: 2022-04-12 | End: 2022-04-12

## 2022-04-12 RX ORDER — PROPOFOL 10 MG/ML
VIAL (ML) INTRAVENOUS
Status: DISCONTINUED | OUTPATIENT
Start: 2022-04-12 | End: 2022-04-12

## 2022-04-12 RX ADMIN — PROPOFOL 100 MCG/KG/MIN: 10 INJECTION, EMULSION INTRAVENOUS at 09:04

## 2022-04-12 RX ADMIN — FENTANYL CITRATE 25 MCG: 50 INJECTION, SOLUTION INTRAMUSCULAR; INTRAVENOUS at 09:04

## 2022-04-12 RX ADMIN — DEXAMETHASONE SODIUM PHOSPHATE 4 MG: 4 INJECTION, SOLUTION INTRAMUSCULAR; INTRAVENOUS at 09:04

## 2022-04-12 RX ADMIN — Medication 2 G: at 09:04

## 2022-04-12 RX ADMIN — Medication 40 MG: at 09:04

## 2022-04-12 RX ADMIN — ONDANSETRON 4 MG: 2 INJECTION, SOLUTION INTRAMUSCULAR; INTRAVENOUS at 09:04

## 2022-04-12 RX ADMIN — SODIUM CHLORIDE: 9 INJECTION, SOLUTION INTRAVENOUS at 09:04

## 2022-04-12 RX ADMIN — LIDOCAINE HYDROCHLORIDE 80 MG: 20 INJECTION, SOLUTION INTRAVENOUS at 09:04

## 2022-04-12 NOTE — OP NOTE
[facility]  Operative Note    Date: 04/12/2022    Pre-Op Diagnosis:   - OAB  Patient Active Problem List    Diagnosis Date Noted    OAB (overactive bladder) 04/12/2022    Solar purpura 06/04/2021    Vaginal vault prolapse after hysterectomy 12/17/2019    Osteoarthritis of knee 12/17/2019    Lumbar radiculopathy 12/17/2019    Nonspecific abnormal electrocardiogram (ECG) (EKG) 12/16/2019    History of DVT in adulthood 10/21/2019    Migraine 09/26/2019    Precordial pain 06/21/2016    Hypercholesteremia, baseline .4, 7/2015 06/21/2016    Cardiovascular event risk, ASCVD 10-year risk 16.2%, 2015, on 10 mg of atorvastatin 06/21/2016    Abdominal obesity 06/21/2016    Esophageal stricture 06/21/2016    Essential hypertension 06/21/2016    Prerenal azotemia 06/21/2016    Varicose vein 06/21/2016    Gastroesophageal reflux disease 02/29/2016    Depression 02/29/2016    Pessary maintenance 06/03/2015       Post-Op Diagnosis: same    Procedure(s) Performed:   1.  Cystoscopy with bladder botox injection    Specimen(s): Urine for culture    Staff Surgeon:  Sánchez Salcido MD    Assistant Surgeon: Jesse Smith MD; Gianfranco Cordero MD     Anesthesia: General mask inhalational anesthesia    Indications: Jazz Martinez is a 76 y.o. female with OAB. She presents today for cystoscopy with bladder botox injection.    Findings:  - 100u injected without difficulty    Estimated Blood Loss: min    Drains:   None    Procedure in Detail:  After informed consent was obtained the patient was brought to the cystoscopy suite and placed in the supine position.  SCDs were applied and working.  Anesthesia was administered.  When the patient was adequately sedated she was placed in the dorsal lithotomy position and prepped and draped in the usual sterile fashion.      A rigid cystoscope in a 22 Fr sheath was introduced into the patients's bladder via the urethra. This passed easily. Formal  cystoscopy was performed which revealed the ureteral orifices in their normal anatomic position bilaterally. No bladder masses, trabeculations, stones or diverticula were seen.      100 units of botox was injected into the detrusor muscle throughout the bladder. Good wheals were raised. The patient's bladder was drained and the cystoscope was removed.      The patient tolerated the procedure well and was transferred to the recovery room in stable condition.      Disposition:  The patient will follow up with Dr. Salcido in 6 weeks.  She was given prescriptions for keflex post-operatively.    Jesse Smith MD

## 2022-04-12 NOTE — TRANSFER OF CARE
"Anesthesia Transfer of Care Note    Patient: Jazz Martinez    Procedure(s) Performed: Procedure(s):  CYSTOSCOPY  INJECTION, BOTULINUM TOXIN  100units    Patient location: PACU    Anesthesia Type: general    Transport from OR: Transported from OR on 6-10 L/min O2 by face mask with adequate spontaneous ventilation    Post pain: adequate analgesia    Post assessment: no apparent anesthetic complications and tolerated procedure well    Post vital signs: stable    Level of consciousness: awake, alert and oriented    Nausea/Vomiting: no nausea/vomiting    Complications: none    Transfer of care protocol was followed      Last vitals:   Visit Vitals  /62   Pulse 65   Temp 36.5 °C (97.7 °F) (Temporal)   Resp 18   Ht 5' 5" (1.651 m)   Wt 67.1 kg (147 lb 14.9 oz)   SpO2 100%   Breastfeeding No   BMI 24.62 kg/m²     "

## 2022-04-12 NOTE — PLAN OF CARE
Patient was prepared for surgery.     Dr Smith is at the bedside talking with the patient.    Patient will still need update to H&P and Anesthesia consent.

## 2022-04-12 NOTE — ANESTHESIA PREPROCEDURE EVALUATION
"                                                                                                             04/12/2022  Jazz Martinez is a 76 y.o., female.  Pre-operative evaluation for Procedure(s) (LRB):  CYSTOSCOPY,WITH BOTULINUM TOXIN INJECTION 100 units (N/A)      Patient Active Problem List   Diagnosis    Pessary maintenance    Gastroesophageal reflux disease    Depression    Precordial pain    Hypercholesteremia, baseline .4, 7/2015    Cardiovascular event risk, ASCVD 10-year risk 16.2%, 2015, on 10 mg of atorvastatin    Abdominal obesity    Esophageal stricture    Essential hypertension    Prerenal azotemia    Varicose vein    Migraine    History of DVT in adulthood    Nonspecific abnormal electrocardiogram (ECG) (EKG)    Vaginal vault prolapse after hysterectomy    Osteoarthritis of knee    Lumbar radiculopathy    Solar purpura       Review of patient's allergies indicates:   Allergen Reactions    Sulfa dyne Other (See Comments)     Blisters in mouth    Gabapentin     Nitrofurantoin Other (See Comments)     Deathly ill , headaches, weakness, 'wiped out"    Sulfa (sulfonamide antibiotics)         No current facility-administered medications on file prior to encounter.     Current Outpatient Medications on File Prior to Encounter   Medication Sig Dispense Refill    calcium carbonate/vitamin D3 (VITAMIN D-3 ORAL) Take by mouth.      docusate calcium (SURFAK) 240 mg capsule Take 240 mg by mouth 2 (two) times daily.      estradioL (ESTRACE) 0.01 % (0.1 mg/gram) vaginal cream INSERT 1 GM VAGINALLY MONDAY,WEDNESDAY,AND FRIDAY AS DIRECTED 42.5 g 7    fluticasone propionate (FLONASE) 50 mcg/actuation nasal spray 1 spray (50 mcg total) by Each Nostril route once daily. 16 g 2    rosuvastatin (CRESTOR) 5 MG tablet Take 1 tablet (5 mg total) by mouth every evening. 90 tablet 1    sertraline (ZOLOFT) 100 MG tablet Take 1.5 tablets (150 mg total) by mouth once daily. (Patient " taking differently: Take 100 mg by mouth once daily.) 135 tablet 3    sumatriptan (IMITREX) 100 MG tablet 1 tab po at start of headache, may repeat in 2 hours X1 in 24 hours. (Patient taking differently: as needed. 1 tab po at start of headache, may repeat in 2 hours X1 in 24 hours.) 10 tablet 11    topiramate (TOPAMAX) 50 MG tablet Take 1 tablet (50 mg total) by mouth 2 (two) times daily. 180 tablet 1    traMADol (ULTRAM) 50 mg tablet Take 50 mg by mouth every 8 (eight) hours as needed for Pain.  5    triamcinolone acetonide 0.025% (KENALOG) 0.025 % cream       triamterene-hydrochlorothiazide 37.5-25 mg (MAXZIDE-25) 37.5-25 mg per tablet 1/2 p.o. q.d. (Patient taking differently: Take 1 tablet by mouth every 7 days. 1/2 p.o. q.d.) 90 tablet 0    valACYclovir (VALTREX) 500 MG tablet Take 2 tablets (1,000 mg total) by mouth 2 (two) times daily. 12 tablet 3       Past Surgical History:   Procedure Laterality Date    APPENDECTOMY  AGE 12    BLADDER SURGERY      Twice    BREAST CYST EXCISION      Benign per patient    CYSTOSCOPY N/A 1/26/2022    Procedure: CYSTOSCOPY;  Surgeon: Sánchez Salcido MD;  Location: [address];  Service: Urology;  Laterality: N/A;    EYE SURGERY      LASER FOR GLAUCOMA    HAND SURGERY Left     HYSTERECTOMY      Not related to cancer per patient.    INJECTION OF BOTULINUM TOXIN TYPE A  1/26/2022    Procedure: INJECTION, BOTULINUM TOXIN, 100units;  Surgeon: Sánchez Salcido MD;  Location: [facility] OR [address] [place] [address];  Service: Urology;;    OOPHORECTOMY      ROBOT-ASSISTED LAPAROSCOPIC ABDOMINAL SACROCOLPOPEXY USING DA KINGA XI N/A 12/17/2019    Procedure: XI ROBOTIC SACROCOLPOPEXY, ABDOMINAL;  Surgeon: Javier Putnam MD;  Location: [facility];  Service: OB/GYN;  Laterality: N/A;  removal of damaged tissue    SURGICAL PROCEDURE FOR STRESS INCONTINENCE USING TENSION FREE VAGINAL TAPE N/A 12/17/2019    Procedure: SURGICAL PROCEDURE, USING TENSION FREE VAGINAL TAPE, FOR STRESS  INCONTINENCE;  Surgeon: Javier Putnam MD;  [facility]: [facility];  Service: OB/GYN;  Laterality: N/A;    TONSILLECTOMY         Social History     Socioeconomic History    Marital status:    Tobacco Use    Smoking status: Never Smoker    Smokeless tobacco: Never Used   Substance and Sexual Activity    Alcohol use: No    Drug use: No    Sexual activity: Never         Vital Signs Range (Last 24H):         CBC: No results for input(s): WBC, RBC, HGB, HCT, PLT, MCV, MCH, MCHC in the last 72 hours.    CMP: No results for input(s): NA, K, CL, CO2, BUN, CREATININE, GLU, MG, PHOS, CALCIUM, ALBUMIN, PROT, ALKPHOS, ALT, AST, BILITOT in the last 72 hours.    INR  No results for input(s): PT, INR, PROTIME, APTT in the last 72 hours.        Diagnostic Studies:      EKG:    Normal sinus rhythm   Possible Inferior infarct ,age undetermined   Abnormal ECG   When compared with ECG of 21-JUN-2016 12:50,   Borderline criteria for Inferior infarct are now Present   T wave inversion now evident in Lateral leads   Confirmed by Gabriel Daniel MD ([id number]) on 12/11/2019 4:12:57 PM   2D Echo:    CONCLUSIONS     1 - Concentric remodeling.     2 - Normal left ventricular systolic function (EF 60-65%).     3 - Normal left ventricular diastolic function.     4 - Normal right ventricular systolic function .     5 - The estimated PA systolic pressure is 24 mmHg.     6 - Difficult apical windows.     No evidence of stress induced myocardial ischemia.         Pre-op Assessment    I have reviewed the Patient Summary Reports.     I have reviewed the Nursing Notes. I have reviewed the NPO Status.      Review of Systems  Anesthesia Hx:  No problems with previous Anesthesia  History of prior surgery of interest to airway management or planning: Denies Family Hx of Anesthesia complications.   Denies Personal Hx of Anesthesia complications.   Social:  Non-Smoker, No Alcohol Use    Hematology/Oncology:  Hematology Normal   Oncology Normal      EENT/Dental:   Wears glasses/S/P-Phaco with IOL/S/P-Glaucoma Sx   Cardiovascular:   Exercise tolerance: good Hypertension PVD hyperlipidemia Varicose veins/ Walking daily/Housework/Walking on job/ Cares for brother daily   Pulmonary:  Pulmonary Normal    Renal/:   Urge incontinence/ X2 months ago-UTI-treated with antibx-resloved   Hepatic/GI:   GERD    Musculoskeletal:   Arthritis  Generalized   Neurological:   Neuromuscular Disease, Headaches Migraine/Lumbar radiculopathy   Endocrine:  Endocrine Normal    Psych:   Psychiatric History anxiety depression      Alejandra Silva RN  4/5/22    Physical Exam  General:  Well nourished, Cooperative, Alert and Oriented      Airway/Jaw/Neck:  Mouth Opening: Normal   Tongue: Normal   Mallampati: III Improves to I with phonation.  TM Distance: Normal   Neck ROM: Normal ROM       Dental:  Intact     Chest/Lungs:  Chest/Lungs Findings: Clear to auscultation, Normal Respiratory Rate      Heart/Vascular:  Heart Findings: Rate: Normal  Rhythm: Regular Rhythm  Sounds: Normal             Anesthesia Assessment: Preoperative EQUATION    Planned Procedure: Procedure(s) (LRB):  CYSTOSCOPY,WITH BOTULINUM TOXIN INJECTION 100 units (N/A)  Requested Anesthesia Type:Monitor Anesthesia Care  Surgeon: Sánchez Salcido MD  Service: Urology  Known or anticipated Date of Surgery:4/12/2022    Surgeon notes: reviewed and Urgency incontinence    Previous anesthesia records:1/26/22-Cystoscopy Injection, Botulinum Toxin, 100units-General-no apparent anesthetic complications and tolerated procedure well-no nausea/vomiting    Anesthesia Hx:  No problems with previous Anesthesia History of prior surgery of interest to airway management or planning: Previous anesthesia: General Denies Family Hx of Anesthesia complications. Denies Personal Hx of Anesthesia complications.       Airway/Jaw/Neck:  Airway Findings: Mouth Opening: Normal Tongue: Normal  General Airway Assessment: Adult  Mallampati: II          Last PCP note: outside [facility] , Linda T. Melerine, NP-General-OS PCP-[facility]  Subspecialty notes: Cardiology: General, Dermatology, Hematology/Oncology, Ortho, Urogynecology visit    Other important co-morbidities: GERD, HLD, HTN and Urgency incontinence    Medical History    Diagnosis Date Comment Source   Arthritis      Corns and callosities      Deep vein thrombosis      GERD (gastroesophageal reflux disease)      Gout      H/O bladder problems      History of colonic polyps 2014     Hyperlipidemia      Hypertension      Pulmonary embolism          Tests already available:  Results have been reviewed. Labs-1/26/22-SARS-COV-2 test/ CXR-8/2/21      Plan:Phone pending      Testing:  BMP and Hematology Profile      Patient  has previously scheduled Medical Appointment:None    Navigation:Phone Completed   Tests Scheduled. BMP and Hematology Profile on 4/8/22-pending             Consults scheduled.None needed at this time.             Results will be tracked by Preop Clinic.       Alejandra Silva. DEN  4/5/22       Addendum: Labs-BMP & Hem Prof done on 4/8/22 & reviewed by Dr. Chang. Alejandra Silva, RN  4/11/22             Physical Exam  General: Well nourished, Cooperative, Alert and Oriented    Airway:  Mallampati: III / I  Mouth Opening: Normal  TM Distance: Normal  Tongue: Normal  Neck ROM: Normal ROM    Dental:  Intact    Chest/Lungs:  Clear to auscultation, Normal Respiratory Rate    Heart:  Rate: Normal  Rhythm: Regular Rhythm  Sounds: Normal          Anesthesia Plan  Type of Anesthesia, risks & benefits discussed:    Anesthesia Type: Gen ETT, Gen Supraglottic Airway, Gen Natural Airway  Intra-op Monitoring Plan: Standard ASA Monitors  Post Op Pain Control Plan: multimodal analgesia  Induction:  IV  Informed Consent: Informed consent signed with the Patient and all parties understand the risks and agree with anesthesia plan.  All questions answered.   ASA Score: 3    Ready For Surgery  From Anesthesia Perspective.       .

## 2022-04-12 NOTE — PATIENT INSTRUCTIONS
Post Cystoscopy Instructions  Do not strain to have a bowel movement  No strenuous exercise x 7 days  No driving while you are on narcotic pain medications or if your soria  catheter is in place    You can expect:  Have pain when you void  See blood in your urine    If you have a catheter, please return to the ER if your catheter stops draining or you are having abdominal pain.    Call the doctor if:  Temperature is greater than 101F  Persistent vomiting and inability to keep food down  Inability to void if you do not have a catheter

## 2022-04-13 NOTE — ANESTHESIA POSTPROCEDURE EVALUATION
Anesthesia Post Evaluation    Patient: Jazz Martinez    Procedure(s) Performed: Procedure(s):  CYSTOSCOPY  INJECTION, BOTULINUM TOXIN  100units    Final Anesthesia Type: general      Patient location during evaluation: PACU  Patient participation: Yes- Able to Participate  Level of consciousness: awake and alert  Post-procedure vital signs: reviewed and stable  Pain management: adequate  Airway patency: patent    PONV status at discharge: No PONV  Anesthetic complications: no      Cardiovascular status: blood pressure returned to baseline  Respiratory status: unassisted, spontaneous ventilation and room air  Hydration status: euvolemic  Follow-up not needed.          Vitals Value Taken Time   /75 04/12/22 1030   Temp 36.6 °C (97.9 °F) 04/12/22 1030   Pulse 68 04/12/22 1030   Resp 18 04/12/22 1030   SpO2 99 % 04/12/22 1030         No case tracking events are documented in the log.      Pain/Tita Score: Tita Score: 9 (4/12/2022  9:55 AM)

## 2022-04-14 LAB — BACTERIA UR CULT: ABNORMAL

## 2022-05-05 DIAGNOSIS — M25.562 LEFT KNEE PAIN, UNSPECIFIED CHRONICITY: Primary | ICD-10-CM

## 2022-05-19 ENCOUNTER — TELEPHONE (OUTPATIENT)
Dept: FAMILY MEDICINE | Facility: CLINIC | Age: 77
End: 2022-05-19
Payer: MEDICARE

## 2022-05-26 ENCOUNTER — OFFICE VISIT (OUTPATIENT)
Dept: ORTHOPEDICS | Facility: CLINIC | Age: 77
End: 2022-05-26
Payer: MEDICARE

## 2022-05-26 ENCOUNTER — HOSPITAL ENCOUNTER (OUTPATIENT)
Dept: RADIOLOGY | Facility: HOSPITAL | Age: 77
Discharge: HOME | End: 2022-05-26
Attending: ORTHOPAEDIC SURGERY
Payer: MEDICARE

## 2022-05-26 VITALS — BODY MASS INDEX: 24.49 KG/M2 | RESPIRATION RATE: 18 BRPM | WEIGHT: 147 LBS | HEIGHT: 65 IN

## 2022-05-26 DIAGNOSIS — M25.562 LEFT KNEE PAIN, UNSPECIFIED CHRONICITY: ICD-10-CM

## 2022-05-26 DIAGNOSIS — M17.12 ARTHRITIS OF LEFT KNEE: Primary | ICD-10-CM

## 2022-05-26 PROCEDURE — 73562 XR KNEE ORTHO LEFT WITH FLEXION: ICD-10-PCS | Mod: 26,RT,, | Performed by: RADIOLOGY

## 2022-05-26 PROCEDURE — 20610 LARGE JOINT ASPIRATION/INJECTION: L KNEE: ICD-10-PCS | Mod: S$PBB,LT,, | Performed by: ORTHOPAEDIC SURGERY

## 2022-05-26 PROCEDURE — 99214 PR OFFICE/OUTPT VISIT, EST, LEVL IV, 30-39 MIN: ICD-10-PCS | Mod: S$PBB,25,, | Performed by: ORTHOPAEDIC SURGERY

## 2022-05-26 PROCEDURE — 99214 OFFICE O/P EST MOD 30 MIN: CPT | Mod: S$PBB,25,, | Performed by: ORTHOPAEDIC SURGERY

## 2022-05-26 PROCEDURE — 99213 OFFICE O/P EST LOW 20 MIN: CPT | Mod: PBBFAC,PN,25 | Performed by: ORTHOPAEDIC SURGERY

## 2022-05-26 PROCEDURE — 73562 X-RAY EXAM OF KNEE 3: CPT | Mod: 59,TC,PN,RT

## 2022-05-26 PROCEDURE — 73564 XR KNEE ORTHO LEFT WITH FLEXION: ICD-10-PCS | Mod: 26,LT,, | Performed by: RADIOLOGY

## 2022-05-26 PROCEDURE — 73564 X-RAY EXAM KNEE 4 OR MORE: CPT | Mod: 26,LT,, | Performed by: RADIOLOGY

## 2022-05-26 PROCEDURE — 99999 PR PBB SHADOW E&M-EST. PATIENT-LVL III: CPT | Mod: PBBFAC,,, | Performed by: ORTHOPAEDIC SURGERY

## 2022-05-26 PROCEDURE — 99999 PR PBB SHADOW E&M-EST. PATIENT-LVL III: ICD-10-PCS | Mod: PBBFAC,,, | Performed by: ORTHOPAEDIC SURGERY

## 2022-05-26 PROCEDURE — 73562 X-RAY EXAM OF KNEE 3: CPT | Mod: 26,RT,, | Performed by: RADIOLOGY

## 2022-05-26 PROCEDURE — 20610 DRAIN/INJ JOINT/BURSA W/O US: CPT | Mod: PBBFAC,PN | Performed by: ORTHOPAEDIC SURGERY

## 2022-05-26 RX ORDER — METHYLPREDNISOLONE ACETATE 40 MG/ML
40 INJECTION, SUSPENSION INTRA-ARTICULAR; INTRALESIONAL; INTRAMUSCULAR; SOFT TISSUE
Status: DISCONTINUED | OUTPATIENT
Start: 2022-05-26 | End: 2022-05-26 | Stop reason: HOSPADM

## 2022-05-26 RX ADMIN — METHYLPREDNISOLONE ACETATE 40 MG: 40 INJECTION, SUSPENSION INTRA-ARTICULAR; INTRALESIONAL; INTRAMUSCULAR; SOFT TISSUE at 10:05

## 2022-05-26 NOTE — PROCEDURES
Large Joint Aspiration/Injection: L knee    Date/Time: 5/26/2022 10:45 AM  Performed by: Edward Yeager II, MD  Authorized by: Edward Yeager II, MD     Consent Done?:  Yes (Verbal)  Indications:  Pain and arthritis  Timeout: prior to procedure the correct patient, procedure, and site was verified    Prep: patient was prepped and draped in usual sterile fashion      Local anesthesia used?: Yes    Local anesthetic:  Topical anesthetic    Details:  Needle Size:  22 G  Approach:  Anteromedial  Location:  Knee  Site:  L knee  Medications:  40 mg methylPREDNISolone acetate 40 mg/mL  Patient tolerance:  Patient tolerated the procedure well with no immediate complications

## 2022-05-26 NOTE — PROGRESS NOTES
CC:  [age]-year-old female presents for evaluation of left knee pain.  The patient has had chronic pain in the left knee quite some time.  She last had an injection in her knee in September of last year.  She states she got really good relief with that until recently when she has had insidious return of pain in the left knee.  she currently rates the pain as an 8/10.    Past Medical History:   Diagnosis Date    Arthritis     Corns and callosities     Deep vein thrombosis     GERD (gastroesophageal reflux disease)     Gout     H/O bladder problems     History of colonic polyps 2014    Hyperlipidemia     Hypertension     Pulmonary embolism        Past Surgical History:   Procedure Laterality Date    APPENDECTOMY  AGE 12    BLADDER SURGERY      Twice    BREAST CYST EXCISION      Benign per patient    CYSTOSCOPY N/A 1/26/2022    Procedure: CYSTOSCOPY;  Surgeon: Sánchez Salcido MD;  Location: [facility] OR [address] [place] [address];  Service: Urology;  Laterality: N/A;    CYSTOSCOPY  4/12/2022    Procedure: CYSTOSCOPY;  Surgeon: Sánchez Salcido MD;  Location: [facility] OR [address] [place] [address];  Service: Urology;;    EYE SURGERY      LASER FOR GLAUCOMA    HAND SURGERY Left     HYSTERECTOMY      Not related to cancer per patient.    INJECTION OF BOTULINUM TOXIN TYPE A  1/26/2022    Procedure: INJECTION, BOTULINUM TOXIN, 100units;  Surgeon: Sánchez Salcido MD;  Location: [facility] OR [address] [place] [address];  Service: Urology;;    INJECTION OF BOTULINUM TOXIN TYPE A  4/12/2022    Procedure: INJECTION, BOTULINUM TOXIN  100units;  Surgeon: Sánchez Salcido MD;  Location: [facility] OR [address] [place] [address];  Service: Urology;;    OOPHORECTOMY      ROBOT-ASSISTED LAPAROSCOPIC ABDOMINAL SACROCOLPOPEXY USING DA KINGA XI N/A 12/17/2019    Procedure: XI ROBOTIC SACROCOLPOPEXY, ABDOMINAL;  Surgeon: Javier Putnam MD;  Location: [facility];  Service: OB/GYN;  Laterality: N/A;  removal of damaged tissue    SURGICAL PROCEDURE FOR STRESS INCONTINENCE USING TENSION FREE VAGINAL  TAPE N/A 12/17/2019    Procedure: SURGICAL PROCEDURE, USING TENSION FREE VAGINAL TAPE, FOR STRESS INCONTINENCE;  Surgeon: Javier Putnam MD;  Location: [facility];  Service: OB/GYN;  Laterality: N/A;    TONSILLECTOMY         Current Outpatient Medications on File Prior to Visit   Medication Sig Dispense Refill    calcium carbonate/vitamin D3 (VITAMIN D-3 ORAL) Take by mouth.      docusate calcium (SURFAK) 240 mg capsule Take 240 mg by mouth 2 (two) times daily.      estradioL (ESTRACE) 0.01 % (0.1 mg/gram) vaginal cream INSERT 1 GM VAGINALLY MONDAY,WEDNESDAY,AND FRIDAY AS DIRECTED 42.5 g 7    fluticasone propionate (FLONASE) 50 mcg/actuation nasal spray 1 spray (50 mcg total) by Each Nostril route once daily. 16 g 2    pumpkin seed extract/soy germ (AZO BLADDER CONTROL ORAL) Take by mouth.      rosuvastatin (CRESTOR) 5 MG tablet Take 1 tablet (5 mg total) by mouth every evening. 90 tablet 1    sertraline (ZOLOFT) 100 MG tablet Take 1.5 tablets (150 mg total) by mouth once daily. (Patient taking differently: Take 100 mg by mouth once daily.) 135 tablet 3    sumatriptan (IMITREX) 100 MG tablet 1 tab po at start of headache, may repeat in 2 hours X1 in 24 hours. (Patient taking differently: as needed. 1 tab po at start of headache, may repeat in 2 hours X1 in 24 hours.) 10 tablet 11    topiramate (TOPAMAX) 50 MG tablet Take 1 tablet (50 mg total) by mouth 2 (two) times daily. 180 tablet 1    traMADol (ULTRAM) 50 mg tablet Take 50 mg by mouth every 8 (eight) hours as needed for Pain.  5    triamcinolone acetonide 0.025% (KENALOG) 0.025 % cream       triamterene-hydrochlorothiazide 37.5-25 mg (MAXZIDE-25) 37.5-25 mg per tablet 1/2 p.o. q.d. (Patient taking differently: Take 1 tablet by mouth every 7 days. 1/2 p.o. q.d.) 90 tablet 0    valACYclovir (VALTREX) 500 MG tablet Take 2 tablets (1,000 mg total) by mouth 2 (two) times daily. 12 tablet 3     No current facility-administered medications on file prior to  visit.       ROS:    Constitution: Denies chills, fever, and sweats.  HENT: Denies headaches or blurry vision.  Cardiovascular: Denies chest pain or irregular heart beat.  Respiratory: Denies cough or shortness of breath.  Gastrointestinal: Denies abdominal pain, nausea, or vomiting.  Genitourinary:  Denies urinary incontinence, bladder and kidney issues  Musculoskeletal:  Denies muscle cramps.  Neurological: Denies dizziness or focal weakness.  Psychiatric/Behavioral: Normal mental status.  Hematologic/Lymphatic: Denies bleeding problem or easy bruising/bleeding.  Skin: Denies rash or suspicious lesions.    Physical examination     Gen - No acute distress, well nourished, well groomed   Eyes - Extraoccular motions intact, pupils equally round and reactive to light and accommodation   ENT - normocephalic, atruamtic, oropharynx clear   Neck - Supple, no abnormal masses   Cardiovascular - regular rate and rhythm   Pulmonary - clear to auscultation bilaterally, no wheezes, ronchi, or rales   Abdomen - soft, non-tender, non-distended, positive bowel sounds   Psych - The patient is alert and oriented x3 with normal mood and affect    Examination of the Left Lower Extremity:     Skin intact throughout.  Motor function is intact distally EHL/FHL/TA/julieta   +2 dorsalis pedis and posterior tibial pulses   Sensation to light touch intact distally dorsal, plantar, and first web space     Examination of the Left knee:    ROM 0 - 150   Effusion positive  Tenderness to palpation at the joint line positive  Pain during range of motion positive  Crepitation during range of motion positive     positive increased pain noted with flexion past 90   positive antalgic gait noted   negative Lachman's Test   negative Anterior Drawer Test   negative Posterior Drawer Test   positive McMurrays Test   positive Disco Test   negative Varus/Valgus instability    X-ray images were examined and personally interpreted by me.  Three views the left  knee dated 05/26/2022 show osteoarthritis with narrowing of joint space, periarticular osteophytes, and subchondral sclerosis.    Dx:  Osteoarthritis left knee    Plan:  Offer the patient an intra-articular injection and she agreed.  We injected the left knee with a mixture of 2, 2, 1. She tolerated that well.  Follow up p.r.n..

## 2022-05-31 ENCOUNTER — OFFICE VISIT (OUTPATIENT)
Dept: PODIATRY | Facility: CLINIC | Age: 77
End: 2022-05-31
Payer: MEDICARE

## 2022-05-31 VITALS
HEIGHT: 65 IN | RESPIRATION RATE: 18 BRPM | BODY MASS INDEX: 24.32 KG/M2 | SYSTOLIC BLOOD PRESSURE: 135 MMHG | HEART RATE: 73 BPM | WEIGHT: 146 LBS | DIASTOLIC BLOOD PRESSURE: 80 MMHG

## 2022-05-31 DIAGNOSIS — L60.1 ONYCHOLYSIS OF TOENAIL: ICD-10-CM

## 2022-05-31 DIAGNOSIS — G57.93 NEUROPATHIC PAIN OF BOTH FEET: ICD-10-CM

## 2022-05-31 DIAGNOSIS — I87.2 EDEMA OF BOTH LOWER LEGS DUE TO PERIPHERAL VENOUS INSUFFICIENCY: ICD-10-CM

## 2022-05-31 DIAGNOSIS — M19.079 ARTHRITIS OF JOINT OF TOE: ICD-10-CM

## 2022-05-31 DIAGNOSIS — M20.11 HALLUX ABDUCTO VALGUS, BILATERAL: Primary | ICD-10-CM

## 2022-05-31 DIAGNOSIS — L84 FOOT CALLUS: ICD-10-CM

## 2022-05-31 DIAGNOSIS — M20.12 HALLUX ABDUCTO VALGUS, BILATERAL: Primary | ICD-10-CM

## 2022-05-31 DIAGNOSIS — R60.0 EDEMA OF BOTH LOWER LEGS DUE TO PERIPHERAL VENOUS INSUFFICIENCY: ICD-10-CM

## 2022-05-31 PROCEDURE — 99214 OFFICE O/P EST MOD 30 MIN: CPT | Mod: PBBFAC | Performed by: PODIATRIST

## 2022-05-31 PROCEDURE — 99999 PR PBB SHADOW E&M-EST. PATIENT-LVL IV: CPT | Mod: PBBFAC,,, | Performed by: PODIATRIST

## 2022-05-31 PROCEDURE — 99203 OFFICE O/P NEW LOW 30 MIN: CPT | Mod: S$PBB,,, | Performed by: PODIATRIST

## 2022-05-31 PROCEDURE — 99203 PR OFFICE/OUTPT VISIT, NEW, LEVL III, 30-44 MIN: ICD-10-PCS | Mod: S$PBB,,, | Performed by: PODIATRIST

## 2022-05-31 PROCEDURE — 99999 PR PBB SHADOW E&M-EST. PATIENT-LVL IV: ICD-10-PCS | Mod: PBBFAC,,, | Performed by: PODIATRIST

## 2022-05-31 RX ORDER — METHYLPREDNISOLONE 4 MG/1
TABLET ORAL
COMMUNITY
Start: 2022-05-04 | End: 2022-06-03

## 2022-05-31 RX ORDER — DIAZEPAM 5 MG/1
TABLET ORAL
COMMUNITY
Start: 2022-02-18 | End: 2022-12-27

## 2022-06-04 NOTE — PROGRESS NOTES
Subjective:       Patient ID: Jazz Martinez is a 76 y.o. female.    Chief Complaint: Foot Pain and Callouses  Patient presents with complaint of pain under the balls of both feet, has had for quite a while, sensations more notable, sore, has tried gabapentin in the past with side effects.  Relates nail problem, loose right great toenail.  Relates painful calluses side big toes both feet, has had for a long time.  She tries to wear appropriate shoes and not walk barefoot.  Pain level 4/10    Past Medical History:   Diagnosis Date    Arthritis     Corns and callosities     Deep vein thrombosis     GERD (gastroesophageal reflux disease)     Gout     H/O bladder problems     History of colonic polyps 2014    Hyperlipidemia     Hypertension     Pulmonary embolism      Past Surgical History:   Procedure Laterality Date    APPENDECTOMY  AGE 12    BLADDER SURGERY      Twice    BREAST CYST EXCISION      Benign per patient    CYSTOSCOPY N/A 1/26/2022    Procedure: CYSTOSCOPY;  Surgeon: Sánchez Salcido MD;  Location: [facility] OR [address] [place] [address];  Service: Urology;  Laterality: N/A;    CYSTOSCOPY  4/12/2022    Procedure: CYSTOSCOPY;  Surgeon: Sánchez Salcido MD;  Location: [facility] OR [address] [place] [address];  Service: Urology;;    EYE SURGERY      LASER FOR GLAUCOMA    HAND SURGERY Left     HYSTERECTOMY      Not related to cancer per patient.    INJECTION OF BOTULINUM TOXIN TYPE A  1/26/2022    Procedure: INJECTION, BOTULINUM TOXIN, 100units;  Surgeon: Sánchez Salcido MD;  Location: [facility] OR [address] [place] [address];  Service: Urology;;    INJECTION OF BOTULINUM TOXIN TYPE A  4/12/2022    Procedure: INJECTION, BOTULINUM TOXIN  100units;  Surgeon: Sánchez Salcido MD;  Location: [facility] OR [address] [place] [address];  Service: Urology;;    OOPHORECTOMY      ROBOT-ASSISTED LAPAROSCOPIC ABDOMINAL SACROCOLPOPEXY USING DA KINGA XI N/A 12/17/2019    Procedure: XI ROBOTIC SACROCOLPOPEXY, ABDOMINAL;  Surgeon: Javier Putnam MD;  Location: [facility] OR;  Service:  OB/GYN;  Laterality: N/A;  removal of damaged tissue    SURGICAL PROCEDURE FOR STRESS INCONTINENCE USING TENSION FREE VAGINAL TAPE N/A 12/17/2019    Procedure: SURGICAL PROCEDURE, USING TENSION FREE VAGINAL TAPE, FOR STRESS INCONTINENCE;  Surgeon: Javier Putnam MD;  Location: [facility];  Service: OB/GYN;  Laterality: N/A;    TONSILLECTOMY       Family History   Problem Relation Age of Onset    Cancer Brother         bladder    Breast cancer Neg Hx     Colon cancer Neg Hx     Ovarian cancer Neg Hx     Eczema Neg Hx     Lupus Neg Hx     Psoriasis Neg Hx     Melanoma Neg Hx     Anesthesia problems Neg Hx      Social History     Socioeconomic History    Marital status:    Tobacco Use    Smoking status: Never Smoker    Smokeless tobacco: Never Used   Substance and Sexual Activity    Alcohol use: No    Drug use: No    Sexual activity: Never       Current Outpatient Medications   Medication Sig Dispense Refill    calcium carbonate/vitamin D3 (VITAMIN D-3 ORAL) Take by mouth.      docusate calcium (SURFAK) 240 mg capsule Take 240 mg by mouth 2 (two) times daily.      estradioL (ESTRACE) 0.01 % (0.1 mg/gram) vaginal cream INSERT 1 GM VAGINALLY MONDAY,WEDNESDAY,AND FRIDAY AS DIRECTED 42.5 g 7    fluticasone propionate (FLONASE) 50 mcg/actuation nasal spray 1 spray (50 mcg total) by Each Nostril route once daily. 16 g 2    rosuvastatin (CRESTOR) 5 MG tablet Take 1 tablet (5 mg total) by mouth every evening. 90 tablet 1    sertraline (ZOLOFT) 100 MG tablet Take 1.5 tablets (150 mg total) by mouth once daily. (Patient taking differently: Take 100 mg by mouth once daily.) 135 tablet 3    sumatriptan (IMITREX) 100 MG tablet 1 tab po at start of headache, may repeat in 2 hours X1 in 24 hours. (Patient taking differently: as needed. 1 tab po at start of headache, may repeat in 2 hours X1 in 24 hours.) 10 tablet 11    topiramate (TOPAMAX) 50 MG tablet Take 1 tablet (50 mg total) by mouth 2 (two)  "times daily. 180 tablet 1    traMADol (ULTRAM) 50 mg tablet Take 50 mg by mouth every 8 (eight) hours as needed for Pain.  5    triamterene-hydrochlorothiazide 37.5-25 mg (MAXZIDE-25) 37.5-25 mg per tablet 1/2 p.o. q.d. (Patient taking differently: Take 1 tablet by mouth every 7 days. 1/2 p.o. q.d.) 90 tablet 0    valACYclovir (VALTREX) 500 MG tablet Take 2 tablets (1,000 mg total) by mouth 2 (two) times daily. 12 tablet 3    diazePAM (VALIUM) 5 MG tablet TAKE 1 TABLET BY MOUTH 30 MINUTES BEFORE PROCEDURE, BRING BOTTLE TO APPOINTMENT      triamcinolone acetonide 0.025% (KENALOG) 0.025 % cream        No current facility-administered medications for this visit.     Review of patient's allergies indicates:   Allergen Reactions    Gabapentin     Nitrofurantoin Other (See Comments)     Deathly ill , headaches, weakness, 'wiped out"    Sulfa (sulfonamide antibiotics)        Review of Systems   HENT: Negative for congestion.    Respiratory: Negative for cough and shortness of breath.    Cardiovascular: Positive for leg swelling.   All other systems reviewed and are negative.      Objective:      Vitals:    05/31/22 1510   BP: 135/80   Pulse: 73   Resp: 18   Weight: 66.2 kg (146 lb)   Height: 5' 5" (1.651 m)     Physical Exam  Vitals and nursing note reviewed.   Constitutional:       General: She is not in acute distress.  Cardiovascular:      Pulses:           Dorsalis pedis pulses are 2+ on the right side and 2+ on the left side.        Posterior tibial pulses are 1+ on the right side and 1+ on the left side.   Pulmonary:      Effort: Pulmonary effort is normal.   Musculoskeletal:      Right foot: Decreased range of motion. Bunion (Mild HAV deformity bilateral with prominent osteoarthritis IPJ bilateral hallux with overlying callus.  Cavus foot type bilateral) present.      Left foot: Decreased range of motion. Bunion present.      Comments: Arthritic and degenerative changes bilateral feet, reduced range of " motion foot and ankle bilateral   Feet:      Right foot:      Skin integrity: Callus (Tender thick raised chronic callus medial IPJ bilateral hallux, no discoloration) present. No erythema (Onycholysis distal lateral right hallux).      Toenail Condition: Right toenails are long.      Left foot:      Skin integrity: Callus present.      Toenail Condition: Left toenails are long.   Skin:     Capillary Refill: Capillary refill takes 2 to 3 seconds.   Neurological:      General: No focal deficit present.      Comments: Experiencing subtle paresthesias ball of both feet consistent with early neuropathy, no evidence of acute neuritis/neuroma   Psychiatric:         Mood and Affect: Mood normal.         Behavior: Behavior normal.                              Assessment:       1. Hallux abducto valgus, bilateral    2. Arthritis of joint of toe    3. Edema of both lower legs due to peripheral venous insufficiency    4. Neuropathic pain of both feet    5. Foot callus    6. Onycholysis of toenail - Right Foot        Plan:         Had a lengthy discussion with patient regarding mild bunions, arthritis of the big toe joint, arthritis in the front of both feet.  Advised patient with all of these conditions confined along with high arches making the ball of the foot very prominent and this most likely she has been experiencing subtle symptoms of neuropathy on the ball of her feet for quite some time.    Reviewed neuropathy at length.  We reviewed gabapentin and other medications used for this condition which we do agree patient should avoid due to side effects  Advised patient conservative treatments for this condition include arch support which we discussed at length with appropriate shoes.  Preferably tennis shoes with thick sole for shock absorption, remove existing insole and explained how to gradually adjust arch supports comfortably  Reviewed appropriate shoes for indoors, no flat shoes, slippers or walking in sock or bare  feet  Reviewed massage, soaking, topical treatments, Voltaren gel and other products used to alleviate pain and pressure nerve endings in this location.  Advised patient these treatments are typically more effective if soaking as performed 30 minutes each night with application of topical   Reviewed calluses due to arthritis both big toes, calluses debrided in thickness reduced, no complications  Reviewed condition of nails, lifting up/slight detachment of the nail plate right great toe.  Minimal fungal changes, advised patient majority of the nail is clean, but loose nail most likely will not reattached.  Did recommend use of tea tree oil.  Nails were debrided.    Reviewed care and maintenance of skin, calluses and nails  Patient was in understanding and agreement with treatment plan.  I counseled the patient on their conditions, implications and medical management.  Instructed patient to contact the office with any changes, questions, concerns, worsening of symptoms.   Total face-to-face time, exam, assessment, treatment, discussion, documentation 30 minutes, more than half this time spent on consultation and coordination of care.  Follow up as needed      This note was created using M*Modal voice recognition software that occasionally misinterpreted phrases or words.

## 2022-06-24 ENCOUNTER — PES CALL (OUTPATIENT)
Dept: ADMINISTRATIVE | Facility: CLINIC | Age: 77
End: 2022-06-24
Payer: MEDICARE

## 2022-07-20 ENCOUNTER — OFFICE VISIT (OUTPATIENT)
Dept: UROLOGY | Facility: CLINIC | Age: 77
End: 2022-07-20
Payer: MEDICARE

## 2022-07-20 VITALS
BODY MASS INDEX: 25.49 KG/M2 | HEIGHT: 65 IN | DIASTOLIC BLOOD PRESSURE: 80 MMHG | HEART RATE: 62 BPM | WEIGHT: 153 LBS | SYSTOLIC BLOOD PRESSURE: 136 MMHG

## 2022-07-20 DIAGNOSIS — N32.81 OAB (OVERACTIVE BLADDER): Primary | ICD-10-CM

## 2022-07-20 PROCEDURE — 99213 PR OFFICE/OUTPT VISIT, EST, LEVL III, 20-29 MIN: ICD-10-PCS | Mod: S$PBB,,, | Performed by: UROLOGY

## 2022-07-20 PROCEDURE — 99999 PR PBB SHADOW E&M-EST. PATIENT-LVL II: ICD-10-PCS | Mod: PBBFAC,,, | Performed by: UROLOGY

## 2022-07-20 PROCEDURE — 99999 PR PBB SHADOW E&M-EST. PATIENT-LVL II: CPT | Mod: PBBFAC,,, | Performed by: UROLOGY

## 2022-07-20 PROCEDURE — 99212 OFFICE O/P EST SF 10 MIN: CPT | Mod: PBBFAC | Performed by: UROLOGY

## 2022-07-20 PROCEDURE — 99213 OFFICE O/P EST LOW 20 MIN: CPT | Mod: S$PBB,,, | Performed by: UROLOGY

## 2022-07-20 NOTE — PROGRESS NOTES
[facility]         Overactive Bladder (OAB) Return Note     7/20/2022     Referred by:  Javier Putnam MD     HPI: Jazz Martinez is a very pleasant 76 y.o.woman who is a return patient to our department 3 months after Botox 100 units were injected. This was the second injection of 100 units, about 3 months apart. She was initially referred for evaluation of urinary incontinence of several years duration. She reports bother was associated without urinary daytime frequency though that has largely resolved. However, she is most bothered by nocturia (3-4x per night) and with urgency that results in urinary incontinence 2-3 x daily, now, always at night. She reports no stress urinary incontinence associated with exertion. She requires daily pads (1-4 pads/day).  She has decreased overall fluid intake and takes in very little fluid prior to bed.       She denies symptoms of irritative voiding including dysuria. She denies symptoms of obstructive voiding including decreased stream, hesitancy, intermittency, post void dribbling and sense of incomplete emptying. Bladder scan PVR was 0 mL. Her history includes pelvic organ prolapse repair as detailed in Dr. Putnam's notes.         Previous Incontinence Treatment:  · Medication:   § Currently: Myrbetriq 50 mg and Oxybutynin 10 mg (10/29-present)  § Gemtesa  75 mg (9/29/21-10/29/21)   § Myrbetriq 25/50 mg (01/20-03/20, 2021) Reason for d/c: therapy not effective  § Oxybutynin 10 mg  (11/13-01/14, 2020) Reason for d/c: memory issues  § Solifenacin 10 mg  (4/12-11/13) Reason for d/c: cost prohibitive  § Trospium 20 mg (1/20/14- 10/14)     · Dietary modification (less caffeine/soda)   · PFE  · Pessary: ([id number]) irritation, bleeding, infections  · PTNS: 2018- Therapy not effective  · Surgery: Sacrocolpopexy, bladder suspensions     A review of 10+ systems was conducted with pertinent positive and negative findings documented in HPI with all other  systems reviewed and negative.     Past medical, family, surgical and social history reviewed as documented in chart with pertinent positive medical, family, surgical and social history detailed in HPI.     Exam Findings:     Const: no acute distress, conversant and alert  Eyes: anicteric, extraocular muscles intact  ENMT: normocephalic, Nl oral membranes  Cardio: no cyanosis, nl cap refill  Pulm: no tachypnea; no resp distress  Abd: soft, no tenderness  Musc: no laceration, no tenderness  Neuro: alert; oriented x 3  Skin: warm, dry; no petichiae  Psych: no anxiety; normal speech      Assessment/Plan:     Overactive Bladder with Urgency Incontinence (established,goals not met): Daytime is better without day incontinence. However, nocturia and nocturnal enuresis have not changed. She limits fluid in the evenings. Diary demonstrates no nocturnal polyuria. She is going to consider use of a PureWick.

## 2022-07-26 ENCOUNTER — TELEPHONE (OUTPATIENT)
Dept: FAMILY MEDICINE | Facility: CLINIC | Age: 77
End: 2022-07-26

## 2022-07-26 NOTE — TELEPHONE ENCOUNTER
----- Message from Genevieve Magaña sent at 7/26/2022  9:26 AM CDT -----  Patient called and stated that she would like to talk to Elle she had a procedure done on her legs for her veins and not she has a blood clot and she is not to sure about the clinic and she has some questions for Elle please give her a call at [phone]

## 2022-07-26 NOTE — TELEPHONE ENCOUNTER
Please let patient know we have requested records but until I receive the records and review them I would recommend she continue with their treatment plan (please set action so we can check in 2 days and see if we received the records)

## 2022-07-26 NOTE — TELEPHONE ENCOUNTER
Pt states she has been seeing a vascular clinic in [place] for varicose vein. States she clinic reports pt has a small blood clot behind her knee confirmed by US. Pt states they started her on a blood thinner, xarelto 30 mg daily for 21 days. Pt states she does not have confidence in the clinic and mid level she is seeing. Pt is requesting Elle's opinion on what she should do. Records have been requested from the clinic.     [facility]  Phone : [phone]  Fax: [phone]

## 2022-07-29 ENCOUNTER — TELEPHONE (OUTPATIENT)
Dept: FAMILY MEDICINE | Facility: CLINIC | Age: 77
End: 2022-07-29

## 2022-07-29 NOTE — TELEPHONE ENCOUNTER
Faxed medical records request twice with a confirmation via right fax. Informed pt of this. She will  records to bring to our clinic.

## 2022-07-29 NOTE — TELEPHONE ENCOUNTER
----- Message from Nieves Dalton LPN sent at 7/26/2022  4:15 PM CDT -----  Check media to see if records have been requested.

## 2022-08-02 ENCOUNTER — TELEPHONE (OUTPATIENT)
Dept: FAMILY MEDICINE | Facility: CLINIC | Age: 77
End: 2022-08-02

## 2022-08-02 NOTE — TELEPHONE ENCOUNTER
----- Message from Nieves Dalton LPN sent at 8/2/2022  2:05 PM CDT -----    ----- Message -----  From: Ca Silverio MA  Sent: 8/2/2022   1:14 PM CDT  To: Elle Romo

## 2022-08-04 ENCOUNTER — TELEPHONE (OUTPATIENT)
Dept: FAMILY MEDICINE | Facility: CLINIC | Age: 77
End: 2022-08-04

## 2022-08-04 DIAGNOSIS — E78.5 DYSLIPIDEMIA: Primary | ICD-10-CM

## 2022-08-04 DIAGNOSIS — Z79.899 ENCOUNTER FOR LONG-TERM (CURRENT) USE OF OTHER MEDICATIONS: ICD-10-CM

## 2022-08-11 ENCOUNTER — OFFICE VISIT (OUTPATIENT)
Dept: FAMILY MEDICINE | Facility: CLINIC | Age: 77
End: 2022-08-11
Payer: MEDICARE

## 2022-08-11 VITALS
BODY MASS INDEX: 25.52 KG/M2 | DIASTOLIC BLOOD PRESSURE: 70 MMHG | OXYGEN SATURATION: 98 % | HEIGHT: 65 IN | WEIGHT: 153.19 LBS | HEART RATE: 77 BPM | SYSTOLIC BLOOD PRESSURE: 112 MMHG

## 2022-08-11 DIAGNOSIS — E78.5 DYSLIPIDEMIA: ICD-10-CM

## 2022-08-11 DIAGNOSIS — I82.411 ACUTE DEEP VEIN THROMBOSIS (DVT) OF FEMORAL VEIN OF RIGHT LOWER EXTREMITY: Primary | ICD-10-CM

## 2022-08-11 DIAGNOSIS — I83.93 VARICOSE VEINS OF BOTH LOWER EXTREMITIES, UNSPECIFIED WHETHER COMPLICATED: ICD-10-CM

## 2022-08-11 DIAGNOSIS — M54.32 SCIATICA OF LEFT SIDE: ICD-10-CM

## 2022-08-11 PROCEDURE — 99214 OFFICE O/P EST MOD 30 MIN: CPT | Mod: S$GLB,,, | Performed by: NURSE PRACTITIONER

## 2022-08-11 PROCEDURE — 99214 PR OFFICE/OUTPT VISIT, EST, LEVL IV, 30-39 MIN: ICD-10-PCS | Mod: S$GLB,,, | Performed by: NURSE PRACTITIONER

## 2022-08-11 RX ORDER — TRIAMTERENE/HYDROCHLOROTHIAZID 37.5-25 MG
TABLET ORAL
Qty: 30 TABLET | Refills: 0 | Status: SHIPPED | OUTPATIENT
Start: 2022-08-11 | End: 2023-10-23

## 2022-08-11 NOTE — PROGRESS NOTES
SUBJECTIVE:    Patient ID: Jazz Martinez is a 77 y.o. female.    Chief Complaint: Follow-up (No bottles/sciatic nerve/ blood clot in right leg/ xarelto//dp)    Pt here for regular f/u- lipids  Since last visit went and saw Dr. Martinez for her varicose veins- had endovenous ablation of right greater saphenous vein and microphlebectomy right leg in March. Reports had problems with phlebitis post procedure and has continued with pain, worse right medial distal thigh- reports development of new/enlarged varicose veins right leg since procedure. Most recently 7/22/2022 US revealed DVT right femoral vein, partially occlusive so now on Xarelto. Reports had repeat US this week and told clot went from an 8 to a 7. Pt reports has now arranged to get help through company so she can afford xarelto. Pt very unhappy with Dr. Martinez's office and doesn't want to f/u with them anymore  Reports had a couple botox injections with Dr. Salcido for incontinence issues but hasn't really helped much. Continues with significant incontinence at night time, considering using a purewick  Pt c/oing of left side sciatica pain- seems to come and go- pain left buttock which radiate down lateral thigh to above knee. Denies any falls, no numbness to leg. No fever/chills  Currently going to PT for left knee arthritis      Admission on 04/12/2022, Discharged on 04/12/2022   Component Date Value Ref Range Status    Urine Culture, Routine 04/12/2022  (A)  Final                    Value:ESCHERICHIA COLI  >100,000 cfu/ml     Lab Visit on 04/08/2022   Component Date Value Ref Range Status    Sodium 04/08/2022 142  136 - 145 mmol/L Final    Potassium 04/08/2022 4.0  3.5 - 5.1 mmol/L Final    Chloride 04/08/2022 107  95 - 110 mmol/L Final    CO2 04/08/2022 27  23 - 29 mmol/L Final    Glucose 04/08/2022 98  70 - 110 mg/dL Final    BUN 04/08/2022 13  8 - 23 mg/dL Final    Creatinine 04/08/2022 0.7  0.5 - 1.4 mg/dL Final    Calcium 04/08/2022  9.1  8.7 - 10.5 mg/dL Final    Anion Gap 04/08/2022 8  8 - 16 mmol/L Final    eGFR if African American 04/08/2022 >60.0  >60 mL/min/1.73 m^2 Final    eGFR if non African American 04/08/2022 >60.0  >60 mL/min/1.73 m^2 Final    WBC 04/08/2022 5.68  3.90 - 12.70 K/uL Final    RBC 04/08/2022 4.47  4.00 - 5.40 M/uL Final    Hemoglobin 04/08/2022 14.0  12.0 - 16.0 g/dL Final    Hematocrit 04/08/2022 42.4  37.0 - 48.5 % Final    MCV 04/08/2022 95  82 - 98 fL Final    MCH 04/08/2022 31.3 (A) 27.0 - 31.0 pg Final    MCHC 04/08/2022 33.0  32.0 - 36.0 g/dL Final    RDW 04/08/2022 13.4  11.5 - 14.5 % Final    Platelets 04/08/2022 211  150 - 450 K/uL Final    MPV 04/08/2022 9.5  9.2 - 12.9 fL Final       Past Medical History:   Diagnosis Date    Arthritis     Corns and callosities     Deep vein thrombosis     GERD (gastroesophageal reflux disease)     Gout     H/O bladder problems     History of colonic polyps 2014    Hyperlipidemia     Hypertension     Pulmonary embolism      Past Surgical History:   Procedure Laterality Date    APPENDECTOMY  AGE 12    BLADDER SURGERY      Twice    BREAST CYST EXCISION      Benign per patient    CYSTOSCOPY N/A 1/26/2022    Procedure: CYSTOSCOPY;  Surgeon: Sánchez Salcido MD;  Location: [facility] OR [address] [place] [address];  Service: Urology;  Laterality: N/A;    CYSTOSCOPY  4/12/2022    Procedure: CYSTOSCOPY;  Surgeon: Sánchez Salcido MD;  Location: [facility] OR [address] [place] [address];  Service: Urology;;    EYE SURGERY      LASER FOR GLAUCOMA    HAND SURGERY Left     HYSTERECTOMY      Not related to cancer per patient.    INJECTION OF BOTULINUM TOXIN TYPE A  1/26/2022    Procedure: INJECTION, BOTULINUM TOXIN, 100units;  Surgeon: Sánchez Salcido MD;  Location: [facility] OR [address] [place] [address];  Service: Urology;;    INJECTION OF BOTULINUM TOXIN TYPE A  4/12/2022    Procedure: INJECTION, BOTULINUM TOXIN  100units;  Surgeon: Sánchez Salcido MD;  Location: [facility] OR [address] [place] [address];  Service: Urology;;     OOPHORECTOMY      ROBOT-ASSISTED LAPAROSCOPIC ABDOMINAL SACROCOLPOPEXY USING DA KINGA XI N/A 12/17/2019    Procedure: XI ROBOTIC SACROCOLPOPEXY, ABDOMINAL;  Surgeon: Javier Putnam MD;  Location: [facility] OR;  Service: OB/GYN;  Laterality: N/A;  removal of damaged tissue    SURGICAL PROCEDURE FOR STRESS INCONTINENCE USING TENSION FREE VAGINAL TAPE N/A 12/17/2019    Procedure: SURGICAL PROCEDURE, USING TENSION FREE VAGINAL TAPE, FOR STRESS INCONTINENCE;  Surgeon: Javier Putnam MD;  Location: [facility] OR;  Service: OB/GYN;  Laterality: N/A;    TONSILLECTOMY       Family History   Problem Relation Age of Onset    Cancer Brother         bladder    Breast cancer Neg Hx     Colon cancer Neg Hx     Ovarian cancer Neg Hx     Eczema Neg Hx     Lupus Neg Hx     Psoriasis Neg Hx     Melanoma Neg Hx     Anesthesia problems Neg Hx        The CVD Risk score (GRACIELA'Alexaino, et al., 2008) failed to calculate for the following reasons:    The 2008 CVD risk score is only valid for ages 30 to 74    The patient has a prior MI, stroke, CHF, or peripheral vascular disease diagnosis     Marital Status:   Alcohol History:  reports no history of alcohol use.  Tobacco History:  reports that she has never smoked. She has never used smokeless tobacco.  Drug History:  reports no history of drug use.    Health Maintenance Topics with due status: Not Due       Topic Last Completion Date    DEXA Scan 12/07/2020    Colonoscopy 03/16/2021    Influenza Vaccine 10/18/2021    Lipid Panel 01/18/2022     Immunization History   Administered Date(s) Administered    COVID-19, MRNA, LN-S, PF (MODERNA FULL 0.5 ML DOSE) 01/22/2021, 02/19/2021    Influenza 10/17/2016, 10/01/2017, 10/15/2020, 10/18/2021    Influenza - High Dose - PF (65 years and older) 10/03/2013, 10/07/2015, 10/17/2016, 11/08/2017, 10/15/2018, 09/26/2019, 10/13/2020    Influenza - Intradermal - Quadrivalent - PF 10/15/2018    Influenza - Quadrivalent - PF *Preferred* (6  "months and older) 08/31/2011    Pneumococcal Conjugate - 13 Valent 10/17/2016    Pneumococcal Polysaccharide - 23 Valent 08/31/2011, 11/08/2017    Zoster 03/14/2015, 10/14/2019    Zoster Recombinant 10/14/2019, 07/14/2020       Review of patient's allergies indicates:   Allergen Reactions    Gabapentin     Nitrofurantoin Other (See Comments)     Deathly ill , headaches, weakness, 'wiped out"    Sulfa (sulfonamide antibiotics)        Current Outpatient Medications:     calcium carbonate/vitamin D3 (VITAMIN D-3 ORAL), Take by mouth., Disp: , Rfl:     diazePAM (VALIUM) 5 MG tablet, TAKE 1 TABLET BY MOUTH 30 MINUTES BEFORE PROCEDURE, BRING BOTTLE TO APPOINTMENT, Disp: , Rfl:     docusate calcium (SURFAK) 240 mg capsule, Take 240 mg by mouth 2 (two) times daily., Disp: , Rfl:     estradioL (ESTRACE) 0.01 % (0.1 mg/gram) vaginal cream, INSERT 1 GM VAGINALLY MONDAY,WEDNESDAY,AND FRIDAY AS DIRECTED, Disp: 42.5 g, Rfl: 7    fluticasone propionate (FLONASE) 50 mcg/actuation nasal spray, 1 spray (50 mcg total) by Each Nostril route once daily., Disp: 16 g, Rfl: 2    rosuvastatin (CRESTOR) 5 MG tablet, Take 1 tablet (5 mg total) by mouth every evening., Disp: 90 tablet, Rfl: 1    sertraline (ZOLOFT) 100 MG tablet, Take 1.5 tablets (150 mg total) by mouth once daily. (Patient taking differently: Take 100 mg by mouth once daily.), Disp: 135 tablet, Rfl: 3    sumatriptan (IMITREX) 100 MG tablet, 1 tab po at start of headache, may repeat in 2 hours X1 in 24 hours. (Patient taking differently: as needed. 1 tab po at start of headache, may repeat in 2 hours X1 in 24 hours.), Disp: 10 tablet, Rfl: 11    topiramate (TOPAMAX) 50 MG tablet, Take 1 tablet (50 mg total) by mouth 2 (two) times daily., Disp: 180 tablet, Rfl: 1    traMADol (ULTRAM) 50 mg tablet, Take 50 mg by mouth every 8 (eight) hours as needed for Pain., Disp: , Rfl: 5    triamcinolone acetonide 0.025% (KENALOG) 0.025 % cream, , Disp: , Rfl:     " "valACYclovir (VALTREX) 500 MG tablet, Take 2 tablets (1,000 mg total) by mouth 2 (two) times daily., Disp: 12 tablet, Rfl: 3    triamterene-hydrochlorothiazide 37.5-25 mg (MAXZIDE-25) 37.5-25 mg per tablet, 1 tablet daily as needed for swelling, Disp: 30 tablet, Rfl: 0    Review of Systems   Constitutional: Negative for appetite change, chills, fatigue, fever and unexpected weight change.   HENT: Negative for postnasal drip, sinus pain, sore throat and trouble swallowing.    Eyes: Negative for visual disturbance.   Respiratory: Negative for cough, shortness of breath and wheezing.    Cardiovascular: Positive for leg swelling (controlled with compression socks). Negative for chest pain and palpitations.   Gastrointestinal: Negative for abdominal pain, constipation and diarrhea.   Genitourinary: Positive for frequency. Negative for dysuria, flank pain, hematuria and pelvic pain.        Large volume incontinence, has to wear thick pads   Musculoskeletal: Positive for back pain (left buttock pain radiating down lateral thigh) and myalgias (bilat legs). Negative for gait problem.   Skin: Negative for rash.   Neurological: Positive for headaches (chronic HAs improved on topamax). Negative for dizziness, syncope, weakness and numbness.          Objective:      Vitals:    08/11/22 0948   BP: 112/70   Pulse: 77   SpO2: 98%   Weight: 69.5 kg (153 lb 3.2 oz)   Height: 5' 5" (1.651 m)     Physical Exam  Vitals reviewed.   Constitutional:       General: She is not in acute distress.     Appearance: Normal appearance. She is well-developed and normal weight.      Comments: Well dressed WF   HENT:      Head: Normocephalic and atraumatic.      Right Ear: Tympanic membrane and ear canal normal.      Left Ear: Tympanic membrane and ear canal normal.   Neck:      Vascular: No carotid bruit.   Cardiovascular:      Rate and Rhythm: Normal rate and regular rhythm.      Heart sounds: No murmur heard.    No friction rub. No gallop. "   Pulmonary:      Effort: Pulmonary effort is normal. No respiratory distress.      Breath sounds: Normal breath sounds. No wheezing or rales.   Abdominal:      Palpations: Abdomen is soft.      Tenderness: There is no abdominal tenderness.   Musculoskeletal:      Cervical back: Neck supple.      Right lower leg: No edema.      Left lower leg: No edema.      Comments: bilat thigh high compression socks on    Lymphadenopathy:      Cervical: No cervical adenopathy.   Skin:     General: Skin is warm and dry.      Findings: No rash.   Neurological:      General: No focal deficit present.      Mental Status: She is alert and oriented to person, place, and time.      Gait: Gait normal.   Psychiatric:         Mood and Affect: Mood normal.           Assessment:       1. Acute deep vein thrombosis (DVT) of femoral vein of right lower extremity    2. Varicose veins of both lower extremities, unspecified whether complicated    3. Sciatica of left side    4. Dyslipidemia           Plan:       Acute deep vein thrombosis (DVT) of femoral vein of right lower extremity  Comments:  pt advised she should be on treatment for minimum of 3 months- pt requesting to see a different vascular surgeon  Orders:  -     Ambulatory referral/consult to Vascular Surgery; Future; Expected date: 08/18/2022    Varicose veins of both lower extremities, unspecified whether complicated  Comments:  swelling controlled with compression- pt advised all her leg pain may not be all from varicose veins  Orders:  -     triamterene-hydrochlorothiazide 37.5-25 mg (MAXZIDE-25) 37.5-25 mg per tablet; 1 tablet daily as needed for swelling  Dispense: 30 tablet; Refill: 0    Sciatica of left side  Comments:  discussed trt options including PT/stretching exercises, no NSAID d/t xarelto, offered steroids which she declines- advised to call if no improvement    Dyslipidemia  Comments:  stable on last labs, to be repeated today      Follow up in about 3 months (around  11/11/2022) for labs to be drawn today.          Counseled on age and gender appropriate medical preventative services, including cancer screenings, immunizations, overall nutritional health, need for a consistent exercise regimen and an overall push towards maintaining a vigorous and active lifestyle.      8/11/2022 Elle Mack NP

## 2022-08-12 LAB
ALBUMIN SERPL-MCNC: 4.4 G/DL (ref 3.6–5.1)
ALBUMIN/GLOB SERPL: 2 (CALC) (ref 1–2.5)
ALP SERPL-CCNC: 59 U/L (ref 37–153)
ALT SERPL-CCNC: 9 U/L (ref 6–29)
AST SERPL-CCNC: 17 U/L (ref 10–35)
BILIRUB SERPL-MCNC: 0.5 MG/DL (ref 0.2–1.2)
BUN SERPL-MCNC: 25 MG/DL (ref 7–25)
BUN/CREAT SERPL: NORMAL (CALC) (ref 6–22)
CALCIUM SERPL-MCNC: 9.3 MG/DL (ref 8.6–10.4)
CHLORIDE SERPL-SCNC: 106 MMOL/L (ref 98–110)
CHOLEST SERPL-MCNC: 202 MG/DL
CHOLEST/HDLC SERPL: 3.6 (CALC)
CO2 SERPL-SCNC: 25 MMOL/L (ref 20–32)
CREAT SERPL-MCNC: 0.72 MG/DL (ref 0.6–1)
EGFR: 85 ML/MIN/1.73M2
GLOBULIN SER CALC-MCNC: 2.2 G/DL (CALC) (ref 1.9–3.7)
GLUCOSE SERPL-MCNC: 87 MG/DL (ref 65–99)
HDLC SERPL-MCNC: 56 MG/DL
LDLC SERPL CALC-MCNC: 116 MG/DL (CALC)
NONHDLC SERPL-MCNC: 146 MG/DL (CALC)
POTASSIUM SERPL-SCNC: 4.1 MMOL/L (ref 3.5–5.3)
PROT SERPL-MCNC: 6.6 G/DL (ref 6.1–8.1)
SODIUM SERPL-SCNC: 141 MMOL/L (ref 135–146)
TRIGL SERPL-MCNC: 180 MG/DL

## 2022-08-15 ENCOUNTER — PATIENT MESSAGE (OUTPATIENT)
Dept: FAMILY MEDICINE | Facility: CLINIC | Age: 77
End: 2022-08-15

## 2022-08-19 ENCOUNTER — PATIENT MESSAGE (OUTPATIENT)
Dept: FAMILY MEDICINE | Facility: CLINIC | Age: 77
End: 2022-08-19

## 2022-11-11 NOTE — ASSESSMENT & PLAN NOTE
Physical Medicine and Rehabilitation Inpatient Rehab Program Preadmission Screen     Patient Demographics  Attending Physician: Dr. Juanita Cueva  Consulting Physician: Dr. Navjot Munson  Primary Insurance: Medicare  Name of Primary Insurance: [employer]  Secondary Insurance: None     Prior Living Situation  Type of Home: Condo  Home Layout: One level, Elevator  # Steps to Enter: 0     Lives With: Alone     Home Equipment: Standard walker, Four-wheeled walker  Prior Function  Prior ADL: Independent  Eating: Independent  Grooming: Independent  Upper Body Dressing: Independent  Lower Body Dressing: Independent  Toileting: Independent  Bed Mobility: Independent  Transfers: Independent  Toilet Transfers: Independent  Tub/Shower Transfers: Independent  Ambulation in the Home: Independent        Transfer Equipment: None  History of Falls in past year: Yes  Number of falls in past year: Muliple  Any fall with injury?: No  Additional Comments: per patient, IND c ADL and IADL PTA; patient highly questionsable historian  Prior Communication/Cognition  Prior Cognition: Intact  Current Functional Status  Bed Mobility: Moderate assist  Transfers: Minimal assist  Ambulation: Minimal assist  Dressing-Upper Extremity : Not assessed yet  Dressing-Lower Extremity : Total assist  Cognition/Communication: Impaired  Ability to Tolerate Intensity of Care: Yes  Patient Willing to Participate in Inpatient Rehab Program: Yes  Precautions: Safety, History of falls  Plan for Post Discharge  Plan - Return to Previous Living Setting: Yes  Caregiver Support - Support System: Family  Plan for Post Discharge  Plan - Return to Previous Living Setting: Yes  Caregiver Support - Support System: Family  Support for 24 hour Supervision: Yes  Support for Physical Assist: Yes  Benefits Post Discharge - Outpatient Therapy: Home with family and home health vs outpatient therapy  Benefits Post Discharge - In Home Therapy: Home with family and  History noted     home health vs outpatient therapy  Support for Physical Assist: Yes  Benefits Post Discharge - Outpatient Therapy: Home with family and home health vs outpatient therapy  Benefits Post Discharge - In Home Therapy: Home with family and home health vs outpatient therapy  Medical Information  Admission Diagnosis: Diffuse large B cell lymphoma  Morbidities/Comorbidities: Toxic metabolic encephalopathy, history of bacteremia, acute kidney injury, impaired mobility and ADLs, chemotherapy-induced neuropathy, pancytopenia due to chemotherapy  Height: 5' 5.35\" (166 cm)  Weight: 75.5 kg (166 lb 7.2 oz)  BMI (Calculated): 27.4  Medical Management: Given the above listed co-morbidities and risks for complications, the patient would benefit from 24-hour availability of the rehabilitation team for closer medical monitoring and coordination of care with other specialists. Patient requires a multidisciplinary treatment program. Patient continues require 24-hour / 7 day/week nursing care. To minimize these risks for complications, the patient will receive close medical monitoring provided by the physiatrist who will also coordinate care with multiple disciplines.  Additional Medical Information  Major Surgery in past 100 days: Yes  At Risk for Following Complications: Falls, fatigue, infection, alteration in skin integrity, pain, DVT, PE, electrolyte imbalance, altered sleep pattern, alteration in bowel/bladder control, cardiopulmonary events, impaired coping, neurologic event, anemia, seizure, constipation / incontinence, urinary retention, aspiration, incontinence, TOM, HTN, hyperglycemia  Ongoing Medical Management Needs: Based upon patient's complex medical conditions, reduced functional capabilities compared to premorbid functional status levels it is felt that patient continues to require acute inpatient rehab services. Patient requires a multidisciplinary treatment program. Patient continues require 24-hour / 7 day/week  nursing care. Patient continues to require the oversight of physiatry and follow-up by various/multiple medical specialists.  Services Recommended   Rehab Nursing: Yes  Occupational Therapy: Yes  Frequency - OT: 5-7 days per week  Intensity - OT: 1-1.5 hours per day  Physical Therapy: Yes  Frequency - PT: 5-7 days per week  Intensity - PT: 1-1.5 hours per day  Speech Therapy: Yes  Frequency - SLP: 5-7 days per week  Intensity - SLP: 0.5-1.0 hours per day  Social Work: Yes  Medical necessity: Conditions requiring active medical management/monitoring (medical necessity) include: HTN, DVT risk, risk of aspiration PNA, monitoring of VS, medication management, pain management, skin and wound care management, bladder and bowel training programs, VTE prophylaxis, monitoring of pulmonary, cardiac, neurologic, nutritional monitoring and optimization.  Estimated length of stay: 2 weeks  Recommendation/Education  Recommendation: Accept, meets Inpatient Rehab Program criteria  Rehab prognosis: Good  Expected Level of Improvement: Supervision to modified independent level for ADLs and mobility.    Inpatient Rehabilitation Facility (IRF) Admission Justification    Admission Justification: Pt's medical condition is sufficiently stable at time of admit to allow pt to actively participate in an intensive inpatient rehab  program. Pt's combined medical/rehab needs are such that a rehab physician will conduct face-to-face visits at least 3 days/week during this stay to allow for modifications to pt's rehab process in order to maximize progress and outcome. Pt requires the active and ongoing therapeutic intervention of multiple therapy disciplines, one of which is physical or occupational therapy. Due to the complexity of the combined medical and rehab conditions, the pt demonstrates the  need for an integrated  interdisciplinary team approach to care.  Expected Length of Stay: 2 weeks  Therapy Program: Pt requires and is expected to  participate in at least 3 hours of therapy/day at least 5 days/week, and is expected to make measurable improvement that will be of practical value to improve pt's functional capacity or adaptation to impairments.  Justification Statement: I, the Rehab Physician, have reviewed the pre-admission screening information within 48 hours preceding admission to inpatient rehab. This pt meets  inpatient rehab criteria and I approve admitting this patient to an inpatient rehab program.    Inpatient Rehab Program Admission    Attending Physician: Dr Rianna Jha  Admission to IR Date: 11/11/2022  Admission to IRP Diagnosis: Diffuse Large B cell lymphoma

## 2022-12-07 ENCOUNTER — TELEPHONE (OUTPATIENT)
Dept: FAMILY MEDICINE | Facility: CLINIC | Age: 77
End: 2022-12-07

## 2022-12-13 ENCOUNTER — TELEPHONE (OUTPATIENT)
Dept: FAMILY MEDICINE | Facility: CLINIC | Age: 77
End: 2022-12-13

## 2022-12-13 RX ORDER — METHYLPREDNISOLONE 4 MG/1
TABLET ORAL
Qty: 1 EACH | Refills: 0 | Status: SHIPPED | OUTPATIENT
Start: 2022-12-13 | End: 2022-12-27

## 2022-12-13 NOTE — TELEPHONE ENCOUNTER
Spoke to pt   She is having pain in her right foot. it is hurting all the way through her foot from top to bottom and pain in her toes. The foot pain started 3 days ago. She stated there is little swelling that started yesterday. Pt taken ibuprofen. Pt stated there have been no injuries or falls. Pt stated she has gout years ago and this is similar to it. Pt stated she is taking care of her brother and cant wait until her appointment on the jan 17 to be seen

## 2022-12-13 NOTE — TELEPHONE ENCOUNTER
Let her know I can send in a medrol dosepak however if pain/swelling worsens recommend appt. Also recommend ice and elevation for now

## 2022-12-22 ENCOUNTER — TELEPHONE (OUTPATIENT)
Dept: FAMILY MEDICINE | Facility: CLINIC | Age: 77
End: 2022-12-22

## 2022-12-22 DIAGNOSIS — Z79.899 ENCOUNTER FOR LONG-TERM (CURRENT) USE OF OTHER MEDICATIONS: ICD-10-CM

## 2022-12-22 DIAGNOSIS — E78.5 DYSLIPIDEMIA: Primary | ICD-10-CM

## 2022-12-22 DIAGNOSIS — I10 ESSENTIAL HYPERTENSION: ICD-10-CM

## 2022-12-22 DIAGNOSIS — Z00.00 ROUTINE GENERAL MEDICAL EXAMINATION AT A HEALTH CARE FACILITY: ICD-10-CM

## 2022-12-27 ENCOUNTER — HOSPITAL ENCOUNTER (INPATIENT)
Facility: HOSPITAL | Age: 77
LOS: 2 days | Discharge: HOME | DRG: 392 | End: 2022-12-29
Attending: EMERGENCY MEDICINE | Admitting: HOSPITALIST
Payer: MEDICARE

## 2022-12-27 ENCOUNTER — TELEPHONE (OUTPATIENT)
Dept: FAMILY MEDICINE | Facility: CLINIC | Age: 77
End: 2022-12-27

## 2022-12-27 DIAGNOSIS — R07.9 CHEST PAIN: ICD-10-CM

## 2022-12-27 DIAGNOSIS — K92.2 GI BLEED: ICD-10-CM

## 2022-12-27 DIAGNOSIS — R55 SYNCOPE, UNSPECIFIED SYNCOPE TYPE: ICD-10-CM

## 2022-12-27 DIAGNOSIS — K52.9 COLITIS: Primary | ICD-10-CM

## 2022-12-27 DIAGNOSIS — E86.0 DEHYDRATION: ICD-10-CM

## 2022-12-27 DIAGNOSIS — K62.5 RECTAL BLEEDING: ICD-10-CM

## 2022-12-27 PROBLEM — N39.0 UTI (URINARY TRACT INFECTION): Status: ACTIVE | Noted: 2022-12-27

## 2022-12-27 PROBLEM — E87.6 HYPOKALEMIA: Status: ACTIVE | Noted: 2022-12-27

## 2022-12-27 LAB
ABO + RH BLD: NORMAL
ALBUMIN SERPL BCP-MCNC: 4.2 G/DL (ref 3.5–5.2)
ALP SERPL-CCNC: 61 U/L (ref 55–135)
ALT SERPL W/O P-5'-P-CCNC: 13 U/L (ref 10–44)
ANION GAP SERPL CALC-SCNC: 11 MMOL/L (ref 8–16)
AST SERPL-CCNC: 22 U/L (ref 10–40)
BACTERIA #/AREA URNS HPF: NEGATIVE /HPF
BASOPHILS # BLD AUTO: 0.1 K/UL (ref 0–0.2)
BASOPHILS NFR BLD: 0.4 % (ref 0–1.9)
BILIRUB SERPL-MCNC: 1.1 MG/DL (ref 0.1–1)
BILIRUB UR QL STRIP: ABNORMAL
BLD GP AB SCN CELLS X3 SERPL QL: NORMAL
BUN SERPL-MCNC: 16 MG/DL (ref 8–23)
CALCIUM SERPL-MCNC: 9.3 MG/DL (ref 8.7–10.5)
CHLORIDE SERPL-SCNC: 98 MMOL/L (ref 95–110)
CLARITY UR: ABNORMAL
CO2 SERPL-SCNC: 25 MMOL/L (ref 23–29)
COLOR UR: YELLOW
CREAT SERPL-MCNC: 0.8 MG/DL (ref 0.5–1.4)
CREAT SERPL-MCNC: 0.9 MG/DL (ref 0.5–1.4)
CRP SERPL-MCNC: 11.13 MG/DL
DIFFERENTIAL METHOD: ABNORMAL
EOSINOPHIL # BLD AUTO: 0 K/UL (ref 0–0.5)
EOSINOPHIL NFR BLD: 0.2 % (ref 0–8)
ERYTHROCYTE [DISTWIDTH] IN BLOOD BY AUTOMATED COUNT: 14.1 % (ref 11.5–14.5)
EST. GFR  (NO RACE VARIABLE): >60 ML/MIN/1.73 M^2
GLUCOSE SERPL-MCNC: 99 MG/DL (ref 70–110)
GLUCOSE UR QL STRIP: NEGATIVE
HCT VFR BLD AUTO: 44.8 % (ref 37–48.5)
HGB BLD-MCNC: 15.1 G/DL (ref 12–16)
HGB UR QL STRIP: ABNORMAL
HYALINE CASTS #/AREA URNS LPF: 42 /LPF
IMM GRANULOCYTES # BLD AUTO: 0.14 K/UL (ref 0–0.04)
IMM GRANULOCYTES NFR BLD AUTO: 0.6 % (ref 0–0.5)
INFLUENZA A, MOLECULAR: NEGATIVE
INFLUENZA B, MOLECULAR: NEGATIVE
INR PPP: 1.1 (ref 0.8–1.2)
KETONES UR QL STRIP: ABNORMAL
LEUKOCYTE ESTERASE UR QL STRIP: ABNORMAL
LIPASE SERPL-CCNC: 27 U/L (ref 4–60)
LYMPHOCYTES # BLD AUTO: 1.9 K/UL (ref 1–4.8)
LYMPHOCYTES NFR BLD: 8.1 % (ref 18–48)
MAGNESIUM SERPL-MCNC: 1.9 MG/DL (ref 1.6–2.6)
MCH RBC QN AUTO: 31.2 PG (ref 27–31)
MCHC RBC AUTO-ENTMCNC: 33.7 G/DL (ref 32–36)
MCV RBC AUTO: 93 FL (ref 82–98)
MICROSCOPIC COMMENT: ABNORMAL
MONOCYTES # BLD AUTO: 1.1 K/UL (ref 0.3–1)
MONOCYTES NFR BLD: 4.4 % (ref 4–15)
NEUTROPHILS # BLD AUTO: 20.6 K/UL (ref 1.8–7.7)
NEUTROPHILS NFR BLD: 86.3 % (ref 38–73)
NITRITE UR QL STRIP: NEGATIVE
NRBC BLD-RTO: 0 /100 WBC
PH UR STRIP: 6 [PH] (ref 5–8)
PLATELET # BLD AUTO: 254 K/UL (ref 150–450)
PLATELET BLD QL SMEAR: ABNORMAL
PMV BLD AUTO: 10.2 FL (ref 9.2–12.9)
POTASSIUM SERPL-SCNC: 3.1 MMOL/L (ref 3.5–5.1)
PROT SERPL-MCNC: 7.3 G/DL (ref 6–8.4)
PROT UR QL STRIP: ABNORMAL
PROTHROMBIN TIME: 11.5 SEC (ref 9–12.5)
RBC # BLD AUTO: 4.84 M/UL (ref 4–5.4)
RBC #/AREA URNS HPF: 4 /HPF (ref 0–4)
SAMPLE: NORMAL
SARS-COV-2 RDRP RESP QL NAA+PROBE: NEGATIVE
SODIUM SERPL-SCNC: 134 MMOL/L (ref 136–145)
SP GR UR STRIP: 1.02 (ref 1–1.03)
SPECIMEN SOURCE: NORMAL
SQUAMOUS #/AREA URNS HPF: 0 /HPF
URN SPEC COLLECT METH UR: ABNORMAL
UROBILINOGEN UR STRIP-ACNC: NEGATIVE EU/DL
WBC # BLD AUTO: 23.89 K/UL (ref 3.9–12.7)
WBC #/AREA URNS HPF: >100 /HPF (ref 0–5)

## 2022-12-27 PROCEDURE — 87040 BLOOD CULTURE FOR BACTERIA: CPT | Mod: 59 | Performed by: NURSE PRACTITIONER

## 2022-12-27 PROCEDURE — 63600175 PHARM REV CODE 636 W HCPCS: Performed by: NURSE PRACTITIONER

## 2022-12-27 PROCEDURE — S0030 INJECTION, METRONIDAZOLE: HCPCS | Performed by: EMERGENCY MEDICINE

## 2022-12-27 PROCEDURE — 83690 ASSAY OF LIPASE: CPT | Performed by: NURSE PRACTITIONER

## 2022-12-27 PROCEDURE — 96365 THER/PROPH/DIAG IV INF INIT: CPT

## 2022-12-27 PROCEDURE — 93005 ELECTROCARDIOGRAM TRACING: CPT | Performed by: SPECIALIST

## 2022-12-27 PROCEDURE — 25000003 PHARM REV CODE 250: Performed by: NURSE PRACTITIONER

## 2022-12-27 PROCEDURE — 85014 HEMATOCRIT: CPT | Performed by: NURSE PRACTITIONER

## 2022-12-27 PROCEDURE — 63600175 PHARM REV CODE 636 W HCPCS: Performed by: HOSPITALIST

## 2022-12-27 PROCEDURE — 87086 URINE CULTURE/COLONY COUNT: CPT | Performed by: NURSE PRACTITIONER

## 2022-12-27 PROCEDURE — 86140 C-REACTIVE PROTEIN: CPT | Performed by: NURSE PRACTITIONER

## 2022-12-27 PROCEDURE — 99285 EMERGENCY DEPT VISIT HI MDM: CPT | Mod: 25

## 2022-12-27 PROCEDURE — 93010 EKG 12-LEAD: ICD-10-PCS | Mod: ,,, | Performed by: SPECIALIST

## 2022-12-27 PROCEDURE — 85025 COMPLETE CBC W/AUTO DIFF WBC: CPT | Performed by: NURSE PRACTITIONER

## 2022-12-27 PROCEDURE — 93010 ELECTROCARDIOGRAM REPORT: CPT | Mod: ,,, | Performed by: SPECIALIST

## 2022-12-27 PROCEDURE — 85610 PROTHROMBIN TIME: CPT | Performed by: EMERGENCY MEDICINE

## 2022-12-27 PROCEDURE — 86900 BLOOD TYPING SEROLOGIC ABO: CPT | Performed by: EMERGENCY MEDICINE

## 2022-12-27 PROCEDURE — 87186 SC STD MICRODIL/AGAR DIL: CPT | Performed by: NURSE PRACTITIONER

## 2022-12-27 PROCEDURE — 85018 HEMOGLOBIN: CPT | Performed by: NURSE PRACTITIONER

## 2022-12-27 PROCEDURE — 36415 COLL VENOUS BLD VENIPUNCTURE: CPT | Performed by: NURSE PRACTITIONER

## 2022-12-27 PROCEDURE — 12000002 HC ACUTE/MED SURGE SEMI-PRIVATE ROOM

## 2022-12-27 PROCEDURE — U0002 COVID-19 LAB TEST NON-CDC: HCPCS | Performed by: EMERGENCY MEDICINE

## 2022-12-27 PROCEDURE — 83735 ASSAY OF MAGNESIUM: CPT | Performed by: NURSE PRACTITIONER

## 2022-12-27 PROCEDURE — 25500020 PHARM REV CODE 255: Performed by: EMERGENCY MEDICINE

## 2022-12-27 PROCEDURE — 25000003 PHARM REV CODE 250: Performed by: HOSPITALIST

## 2022-12-27 PROCEDURE — 87502 INFLUENZA DNA AMP PROBE: CPT | Performed by: EMERGENCY MEDICINE

## 2022-12-27 PROCEDURE — 87077 CULTURE AEROBIC IDENTIFY: CPT | Performed by: NURSE PRACTITIONER

## 2022-12-27 PROCEDURE — 25000003 PHARM REV CODE 250: Performed by: EMERGENCY MEDICINE

## 2022-12-27 PROCEDURE — 80053 COMPREHEN METABOLIC PANEL: CPT | Performed by: NURSE PRACTITIONER

## 2022-12-27 PROCEDURE — 81001 URINALYSIS AUTO W/SCOPE: CPT | Performed by: NURSE PRACTITIONER

## 2022-12-27 RX ORDER — SODIUM CHLORIDE 0.9 % (FLUSH) 0.9 %
10 SYRINGE (ML) INJECTION
Status: DISCONTINUED | OUTPATIENT
Start: 2022-12-27 | End: 2022-12-29 | Stop reason: HOSPADM

## 2022-12-27 RX ORDER — SODIUM,POTASSIUM PHOSPHATES 280-250MG
2 POWDER IN PACKET (EA) ORAL
Status: DISCONTINUED | OUTPATIENT
Start: 2022-12-27 | End: 2022-12-29 | Stop reason: HOSPADM

## 2022-12-27 RX ORDER — LANOLIN ALCOHOL/MO/W.PET/CERES
800 CREAM (GRAM) TOPICAL
Status: DISCONTINUED | OUTPATIENT
Start: 2022-12-27 | End: 2022-12-29 | Stop reason: HOSPADM

## 2022-12-27 RX ORDER — LEVOFLOXACIN 5 MG/ML
500 INJECTION, SOLUTION INTRAVENOUS
Status: DISCONTINUED | OUTPATIENT
Start: 2022-12-27 | End: 2022-12-29 | Stop reason: HOSPADM

## 2022-12-27 RX ORDER — POTASSIUM CHLORIDE 7.45 MG/ML
10 INJECTION INTRAVENOUS
Status: DISCONTINUED | OUTPATIENT
Start: 2022-12-27 | End: 2022-12-27

## 2022-12-27 RX ORDER — LEVOFLOXACIN 5 MG/ML
750 INJECTION, SOLUTION INTRAVENOUS
Status: DISCONTINUED | OUTPATIENT
Start: 2022-12-27 | End: 2022-12-27

## 2022-12-27 RX ORDER — HYDROCODONE BITARTRATE AND ACETAMINOPHEN 5; 325 MG/1; MG/1
1 TABLET ORAL EVERY 6 HOURS PRN
Status: DISCONTINUED | OUTPATIENT
Start: 2022-12-27 | End: 2022-12-29 | Stop reason: HOSPADM

## 2022-12-27 RX ORDER — ONDANSETRON 2 MG/ML
4 INJECTION INTRAMUSCULAR; INTRAVENOUS EVERY 6 HOURS PRN
Status: DISCONTINUED | OUTPATIENT
Start: 2022-12-27 | End: 2022-12-29 | Stop reason: HOSPADM

## 2022-12-27 RX ORDER — TOPIRAMATE 25 MG/1
50 TABLET ORAL 2 TIMES DAILY
Status: DISCONTINUED | OUTPATIENT
Start: 2022-12-27 | End: 2022-12-29 | Stop reason: HOSPADM

## 2022-12-27 RX ORDER — SERTRALINE HYDROCHLORIDE 50 MG/1
150 TABLET, FILM COATED ORAL DAILY
Status: DISCONTINUED | OUTPATIENT
Start: 2022-12-28 | End: 2022-12-29 | Stop reason: HOSPADM

## 2022-12-27 RX ORDER — VALACYCLOVIR HYDROCHLORIDE 500 MG/1
1000 TABLET, FILM COATED ORAL 2 TIMES DAILY
Status: DISCONTINUED | OUTPATIENT
Start: 2022-12-27 | End: 2022-12-28

## 2022-12-27 RX ORDER — NALOXONE HCL 0.4 MG/ML
0.02 VIAL (ML) INJECTION
Status: DISCONTINUED | OUTPATIENT
Start: 2022-12-27 | End: 2022-12-29 | Stop reason: HOSPADM

## 2022-12-27 RX ORDER — ACETAMINOPHEN 325 MG/1
650 TABLET ORAL EVERY 4 HOURS PRN
Status: DISCONTINUED | OUTPATIENT
Start: 2022-12-27 | End: 2022-12-28

## 2022-12-27 RX ORDER — METRONIDAZOLE 500 MG/100ML
500 INJECTION, SOLUTION INTRAVENOUS
Status: DISCONTINUED | OUTPATIENT
Start: 2022-12-28 | End: 2022-12-29 | Stop reason: HOSPADM

## 2022-12-27 RX ORDER — RIVAROXABAN 20 MG/1
20 TABLET, FILM COATED ORAL DAILY
Status: ON HOLD | COMMUNITY
Start: 2022-09-04 | End: 2022-12-29 | Stop reason: HOSPADM

## 2022-12-27 RX ORDER — ATORVASTATIN CALCIUM 20 MG/1
20 TABLET, FILM COATED ORAL DAILY
Status: DISCONTINUED | OUTPATIENT
Start: 2022-12-28 | End: 2022-12-29 | Stop reason: HOSPADM

## 2022-12-27 RX ORDER — TALC
6 POWDER (GRAM) TOPICAL NIGHTLY PRN
Status: DISCONTINUED | OUTPATIENT
Start: 2022-12-27 | End: 2022-12-29 | Stop reason: HOSPADM

## 2022-12-27 RX ORDER — METRONIDAZOLE 500 MG/100ML
500 INJECTION, SOLUTION INTRAVENOUS
Status: COMPLETED | OUTPATIENT
Start: 2022-12-27 | End: 2022-12-27

## 2022-12-27 RX ORDER — LEVOFLOXACIN 5 MG/ML
500 INJECTION, SOLUTION INTRAVENOUS
Status: DISCONTINUED | OUTPATIENT
Start: 2022-12-28 | End: 2022-12-27

## 2022-12-27 RX ORDER — MORPHINE SULFATE 4 MG/ML
4 INJECTION, SOLUTION INTRAMUSCULAR; INTRAVENOUS EVERY 4 HOURS PRN
Status: DISCONTINUED | OUTPATIENT
Start: 2022-12-27 | End: 2022-12-29 | Stop reason: HOSPADM

## 2022-12-27 RX ORDER — IPRATROPIUM BROMIDE AND ALBUTEROL SULFATE 2.5; .5 MG/3ML; MG/3ML
3 SOLUTION RESPIRATORY (INHALATION) EVERY 4 HOURS PRN
Status: DISCONTINUED | OUTPATIENT
Start: 2022-12-27 | End: 2022-12-29 | Stop reason: HOSPADM

## 2022-12-27 RX ORDER — SODIUM CHLORIDE AND POTASSIUM CHLORIDE 150; 900 MG/100ML; MG/100ML
INJECTION, SOLUTION INTRAVENOUS CONTINUOUS
Status: DISCONTINUED | OUTPATIENT
Start: 2022-12-27 | End: 2022-12-29 | Stop reason: HOSPADM

## 2022-12-27 RX ORDER — SIMETHICONE 80 MG
1 TABLET,CHEWABLE ORAL 4 TIMES DAILY PRN
Status: DISCONTINUED | OUTPATIENT
Start: 2022-12-27 | End: 2022-12-29 | Stop reason: HOSPADM

## 2022-12-27 RX ADMIN — MORPHINE SULFATE 4 MG: 4 INJECTION, SOLUTION INTRAMUSCULAR; INTRAVENOUS at 11:12

## 2022-12-27 RX ADMIN — ACETAMINOPHEN 650 MG: 325 TABLET ORAL at 09:12

## 2022-12-27 RX ADMIN — SODIUM CHLORIDE 1000 ML: 0.9 INJECTION, SOLUTION INTRAVENOUS at 05:12

## 2022-12-27 RX ADMIN — VALACYCLOVIR 1000 MG: 500 TABLET, FILM COATED ORAL at 08:12

## 2022-12-27 RX ADMIN — IOHEXOL 100 ML: 350 INJECTION, SOLUTION INTRAVENOUS at 03:12

## 2022-12-27 RX ADMIN — TOPIRAMATE 50 MG: 25 TABLET, FILM COATED ORAL at 08:12

## 2022-12-27 RX ADMIN — METRONIDAZOLE 500 MG: 5 INJECTION, SOLUTION INTRAVENOUS at 05:12

## 2022-12-27 RX ADMIN — SODIUM CHLORIDE AND POTASSIUM CHLORIDE: .9; .15 SOLUTION INTRAVENOUS at 08:12

## 2022-12-27 RX ADMIN — POTASSIUM BICARBONATE 35 MEQ: 391 TABLET, EFFERVESCENT ORAL at 07:12

## 2022-12-27 RX ADMIN — LEVOFLOXACIN 500 MG: 5 INJECTION, SOLUTION INTRAVENOUS at 08:12

## 2022-12-27 NOTE — ED PROVIDER NOTES
"Encounter Date: 12/27/2022       History     Chief Complaint   Patient presents with    Loss of Consciousness    Rectal Bleeding     Patient reports 2 episodes of LOC, n/v/d, and abd pain since Som day      77-year-old female presents complaining of rectal bleeding patient reports bright red blood per rectum for the past 2 days patient reports nausea vomiting and diarrhea since Christmas patient also reports that she fainted twice patient denies headache patient cough or shortness of breath but does report subjective fever and reports abdominal discomfort patient also reports some dysuria.    Review of patient's allergies indicates:   Allergen Reactions    Gabapentin     Nitrofurantoin Other (See Comments)     Deathly ill , headaches, weakness, 'wiped out"    Sulfa (sulfonamide antibiotics)      Past Medical History:   Diagnosis Date    Arthritis     Corns and callosities     Deep vein thrombosis     GERD (gastroesophageal reflux disease)     Gout     H/O bladder problems     History of colonic polyps 2014    Hyperlipidemia     Hypertension     Pulmonary embolism      Past Surgical History:   Procedure Laterality Date    APPENDECTOMY  AGE 12    BLADDER SURGERY      Twice    BREAST CYST EXCISION      Benign per patient    CYSTOSCOPY N/A 1/26/2022    Procedure: CYSTOSCOPY;  Surgeon: Sánchez Salcido MD;  Location: [address];  Service: Urology;  Laterality: N/A;    CYSTOSCOPY  4/12/2022    Procedure: CYSTOSCOPY;  Surgeon: Sánchez Salcido MD;  Location: [facility] OR [address] [place] [address];  Service: Urology;;    EYE SURGERY      LASER FOR GLAUCOMA    HAND SURGERY Left     HYSTERECTOMY      Not related to cancer per patient.    INJECTION OF BOTULINUM TOXIN TYPE A  1/26/2022    Procedure: INJECTION, BOTULINUM TOXIN, 100units;  Surgeon: Sánchez Salcido MD;  Location: [address];  Service: Urology;;    INJECTION OF BOTULINUM TOXIN TYPE A  4/12/2022    Procedure: INJECTION, BOTULINUM TOXIN  100units;  Surgeon: " Sánchez Salcido MD;  Location: [address];  Service: Urology;;    OOPHORECTOMY      ROBOT-ASSISTED LAPAROSCOPIC ABDOMINAL SACROCOLPOPEXY USING DA KINGA XI N/A 12/17/2019    Procedure: XI ROBOTIC SACROCOLPOPEXY, ABDOMINAL;  Surgeon: Javier Putnam MD;  Location: [facility];  Service: OB/GYN;  Laterality: N/A;  removal of damaged tissue    SURGICAL PROCEDURE FOR STRESS INCONTINENCE USING TENSION FREE VAGINAL TAPE N/A 12/17/2019    Procedure: SURGICAL PROCEDURE, USING TENSION FREE VAGINAL TAPE, FOR STRESS INCONTINENCE;  Surgeon: Javier Putnam MD;  Location: [facility] OR;  Service: OB/GYN;  Laterality: N/A;    TONSILLECTOMY       Family History   Problem Relation Age of Onset    Cancer Brother         bladder    Breast cancer Neg Hx     Colon cancer Neg Hx     Ovarian cancer Neg Hx     Eczema Neg Hx     Lupus Neg Hx     Psoriasis Neg Hx     Melanoma Neg Hx     Anesthesia problems Neg Hx      Social History     Tobacco Use    Smoking status: Never    Smokeless tobacco: Never   Substance Use Topics    Alcohol use: No    Drug use: No     Review of Systems   Constitutional:  Negative for fever.   HENT:  Negative for congestion, rhinorrhea, sore throat and trouble swallowing.    Eyes:  Negative for visual disturbance.   Respiratory:  Negative for cough, chest tightness, shortness of breath and wheezing.    Cardiovascular:  Negative for chest pain, palpitations and leg swelling.   Gastrointestinal:  Positive for abdominal pain, blood in stool and diarrhea. Negative for abdominal distention, constipation, nausea and vomiting.   Genitourinary:  Positive for dysuria. Negative for difficulty urinating, flank pain and frequency.   Musculoskeletal:  Negative for arthralgias, back pain, joint swelling and neck pain.   Skin:  Negative for color change and rash.   Neurological:  Negative for dizziness, syncope, speech difficulty, weakness, numbness and headaches.   All other systems reviewed and are negative.    Physical Exam      Initial Vitals [12/27/22 1308]   BP Pulse Resp Temp SpO2   (!) 172/89 88 18 98.1 °F (36.7 °C) 98 %      MAP       --         Physical Exam    Nursing note and vitals reviewed.  Constitutional: She appears well-developed and well-nourished. She is not diaphoretic. No distress.   HENT:   Head: Normocephalic and atraumatic.   Right Ear: External ear normal.   Left Ear: External ear normal.   Nose: Nose normal.   Mouth/Throat: Oropharynx is clear and moist. No oropharyngeal exudate.   Eyes: Conjunctivae and EOM are normal. Pupils are equal, round, and reactive to light. Right eye exhibits no discharge. Left eye exhibits no discharge. No scleral icterus.   Neck: Neck supple. No thyromegaly present. No tracheal deviation present. No JVD present.   Normal range of motion.  Cardiovascular:  Normal rate, regular rhythm, normal heart sounds and intact distal pulses.     Exam reveals no gallop and no friction rub.       No murmur heard.  Pulmonary/Chest: Breath sounds normal. No stridor. No respiratory distress. She has no wheezes. She has no rhonchi. She has no rales. She exhibits no tenderness.   Abdominal: Abdomen is soft. Bowel sounds are normal. She exhibits no distension and no mass. There is abdominal tenderness. There is no rebound and no guarding.   Musculoskeletal:         General: No tenderness or edema. Normal range of motion.      Cervical back: Normal range of motion and neck supple.     Lymphadenopathy:     She has no cervical adenopathy.   Neurological: She is alert and oriented to person, place, and time. She has normal strength. No cranial nerve deficit or sensory deficit.   Skin: Skin is warm and dry. No rash and no abscess noted. No erythema. No pallor.       ED Course   Procedures  Labs Reviewed   CBC W/ AUTO DIFFERENTIAL - Abnormal; Notable for the following components:       Result Value    WBC 23.89 (*)     MCH 31.2 (*)     Immature Granulocytes 0.6 (*)     Gran # (ANC) 20.6 (*)     Immature Grans  (Abs) 0.14 (*)     Mono # 1.1 (*)     Gran % 86.3 (*)     Lymph % 8.1 (*)     All other components within normal limits    Narrative:     For upper or mid abdominal pain.   COMPREHENSIVE METABOLIC PANEL - Abnormal; Notable for the following components:    Sodium 134 (*)     Potassium 3.1 (*)     Total Bilirubin 1.1 (*)     All other components within normal limits    Narrative:     For upper or mid abdominal pain.   URINALYSIS, REFLEX TO URINE CULTURE - Abnormal; Notable for the following components:    Appearance, UA Hazy (*)     Protein, UA Trace (*)     Ketones, UA 2+ (*)     Bilirubin (UA) 1+ (*)     Occult Blood UA 2+ (*)     Leukocytes, UA 3+ (*)     All other components within normal limits    Narrative:     In and Out Cath as needed it patient unable to void  Specimen Source->Urine   URINALYSIS MICROSCOPIC - Abnormal; Notable for the following components:    WBC, UA >100 (*)     Hyaline Casts, UA 42 (*)     All other components within normal limits    Narrative:     In and Out Cath as needed it patient unable to void  Specimen Source->Urine   C-REACTIVE PROTEIN - Abnormal; Notable for the following components:    CRP 11.13 (*)     All other components within normal limits   CULTURE, URINE   CULTURE, STOOL   CLOSTRIDIUM DIFFICILE   CULTURE, BLOOD   CULTURE, BLOOD   LIPASE    Narrative:     For upper or mid abdominal pain.   PROTIME-INR   SARS-COV-2 RNA AMPLIFICATION, QUAL   INFLUENZA A AND B ANTIGEN    Narrative:     Specimen Source->Nasopharyngeal Swab   C-REACTIVE PROTEIN   STOOL EXAM-OVA,CYSTS,PARASITES   WBC, STOOL   OCCULT BLOOD X 1, STOOL   GASTROINTESTINAL PATHOGENS PANEL, PCR   MAGNESIUM   TYPE & SCREEN   ISTAT CREATININE   POCT CREATININE        ECG Results              EKG 12-lead (In process)  Result time 12/27/22 13:20:11      In process by Interface, Lab In [facility] (12/27/22 13:20:11)                   Narrative:    Test Reason : K62.5,    Vent. Rate : 086 BPM     Atrial Rate : 086 BPM     P-R  Int : 140 ms          QRS Dur : 092 ms      QT Int : 364 ms       P-R-T Axes : 061 052 074 degrees     QTc Int : 435 ms    Normal sinus rhythm with sinus arrhythmia  Normal ECG  When compared with ECG of 11-DEC-2019 10:57,  Borderline criteria for Inferior infarct are no longer Present  Nonspecific T wave abnormality has replaced inverted T waves in Lateral  leads    Referred By: [email or web address]   SELF           Confirmed By:                                   Imaging Results              CT Abdomen Pelvis With Contrast (Final result)  Result time 12/27/22 16:37:59      Final result by Xavi Del Toro MD (12/27/22 16:37:59)                   Narrative:    CMS MANDATED QUALITY DATA-CT RADIATION DOSE-436  All CT scans at this facility use dose modulation, iterative reconstruction, and or weight-based dosing when appropriate to reduce radiation dose to as low as reasonably achievable.    HISTORY: LLQ abdominal pain  rectal bleeding.    FINDINGS: Axial postcontrast imaging was performed with 100 mL Omnipaque 350 IV contrast. Comparison to multiple prior exams.    CT ABDOMEN: The lung bases are clear. The liver and gallbladder enhance normally, with no calcified gallstones or biliary ductal dilatation. The spleen is normal in size and enhances normally, with the pancreas, adrenal glands and kidneys enhancing normally. There are hypoattenuating renal lesions which are nonspecific but suggestive of simple cysts. No renal calculi or hydronephrosis.    Scattered calcified plaque involves normal caliber abdominal aorta and iliac arteries, with no aortic dissection. No enlarged mesenteric or retroperitoneal lymph nodes.    Long segment wall thickening involves the splenic flexure of the colon and descending colon, with mucosal hyperenhancement and pericolonic fat stranding, reflecting colitis. The remaining small bowel and colon enhance normally, with no bowel obstruction. There are scattered sigmoid colon diverticula. No ascites or  intraperitoneal free air.    CT PELVIS: There are numerous sigmoid colon diverticula, with the rectum and urinary bladder enhancing normally. The uterus is surgically absent, with the pelvic vasculature enhancing normally. There is no pelvic free fluid or mass, with no enlarged pelvic or inguinal lymph nodes.    The extraperitoneal soft tissues enhance normally. There are no acute fractures or destructive osseous lesions, with intervertebral disc space narrowing and facet arthropathy in the lumbar spine.    IMPRESSION:  1. Colitis of the splenic flexure and descending colon, nonspecific but most likely of an acute infectious etiology. No evidence of associated abscess or perforation.  2. Additional observations as described.    Electronically signed by:  Xavi Del Toro MD  12/27/2022 4:37 PM CST Workstation: [id number]                                     CT Head Without Contrast (Final result)  Result time 12/27/22 16:33:06      Final result by Xavi Del Toro MD (12/27/22 16:33:06)                   Narrative:    CMS MANDATED QUALITY DATA-CT RADIATION DOSE-436  All CT scans at this facility dose modulation, iterative reconstruction, and or weight-based dosing when appropriate to reduce radiation dose to as low as reasonably achievable.    HISTORY: Syncope, recurrent  loss of consciousness, bleeding.    FINDINGS: No prior studies for comparison. There is no acute intracranial hemorrhage, with no mass effect or abnormal extra-axial fluid. Scattered areas of nonspecific hypoattenuation involve the subcortical and deep periventricular white matter, with gray-white differentiation maintained.    There is moderate generalized prominence of the cortical sulci and ventricles. The cerebellum and brainstem are unremarkable. There are carotid siphon vascular calcifications. The visualized paranasal sinuses and mastoid air cells are clear. There is no acute osseous abnormality.    IMPRESSION:  1. Scattered areas of nonspecific  white matter hypoattenuation, presumably reflecting gliosis from chronic small vessel ischemic disease.  2. No acute intracranial hemorrhage, mass effect, or loss of gray-white differentiation.  3. Moderate generalized cerebral atrophy.    Electronically signed by:  Xavi Del Toro MD  12/27/2022 4:33 PM [facility] Workstation: [id number]                                     Medications   atorvastatin tablet 20 mg (has no administration in time range)   sertraline tablet 150 mg (has no administration in time range)   topiramate tablet 50 mg (50 mg Oral Given 12/27/22 2018)   valACYclovir tablet 1,000 mg (1,000 mg Oral Given 12/27/22 2018)   sodium chloride 0.9% flush 10 mL (has no administration in time range)   albuterol-ipratropium 2.5 mg-0.5 mg/3 mL nebulizer solution 3 mL (has no administration in time range)   melatonin tablet 6 mg (has no administration in time range)   ondansetron injection 4 mg (has no administration in time range)   simethicone chewable tablet 80 mg (has no administration in time range)   acetaminophen tablet 650 mg (has no administration in time range)   HYDROcodone-acetaminophen 5-325 mg per tablet 1 tablet (has no administration in time range)   naloxone 0.4 mg/mL injection 0.02 mg (has no administration in time range)   morphine injection 4 mg (has no administration in time range)   metronidazole IVPB 500 mg (has no administration in time range)   0.9 % NaCl with KCl 20 mEq infusion ( Intravenous New Bag 12/27/22 2017)   levoFLOXacin 500 mg/100 mL IVPB 500 mg (500 mg Intravenous New Bag 12/27/22 2017)   potassium bicarbonate disintegrating tablet 50 mEq (has no administration in time range)   potassium bicarbonate disintegrating tablet 35 mEq (35 mEq Oral Given 12/27/22 1918)   potassium bicarbonate disintegrating tablet 60 mEq (has no administration in time range)   magnesium oxide tablet 800 mg (has no administration in time range)   magnesium oxide tablet 800 mg (has no administration in time  range)   potassium, sodium phosphates 280-160-250 mg packet 2 packet (has no administration in time range)   potassium, sodium phosphates 280-160-250 mg packet 2 packet (has no administration in time range)   potassium, sodium phosphates 280-160-250 mg packet 2 packet (has no administration in time range)   iohexoL (OMNIPAQUE 350) injection 100 mL (100 mLs Intravenous Given 12/27/22 1556)   metronidazole IVPB 500 mg (0 mg Intravenous Stopped 12/27/22 1855)   sodium chloride 0.9% bolus 1,000 mL 1,000 mL (0 mLs Intravenous Stopped 12/27/22 1853)     Medical Decision Making:   History:   Old Medical Records: I decided to obtain old medical records.  Initial Assessment:   Emergent evaluation of a 77-year-old female presenting with syncopal episodes, abdominal pain, diarrhea, dysuria patient has findings consistent with urinary tract infection as well as colitis patient has an elevated white blood cell count with a left shift patient will be started on broad-spectrum antibiotics stool cultures will be obtained and patient consulted Internal Medicine for further evaluation and management                        Clinical Impression:   Final diagnoses:  [K62.5] Rectal bleeding  [K92.2] GI bleed (Primary)  [K52.9] Colitis  [R55] Syncope, unspecified syncope type  [E86.0] Dehydration        ED Disposition Condition    Admit Stable                Nolan Price MD  12/27/22 2047

## 2022-12-27 NOTE — TELEPHONE ENCOUNTER
----- Message from Flavia Giang sent at 12/27/2022  8:42 AM CST -----  Patient would like to be seen today. She isn't feeling very well. She is unable to eat and having stomach pains, sweats and chills. Also sleeping a lot feeling fatigue and fainting. [phone]

## 2022-12-27 NOTE — TELEPHONE ENCOUNTER
Spoke to pt  She stated she got sick during cr night. Pt said it stated with pains in her lower stomach, diarrhea, chills, sweats, migraine and nausea then pt went to bathroom and fainted.  Pt stated she has no fever Pt took imodium for the diarrhea and it helped. Pt then started having sweats and chills that last until yesterday. Pt said about 9pm last night she got up to fix the air she fainted. Pt stated she has little blood in her stool but pt thinks it may be from Hemorid.   Per omaira it is recommended pt go to ER. Pt agrees to go

## 2022-12-27 NOTE — FIRST PROVIDER EVALUATION
"Medical screening examination initiated.  I have conducted a focused provider triage encounter, findings are as follows:    Brief history of present illness:  diarrhea and nausea since 12/25. Pt reports two episodes of syncope. Pt noticed blood when she wiped after the diarrhea yesterday. Pt states there was more blood this morning that was bright red when she wiped. She is unsure if there was blood in the toilet or not. Pt has PMH of diverticulitis     Vitals:    12/27/22 1308   BP: (!) 172/89   Pulse: 88   Resp: 18   Temp: 98.1 °F (36.7 °C)   TempSrc: Oral   SpO2: 98%   Weight: 69.9 kg (154 lb)   Height: 5' 5" (1.651 m)       Pertinent physical exam:  Pt has abdominal lower tenderness    Brief workup plan:  cbc, lipase, cmp, and ekg     Preliminary workup initiated; this workup will be continued and followed by the physician or advanced practice provider that is assigned to the patient when roomed.  "

## 2022-12-28 LAB
ALBUMIN SERPL BCP-MCNC: 3.4 G/DL (ref 3.5–5.2)
ALP SERPL-CCNC: 51 U/L (ref 55–135)
ALT SERPL W/O P-5'-P-CCNC: 13 U/L (ref 10–44)
ANION GAP SERPL CALC-SCNC: 8 MMOL/L (ref 8–16)
AST SERPL-CCNC: 17 U/L (ref 10–40)
BASOPHILS # BLD AUTO: 0.12 K/UL (ref 0–0.2)
BASOPHILS NFR BLD: 0.6 % (ref 0–1.9)
BILIRUB SERPL-MCNC: 1.1 MG/DL (ref 0.1–1)
BUN SERPL-MCNC: 12 MG/DL (ref 8–23)
CALCIUM SERPL-MCNC: 8.4 MG/DL (ref 8.7–10.5)
CHLORIDE SERPL-SCNC: 104 MMOL/L (ref 95–110)
CO2 SERPL-SCNC: 25 MMOL/L (ref 23–29)
CREAT SERPL-MCNC: 0.7 MG/DL (ref 0.5–1.4)
DIFFERENTIAL METHOD: ABNORMAL
EOSINOPHIL # BLD AUTO: 0.1 K/UL (ref 0–0.5)
EOSINOPHIL NFR BLD: 0.3 % (ref 0–8)
ERYTHROCYTE [DISTWIDTH] IN BLOOD BY AUTOMATED COUNT: 14 % (ref 11.5–14.5)
EST. GFR  (NO RACE VARIABLE): >60 ML/MIN/1.73 M^2
ESTIMATED AVG GLUCOSE: 123 MG/DL (ref 68–131)
GLUCOSE SERPL-MCNC: 78 MG/DL (ref 70–110)
HBA1C MFR BLD: 5.9 % (ref 4.5–6.2)
HCT VFR BLD AUTO: 40.6 % (ref 37–48.5)
HCT VFR BLD AUTO: 40.8 % (ref 37–48.5)
HCT VFR BLD AUTO: 40.9 % (ref 37–48.5)
HCT VFR BLD AUTO: 42 % (ref 37–48.5)
HGB BLD-MCNC: 13.5 G/DL (ref 12–16)
HGB BLD-MCNC: 13.7 G/DL (ref 12–16)
HGB BLD-MCNC: 14 G/DL (ref 12–16)
HGB BLD-MCNC: 14.1 G/DL (ref 12–16)
IMM GRANULOCYTES # BLD AUTO: 0.12 K/UL (ref 0–0.04)
IMM GRANULOCYTES NFR BLD AUTO: 0.6 % (ref 0–0.5)
LYMPHOCYTES # BLD AUTO: 1.2 K/UL (ref 1–4.8)
LYMPHOCYTES NFR BLD: 6.1 % (ref 18–48)
MAGNESIUM SERPL-MCNC: 1.8 MG/DL (ref 1.6–2.6)
MCH RBC QN AUTO: 31.4 PG (ref 27–31)
MCHC RBC AUTO-ENTMCNC: 33.6 G/DL (ref 32–36)
MCV RBC AUTO: 94 FL (ref 82–98)
MONOCYTES # BLD AUTO: 1.1 K/UL (ref 0.3–1)
MONOCYTES NFR BLD: 5.2 % (ref 4–15)
NEUTROPHILS # BLD AUTO: 17.8 K/UL (ref 1.8–7.7)
NEUTROPHILS NFR BLD: 87.2 % (ref 38–73)
NRBC BLD-RTO: 0 /100 WBC
PHOSPHATE SERPL-MCNC: 2 MG/DL (ref 2.7–4.5)
PLATELET # BLD AUTO: 185 K/UL (ref 150–450)
PMV BLD AUTO: 10 FL (ref 9.2–12.9)
POTASSIUM SERPL-SCNC: 3.7 MMOL/L (ref 3.5–5.1)
PROT SERPL-MCNC: 6.3 G/DL (ref 6–8.4)
RBC # BLD AUTO: 4.49 M/UL (ref 4–5.4)
SODIUM SERPL-SCNC: 137 MMOL/L (ref 136–145)
WBC # BLD AUTO: 20.39 K/UL (ref 3.9–12.7)

## 2022-12-28 PROCEDURE — 25000003 PHARM REV CODE 250: Performed by: NURSE PRACTITIONER

## 2022-12-28 PROCEDURE — S0030 INJECTION, METRONIDAZOLE: HCPCS | Performed by: NURSE PRACTITIONER

## 2022-12-28 PROCEDURE — 85014 HEMATOCRIT: CPT | Mod: 91 | Performed by: NURSE PRACTITIONER

## 2022-12-28 PROCEDURE — 25000003 PHARM REV CODE 250: Performed by: HOSPITALIST

## 2022-12-28 PROCEDURE — 63600175 PHARM REV CODE 636 W HCPCS: Performed by: INTERNAL MEDICINE

## 2022-12-28 PROCEDURE — 80053 COMPREHEN METABOLIC PANEL: CPT | Performed by: HOSPITALIST

## 2022-12-28 PROCEDURE — 12000002 HC ACUTE/MED SURGE SEMI-PRIVATE ROOM

## 2022-12-28 PROCEDURE — 63600175 PHARM REV CODE 636 W HCPCS: Performed by: HOSPITALIST

## 2022-12-28 PROCEDURE — 84100 ASSAY OF PHOSPHORUS: CPT | Performed by: HOSPITALIST

## 2022-12-28 PROCEDURE — 36415 COLL VENOUS BLD VENIPUNCTURE: CPT | Performed by: NURSE PRACTITIONER

## 2022-12-28 PROCEDURE — 25000003 PHARM REV CODE 250: Performed by: INTERNAL MEDICINE

## 2022-12-28 PROCEDURE — 83735 ASSAY OF MAGNESIUM: CPT | Performed by: NURSE PRACTITIONER

## 2022-12-28 PROCEDURE — 63600175 PHARM REV CODE 636 W HCPCS: Performed by: NURSE PRACTITIONER

## 2022-12-28 PROCEDURE — 83036 HEMOGLOBIN GLYCOSYLATED A1C: CPT | Performed by: NURSE PRACTITIONER

## 2022-12-28 PROCEDURE — 85018 HEMOGLOBIN: CPT | Mod: 91 | Performed by: NURSE PRACTITIONER

## 2022-12-28 PROCEDURE — 85025 COMPLETE CBC W/AUTO DIFF WBC: CPT | Performed by: NURSE PRACTITIONER

## 2022-12-28 RX ORDER — SODIUM,POTASSIUM PHOSPHATES 280-250MG
2 POWDER IN PACKET (EA) ORAL ONCE
Status: COMPLETED | OUTPATIENT
Start: 2022-12-28 | End: 2022-12-28

## 2022-12-28 RX ORDER — MAG HYDROX/ALUMINUM HYD/SIMETH 200-200-20
30 SUSPENSION, ORAL (FINAL DOSE FORM) ORAL ONCE
Status: COMPLETED | OUTPATIENT
Start: 2022-12-28 | End: 2022-12-28

## 2022-12-28 RX ORDER — HYOSCYAMINE SULFATE 0.5 MG/ML
0.25 INJECTION, SOLUTION SUBCUTANEOUS ONCE
Status: COMPLETED | OUTPATIENT
Start: 2022-12-28 | End: 2022-12-28

## 2022-12-28 RX ORDER — BUTALBITAL, ACETAMINOPHEN AND CAFFEINE 50; 325; 40 MG/1; MG/1; MG/1
1 TABLET ORAL EVERY 6 HOURS PRN
Status: DISCONTINUED | OUTPATIENT
Start: 2022-12-28 | End: 2022-12-29 | Stop reason: HOSPADM

## 2022-12-28 RX ORDER — LIDOCAINE HYDROCHLORIDE 20 MG/ML
15 SOLUTION OROPHARYNGEAL ONCE
Status: COMPLETED | OUTPATIENT
Start: 2022-12-28 | End: 2022-12-28

## 2022-12-28 RX ORDER — ACETAMINOPHEN 325 MG/1
650 TABLET ORAL EVERY 4 HOURS PRN
Status: DISCONTINUED | OUTPATIENT
Start: 2022-12-28 | End: 2022-12-29 | Stop reason: HOSPADM

## 2022-12-28 RX ORDER — MAGNESIUM SULFATE HEPTAHYDRATE 40 MG/ML
2 INJECTION, SOLUTION INTRAVENOUS ONCE
Status: COMPLETED | OUTPATIENT
Start: 2022-12-28 | End: 2022-12-28

## 2022-12-28 RX ORDER — BUTALBITAL, ACETAMINOPHEN AND CAFFEINE 50; 325; 40 MG/1; MG/1; MG/1
1 TABLET ORAL ONCE
Status: COMPLETED | OUTPATIENT
Start: 2022-12-28 | End: 2022-12-28

## 2022-12-28 RX ADMIN — ALUMINUM HYDROXIDE, MAGNESIUM HYDROXIDE, AND SIMETHICONE 30 ML: 200; 200; 20 SUSPENSION ORAL at 01:12

## 2022-12-28 RX ADMIN — Medication 6 MG: at 10:12

## 2022-12-28 RX ADMIN — HYDROCODONE BITARTRATE AND ACETAMINOPHEN 1 TABLET: 5; 325 TABLET ORAL at 02:12

## 2022-12-28 RX ADMIN — MAGNESIUM SULFATE HEPTAHYDRATE 2 G: 40 INJECTION, SOLUTION INTRAVENOUS at 02:12

## 2022-12-28 RX ADMIN — LIDOCAINE HYDROCHLORIDE 15 ML: 20 SOLUTION ORAL at 01:12

## 2022-12-28 RX ADMIN — VALACYCLOVIR 1000 MG: 500 TABLET, FILM COATED ORAL at 09:12

## 2022-12-28 RX ADMIN — METRONIDAZOLE 500 MG: 5 INJECTION, SOLUTION INTRAVENOUS at 09:12

## 2022-12-28 RX ADMIN — SERTRALINE HYDROCHLORIDE 150 MG: 50 TABLET ORAL at 09:12

## 2022-12-28 RX ADMIN — ACETAMINOPHEN 650 MG: 325 TABLET ORAL at 11:12

## 2022-12-28 RX ADMIN — LEVOFLOXACIN 500 MG: 5 INJECTION, SOLUTION INTRAVENOUS at 08:12

## 2022-12-28 RX ADMIN — POTASSIUM, SODIUM PHOSPHATES 280 MG-160 MG-250 MG ORAL POWDER PACKET 2 PACKET: POWDER IN PACKET at 02:12

## 2022-12-28 RX ADMIN — HYOSCYAMINE SULFATE 0.25 MG: 0.5 INJECTION, SOLUTION SUBCUTANEOUS at 01:12

## 2022-12-28 RX ADMIN — BUTALBITAL, ACETAMINOPHEN, AND CAFFEINE 1 TABLET: 50; 325; 40 TABLET, COATED ORAL at 11:12

## 2022-12-28 RX ADMIN — TOPIRAMATE 50 MG: 25 TABLET, FILM COATED ORAL at 10:12

## 2022-12-28 RX ADMIN — ATORVASTATIN CALCIUM 20 MG: 20 TABLET, FILM COATED ORAL at 09:12

## 2022-12-28 RX ADMIN — METRONIDAZOLE 500 MG: 5 INJECTION, SOLUTION INTRAVENOUS at 05:12

## 2022-12-28 RX ADMIN — BUTALBITAL, ACETAMINOPHEN, AND CAFFEINE 1 TABLET: 50; 325; 40 TABLET, COATED ORAL at 07:12

## 2022-12-28 RX ADMIN — TOPIRAMATE 50 MG: 25 TABLET, FILM COATED ORAL at 09:12

## 2022-12-28 RX ADMIN — SODIUM CHLORIDE AND POTASSIUM CHLORIDE: .9; .15 SOLUTION INTRAVENOUS at 07:12

## 2022-12-28 RX ADMIN — METRONIDAZOLE 500 MG: 5 INJECTION, SOLUTION INTRAVENOUS at 01:12

## 2022-12-28 NOTE — ASSESSMENT & PLAN NOTE
Continue levofloxacin and Flagyl, clear liquid diet, Zofran for nausea, consult Gastroenterology, stool studies are pending including C diff, stool culture and GI PCR

## 2022-12-28 NOTE — HPI
Jazz Martinez is a 77-year-old female with chronic medical problems including history of DVT on Xarelto, hyperlipidemia and incontinence who presents to the emergency room complaining of 2 days of abdominal pain.  Patient states that on Britt night she started having severe, cramping abdominal pain that was fairly constant.  Associated symptoms were frequent watery diarrhea, nausea, diaphoresis and chills.  She also had 2 episodes of syncope.  There was no dizziness prior to syncopal episodes, she said she just felt like she was going to pass out and woke up on the floor.  She has had no appetite and has not been eating for the last 2 days but has been drinking fluids with no vomiting.  She took some Imodium today and diarrhea has slowed down but now she has noticed bright red blood on the toilet paper.  Abdominal pain has improved after putting heating pad on her abdomen.  She is not sure if there was blood in the commode.  Symptoms have gotten somewhat better though she still feels quite weak.  She did not take any of her medications this morning.  She denies any urinary symptoms though she does have incontinence at baseline, chest pain or shortness breath, headaches or dizziness, palpitations or swelling.    In the ED, CTA abdomen and pelvis showed colitis of the splenic flexure and descending colon with no evidence of abscess or perforation.  Urinalysis with 3+ leukocytes a greater than 100 WBCs, 2+ ketones, 2+ blood and trace protein obtain by cath specimen.  WBC elevated at 23.89, platelets 254, H&H 15.1/44.8, potassium decreased at 3.1, sodium 134, CO2 25, creatinine 0.9 and glucose 99, no elevation in liver enzymes, INR is 1.1, lipase 27.  She was given 1 L normal saline, 750 mg levofloxacin and 500 mg IV metronidazole.  She will be admitted to the hospitalist service for further evaluation and treatment.

## 2022-12-28 NOTE — PROGRESS NOTES
"[facility] Medicine  Progress Note    Patient name: Jazz Martinez  MRN: [id number]  Admit Date: 12/27/2022   LOS: 1 day     SUBJECTIVE:     Principal problem: Gastrointestinal bleed    Interval History:  Patient was seen and examined bedside, subjectively feels better, no bowel movement since arrival to room.  Electrolytes are better, appears well hydrated.  Still has abdominal cramping and lower abdominal pain.  H and H is stable    Scheduled Meds:   atorvastatin  20 mg Oral Daily    levoFLOXacin  500 mg Intravenous Q24H    magnesium sulfate IVPB  2 g Intravenous Once    metronidazole  500 mg Intravenous Q8H    potassium, sodium phosphates  2 packet Oral Once    sertraline  150 mg Oral Daily    topiramate  50 mg Oral BID     Continuous Infusions:   0/9% NACL & POTASSIUM CHLORIDE 20 MEQ/L 75 mL/hr at 12/27/22 2017     PRN Meds:acetaminophen, albuterol-ipratropium, HYDROcodone-acetaminophen, magnesium oxide, magnesium oxide, melatonin, morphine, naloxone, ondansetron, potassium bicarbonate, potassium bicarbonate, potassium bicarbonate, potassium, sodium phosphates, potassium, sodium phosphates, potassium, sodium phosphates, simethicone, sodium chloride 0.9%    Review of patient's allergies indicates:   Allergen Reactions    Gabapentin     Nitrofurantoin Other (See Comments)     Deathly ill , headaches, weakness, 'wiped out"    Sulfa (sulfonamide antibiotics)        Review of Systems: As per interval history    OBJECTIVE:     Vital Signs (Most Recent)  Temp: 97.6 °F (36.4 °C) (12/28/22 1227)  Pulse: 89 (12/28/22 1227)  Resp: 16 (12/28/22 1227)  BP: 121/68 (12/28/22 1227)  SpO2: 97 % (12/28/22 1227)    Vital Signs Range (Last 24H):  Temp:  [97.6 °F (36.4 °C)-98.5 °F (36.9 °C)]   Pulse:  [75-91]   Resp:  [16-18]   BP: (120-172)/(66-89)   SpO2:  [94 %-100 %]     I & O (Last 24H):  Intake/Output Summary (Last 24 hours) at 12/28/2022 1251  Last data filed at 12/28/2022 0938  Gross per 24 hour "   Intake 580 ml   Output 600 ml   Net -20 ml       Physical Exam:  General: Patient resting comfortably in no acute distress. Appears as stated age. Calm  Eyes: No conjunctival injection. No scleral icterus.  ENT: Hearing grossly intact. No discharge from ears. No nasal discharge.   Neck: Supple, trachea midline. No JVD  CVS: RRR. No LE edema BL  Lungs:  No tachypnea or accessory muscle use.  Clear to auscultation bilaterally  Abdomen:  Soft, tender in lower quadrants.  No organomegaly, BS+  Neuro: AOx3. Moves all extremities. Follows commands. Responds appropriately       Laboratory:  I have reviewed all pertinent lab results within the past 24 hours.    Diagnostic Results:  REVIEWED ALL IMAGING    ASSESSMENT/PLAN:     77-year-old with prior history of DVT on Xarelto, hyperlipidemia and diverticulosis came with diarrhea, rectal bleed found to have colitis and UTI    Active Hospital Problems    Diagnosis  POA    *Gastrointestinal bleed [K92.2]  Unknown    Colitis [K52.9]  Unknown    UTI (urinary tract infection) [N39.0]  Unknown    Hypokalemia [E87.6]  Unknown    History of DVT in adulthood [Z86.718]  Not Applicable    Hyperlipidemia [E78.5]  Unknown      Resolved Hospital Problems   No resolved problems to display.         Plan:   Clinically improving with IV fluids, antibiotics  GI consulted  Advanced diet to full liquid  Judicious analgesia  Aggressive electrolyte replacement, encourage p.o. hydration, will discontinue IV fluids  Remains on Levaquin and Flagyl for colitis and UTI  Continue holding Xarelto  Stool studies ordered however no stool sample so far  Further management as per clinical course  Out of bed to chair, encourage ambulation    VTE Risk Mitigation (From admission, onward)           Ordered     IP VTE HIGH RISK PATIENT  Once         12/27/22 1812     Place sequential compression device  Until discontinued         12/27/22 1812     Reason for No Pharmacological VTE Prophylaxis  Once         Question:  Reasons:  Answer:  Active Bleeding    12/27/22 1812                        Department Hospital Medicine  [facility]  Austin Hicks MD  Date of service: 12/28/2022

## 2022-12-28 NOTE — PLAN OF CARE
[facility]  Initial Discharge Assessment       Primary Care Provider: Elle Mack NP    Admission Diagnosis: GI bleed [K92.2]    Admission Date: 12/27/2022  Expected Discharge Date:     Pt confirmed home address and lives with Kristyn Jones [phone]. Pt denied HH and DME and stated that she is here with a home cane due to passing out. She has been staying at her brothers home for over a month assisting him after knee surgery. PT confirmed PCP as Elle Mack, NP and insurance as Medicare and [employer]. [facility] uses [facility] and stated that Kristyn will provide transport home. Pts discharge plan is home with family. CM following for additional needs.     Discharge Barriers Identified: None    Payor: MEDICARE / Plan: MEDICARE PART A & B / Product Type: Government /     Extended Emergency Contact Information  Primary Emergency Contact: Kristyn Jones   [place]  Mobile Phone: [phone]  Relation: Friend    Discharge Plan A: Home with family  Discharge Plan B: Home      [id number] - [place], [place] - [address] AT [facility]  [address]  [place] [address]  Phone: [phone] Fax: [phone]      Initial Assessment (most recent)       Adult Discharge Assessment - 12/28/22 0956          Discharge Assessment    Assessment Type Discharge Planning Assessment     Confirmed/corrected address, phone number and insurance Yes     Confirmed Demographics Correct on Facesheet     Source of Information patient     Reason For Admission GI bleed     People in Home friend(s)     Facility Arrived From: Home     Do you expect to return to your current living situation? Yes     Do you have help at home or someone to help you manage your care at home? Yes     Who are your caregiver(s) and their phone number(s)? Kristyn Jones [phone]     Prior to hospitilization cognitive status: Alert/Oriented     Current cognitive status: Alert/Oriented     Walking or Climbing  Stairs ambulation difficulty, requires equipment     Mobility Management cane     Equipment Currently Used at Home cane, straight     Readmission within 30 days? No     Patient currently being followed by outpatient case management? No     Do you currently have service(s) that help you manage your care at home? No     Do you take prescription medications? Yes     Do you have prescription coverage? Yes     Do you have any problems affording any of your prescribed medications? No     Is the patient taking medications as prescribed? yes     Who is going to help you get home at discharge? Kristyn Jones [phone]     How do you get to doctors appointments? car, drives self     Are you on dialysis? No     Do you take coumadin? No     Discharge Plan A Home with family     Discharge Plan B Home     DME Needed Upon Discharge  none     Discharge Plan discussed with: Patient     Discharge Barriers Identified None        Physical Activity    On average, how many days per week do you engage in moderate to strenuous exercise (like a brisk walk)? Patient refused     On average, how many minutes do you engage in exercise at this level? Patient refused        Housing Stability    In the last 12 months, was there a time when you were not able to pay the mortgage or rent on time? Patient refused     In the last 12 months, was there a time when you did not have a steady place to sleep or slept in a shelter (including now)? No        Transportation Needs    In the past 12 months, has lack of transportation kept you from medical appointments or from getting medications? No     In the past 12 months, has lack of transportation kept you from meetings, work, or from getting things needed for daily living? No        Food Insecurity    Within the past 12 months, you worried that your food would run out before you got the money to buy more. Never true     Within the past 12 months, the food you bought just didn't last and you didn't have money  to get more. Never true        Stress    Do you feel stress - tense, restless, nervous, or anxious, or unable to sleep at night because your mind is troubled all the time - these days? Patient refused        Social Connections    In a typical week, how many times do you talk on the phone with family, friends, or neighbors? Patient refused     How often do you get together with friends or relatives? Patient refused     How often do you attend Restoration or Jainism services? Patient refused     Do you belong to any clubs or organizations such as [employer]oration groups, unions, fraternal or athletic groups, or school groups? Patient refused     How often do you attend meetings of the clubs or organizations you belong to? Patient refused        Alcohol Use    Q1: How often do you have a drink containing alcohol? Patient refused     Q2: How many drinks containing alcohol do you have on a typical day when you are drinking? Patient refused     Q3: How often do you have six or more drinks on one occasion? Patient refused        OTHER    Name(s) of People in Home Kristyn Jones [phone]

## 2022-12-28 NOTE — H&P
[facility] - Emergency Dept  Hospital Medicine  History & Physical    Patient Name: Jazz Martinez  MRN: [id number]  Patient Class: IP- Inpatient  Admission Date: 12/27/2022  Attending Physician: Austin Hicks MD   Primary Care Provider: Elle Mack NP         Patient information was obtained from patient, caregiver / friend and ER records.     Subjective:     Principal Problem:Gastrointestinal bleed    Chief Complaint:   Chief Complaint   Patient presents with    Loss of Consciousness    Rectal Bleeding     Patient reports 2 episodes of LOC, n/v/d, and abd pain since [place] day         HPI: Jazz Martinez is a 77-year-old female with chronic medical problems including history of DVT on Xarelto, hyperlipidemia and incontinence who presents to the emergency room complaining of 2 days of abdominal pain.  Patient states that on Christmas night she started having severe, cramping abdominal pain that was fairly constant.  Associated symptoms were frequent watery diarrhea, nausea, diaphoresis and chills.  She also had 2 episodes of syncope.  There was no dizziness prior to syncopal episodes, she said she just felt like she was going to pass out and woke up on the floor.  She has had no appetite and has not been eating for the last 2 days but has been drinking fluids with no vomiting.  She took some Imodium today and diarrhea has slowed down but now she has noticed bright red blood on the toilet paper.  Abdominal pain has improved after putting heating pad on her abdomen.  She is not sure if there was blood in the commode.  Symptoms have gotten somewhat better though she still feels quite weak.  She did not take any of her medications this morning.  She denies any urinary symptoms though she does have incontinence at baseline, chest pain or shortness breath, headaches or dizziness, palpitations or swelling.    In the ED, CTA abdomen and pelvis showed colitis of the splenic flexure and descending  colon with no evidence of abscess or perforation.  Urinalysis with 3+ leukocytes a greater than 100 WBCs, 2+ ketones, 2+ blood and trace protein obtain by cath specimen.  WBC elevated at 23.89, platelets 254, H&H 15.1/44.8, potassium decreased at 3.1, sodium 134, CO2 25, creatinine 0.9 and glucose 99, no elevation in liver enzymes, INR is 1.1, lipase 27.  She was given 1 L normal saline, 750 mg levofloxacin and 500 mg IV metronidazole.  She will be admitted to the hospitalist service for further evaluation and treatment.      Past Medical History:   Diagnosis Date    Arthritis     Corns and callosities     Deep vein thrombosis     GERD (gastroesophageal reflux disease)     Gout     H/O bladder problems     History of colonic polyps 2014    Hyperlipidemia     Hypertension     Pulmonary embolism        Past Surgical History:   Procedure Laterality Date    APPENDECTOMY  AGE 12    BLADDER SURGERY      Twice    BREAST CYST EXCISION      Benign per patient    CYSTOSCOPY N/A 1/26/2022    Procedure: CYSTOSCOPY;  Surgeon: Sánchez Salcido MD;  Location: [facility] OR [address] [place] [address];  Service: Urology;  Laterality: N/A;    CYSTOSCOPY  4/12/2022    Procedure: CYSTOSCOPY;  Surgeon: Sánchez Salcido MD;  Location: [facility] OR [address] [place] [address];  Service: Urology;;    EYE SURGERY      LASER FOR GLAUCOMA    HAND SURGERY Left     HYSTERECTOMY      Not related to cancer per patient.    INJECTION OF BOTULINUM TOXIN TYPE A  1/26/2022    Procedure: INJECTION, BOTULINUM TOXIN, 100units;  Surgeon: Sánchez Salcido MD;  Location: [facility] OR [address] [place] [address];  Service: Urology;;    INJECTION OF BOTULINUM TOXIN TYPE A  4/12/2022    Procedure: INJECTION, BOTULINUM TOXIN  100units;  Surgeon: Sánchez Salcido MD;  Location: [facility] OR [address] [place] [address];  Service: Urology;;    OOPHORECTOMY      ROBOT-ASSISTED LAPAROSCOPIC ABDOMINAL SACROCOLPOPEXY USING DA KINGA XI N/A 12/17/2019    Procedure: XI ROBOTIC SACROCOLPOPEXY, ABDOMINAL;  Surgeon: Javier Putnam  "MD;  Location: [facility] OR;  Service: OB/GYN;  Laterality: N/A;  removal of damaged tissue    SURGICAL PROCEDURE FOR STRESS INCONTINENCE USING TENSION FREE VAGINAL TAPE N/A 12/17/2019    Procedure: SURGICAL PROCEDURE, USING TENSION FREE VAGINAL TAPE, FOR STRESS INCONTINENCE;  Surgeon: Javier Putnam MD;  Location: [facility] OR;  Service: OB/GYN;  Laterality: N/A;    TONSILLECTOMY         Review of patient's allergies indicates:   Allergen Reactions    Gabapentin     Nitrofurantoin Other (See Comments)     Deathly ill , headaches, weakness, 'wiped out"    Sulfa (sulfonamide antibiotics)        No current facility-administered medications on file prior to encounter.     Current Outpatient Medications on File Prior to Encounter   Medication Sig    calcium carbonate/vitamin D3 (VITAMIN D-3 ORAL) Take 1 tablet by mouth once daily.    estradioL (ESTRACE) 0.01 % (0.1 mg/gram) vaginal cream INSERT 1 GM VAGINALLY MONDAY,WEDNESDAY,AND FRIDAY AS DIRECTED (Patient taking differently: Place 1 g vaginally every Mon, Wed, Fri.)    rosuvastatin (CRESTOR) 5 MG tablet Take 1 tablet (5 mg total) by mouth every evening.    sertraline (ZOLOFT) 100 MG tablet Take 1.5 tablets (150 mg total) by mouth once daily.    topiramate (TOPAMAX) 50 MG tablet Take 1 tablet (50 mg total) by mouth 2 (two) times daily.    valACYclovir (VALTREX) 500 MG tablet Take 2 tablets (1,000 mg total) by mouth 2 (two) times daily.    XARELTO 20 mg Tab Take 20 mg by mouth once daily.    docusate calcium (SURFAK) 240 mg capsule Take 240 mg by mouth 2 (two) times daily.    fluticasone propionate (FLONASE) 50 mcg/actuation nasal spray 1 spray (50 mcg total) by Each Nostril route once daily.    sumatriptan (IMITREX) 100 MG tablet 1 tab po at start of headache, may repeat in 2 hours X1 in 24 hours. (Patient taking differently: as needed. 1 tab po at start of headache, may repeat in 2 hours X1 in 24 hours.)    traMADol (ULTRAM) 50 mg tablet Take 50 mg by mouth " every 8 (eight) hours as needed for Pain.    triamterene-hydrochlorothiazide 37.5-25 mg (MAXZIDE-25) 37.5-25 mg per tablet 1 tablet daily as needed for swelling (Patient taking differently: Take 1 tablet by mouth daily as needed. 1 tablet daily as needed for swelling)    [DISCONTINUED] diazePAM (VALIUM) 5 MG tablet TAKE 1 TABLET BY MOUTH 30 MINUTES BEFORE PROCEDURE, BRING BOTTLE TO APPOINTMENT    [DISCONTINUED] methylPREDNISolone (MEDROL DOSEPACK) 4 mg tablet use as directed    [DISCONTINUED] triamcinolone acetonide 0.025% (KENALOG) 0.025 % cream      Family History       Problem Relation (Age of Onset)    Cancer Brother          Tobacco Use    Smoking status: Never    Smokeless tobacco: Never   Substance and Sexual Activity    Alcohol use: No    Drug use: No    Sexual activity: Never     Review of Systems   All other systems reviewed and are negative.  Twelve point review of systems obtained and negative except as stated above in HPI      Objective:     Vital Signs (Most Recent):  Temp: 98.1 °F (36.7 °C) (12/27/22 1308)  Pulse: 88 (12/27/22 1621)  Resp: 18 (12/27/22 1308)  BP: 130/70 (12/27/22 1621)  SpO2: 100 % (12/27/22 1621) Vital Signs (24h Range):  Temp:  [98.1 °F (36.7 °C)] 98.1 °F (36.7 °C)  Pulse:  [87-91] 88  Resp:  [18] 18  SpO2:  [97 %-100 %] 100 %  BP: (130-172)/(70-89) 130/70     Weight: 69.9 kg (154 lb)  Body mass index is 25.63 kg/m².    Physical Exam  Vitals and nursing note reviewed.   Constitutional:       General: She is awake. She is not in acute distress.     Appearance: Normal appearance. She is well-developed. She is not toxic-appearing.   HENT:      Head: Normocephalic.      Right Ear: External ear normal.      Left Ear: External ear normal.      Nose: Nose normal.      Mouth/Throat:      Mouth: Mucous membranes are moist.      Pharynx: Oropharynx is clear.   Eyes:      Conjunctiva/sclera: Conjunctivae normal.   Cardiovascular:      Rate and Rhythm: Normal rate and regular rhythm.       Pulses: Normal pulses.      Heart sounds: Normal heart sounds.   Pulmonary:      Effort: Pulmonary effort is normal.      Breath sounds: Normal breath sounds. No wheezing or rhonchi.   Abdominal:      General: Abdomen is protuberant.      Palpations: Abdomen is soft.      Tenderness: There is generalized abdominal tenderness and tenderness in the left upper quadrant and left lower quadrant.      Comments:   Generalized tenderness to abdomen to palpation but worse at left upper quadrant and left lower quadrant.   Musculoskeletal:         General: Normal range of motion.      Cervical back: Normal range of motion.   Skin:     General: Skin is warm and dry.      Capillary Refill: Capillary refill takes less than 2 seconds.   Neurological:      General: No focal deficit present.      Mental Status: She is alert and oriented to person, place, and time.   Psychiatric:         Mood and Affect: Mood normal.         Behavior: Behavior normal.         Thought Content: Thought content normal.         Judgment: Judgment normal.           Significant Labs: All pertinent labs within the past 24 hours have been reviewed.  Bilirubin:   Recent Labs   Lab 12/27/22  1441   BILITOT 1.1*     CBC:   Recent Labs   Lab 12/27/22  1441   WBC 23.89*   HGB 15.1   HCT 44.8        CMP:   Recent Labs   Lab 12/27/22  1441   *   K 3.1*   CL 98   CO2 25   GLU 99   BUN 16   CREATININE 0.9   CALCIUM 9.3   PROT 7.3   ALBUMIN 4.2   BILITOT 1.1*   ALKPHOS 61   AST 22   ALT 13   ANIONGAP 11     Coagulation:   Recent Labs   Lab 12/27/22  1550   INR 1.1     Lipase:   Recent Labs   Lab 12/27/22  1441   LIPASE 27     Urine Studies:   Recent Labs   Lab 12/27/22  1539   COLORU Yellow   APPEARANCEUA Hazy*   PHUR 6.0   SPECGRAV 1.020   PROTEINUA Trace*   GLUCUA Negative   KETONESU 2+*   BILIRUBINUA 1+*   OCCULTUA 2+*   NITRITE Negative   UROBILINOGEN Negative   LEUKOCYTESUR 3+*   RBCUA 4   WBCUA >100*   BACTERIA Negative   SQUAMEPITHEL 0    HYALINECASTS 42*       Significant Imaging: I have reviewed all pertinent imaging results/findings within the past 24 hours.    CT Abdomen Pelvis With Contrast  12/27/2022  FINDINGS: Axial postcontrast imaging was performed with 100 mL Omnipaque 350 IV contrast. Comparison to multiple prior exams.     CT ABDOMEN: The lung bases are clear. The liver and gallbladder enhance normally, with no calcified gallstones or biliary ductal dilatation. The spleen is normal in size and enhances normally, with the pancreas, adrenal glands and kidneys enhancing normally. There are hypoattenuating renal lesions which are nonspecific but suggestive of simple cysts. No renal calculi or hydronephrosis.     Scattered calcified plaque involves normal caliber abdominal aorta and iliac arteries, with no aortic dissection. No enlarged mesenteric or retroperitoneal lymph nodes.     Long segment wall thickening involves the splenic flexure of the colon and descending colon, with mucosal hyperenhancement and pericolonic fat stranding, reflecting colitis. The remaining small bowel and colon enhance normally, with no bowel obstruction. There are scattered sigmoid colon diverticula. No ascites or intraperitoneal free air.     CT PELVIS: There are numerous sigmoid colon diverticula, with the rectum and urinary bladder enhancing normally. The uterus is surgically absent, with the pelvic vasculature enhancing normally. There is no pelvic free fluid or mass, with no enlarged pelvic or inguinal lymph nodes.     The extraperitoneal soft tissues enhance normally. There are no acute fractures or destructive osseous lesions, with intervertebral disc space narrowing and facet arthropathy in the lumbar spine.     IMPRESSION:  1. Colitis of the splenic flexure and descending colon, nonspecific but most likely of an acute infectious etiology. No evidence of associated abscess or perforation.  2. Additional observations as described.         CT Head Without  Contrast  12/27/2022    HISTORY: Syncope, recurrent  loss of consciousness, bleeding.     FINDINGS: No prior studies for comparison. There is no acute intracranial hemorrhage, with no mass effect or abnormal extra-axial fluid. Scattered areas of nonspecific hypoattenuation involve the subcortical and deep periventricular white matter, with gray-white differentiation maintained.     There is moderate generalized prominence of the cortical sulci and ventricles. The cerebellum and brainstem are unremarkable. There are carotid siphon vascular calcifications. The visualized paranasal sinuses and mastoid air cells are clear. There is no acute osseous abnormality.     IMPRESSION:  1. Scattered areas of nonspecific white matter hypoattenuation, presumably reflecting gliosis from chronic small vessel ischemic disease.  2. No acute intracranial hemorrhage, mass effect, or loss of gray-white differentiation.  3. Moderate generalized cerebral atrophy.         Assessment/Plan:     * Gastrointestinal bleed  H&H q.6 hours for 48 hours, clear liquid diet tonight, NPO after midnight, consult to Gastroenterology, monitor on telemetry, hydrate with fluids      Colitis  Continue levofloxacin and Flagyl, clear liquid diet, Zofran for nausea, consult Gastroenterology, stool studies are pending including C diff, stool culture and GI PCR      UTI (urinary tract infection)  Culture is pending, she is asymptomatic      Hypokalemia  Potassium 3.1, magnesium ordered, replete      History of DVT in adulthood  Hold Xarelto, SCDs      Hyperlipidemia  Resume statin        VTE Risk Mitigation (From admission, onward)         Ordered     IP VTE HIGH RISK PATIENT  Once         12/27/22 1812     Place sequential compression device  Until discontinued         12/27/22 1812     Reason for No Pharmacological VTE Prophylaxis  Once        Question:  Reasons:  Answer:  Active Bleeding    12/27/22 1812                 VTE prophylaxis: SCD's    Patient was  examined at 1718    Code discussion with patient and/or caregiver regarding intubation, CPR, medications and emergency life support.  Patient elected to be: DO NOT RESUSCITATE      Sherine Gomez NP  Department of Hospital Medicine   [facility] - Emergency Dept

## 2022-12-28 NOTE — SUBJECTIVE & OBJECTIVE
"Past Medical History:   Diagnosis Date    Arthritis     Corns and callosities     Deep vein thrombosis     GERD (gastroesophageal reflux disease)     Gout     H/O bladder problems     History of colonic polyps 2014    Hyperlipidemia     Hypertension     Pulmonary embolism        Past Surgical History:   Procedure Laterality Date    APPENDECTOMY  AGE 12    BLADDER SURGERY      Twice    BREAST CYST EXCISION      Benign per patient    CYSTOSCOPY N/A 1/26/2022    Procedure: CYSTOSCOPY;  Surgeon: Sánchez Salcido MD;  Location: [address];  Service: Urology;  Laterality: N/A;    CYSTOSCOPY  4/12/2022    Procedure: CYSTOSCOPY;  Surgeon: Sánchez Salcido MD;  Location: [facility] OR [address] [place] [address];  Service: Urology;;    EYE SURGERY      LASER FOR GLAUCOMA    HAND SURGERY Left     HYSTERECTOMY      Not related to cancer per patient.    INJECTION OF BOTULINUM TOXIN TYPE A  1/26/2022    Procedure: INJECTION, BOTULINUM TOXIN, 100units;  Surgeon: Sánchez Salcido MD;  Location: [facility] OR [address] [place] [address];  Service: Urology;;    INJECTION OF BOTULINUM TOXIN TYPE A  4/12/2022    Procedure: INJECTION, BOTULINUM TOXIN  100units;  Surgeon: Sánchez Salcido MD;  Location: [facility] OR [address] [place] [address];  Service: Urology;;    OOPHORECTOMY      ROBOT-ASSISTED LAPAROSCOPIC ABDOMINAL SACROCOLPOPEXY USING DA KINGA XI N/A 12/17/2019    Procedure: XI ROBOTIC SACROCOLPOPEXY, ABDOMINAL;  Surgeon: Javier Putnam MD;  Location: [facility];  Service: OB/GYN;  Laterality: N/A;  removal of damaged tissue    SURGICAL PROCEDURE FOR STRESS INCONTINENCE USING TENSION FREE VAGINAL TAPE N/A 12/17/2019    Procedure: SURGICAL PROCEDURE, USING TENSION FREE VAGINAL TAPE, FOR STRESS INCONTINENCE;  Surgeon: Javier Putnam MD;  Location: [facility];  Service: OB/GYN;  Laterality: N/A;    TONSILLECTOMY         Review of patient's allergies indicates:   Allergen Reactions    Gabapentin     Nitrofurantoin Other (See Comments)     Deathly ill , headaches, weakness, 'wiped out"    " Sulfa (sulfonamide antibiotics)        No current facility-administered medications on file prior to encounter.     Current Outpatient Medications on File Prior to Encounter   Medication Sig    calcium carbonate/vitamin D3 (VITAMIN D-3 ORAL) Take 1 tablet by mouth once daily.    estradioL (ESTRACE) 0.01 % (0.1 mg/gram) vaginal cream INSERT 1 GM VAGINALLY MONDAY,WEDNESDAY,AND FRIDAY AS DIRECTED (Patient taking differently: Place 1 g vaginally every Mon, Wed, Fri.)    rosuvastatin (CRESTOR) 5 MG tablet Take 1 tablet (5 mg total) by mouth every evening.    sertraline (ZOLOFT) 100 MG tablet Take 1.5 tablets (150 mg total) by mouth once daily.    topiramate (TOPAMAX) 50 MG tablet Take 1 tablet (50 mg total) by mouth 2 (two) times daily.    valACYclovir (VALTREX) 500 MG tablet Take 2 tablets (1,000 mg total) by mouth 2 (two) times daily.    XARELTO 20 mg Tab Take 20 mg by mouth once daily.    docusate calcium (SURFAK) 240 mg capsule Take 240 mg by mouth 2 (two) times daily.    fluticasone propionate (FLONASE) 50 mcg/actuation nasal spray 1 spray (50 mcg total) by Each Nostril route once daily.    sumatriptan (IMITREX) 100 MG tablet 1 tab po at start of headache, may repeat in 2 hours X1 in 24 hours. (Patient taking differently: as needed. 1 tab po at start of headache, may repeat in 2 hours X1 in 24 hours.)    traMADol (ULTRAM) 50 mg tablet Take 50 mg by mouth every 8 (eight) hours as needed for Pain.    triamterene-hydrochlorothiazide 37.5-25 mg (MAXZIDE-25) 37.5-25 mg per tablet 1 tablet daily as needed for swelling (Patient taking differently: Take 1 tablet by mouth daily as needed. 1 tablet daily as needed for swelling)    [DISCONTINUED] diazePAM (VALIUM) 5 MG tablet TAKE 1 TABLET BY MOUTH 30 MINUTES BEFORE PROCEDURE, BRING BOTTLE TO APPOINTMENT    [DISCONTINUED] methylPREDNISolone (MEDROL DOSEPACK) 4 mg tablet use as directed    [DISCONTINUED] triamcinolone acetonide 0.025% (KENALOG) 0.025 % cream      Family History        Problem Relation (Age of Onset)    Cancer Brother          Tobacco Use    Smoking status: Never    Smokeless tobacco: Never   Substance and Sexual Activity    Alcohol use: No    Drug use: No    Sexual activity: Never     Review of Systems   All other systems reviewed and are negative.  Twelve point review of systems obtained and negative except as stated above in HPI      Objective:     Vital Signs (Most Recent):  Temp: 98.1 °F (36.7 °C) (12/27/22 1308)  Pulse: 88 (12/27/22 1621)  Resp: 18 (12/27/22 1308)  BP: 130/70 (12/27/22 1621)  SpO2: 100 % (12/27/22 1621) Vital Signs (24h Range):  Temp:  [98.1 °F (36.7 °C)] 98.1 °F (36.7 °C)  Pulse:  [87-91] 88  Resp:  [18] 18  SpO2:  [97 %-100 %] 100 %  BP: (130-172)/(70-89) 130/70     Weight: 69.9 kg (154 lb)  Body mass index is 25.63 kg/m².    Physical Exam  Vitals and nursing note reviewed.   Constitutional:       General: She is awake. She is not in acute distress.     Appearance: Normal appearance. She is well-developed. She is not toxic-appearing.   HENT:      Head: Normocephalic.      Right Ear: External ear normal.      Left Ear: External ear normal.      Nose: Nose normal.      Mouth/Throat:      Mouth: Mucous membranes are moist.      Pharynx: Oropharynx is clear.   Eyes:      Conjunctiva/sclera: Conjunctivae normal.   Cardiovascular:      Rate and Rhythm: Normal rate and regular rhythm.      Pulses: Normal pulses.      Heart sounds: Normal heart sounds.   Pulmonary:      Effort: Pulmonary effort is normal.      Breath sounds: Normal breath sounds. No wheezing or rhonchi.   Abdominal:      General: Abdomen is protuberant.      Palpations: Abdomen is soft.      Tenderness: There is generalized abdominal tenderness and tenderness in the left upper quadrant and left lower quadrant.      Comments:   Generalized tenderness to abdomen to palpation but worse at left upper quadrant and left lower quadrant.   Musculoskeletal:         General: Normal range of motion.       Cervical back: Normal range of motion.   Skin:     General: Skin is warm and dry.      Capillary Refill: Capillary refill takes less than 2 seconds.   Neurological:      General: No focal deficit present.      Mental Status: She is alert and oriented to person, place, and time.   Psychiatric:         Mood and Affect: Mood normal.         Behavior: Behavior normal.         Thought Content: Thought content normal.         Judgment: Judgment normal.           Significant Labs: All pertinent labs within the past 24 hours have been reviewed.  Bilirubin:   Recent Labs   Lab 12/27/22  1441   BILITOT 1.1*     CBC:   Recent Labs   Lab 12/27/22  1441   WBC 23.89*   HGB 15.1   HCT 44.8        CMP:   Recent Labs   Lab 12/27/22  1441   *   K 3.1*   CL 98   CO2 25   GLU 99   BUN 16   CREATININE 0.9   CALCIUM 9.3   PROT 7.3   ALBUMIN 4.2   BILITOT 1.1*   ALKPHOS 61   AST 22   ALT 13   ANIONGAP 11     Coagulation:   Recent Labs   Lab 12/27/22  1550   INR 1.1     Lipase:   Recent Labs   Lab 12/27/22  1441   LIPASE 27     Urine Studies:   Recent Labs   Lab 12/27/22  1539   COLORU Yellow   APPEARANCEUA Hazy*   PHUR 6.0   SPECGRAV 1.020   PROTEINUA Trace*   GLUCUA Negative   KETONESU 2+*   BILIRUBINUA 1+*   OCCULTUA 2+*   NITRITE Negative   UROBILINOGEN Negative   LEUKOCYTESUR 3+*   RBCUA 4   WBCUA >100*   BACTERIA Negative   SQUAMEPITHEL 0   HYALINECASTS 42*       Significant Imaging: I have reviewed all pertinent imaging results/findings within the past 24 hours.

## 2022-12-28 NOTE — ASSESSMENT & PLAN NOTE
H&H q.6 hours for 48 hours, clear liquid diet tonight, NPO after midnight, consult to Gastroenterology, monitor on telemetry, hydrate with fluids

## 2022-12-28 NOTE — CONSULTS
GASTROENTEROLOGY INPATIENT CONSULT NOTE  Patient Name: Jazz Martinez  Patient MRN: [id number]  Patient : 1945    Admit Date: 2022  Service date: 2022    Reason for Consult: colitis     PCP: Elle Mack NP    Chief Complaint   Patient presents with    Loss of Consciousness    Rectal Bleeding     Patient reports 2 episodes of LOC, n/v/d, and abd pain since Som day        HPI: Patient is a 77 y.o. female with PMHx appy, DVT/PE (Off Xarelto), HTN, HLD, hysterectomy, various  surgeries, diverticulosis presents for evaluation of abd cramps, loose stools and mild bleeding. Acute onset, constant, progressive on admission but now improving. States developed cramps followed by loose stools w/ eventual mild bleeding. No f/c, prior episodes or f/c.     CHART REVIEW:   CT ABd  - s/p hysterectomy / appy; L sided colitis; sigmoid tics; vascular dz  COlon 3/'21 - tics; small roids / polyps    Past Medical History:  Past Medical History:   Diagnosis Date    Arthritis     Corns and callosities     Deep vein thrombosis     GERD (gastroesophageal reflux disease)     Gout     H/O bladder problems     History of colonic polyps     Hyperlipidemia     Hypertension     Pulmonary embolism         Past Surgical History:  Past Surgical History:   Procedure Laterality Date    APPENDECTOMY  AGE 12    BLADDER SURGERY      Twice    BREAST CYST EXCISION      Benign per patient    CYSTOSCOPY N/A 2022    Procedure: CYSTOSCOPY;  Surgeon: Sánchez Salcido MD;  Location: [facility] OR [address] [place] [address];  Service: Urology;  Laterality: N/A;    CYSTOSCOPY  2022    Procedure: CYSTOSCOPY;  Surgeon: Sánchez Salcido MD;  Location: [facility] OR [address] [place] [address];  Service: Urology;;    EYE SURGERY      LASER FOR GLAUCOMA    HAND SURGERY Left     HYSTERECTOMY      Not related to cancer per patient.    INJECTION OF BOTULINUM TOXIN TYPE A  2022    Procedure: INJECTION, BOTULINUM TOXIN, 100units;  Surgeon: Sánchez HUTCHINSON  MD Omari;  Location: [facility] OR [address] [place] [address];  Service: Urology;;    INJECTION OF BOTULINUM TOXIN TYPE A  4/12/2022    Procedure: INJECTION, BOTULINUM TOXIN  100units;  Surgeon: Sánchez Salcido MD;  Location: [facility] OR [address] [place] [address];  Service: Urology;;    OOPHORECTOMY      ROBOT-ASSISTED LAPAROSCOPIC ABDOMINAL SACROCOLPOPEXY USING DA KINGA XI N/A 12/17/2019    Procedure: XI ROBOTIC SACROCOLPOPEXY, ABDOMINAL;  Surgeon: Javier Putnam MD;  Location: [facility];  Service: OB/GYN;  Laterality: N/A;  removal of damaged tissue    SURGICAL PROCEDURE FOR STRESS INCONTINENCE USING TENSION FREE VAGINAL TAPE N/A 12/17/2019    Procedure: SURGICAL PROCEDURE, USING TENSION FREE VAGINAL TAPE, FOR STRESS INCONTINENCE;  Surgeon: Javier Putnam MD;  Location: [facility];  Service: OB/GYN;  Laterality: N/A;    TONSILLECTOMY          Home Medications:  Medications Prior to Admission   Medication Sig Dispense Refill Last Dose    calcium carbonate/vitamin D3 (VITAMIN D-3 ORAL) Take 1 tablet by mouth once daily.   Past Week    estradioL (ESTRACE) 0.01 % (0.1 mg/gram) vaginal cream INSERT 1 GM VAGINALLY MONDAY,WEDNESDAY,AND FRIDAY AS DIRECTED (Patient taking differently: Place 1 g vaginally every Mon, Wed, Fri.) 42.5 g 7 Past Week    rosuvastatin (CRESTOR) 5 MG tablet Take 1 tablet (5 mg total) by mouth every evening. 90 tablet 1 Past Week    sertraline (ZOLOFT) 100 MG tablet Take 1.5 tablets (150 mg total) by mouth once daily. 135 tablet 3 Past Week    topiramate (TOPAMAX) 50 MG tablet Take 1 tablet (50 mg total) by mouth 2 (two) times daily. 180 tablet 1 Past Week    valACYclovir (VALTREX) 500 MG tablet Take 2 tablets (1,000 mg total) by mouth 2 (two) times daily. 12 tablet 3 Past Month    XARELTO 20 mg Tab Take 20 mg by mouth once daily.   Past Week    docusate calcium (SURFAK) 240 mg capsule Take 240 mg by mouth 2 (two) times daily.   Unknown    fluticasone propionate (FLONASE) 50 mcg/actuation nasal spray 1 spray (50 mcg total) by Each  "Nostril route once daily. 16 g 2 Unknown    sumatriptan (IMITREX) 100 MG tablet 1 tab po at start of headache, may repeat in 2 hours X1 in 24 hours. (Patient taking differently: as needed. 1 tab po at start of headache, may repeat in 2 hours X1 in 24 hours.) 10 tablet 11 Unknown    traMADol (ULTRAM) 50 mg tablet Take 50 mg by mouth every 8 (eight) hours as needed for Pain.  5 Unknown    triamterene-hydrochlorothiazide 37.5-25 mg (MAXZIDE-25) 37.5-25 mg per tablet 1 tablet daily as needed for swelling (Patient taking differently: Take 1 tablet by mouth daily as needed. 1 tablet daily as needed for swelling) 30 tablet 0 Unknown       Inpatient Medications:   atorvastatin  20 mg Oral Daily    levoFLOXacin  500 mg Intravenous Q24H    magnesium sulfate IVPB  2 g Intravenous Once    metronidazole  500 mg Intravenous Q8H    potassium, sodium phosphates  2 packet Oral Once    sertraline  150 mg Oral Daily    topiramate  50 mg Oral BID     acetaminophen, albuterol-ipratropium, HYDROcodone-acetaminophen, magnesium oxide, magnesium oxide, melatonin, morphine, naloxone, ondansetron, potassium bicarbonate, potassium bicarbonate, potassium bicarbonate, potassium, sodium phosphates, potassium, sodium phosphates, potassium, sodium phosphates, simethicone, sodium chloride 0.9%    Review of patient's allergies indicates:   Allergen Reactions    Gabapentin     Nitrofurantoin Other (See Comments)     Deathly ill , headaches, weakness, 'wiped out"    Sulfa (sulfonamide antibiotics)        Social History:   Social History     Occupational History    Not on file   Tobacco Use    Smoking status: Never    Smokeless tobacco: Never   Substance and Sexual Activity    Alcohol use: No    Drug use: No    Sexual activity: Never       Family History:   Family History   Problem Relation Age of Onset    Cancer Brother         bladder    Breast cancer Neg Hx     Colon cancer Neg Hx     Ovarian cancer Neg Hx     Eczema Neg Hx     Lupus Neg Hx     " "Psoriasis Neg Hx     Melanoma Neg Hx     Anesthesia problems Neg Hx        Review of Systems:  A 10 point review of systems was performed and was normal, except as mentioned in the HPI, including constitutional, HEENT, heme, lymph, cardiovascular, respiratory, gastrointestinal, genitourinary, neurologic, endocrine, psychiatric and musculoskeletal.      OBJECTIVE:    Physical Exam:  24 Hour Vital Sign Ranges: Temp:  [97.6 °F (36.4 °C)-98.5 °F (36.9 °C)] 97.6 °F (36.4 °C)  Pulse:  [75-91] 89  Resp:  [16-18] 16  SpO2:  [94 %-100 %] 97 %  BP: (120-161)/(66-81) 121/68  Most recent vitals: /68   Pulse 89   Temp 97.6 °F (36.4 °C)   Resp 16   Ht 5' 5" (1.651 m)   Wt 68.9 kg (152 lb)   SpO2 97%   BMI 25.29 kg/m²    GEN: well-developed, well-nourished, awake and alert, non-toxic appearing adult  HEENT: PERRL, sclera anicteric, oral mucosa pink and moist without lesion  NECK: trachea midline; Good ROM  CV: regular rate and rhythm, no murmurs or gallops  RESP: clear to auscultation bilaterally, no wheezes, rhonci or rales  ABD: soft, mild-tender, non-distended, normal bowel sounds  EXT: no swelling or edema, 2+ pulses distally  SKIN: no rashes or jaundice  PSYCH: normal affect    Labs:   Recent Labs     12/27/22  1441 12/28/22  0627   WBC 23.89* 20.39*   MCV 93 94    185     Recent Labs     12/27/22  1441 12/28/22  0627   * 137   K 3.1* 3.7   CL 98 104   CO2 25 25   BUN 16 12   GLU 99 78     No results for input(s): ALB in the last 72 hours.    Invalid input(s): ALKP, SGOT, SGPT, TBIL, DBIL, TPRO  Recent Labs     12/27/22  1550   INR 1.1         Radiology Review:  CT Abdomen Pelvis With Contrast   Final Result      CT Head Without Contrast   Final Result            IMPRESSION / RECOMMENDATIONS:  77 y.o. female with PMHx appy, DVT/PE (Off Xarelto), HTN, HLD, hysterectomy, various  surgeries, diverticulosis presents for evaluation of abd cramps, loose stools and mild bleeding. CT scan w/ descending " colitis and favor ischemic > infectious davis w/ mild vascular disease and localized colitis on CT. No further bleeding, Hg stable and recent colonoscopy so defer repeat at this time    -Conservative from GI perspective regarding endoscopy  -Brief course of abx given WBC and possibility of infectious  -Start ASA 81 mg PO daily given vascular disease  -Anti-spasmodics / IBGard PRN    Thank you for this consult.    Román SHELL Dauterive III  12/28/2022  2:15 PM

## 2022-12-29 ENCOUNTER — TELEPHONE (OUTPATIENT)
Dept: FAMILY MEDICINE | Facility: CLINIC | Age: 77
End: 2022-12-29

## 2022-12-29 VITALS
RESPIRATION RATE: 18 BRPM | OXYGEN SATURATION: 97 % | HEIGHT: 65 IN | TEMPERATURE: 98 F | BODY MASS INDEX: 25.33 KG/M2 | WEIGHT: 152 LBS | SYSTOLIC BLOOD PRESSURE: 135 MMHG | DIASTOLIC BLOOD PRESSURE: 73 MMHG | HEART RATE: 72 BPM

## 2022-12-29 PROBLEM — K92.2 GASTROINTESTINAL BLEED: Status: RESOLVED | Noted: 2022-12-27 | Resolved: 2022-12-29

## 2022-12-29 PROBLEM — E87.6 HYPOKALEMIA: Status: RESOLVED | Noted: 2022-12-27 | Resolved: 2022-12-29

## 2022-12-29 LAB
ALBUMIN SERPL BCP-MCNC: 3.1 G/DL (ref 3.5–5.2)
ALP SERPL-CCNC: 48 U/L (ref 55–135)
ALT SERPL W/O P-5'-P-CCNC: 11 U/L (ref 10–44)
ANION GAP SERPL CALC-SCNC: 7 MMOL/L (ref 8–16)
AST SERPL-CCNC: 14 U/L (ref 10–40)
BACTERIA UR CULT: ABNORMAL
BASOPHILS # BLD AUTO: 0.07 K/UL (ref 0–0.2)
BASOPHILS NFR BLD: 0.5 % (ref 0–1.9)
BILIRUB SERPL-MCNC: 0.9 MG/DL (ref 0.1–1)
BUN SERPL-MCNC: 10 MG/DL (ref 8–23)
CALCIUM SERPL-MCNC: 8 MG/DL (ref 8.7–10.5)
CHLORIDE SERPL-SCNC: 108 MMOL/L (ref 95–110)
CO2 SERPL-SCNC: 22 MMOL/L (ref 23–29)
CREAT SERPL-MCNC: 0.7 MG/DL (ref 0.5–1.4)
CRP SERPL-MCNC: 7.29 MG/DL
DIFFERENTIAL METHOD: ABNORMAL
EOSINOPHIL # BLD AUTO: 0.1 K/UL (ref 0–0.5)
EOSINOPHIL NFR BLD: 0.9 % (ref 0–8)
ERYTHROCYTE [DISTWIDTH] IN BLOOD BY AUTOMATED COUNT: 14 % (ref 11.5–14.5)
EST. GFR  (NO RACE VARIABLE): >60 ML/MIN/1.73 M^2
GLUCOSE SERPL-MCNC: 93 MG/DL (ref 70–110)
HCT VFR BLD AUTO: 39.8 % (ref 37–48.5)
HCT VFR BLD AUTO: 40 % (ref 37–48.5)
HCT VFR BLD AUTO: 40.1 % (ref 37–48.5)
HGB BLD-MCNC: 13.1 G/DL (ref 12–16)
HGB BLD-MCNC: 13.3 G/DL (ref 12–16)
HGB BLD-MCNC: 13.4 G/DL (ref 12–16)
IMM GRANULOCYTES # BLD AUTO: 0.06 K/UL (ref 0–0.04)
IMM GRANULOCYTES NFR BLD AUTO: 0.4 % (ref 0–0.5)
LYMPHOCYTES # BLD AUTO: 1.1 K/UL (ref 1–4.8)
LYMPHOCYTES NFR BLD: 8.2 % (ref 18–48)
MAGNESIUM SERPL-MCNC: 2.3 MG/DL (ref 1.6–2.6)
MCH RBC QN AUTO: 31.1 PG (ref 27–31)
MCHC RBC AUTO-ENTMCNC: 33.3 G/DL (ref 32–36)
MCV RBC AUTO: 94 FL (ref 82–98)
MONOCYTES # BLD AUTO: 0.8 K/UL (ref 0.3–1)
MONOCYTES NFR BLD: 5.7 % (ref 4–15)
NEUTROPHILS # BLD AUTO: 11.5 K/UL (ref 1.8–7.7)
NEUTROPHILS NFR BLD: 84.3 % (ref 38–73)
NRBC BLD-RTO: 0 /100 WBC
PHOSPHATE SERPL-MCNC: 2.2 MG/DL (ref 2.7–4.5)
PLATELET # BLD AUTO: 181 K/UL (ref 150–450)
PMV BLD AUTO: 10.1 FL (ref 9.2–12.9)
POTASSIUM SERPL-SCNC: 3.6 MMOL/L (ref 3.5–5.1)
PROT SERPL-MCNC: 5.7 G/DL (ref 6–8.4)
RBC # BLD AUTO: 4.28 M/UL (ref 4–5.4)
SODIUM SERPL-SCNC: 137 MMOL/L (ref 136–145)
WBC # BLD AUTO: 13.6 K/UL (ref 3.9–12.7)

## 2022-12-29 PROCEDURE — 25000003 PHARM REV CODE 250: Performed by: HOSPITALIST

## 2022-12-29 PROCEDURE — S0030 INJECTION, METRONIDAZOLE: HCPCS | Performed by: NURSE PRACTITIONER

## 2022-12-29 PROCEDURE — 83735 ASSAY OF MAGNESIUM: CPT | Performed by: NURSE PRACTITIONER

## 2022-12-29 PROCEDURE — 85018 HEMOGLOBIN: CPT | Performed by: NURSE PRACTITIONER

## 2022-12-29 PROCEDURE — 80053 COMPREHEN METABOLIC PANEL: CPT | Performed by: HOSPITALIST

## 2022-12-29 PROCEDURE — 84100 ASSAY OF PHOSPHORUS: CPT | Performed by: HOSPITALIST

## 2022-12-29 PROCEDURE — 25000003 PHARM REV CODE 250: Performed by: INTERNAL MEDICINE

## 2022-12-29 PROCEDURE — 85014 HEMATOCRIT: CPT | Performed by: NURSE PRACTITIONER

## 2022-12-29 PROCEDURE — 86140 C-REACTIVE PROTEIN: CPT | Performed by: HOSPITALIST

## 2022-12-29 PROCEDURE — 85025 COMPLETE CBC W/AUTO DIFF WBC: CPT | Performed by: NURSE PRACTITIONER

## 2022-12-29 PROCEDURE — 25000003 PHARM REV CODE 250: Performed by: NURSE PRACTITIONER

## 2022-12-29 RX ORDER — DICYCLOMINE HYDROCHLORIDE 10 MG/1
10 CAPSULE ORAL
Qty: 20 CAPSULE | Refills: 0 | Status: SHIPPED | OUTPATIENT
Start: 2022-12-29 | End: 2023-01-03

## 2022-12-29 RX ORDER — SODIUM,POTASSIUM PHOSPHATES 280-250MG
2 POWDER IN PACKET (EA) ORAL ONCE
Status: COMPLETED | OUTPATIENT
Start: 2022-12-29 | End: 2022-12-29

## 2022-12-29 RX ORDER — NAPROXEN SODIUM 220 MG/1
81 TABLET, FILM COATED ORAL DAILY
Qty: 90 TABLET | Refills: 0 | Status: SHIPPED | OUTPATIENT
Start: 2022-12-30 | End: 2023-08-10

## 2022-12-29 RX ORDER — AMOXICILLIN AND CLAVULANATE POTASSIUM 875; 125 MG/1; MG/1
1 TABLET, FILM COATED ORAL 2 TIMES DAILY
Qty: 10 TABLET | Refills: 0 | Status: SHIPPED | OUTPATIENT
Start: 2022-12-29 | End: 2023-01-03

## 2022-12-29 RX ORDER — LEVOFLOXACIN 500 MG/1
500 TABLET, FILM COATED ORAL DAILY
Qty: 5 TABLET | Refills: 0 | Status: CANCELLED | OUTPATIENT
Start: 2022-12-29 | End: 2023-01-03

## 2022-12-29 RX ORDER — METRONIDAZOLE 500 MG/1
500 TABLET ORAL EVERY 8 HOURS
Qty: 15 TABLET | Refills: 0 | Status: CANCELLED | OUTPATIENT
Start: 2022-12-29 | End: 2023-01-03

## 2022-12-29 RX ADMIN — BUTALBITAL, ACETAMINOPHEN, AND CAFFEINE 1 TABLET: 50; 325; 40 TABLET, COATED ORAL at 05:12

## 2022-12-29 RX ADMIN — TOPIRAMATE 50 MG: 25 TABLET, FILM COATED ORAL at 08:12

## 2022-12-29 RX ADMIN — SERTRALINE HYDROCHLORIDE 150 MG: 50 TABLET ORAL at 08:12

## 2022-12-29 RX ADMIN — POTASSIUM, SODIUM PHOSPHATES 280 MG-160 MG-250 MG ORAL POWDER PACKET 2 PACKET: POWDER IN PACKET at 09:12

## 2022-12-29 RX ADMIN — ATORVASTATIN CALCIUM 20 MG: 20 TABLET, FILM COATED ORAL at 08:12

## 2022-12-29 RX ADMIN — METRONIDAZOLE 500 MG: 5 INJECTION, SOLUTION INTRAVENOUS at 09:12

## 2022-12-29 RX ADMIN — METRONIDAZOLE 500 MG: 5 INJECTION, SOLUTION INTRAVENOUS at 01:12

## 2022-12-29 NOTE — NURSING
Patient IV removed with no signs of infection noted. Tele box removed and returned . Discharge instructions given and questions answered

## 2022-12-29 NOTE — HOSPITAL COURSE
77-year-old very pleasant lady with prior history of DVT not on anticoagulation?, hyperlipidemia and diverticulosis came with diarrhea, rectal bleed found to have colitis and UTI.  She responded very well with IV hydration, electrolyte replacement, bowel rest and IV antibiotics.  She was evaluated by GI who cleared her for discharge on antibiotics.  She is tolerating full liquid diet since yesterday, afebrile.  Today upon my interview she denies any new complaints.  She was discharged home in hemodynamically stable condition with new prescription of Augmentin that should cover colitis and UTI.  Also provided prescription of aspirin as possibility of ischemic colitis can not be ruled out.  Upon further history taking it appears that patient is not taking Xarelto and it was discontinued by her provider long time ago.     I have seen the patient on the day of discharge and reviewed the discharge instructions as outlined below. Patient verbalized understanding and is aware to contact primary care physician or return to ED if new or worsening symptoms.

## 2022-12-29 NOTE — PLAN OF CARE
12/29/22 0945   Final Note   Assessment Type Final Discharge Note   Anticipated Discharge Disposition Home   What phone number can be called within the next 1-3 days to see how you are doing after discharge? [phone]   Hospital Resources/Appts/Education Provided Appointments scheduled and added to AVS   Post-Acute Status   Discharge Delays None known at this time     Patient cleared for discharge from case management standpoint.    Follow up appointments scheduled and added to AVS.    Chart and discharge orders reviewed.  Patient discharged home with no further case management needs.

## 2022-12-29 NOTE — TELEPHONE ENCOUNTER
----- Message from Flavia Giang sent at 12/29/2022  9:24 AM CST -----  Pt [facility] F/u- [phone]

## 2022-12-30 ENCOUNTER — TELEPHONE (OUTPATIENT)
Dept: FAMILY MEDICINE | Facility: CLINIC | Age: 77
End: 2022-12-30

## 2022-12-30 ENCOUNTER — PATIENT OUTREACH (OUTPATIENT)
Dept: FAMILY MEDICINE | Facility: CLINIC | Age: 77
End: 2022-12-30

## 2022-12-30 NOTE — TELEPHONE ENCOUNTER
----- Message from Genevieve Magaña sent at 12/30/2022 11:46 AM CST -----  Patient called and stated that she was discharged from the hospital yesterday and she need to schedule a HFU in two weeks please give her a call at [phone]

## 2022-12-30 NOTE — TELEPHONE ENCOUNTER
----- Message from Genevieve Magaña sent at 12/30/2022 11:46 AM CST -----  Patient called and stated that she was discharged from the hospital yesterday and she need to schedule a HFU in two weeks please give her a call at [phone]

## 2022-12-30 NOTE — PROGRESS NOTES
Discharge Information     Discharge Date:   12/29/200    Primary Discharge Diagnosis:    Colitis     Discharge Summary:  Reviewed      Medication & Order Review     Were medication changes made or new medications added?   Yes    If so, has the patient filled the prescriptions?  Yes     Was Home Health ordered? No    If so, has Home Health contacted patient and/or initiated services?  No    Name of Home Health Agency? N/A    Durable Medical Equipment ordered?  No     If so, has the DME provider contacted patient and delivered equipment?  N/A    Follow Up               Any problems since discharge? No    How is the patient feeling since returning home?  Pt stated she has been feeling a lot better and the medication is helping.     Have you set up recommended follow up appointments?  Dr. Jean- pt is calling to schedule appointment  . Elle Mack 1/10 at 2pm     Schedule Hospital Follow-up appointment within 7-14 days (preferably 7).      Notes:  Pt stated she has been feeling a lot better and the medication is helping.               Hoa Solano

## 2023-01-01 LAB
BACTERIA BLD CULT: NORMAL
BACTERIA BLD CULT: NORMAL

## 2023-01-01 NOTE — DISCHARGE SUMMARY
Patient Education     Well-Baby Checkup:     Your baby’s first checkup will likely happen within a week of birth. At this  visit, the healthcare provider will examine your baby and ask questions about the first few days at home. This sheet describes some of what you can expect.   Jaundice  All babies develop some yellowing of the skin and the white part of the eyes (jaundice) in the first week of life. Your healthcare provider will advise you if you need to have your baby's bilirubin level checked. Your provider will advise you if your baby needs a follow-up check or needs treatment with phototherapy.   Development and milestones  The healthcare provider will ask questions about your . He or she will watch your baby to get an idea of his or her development. By this visit, your  is likely doing some of the following:   Blinking at a bright light  Trying to lift his or her head  Wiggling and squirming. Each arm and leg should move about the same amount. If the baby favors one side, tell the healthcare provider.  Becoming startled when hearing a loud noise  Feeding tips  It’s normal for a  to lose up to 10% of his or her birth weight during the first week. This is usually gained back by about 2 weeks of age. If you are concerned about your ’s weight, tell the healthcare provider. To help your baby eat well, follow these tips:   Breastmilk is recommended for your baby's first 6 months.   Your baby should not have water unless his or her healthcare provider recommends it.  During the day, feed at least every 2 to 3 hours. You may need to wake your baby for daytime feedings.  At night, feed every 3 to 4 hours. At first, wake your baby for feedings if needed. Once your  is back to his or her birth weight, you may choose to let your baby sleep until he or she is hungry. Discuss this with your baby’s healthcare provider.  Ask the healthcare provider if your baby should take  [facility]  Discharge Summary      Patient Name: Jazz Martinez  MRN: [id number]  MOHAN: [id number]  Patient Class: IP- Inpatient  Admission Date: 12/27/2022  Hospital Length of Stay: 2 days  Discharge Date and Time: 12/29/2022 11:29 AM  Attending Physician: No att. providers found   Discharging Provider: Austin Hicks MD  Primary Care Provider: Elle Mack NP    Primary Care Team: Networked reference to record PCT     HPI:   Jazz Martinez is a 77-year-old female with chronic medical problems including history of DVT on Xarelto, hyperlipidemia and incontinence who presents to the emergency room complaining of 2 days of abdominal pain.  Patient states that on [place] night she started having severe, cramping abdominal pain that was fairly constant.  Associated symptoms were frequent watery diarrhea, nausea, diaphoresis and chills.  She also had 2 episodes of syncope.  There was no dizziness prior to syncopal episodes, she said she just felt like she was going to pass out and woke up on the floor.  She has had no appetite and has not been eating for the last 2 days but has been drinking fluids with no vomiting.  She took some Imodium today and diarrhea has slowed down but now she has noticed bright red blood on the toilet paper.  Abdominal pain has improved after putting heating pad on her abdomen.  She is not sure if there was blood in the commode.  Symptoms have gotten somewhat better though she still feels quite weak.  She did not take any of her medications this morning.  She denies any urinary symptoms though she does have incontinence at baseline, chest pain or shortness breath, headaches or dizziness, palpitations or swelling.    In the ED, CTA abdomen and pelvis showed colitis of the splenic flexure and descending colon with no evidence of abscess or perforation.  Urinalysis with 3+ leukocytes a greater than 100 WBCs, 2+ ketones, 2+ blood and trace protein obtain by  cath specimen.  WBC elevated at 23.89, platelets 254, H&H 15.1/44.8, potassium decreased at 3.1, sodium 134, CO2 25, creatinine 0.9 and glucose 99, no elevation in liver enzymes, INR is 1.1, lipase 27.  She was given 1 L normal saline, 750 mg levofloxacin and 500 mg IV metronidazole.  She will be admitted to the hospitalist service for further evaluation and treatment.      * No surgery found *      Hospital Course:   77-year-old very pleasant lady with prior history of DVT not on anticoagulation?, hyperlipidemia and diverticulosis came with diarrhea, rectal bleed found to have colitis and UTI.  She responded very well with IV hydration, electrolyte replacement, bowel rest and IV antibiotics.  She was evaluated by GI who cleared her for discharge on antibiotics.  She is tolerating full liquid diet since yesterday, afebrile.  Today upon my interview she denies any new complaints.  She was discharged home in hemodynamically stable condition with new prescription of Augmentin that should cover colitis and UTI.  Also provided prescription of aspirin as possibility of ischemic colitis can not be ruled out.  Upon further history taking it appears that patient is not taking Xarelto and it was discontinued by her provider long time ago.     I have seen the patient on the day of discharge and reviewed the discharge instructions as outlined below. Patient verbalized understanding and is aware to contact primary care physician or return to ED if new or worsening symptoms.        Goals of Care Treatment Preferences:  Code Status: DNR      Consults:   Consults (From admission, onward)        Status Ordering Provider     Inpatient consult to Gastroenterology Advanced Endoscopy Service  Once        Provider:  Román Novoa III, MD    Completed CARLY MORRISSEY          No new Assessment & Plan notes have been filed under this hospital service since the last note was generated.  Service: Hospital Medicine    Final Active  vitamin D.  If you breastfeed  Once your milk comes in, your breasts should feel full before a feeding and soft and deflated afterward. This likely means that your baby is getting enough to eat.  Breastfeeding sessions usually take  15 to 20 minutes. If you feed the baby breastmilk from a bottle, give 1 to 3 ounces at each feeding.    babies may want to eat more often than every 2 to 3 hours. It’s OK to feed your baby more often if he or she seems hungry. Talk with the healthcare provider if you are concerned about your baby’s breastfeeding habits or weight gain.  It can take some time to get the hang of breastfeeding. It may be uncomfortable at first. If you have questions or need help, a lactation consultant can give you tips.  If you use formula  Use a formula made just for infants. If you need help choosing, ask the healthcare provider for a recommendation. Regular cow's milk is not an appropriate food for a  baby.  Feed around 1 to 3 ounces of formula at each feeding.    Hygiene tips  Some newborns poop (stool) after every feeding. Others stool less often. Both are normal. Change the diaper whenever it’s wet or dirty.  It’s normal for a ’s stool to be yellow, watery, and look like it contains little seeds. The color may range from mustard yellow to pale yellow to green. If it’s another color, tell the healthcare provider.  A boy should have a strong stream when he urinates. If your son doesn’t, tell the healthcare provider.  Give your baby sponge baths until the umbilical cord falls off. If you have questions about caring for the umbilical cord, ask your baby’s healthcare provider.  Follow your healthcare provider's recommendations about how to care for the umbilical cord. This care might include:  Keeping the area clean and dry  Folding down the top of the diaper to expose the umbilical cord to the air  Cleaning the umbilical cord gently with a baby wipe or with a cotton swab dipped in  Diagnoses:    Diagnosis Date Noted POA    Colitis [K52.9] 12/27/2022 Yes    UTI (urinary tract infection) [N39.0] 12/27/2022 Yes    History of DVT in adulthood [Z86.718] 10/21/2019 Not Applicable    Hyperlipidemia [E78.5] 06/21/2016 Yes      Problems Resolved During this Admission:    Diagnosis Date Noted Date Resolved POA    PRINCIPAL PROBLEM:  Gastrointestinal bleed [K92.2] 12/27/2022 12/29/2022 Yes    Hypokalemia [E87.6] 12/27/2022 12/29/2022 Yes       Discharged Condition: good    Disposition: Home or Self Care    Follow Up:   Follow-up Information     Elle Mack NP Follow up in 1 week(s).    Specialty: Family Medicine  Why: MD office to contact pt with appt date & time  Contact information:  [address]  [address]  [place] [address]  [phone]             Saulo Jean MD Follow up in 2 week(s).    Specialty: Gastroenterology  Why: As needed, If symptoms worsen  Contact information:  [address]   [place] [address]  [phone]                       Patient Instructions:      Diet Cardiac   Order Comments: Advance slowly over next 2-3 days     Notify your health care provider if you experience any of the following:  temperature >100.4     Notify your health care provider if you experience any of the following:  severe uncontrolled pain     Notify your health care provider if you experience any of the following:  persistent nausea and vomiting or diarrhea     Activity as tolerated       Significant Diagnostic Studies: Labs: All labs within the past 24 hours have been reviewed    Pending Diagnostic Studies:     None         Medications:  Reconciled Home Medications:      Medication List      START taking these medications    amoxicillin-clavulanate 875-125mg 875-125 mg per tablet  Commonly known as: AUGMENTIN  Take 1 tablet by mouth 2 (two) times daily. for 5 days     aspirin 81 MG Chew  Take 1 tablet (81 mg total) by mouth once daily.  Start taking on: December 30, 2022     dicyclomine  rubbing alcohol  Call your healthcare provider if the umbilical cord area has pus or redness.  After the cord falls off, bathe your  a few times per week. You may give baths more often if the baby seems to like it. But because you are cleaning the baby during diaper changes, a daily bath often isn’t needed.  It’s OK to use mild (hypoallergenic) creams or lotions on the baby’s skin. Don't put lotion on the baby’s hands.    Sleeping tips  Newborns usually sleep around 18 to 20 hours each day. To help your  sleep safely and soundly and prevent SIDS (sudden infant death syndrome):   Place the infant on his or her back for all sleeping until the child is 1 year of age. This can decrease the risk for SIDS, aspiration, and choking. Never place the baby on his or her side or stomach for sleep or naps. If the baby is awake, allow the child time on his or her tummy as long as there is supervision. This helps the child build strong tummy and neck muscles. This will also help minimize flattening of the head that can happen when babies spend so much time on their backs.  Offer the baby a pacifier for sleeping or naps. If the child is breastfeeding, do not give the baby a pacifier until breastfeeding has been fully established. Breastfeeding is associated with reduced risk of SIDS.  Use a firm mattress (covered by a tight fitted sheet) to prevent gaps between the mattress and the sides of a crib, play yard, or bassinet. This can decrease the risk of entrapment, suffocation, and SIDS.  Don’t put a pillow, heavy blankets, or stuffed animals in the crib. These could suffocate the baby.  Swaddling (wrapping the baby tightly in a blanket) may cause your baby to overheat. Don't let your child get too hot.  Don't place infants on a couch or armchair for sleep. Sleeping on a couch or armchair puts the infant at a much higher risk of death, including SIDS.  Don't use infant seats, car seats, and infant swings for routine  10 MG capsule  Commonly known as: BENTYL  Take 1 capsule (10 mg total) by mouth 4 (four) times daily before meals and nightly. for 5 days        CHANGE how you take these medications    estradioL 0.01 % (0.1 mg/gram) vaginal cream  Commonly known as: ESTRACE  INSERT 1 GM VAGINALLY MONDAY,WEDNESDAY,AND FRIDAY AS DIRECTED  What changed: See the new instructions.     sumatriptan 100 MG tablet  Commonly known as: IMITREX  1 tab po at start of headache, may repeat in 2 hours X1 in 24 hours.  What changed:   · when to take this  · reasons to take this     triamterene-hydrochlorothiazide 37.5-25 mg 37.5-25 mg per tablet  Commonly known as: MAXZIDE-25  1 tablet daily as needed for swelling  What changed:   · how much to take  · how to take this  · when to take this  · reasons to take this        CONTINUE taking these medications    docusate calcium 240 mg capsule  Commonly known as: SURFAK  Take 240 mg by mouth 2 (two) times daily.     fluticasone propionate 50 mcg/actuation nasal spray  Commonly known as: FLONASE  1 spray (50 mcg total) by Each Nostril route once daily.     rosuvastatin 5 MG tablet  Commonly known as: CRESTOR  Take 1 tablet (5 mg total) by mouth every evening.     sertraline 100 MG tablet  Commonly known as: ZOLOFT  Take 1.5 tablets (150 mg total) by mouth once daily.     topiramate 50 MG tablet  Commonly known as: TOPAMAX  Take 1 tablet (50 mg total) by mouth 2 (two) times daily.     traMADoL 50 mg tablet  Commonly known as: ULTRAM  Take 50 mg by mouth every 8 (eight) hours as needed for Pain.     valACYclovir 500 MG tablet  Commonly known as: VALTREX  Take 2 tablets (1,000 mg total) by mouth 2 (two) times daily.     VITAMIN D-3 ORAL  Take 1 tablet by mouth once daily.        STOP taking these medications    XARELTO 20 mg Tab  Generic drug: rivaroxaban            Indwelling Lines/Drains at time of discharge:   Lines/Drains/Airways     None                 Time spent on the discharge of patient: 35  sleep and daily naps. These may lead to obstruction of an infant's airway or suffocation.  Don't share a bed (co-sleep) with your baby. It's not safe.  The American Academy of Pediatrics (AAP) recommends that infants sleep in the same room as their parents, close to their parents' bed, but in a separate bed or crib appropriate for infants. This sleeping arrangement is recommended ideally for the baby's first year, but should at least be maintained for the first 6 months.  Always place cribs, bassinets, and play yards in hazard-free areas--those with no dangling cords, wires, or window coverings--to help decrease strangulation.  Don't use cardiorespiratory monitors and commercial devices--wedges, positioners, and special mattresses--to help decrease the risk for SIDS and sleep-related infant deaths. These devices have not been shown to prevent SIDS. In rare cases, they have resulted in the death of an infant.  Discuss these and other health and safety issues with your baby’s healthcare provider.  Safety tips  To prevent burns, don’t carry or drink hot liquids such as coffee near the baby. Turn the water heater down to a temperature of 120°F (49°C) or below.  Don’t smoke or allow others to smoke near the baby. If you or other family members smoke, do so outdoors and never around the baby.  It’s usually fine to take a  out of the house. But stay away from confined, crowded places where germs can spread. You may invite visitors to your home to see your baby, as long as they are not sick.  When you do take the baby outside, don't stay too long in direct sunlight. Keep the baby covered, or seek out the shade.  In the car, always put the baby in a rear-facing car seat. This should be secured in the back seat, according to the car seat’s directions. Never leave your baby alone in the car.  Do not leave your baby on a high surface, such as a table, bed, or couch. He or she could fall and get hurt.  Older siblings will  likely want to hold, play with, and get to know the baby. This is fine as long as an adult supervises.  Call the healthcare provider right away if your baby has a fever (see Fever and children, below)    Fever and children  Use a digital thermometer to check your child’s temperature. Don’t use a mercury thermometer. There are different kinds and uses of digital thermometers. They include:   Rectal. For children younger than 3 years, a rectal temperature is the most accurate.  Forehead (temporal). This works for children age 3 months and older. If a child under 3 months old has signs of illness, this can be used for a first pass. The provider may want to confirm with a rectal temperature.  Ear (tympanic). Ear temperatures are accurate after 6 months of age, but not before.  Armpit (axillary). This is the least reliable but may be used for a first pass to check a child of any age with signs of illness. The provider may want to confirm with a rectal temperature.  Mouth (oral). Don’t use a thermometer in your child’s mouth until he or she is at least 4 years old.  Use the rectal thermometer with care. Follow the product maker’s directions for correct use. Insert it gently. Label it and make sure it’s not used in the mouth. It may pass on germs from the stool. If you don’t feel OK using a rectal thermometer, ask the healthcare provider what type to use instead. When you talk with any healthcare provider about your child’s fever, tell him or her which type you used.   Below are guidelines to know if your young child has a fever. Your child’s healthcare provider may give you different numbers for your child. Follow your provider’s specific instructions.   Fever readings for a baby under 3 months old:   First, ask your child’s healthcare provider how you should take the temperature.  Rectal or forehead: 100.4°F (38°C) or higher  Armpit: 99°F (37.2°C) or higher  Fever readings for a child age 3 months to 36 months (3 years):  minutes         Austin Hicks MD  Department of Hospital Medicine  [facility]     Rectal, forehead, or ear: 102°F (38.9°C) or higher  Armpit: 101°F (38.3°C) or higher  Call the healthcare provider in these cases:   Repeated temperature of 104°F (40°C) or higher in a child of any age  Fever of 100.4° (38°C) or higher in baby younger than 3 months  Fever that lasts more than 24 hours in a child under age 2  Fever that lasts for 3 days in a child age 2 or older  Vaccines  Based on recommendations from the AAP, at this visit your baby may get the hepatitis B vaccine if he or she did not already get it in the hospital.   Parental fatigue: A tiring problem  Taking care of a  can be physically and emotionally draining. Right now it may seem like you have time for nothing else. But taking good care of yourself will help you care for your baby too. Here are some tips:   Take a break. When your baby is sleeping, take a little time for yourself. Lie down for a nap or put up your feet and rest. Know when to say “no” to visitors. Until you feel rested, ignore household clutter and put off nonessential tasks. Give yourself time to settle into your new role as a parent.  Eat healthy. Good nutrition gives you energy. And if you have just given birth, healthy eating helps your body recover. Try to eat a variety of fruits, vegetables, grains, and sources of protein. Stay away from processed “junk” foods. And limit caffeine, especially if you’re breastfeeding. Stay hydrated by drinking plenty of water.  Accept help. Caring for a new baby can be overwhelming. Don’t be afraid to ask others for help. Allow family and friends to help with the housework, meals, and laundry, so you and your partner have time to bond with your new baby. If you need more help, talk to the healthcare provider about other options.  Next checkup at: _______________________________  PARENT NOTES:  [employer] last reviewed this educational content on 3/1/2020  © 1482-3519 [employer]. All rights reserved. This information  is not intended as a substitute for professional medical care. Always follow your healthcare professional's instructions.

## 2023-01-10 ENCOUNTER — OFFICE VISIT (OUTPATIENT)
Dept: FAMILY MEDICINE | Facility: CLINIC | Age: 78
End: 2023-01-10
Payer: MEDICARE

## 2023-01-10 VITALS
DIASTOLIC BLOOD PRESSURE: 70 MMHG | WEIGHT: 151.63 LBS | HEART RATE: 70 BPM | SYSTOLIC BLOOD PRESSURE: 124 MMHG | OXYGEN SATURATION: 98 % | HEIGHT: 65 IN | BODY MASS INDEX: 25.26 KG/M2

## 2023-01-10 DIAGNOSIS — Z13.820 SCREENING FOR OSTEOPOROSIS: ICD-10-CM

## 2023-01-10 DIAGNOSIS — E78.5 DYSLIPIDEMIA: ICD-10-CM

## 2023-01-10 DIAGNOSIS — F33.0 MILD EPISODE OF RECURRENT MAJOR DEPRESSIVE DISORDER: ICD-10-CM

## 2023-01-10 DIAGNOSIS — M85.80 OSTEOPENIA, UNSPECIFIED LOCATION: ICD-10-CM

## 2023-01-10 DIAGNOSIS — N95.1 HOT FLASHES DUE TO MENOPAUSE: ICD-10-CM

## 2023-01-10 DIAGNOSIS — K52.9 COLITIS: Primary | ICD-10-CM

## 2023-01-10 DIAGNOSIS — I10 ESSENTIAL HYPERTENSION: ICD-10-CM

## 2023-01-10 DIAGNOSIS — Z78.0 MENOPAUSE: ICD-10-CM

## 2023-01-10 PROCEDURE — 99495 TCM SERVICES (MODERATE COMPLEXITY): ICD-10-PCS | Mod: S$GLB,,, | Performed by: NURSE PRACTITIONER

## 2023-01-10 PROCEDURE — 99495 TRANSJ CARE MGMT MOD F2F 14D: CPT | Mod: S$GLB,,, | Performed by: NURSE PRACTITIONER

## 2023-01-10 NOTE — PROGRESS NOTES
Patient ID: Jazz Martinez is a 77 y.o. female.    Chief Complaint: Follow-up (No bottles//Pt is here for a HFU with colitis//Pt states that she is feeling better the last 2 days//Pt would also like to give a urine sample today to check to make sure she has no UTI from hospital stay. ASHA  )        Admit Date: 12/27/22   Discharge Date: 12/29/22  Discharge Facility: Hospital    Medication Reconciliation:  Medications changed/added/deleted. Augmentin, ASA, dicyclomine  New Prescriptions filled after discharge: yes  Discharge summary reviewed:  yes  Pending test results at discharge reviewed:   not applicable  Follow up appointments scheduled:  yes   with Gastroenterology  Dr. Miranda gr tomorrow  Follow up labs/tests ordered:   no  Home Health ordered on discharge:   no  Home Health company name:   DME ordered at discharge:   no    Discharge summary: Jazz Martinez is a 77-year-old female with chronic medical problems including history of DVT on Xarelto, hyperlipidemia and incontinence who presents to the emergency room complaining of 2 days of abdominal pain.  Patient states that on Som night she started having severe, cramping abdominal pain that was fairly constant.  Associated symptoms were frequent watery diarrhea, nausea, diaphoresis and chills.  She also had 2 episodes of syncope.  There was no dizziness prior to syncopal episodes, she said she just felt like she was going to pass out and woke up on the floor.  She has had no appetite and has not been eating for the last 2 days but has been drinking fluids with no vomiting.  She took some Imodium today and diarrhea has slowed down but now she has noticed bright red blood on the toilet paper.  Abdominal pain has improved after putting heating pad on her abdomen.  She is not sure if there was blood in the commode.  Symptoms have gotten somewhat better though she still feels quite weak.  She did not take any of her medications this morning.  She denies any  urinary symptoms though she does have incontinence at baseline, chest pain or shortness breath, headaches or dizziness, palpitations or swelling.     In the ED, CTA abdomen and pelvis showed colitis of the splenic flexure and descending colon with no evidence of abscess or perforation.  Urinalysis with 3+ leukocytes a greater than 100 WBCs, 2+ ketones, 2+ blood and trace protein obtain by cath specimen.  WBC elevated at 23.89, platelets 254, H&H 15.1/44.8, potassium decreased at 3.1, sodium 134, CO2 25, creatinine 0.9 and glucose 99, no elevation in liver enzymes, INR is 1.1, lipase 27.  She was given 1 L normal saline, 750 mg levofloxacin and 500 mg IV metronidazole.  She will be admitted to the hospitalist service for further evaluation and treatment. Final urine culture >100K klebsiella     Hospital Course:   77-year-old very pleasant lady with prior history of DVT not on anticoagulation?, hyperlipidemia and diverticulosis came with diarrhea, rectal bleed found to have colitis and UTI.  She responded very well with IV hydration, electrolyte replacement, bowel rest and IV antibiotics.  She was evaluated by GI who cleared her for discharge on antibiotics.  She is tolerating full liquid diet since yesterday, afebrile.  Today upon my interview she denies any new complaints.  She was discharged home in hemodynamically stable condition with new prescription of Augmentin that should cover colitis and UTI.  Also provided prescription of aspirin as possibility of ischemic colitis can not be ruled out.      How patient is feeling since discharge from the hospital?    Pt reports overall she's feeling better- has completed abx. Diarrhea has now resolved. Has good appetite but is a little scared to eat too much. Having formed brown stools. Denies dysuria or hematuria.    Pt c/oing of chronic night sweats- seems to be occurring more frequently- will wake up drenched in sweat.  Reports took hormones for years though it was  stopped a few years back and she was started on vaginal Estrace though it is not affected with the hot flashes    Patient follow up phone call documented on separate encounter.    Admission on 12/27/2022, Discharged on 12/29/2022   Component Date Value Ref Range Status    WBC 12/27/2022 23.89 (H)  3.90 - 12.70 K/uL Final    RBC 12/27/2022 4.84  4.00 - 5.40 M/uL Final    Hemoglobin 12/27/2022 15.1  12.0 - 16.0 g/dL Final    Hematocrit 12/27/2022 44.8  37.0 - 48.5 % Final    MCV 12/27/2022 93  82 - 98 fL Final    MCH 12/27/2022 31.2 (H)  27.0 - 31.0 pg Final    MCHC 12/27/2022 33.7  32.0 - 36.0 g/dL Final    RDW 12/27/2022 14.1  11.5 - 14.5 % Final    Platelets 12/27/2022 254  150 - 450 K/uL Final    MPV 12/27/2022 10.2  9.2 - 12.9 fL Final    Immature Granulocytes 12/27/2022 0.6 (H)  0.0 - 0.5 % Final    Gran # (ANC) 12/27/2022 20.6 (H)  1.8 - 7.7 K/uL Final    Immature Grans (Abs) 12/27/2022 0.14 (H)  0.00 - 0.04 K/uL Final    Lymph # 12/27/2022 1.9  1.0 - 4.8 K/uL Final    Mono # 12/27/2022 1.1 (H)  0.3 - 1.0 K/uL Final    Eos # 12/27/2022 0.0  0.0 - 0.5 K/uL Final    Baso # 12/27/2022 0.10  0.00 - 0.20 K/uL Final    nRBC 12/27/2022 0  0 /100 WBC Final    Gran % 12/27/2022 86.3 (H)  38.0 - 73.0 % Final    Lymph % 12/27/2022 8.1 (L)  18.0 - 48.0 % Final    Mono % 12/27/2022 4.4  4.0 - 15.0 % Final    Eosinophil % 12/27/2022 0.2  0.0 - 8.0 % Final    Basophil % 12/27/2022 0.4  0.0 - 1.9 % Final    Platelet Estimate 12/27/2022 Appears normal   Final    Differential Method 12/27/2022 Automated   Final    Sodium 12/27/2022 134 (L)  136 - 145 mmol/L Final    Potassium 12/27/2022 3.1 (L)  3.5 - 5.1 mmol/L Final    Chloride 12/27/2022 98  95 - 110 mmol/L Final    CO2 12/27/2022 25  23 - 29 mmol/L Final    Glucose 12/27/2022 99  70 - 110 mg/dL Final    BUN 12/27/2022 16  8 - 23 mg/dL Final    Creatinine 12/27/2022 0.9  0.5 - 1.4 mg/dL Final    Calcium 12/27/2022 9.3  8.7 - 10.5 mg/dL Final    Total Protein 12/27/2022 7.3   6.0 - 8.4 g/dL Final    Albumin 12/27/2022 4.2  3.5 - 5.2 g/dL Final    Total Bilirubin 12/27/2022 1.1 (H)  0.1 - 1.0 mg/dL Final    Alkaline Phosphatase 12/27/2022 61  55 - 135 U/L Final    AST 12/27/2022 22  10 - 40 U/L Final    ALT 12/27/2022 13  10 - 44 U/L Final    Anion Gap 12/27/2022 11  8 - 16 mmol/L Final    eGFR 12/27/2022 >60.0  >60 mL/min/1.73 m^2 Final    Specimen UA 12/27/2022 Urine, Clean Catch   Final    Color, UA 12/27/2022 Yellow  Yellow, Straw, [place] Final    Appearance, UA 12/27/2022 Hazy (A)  Clear Final    pH, UA 12/27/2022 6.0  5.0 - 8.0 Final    Specific Gravity, UA 12/27/2022 1.020  1.005 - 1.030 Final    Protein, UA 12/27/2022 Trace (A)  Negative Final    Glucose, UA 12/27/2022 Negative  Negative Final    Ketones, UA 12/27/2022 2+ (A)  Negative Final    Bilirubin (UA) 12/27/2022 1+ (A)  Negative Final    Occult Blood UA 12/27/2022 2+ (A)  Negative Final    Nitrite, UA 12/27/2022 Negative  Negative Final    Urobilinogen, UA 12/27/2022 Negative  Negative EU/dL Final    Leukocytes, UA 12/27/2022 3+ (A)  Negative Final    Lipase 12/27/2022 27  4 - 60 U/L Final    POC Creatinine 12/27/2022 0.8  0.5 - 1.4 mg/dL Final    Sample 12/27/2022 VENOUS   Final    Group & Rh 12/27/2022 A POS   Final    Indirect Matt 12/27/2022 NEG   Final    Prothrombin Time 12/27/2022 11.5  9.0 - 12.5 sec Final    INR 12/27/2022 1.1  0.8 - 1.2 Final    SARS-CoV-2 RNA, Amplification, Qual 12/27/2022 Negative  Negative Final    Influenza A, Molecular 12/27/2022 Negative  Negative Final    Influenza B, Molecular 12/27/2022 Negative  Negative Final    Flu A & B Source 12/27/2022 Nasal swab   Final    RBC, UA 12/27/2022 4  0 - 4 /hpf Final    WBC, UA 12/27/2022 >100 (H)  0 - 5 /hpf Final    Bacteria 12/27/2022 Negative  None-Occ /hpf Final    Squam Epithel, UA 12/27/2022 0  /hpf Final    Hyaline Casts, UA 12/27/2022 42 (A)  0-1/lpf /lpf Final    Microscopic Comment 12/27/2022 SEE COMMENT   Final    Urine Culture, Routine  12/27/2022  (A)   Final                    Value:ENTEROCOCCUS FAECALIS  >100,000 cfu/ml      Blood Culture, Routine 12/27/2022 No growth after 5 days.   Final    Blood Culture, Routine 12/27/2022 No growth after 5 days.   Final    Magnesium 12/27/2022 1.9  1.6 - 2.6 mg/dL Final    CRP 12/27/2022 11.13 (H)  <0.76 mg/dL Final    Hemoglobin 12/27/2022 14.0  12.0 - 16.0 g/dL Final    Hematocrit 12/27/2022 40.6  37.0 - 48.5 % Final    Magnesium 12/28/2022 1.8  1.6 - 2.6 mg/dL Final    Hemoglobin A1C 12/28/2022 5.9  4.5 - 6.2 % Final    Estimated Avg Glucose 12/28/2022 123  68 - 131 mg/dL Final    WBC 12/28/2022 20.39 (H)  3.90 - 12.70 K/uL Final    RBC 12/28/2022 4.49  4.00 - 5.40 M/uL Final    Hemoglobin 12/28/2022 14.1  12.0 - 16.0 g/dL Final    Hematocrit 12/28/2022 42.0  37.0 - 48.5 % Final    MCV 12/28/2022 94  82 - 98 fL Final    MCH 12/28/2022 31.4 (H)  27.0 - 31.0 pg Final    MCHC 12/28/2022 33.6  32.0 - 36.0 g/dL Final    RDW 12/28/2022 14.0  11.5 - 14.5 % Final    Platelets 12/28/2022 185  150 - 450 K/uL Final    MPV 12/28/2022 10.0  9.2 - 12.9 fL Final    Immature Granulocytes 12/28/2022 0.6 (H)  0.0 - 0.5 % Final    Gran # (ANC) 12/28/2022 17.8 (H)  1.8 - 7.7 K/uL Final    Immature Grans (Abs) 12/28/2022 0.12 (H)  0.00 - 0.04 K/uL Final    Lymph # 12/28/2022 1.2  1.0 - 4.8 K/uL Final    Mono # 12/28/2022 1.1 (H)  0.3 - 1.0 K/uL Final    Eos # 12/28/2022 0.1  0.0 - 0.5 K/uL Final    Baso # 12/28/2022 0.12  0.00 - 0.20 K/uL Final    nRBC 12/28/2022 0  0 /100 WBC Final    Gran % 12/28/2022 87.2 (H)  38.0 - 73.0 % Final    Lymph % 12/28/2022 6.1 (L)  18.0 - 48.0 % Final    Mono % 12/28/2022 5.2  4.0 - 15.0 % Final    Eosinophil % 12/28/2022 0.3  0.0 - 8.0 % Final    Basophil % 12/28/2022 0.6  0.0 - 1.9 % Final    Differential Method 12/28/2022 Automated   Final    Sodium 12/28/2022 137  136 - 145 mmol/L Final    Potassium 12/28/2022 3.7  3.5 - 5.1 mmol/L Final    Chloride 12/28/2022 104  95 - 110 mmol/L Final     CO2 12/28/2022 25  23 - 29 mmol/L Final    Glucose 12/28/2022 78  70 - 110 mg/dL Final    BUN 12/28/2022 12  8 - 23 mg/dL Final    Creatinine 12/28/2022 0.7  0.5 - 1.4 mg/dL Final    Calcium 12/28/2022 8.4 (L)  8.7 - 10.5 mg/dL Final    Total Protein 12/28/2022 6.3  6.0 - 8.4 g/dL Final    Albumin 12/28/2022 3.4 (L)  3.5 - 5.2 g/dL Final    Total Bilirubin 12/28/2022 1.1 (H)  0.1 - 1.0 mg/dL Final    Alkaline Phosphatase 12/28/2022 51 (L)  55 - 135 U/L Final    AST 12/28/2022 17  10 - 40 U/L Final    ALT 12/28/2022 13  10 - 44 U/L Final    Anion Gap 12/28/2022 8  8 - 16 mmol/L Final    eGFR 12/28/2022 >60.0  >60 mL/min/1.73 m^2 Final    Phosphorus 12/28/2022 2.0 (L)  2.7 - 4.5 mg/dL Final    Hemoglobin 12/28/2022 13.7  12.0 - 16.0 g/dL Final    Hematocrit 12/28/2022 40.9  37.0 - 48.5 % Final    Hemoglobin 12/28/2022 13.5  12.0 - 16.0 g/dL Final    Hematocrit 12/28/2022 40.8  37.0 - 48.5 % Final    Hemoglobin 12/28/2022 13.4  12.0 - 16.0 g/dL Final    Hematocrit 12/28/2022 40.1  37.0 - 48.5 % Final    Magnesium 12/29/2022 2.3  1.6 - 2.6 mg/dL Final    WBC 12/29/2022 13.60 (H)  3.90 - 12.70 K/uL Final    RBC 12/29/2022 4.28  4.00 - 5.40 M/uL Final    Hemoglobin 12/29/2022 13.3  12.0 - 16.0 g/dL Final    Hematocrit 12/29/2022 40.0  37.0 - 48.5 % Final    MCV 12/29/2022 94  82 - 98 fL Final    MCH 12/29/2022 31.1 (H)  27.0 - 31.0 pg Final    MCHC 12/29/2022 33.3  32.0 - 36.0 g/dL Final    RDW 12/29/2022 14.0  11.5 - 14.5 % Final    Platelets 12/29/2022 181  150 - 450 K/uL Final    MPV 12/29/2022 10.1  9.2 - 12.9 fL Final    Immature Granulocytes 12/29/2022 0.4  0.0 - 0.5 % Final    Gran # (ANC) 12/29/2022 11.5 (H)  1.8 - 7.7 K/uL Final    Immature Grans (Abs) 12/29/2022 0.06 (H)  0.00 - 0.04 K/uL Final    Lymph # 12/29/2022 1.1  1.0 - 4.8 K/uL Final    Mono # 12/29/2022 0.8  0.3 - 1.0 K/uL Final    Eos # 12/29/2022 0.1  0.0 - 0.5 K/uL Final    Baso # 12/29/2022 0.07  0.00 - 0.20 K/uL Final    nRBC 12/29/2022 0  0 /100  WBC Final    Gran % 12/29/2022 84.3 (H)  38.0 - 73.0 % Final    Lymph % 12/29/2022 8.2 (L)  18.0 - 48.0 % Final    Mono % 12/29/2022 5.7  4.0 - 15.0 % Final    Eosinophil % 12/29/2022 0.9  0.0 - 8.0 % Final    Basophil % 12/29/2022 0.5  0.0 - 1.9 % Final    Differential Method 12/29/2022 Automated   Final    Hemoglobin 12/29/2022 13.1  12.0 - 16.0 g/dL Final    Hematocrit 12/29/2022 39.8  37.0 - 48.5 % Final    Sodium 12/29/2022 137  136 - 145 mmol/L Final    Potassium 12/29/2022 3.6  3.5 - 5.1 mmol/L Final    Chloride 12/29/2022 108  95 - 110 mmol/L Final    CO2 12/29/2022 22 (L)  23 - 29 mmol/L Final    Glucose 12/29/2022 93  70 - 110 mg/dL Final    BUN 12/29/2022 10  8 - 23 mg/dL Final    Creatinine 12/29/2022 0.7  0.5 - 1.4 mg/dL Final    Calcium 12/29/2022 8.0 (L)  8.7 - 10.5 mg/dL Final    Total Protein 12/29/2022 5.7 (L)  6.0 - 8.4 g/dL Final    Albumin 12/29/2022 3.1 (L)  3.5 - 5.2 g/dL Final    Total Bilirubin 12/29/2022 0.9  0.1 - 1.0 mg/dL Final    Alkaline Phosphatase 12/29/2022 48 (L)  55 - 135 U/L Final    AST 12/29/2022 14  10 - 40 U/L Final    ALT 12/29/2022 11  10 - 44 U/L Final    Anion Gap 12/29/2022 7 (L)  8 - 16 mmol/L Final    eGFR 12/29/2022 >60.0  >60 mL/min/1.73 m^2 Final    Phosphorus 12/29/2022 2.2 (L)  2.7 - 4.5 mg/dL Final    CRP 12/29/2022 7.29 (H)  <0.76 mg/dL Final   Telephone on 08/04/2022   Component Date Value Ref Range Status    Glucose 08/11/2022 87  65 - 99 mg/dL Final    BUN 08/11/2022 25  7 - 25 mg/dL Final    Creatinine 08/11/2022 0.72  0.60 - 1.00 mg/dL Final    eGFR 08/11/2022 85  > OR = 60 mL/min/1.73m2 Final    BUN/Creatinine Ratio 08/11/2022 NOT APPLICABLE  6 - 22 (calc) Final    Sodium 08/11/2022 141  135 - 146 mmol/L Final    Potassium 08/11/2022 4.1  3.5 - 5.3 mmol/L Final    Chloride 08/11/2022 106  98 - 110 mmol/L Final    CO2 08/11/2022 25  20 - 32 mmol/L Final    Calcium 08/11/2022 9.3  8.6 - 10.4 mg/dL Final    Total Protein 08/11/2022 6.6  6.1 - 8.1 g/dL Final     Albumin 08/11/2022 4.4  3.6 - 5.1 g/dL Final    Globulin, Total 08/11/2022 2.2  1.9 - 3.7 g/dL (calc) Final    Albumin/Globulin Ratio 08/11/2022 2.0  1.0 - 2.5 (calc) Final    Total Bilirubin 08/11/2022 0.5  0.2 - 1.2 mg/dL Final    Alkaline Phosphatase 08/11/2022 59  37 - 153 U/L Final    AST 08/11/2022 17  10 - 35 U/L Final    ALT 08/11/2022 9  6 - 29 U/L Final    Cholesterol 08/11/2022 202 (H)  <200 mg/dL Final    HDL 08/11/2022 56  > OR = 50 mg/dL Final    Triglycerides 08/11/2022 180 (H)  <150 mg/dL Final    LDL Cholesterol 08/11/2022 116 (H)  mg/dL (calc) Final    HDL/Cholesterol Ratio 08/11/2022 3.6  <5.0 (calc) Final    Non HDL Chol. (LDL+VLDL) 08/11/2022 146 (H)  <130 mg/dL (calc) Final       Past Medical History:   Diagnosis Date    Arthritis     Corns and callosities     Deep vein thrombosis     GERD (gastroesophageal reflux disease)     Gout     H/O bladder problems     History of colonic polyps 2014    Hyperlipidemia     Hypertension     Pulmonary embolism      Past Surgical History:   Procedure Laterality Date    APPENDECTOMY  AGE 12    BLADDER SURGERY      Twice    BREAST CYST EXCISION      Benign per patient    CYSTOSCOPY N/A 1/26/2022    Procedure: CYSTOSCOPY;  Surgeon: Sánchez Salcido MD;  Location: [facility] OR [address] [place] [address];  Service: Urology;  Laterality: N/A;    CYSTOSCOPY  4/12/2022    Procedure: CYSTOSCOPY;  Surgeon: Sánchez Salcido MD;  Location: [facility] OR [address] [place] [address];  Service: Urology;;    EYE SURGERY      LASER FOR GLAUCOMA    HAND SURGERY Left     HYSTERECTOMY      Not related to cancer per patient.    INJECTION OF BOTULINUM TOXIN TYPE A  1/26/2022    Procedure: INJECTION, BOTULINUM TOXIN, 100units;  Surgeon: Sánchez Salcido MD;  Location: [facility] OR [address] [place] [address];  Service: Urology;;    INJECTION OF BOTULINUM TOXIN TYPE A  4/12/2022    Procedure: INJECTION, BOTULINUM TOXIN  100units;  Surgeon: Sánchez Salcido MD;  Location: [facility] OR [address] [place] [address];  Service: Urology;;    OOPHORECTOMY       "ROBOT-ASSISTED LAPAROSCOPIC ABDOMINAL SACROCOLPOPEXY USING DA KINGA XI N/A 12/17/2019    Procedure: XI ROBOTIC SACROCOLPOPEXY, ABDOMINAL;  Surgeon: Javier Putnam MD;  Location: [facility] OR;  Service: OB/GYN;  Laterality: N/A;  removal of damaged tissue    SURGICAL PROCEDURE FOR STRESS INCONTINENCE USING TENSION FREE VAGINAL TAPE N/A 12/17/2019    Procedure: SURGICAL PROCEDURE, USING TENSION FREE VAGINAL TAPE, FOR STRESS INCONTINENCE;  Surgeon: Javier Putnam MD;  Location: [facility] OR;  Service: OB/GYN;  Laterality: N/A;    TONSILLECTOMY       Family History   Problem Relation Age of Onset    Cancer Brother         bladder    Breast cancer Neg Hx     Colon cancer Neg Hx     Ovarian cancer Neg Hx     Eczema Neg Hx     Lupus Neg Hx     Psoriasis Neg Hx     Melanoma Neg Hx     Anesthesia problems Neg Hx        Marital Status:   Alcohol History:  reports no history of alcohol use.  Tobacco History:  reports that she has never smoked. She has never used smokeless tobacco.  Drug History:  reports no history of drug use.    Review of patient's allergies indicates:   Allergen Reactions    Gabapentin     Nitrofurantoin Other (See Comments)     Deathly ill , headaches, weakness, 'wiped out"    Sulfa (sulfonamide antibiotics)        Current Outpatient Medications:     aspirin 81 MG Chew, Take 1 tablet (81 mg total) by mouth once daily., Disp: 90 tablet, Rfl: 0    calcium carbonate/vitamin D3 (VITAMIN D-3 ORAL), Take 1 tablet by mouth once daily., Disp: , Rfl:     docusate calcium (SURFAK) 240 mg capsule, Take 240 mg by mouth 2 (two) times daily., Disp: , Rfl:     estradioL (ESTRACE) 0.01 % (0.1 mg/gram) vaginal cream, INSERT 1 GM VAGINALLY MONDAY,WEDNESDAY,AND FRIDAY AS DIRECTED (Patient taking differently: Place 1 g vaginally every Mon, Wed, Fri.), Disp: 42.5 g, Rfl: 7    fluticasone propionate (FLONASE) 50 mcg/actuation nasal spray, 1 spray (50 mcg total) by Each Nostril route once daily., Disp: 16 g, Rfl: 2    " rosuvastatin (CRESTOR) 5 MG tablet, Take 1 tablet (5 mg total) by mouth every evening., Disp: 90 tablet, Rfl: 1    sertraline (ZOLOFT) 100 MG tablet, Take 1.5 tablets (150 mg total) by mouth once daily., Disp: 135 tablet, Rfl: 3    sumatriptan (IMITREX) 100 MG tablet, 1 tab po at start of headache, may repeat in 2 hours X1 in 24 hours. (Patient taking differently: as needed. 1 tab po at start of headache, may repeat in 2 hours X1 in 24 hours.), Disp: 10 tablet, Rfl: 11    topiramate (TOPAMAX) 50 MG tablet, Take 1 tablet (50 mg total) by mouth 2 (two) times daily., Disp: 180 tablet, Rfl: 1    traMADol (ULTRAM) 50 mg tablet, Take 50 mg by mouth every 8 (eight) hours as needed for Pain., Disp: , Rfl: 5    triamterene-hydrochlorothiazide 37.5-25 mg (MAXZIDE-25) 37.5-25 mg per tablet, 1 tablet daily as needed for swelling (Patient taking differently: Take 1 tablet by mouth daily as needed. 1 tablet daily as needed for swelling), Disp: 30 tablet, Rfl: 0    valACYclovir (VALTREX) 500 MG tablet, Take 2 tablets (1,000 mg total) by mouth 2 (two) times daily., Disp: 12 tablet, Rfl: 3    Review of Systems   Constitutional:  Negative for appetite change, chills, fatigue, fever and unexpected weight change.   HENT:  Negative for postnasal drip, sinus pain, sore throat and trouble swallowing.    Eyes:  Negative for visual disturbance.   Respiratory:  Negative for cough, shortness of breath and wheezing.    Cardiovascular:  Negative for chest pain, palpitations and leg swelling.   Gastrointestinal:  Negative for abdominal pain, constipation, diarrhea (now resolved), nausea and vomiting.   Endocrine:        Pt c/o's of chronic night sweats for several years- seems to be occurring more frequently   Genitourinary:  Positive for frequency. Negative for dysuria, flank pain, hematuria and pelvic pain.   Musculoskeletal:  Positive for back pain (left buttock pain radiating down lateral thigh) and myalgias (bilat legs). Negative for gait  "problem.   Skin:  Negative for rash.   Neurological:  Positive for headaches (chronic HAs improved on topamax). Negative for dizziness, syncope, weakness and numbness.      Objective:      Vitals:    01/10/23 1412   BP: 124/70   Pulse: 70   SpO2: 98%   Weight: 68.8 kg (151 lb 9.6 oz)   Height: 5' 5" (1.651 m)     Physical Exam  Vitals reviewed.   Constitutional:       General: She is not in acute distress.     Appearance: Normal appearance. She is well-developed and normal weight.      Comments: Well dressed WF   HENT:      Head: Normocephalic and atraumatic.      Mouth/Throat:      Mouth: Mucous membranes are moist.   Neck:      Vascular: No carotid bruit.   Cardiovascular:      Rate and Rhythm: Normal rate and regular rhythm.      Heart sounds: No murmur heard.    No friction rub. No gallop.   Pulmonary:      Effort: Pulmonary effort is normal. No respiratory distress.      Breath sounds: Normal breath sounds. No wheezing or rales.   Abdominal:      General: There is no distension.      Palpations: Abdomen is soft. There is no mass.      Tenderness: There is no abdominal tenderness (very mild tenderness to lower quadrants bilaterally). There is no guarding or rebound.   Musculoskeletal:      Cervical back: Neck supple.      Right lower leg: No edema.      Left lower leg: No edema.   Lymphadenopathy:      Cervical: No cervical adenopathy.   Skin:     General: Skin is warm and dry.      Findings: No rash.   Neurological:      General: No focal deficit present.      Mental Status: She is alert and oriented to person, place, and time.      Gait: Gait normal.   Psychiatric:         Mood and Affect: Mood normal.       Assessment:       1. Colitis    2. Essential hypertension    3. Dyslipidemia    4. Osteopenia, unspecified location    5. Hot flashes due to menopause    6. Screening for osteoporosis    7. Menopause    8. Mild episode of recurrent major depressive disorder         Plan:       Colitis  Comments:  Improved, " nearly resolved.  Patient remains on baby aspirin due to possibility of ischemic colitis, has GI follow-up tomorrow    Essential hypertension  Comments:  BP well controlled    Dyslipidemia  Comments:  Stable on last labs    Osteopenia, unspecified location  Comments:  Due for repeat DEXA  Orders:  -     DXA Bone Density Spine And Hip; Future; Expected date: 01/17/2023    Hot flashes due to menopause  Comments:  Discussed hot flashes with pt, advised do not recommend systemic hormone treatment d/t risks.  Discuss nonhormonal options- gabapentin which she declines, already on SSRI    Screening for osteoporosis  -     DXA Bone Density Spine And Hip; Future; Expected date: 01/17/2023    Menopause  -     DXA Bone Density Spine And Hip; Future; Expected date: 01/17/2023    Mild episode of recurrent major depressive disorder  Comments:  Patient reports she has recently only been taking sertraline 100 mg though admits to chronic ongoing depression, will increase back to 150 mg daily      Follow up in about 3 months (around 4/10/2023) for HTN, lipids.

## 2023-01-17 ENCOUNTER — HOSPITAL ENCOUNTER (OUTPATIENT)
Dept: RADIOLOGY | Facility: HOSPITAL | Age: 78
Discharge: HOME | End: 2023-01-17
Attending: NURSE PRACTITIONER
Payer: MEDICARE

## 2023-01-17 DIAGNOSIS — Z13.820 SCREENING FOR OSTEOPOROSIS: ICD-10-CM

## 2023-01-17 DIAGNOSIS — Z78.0 MENOPAUSE: ICD-10-CM

## 2023-01-17 DIAGNOSIS — M85.80 OSTEOPENIA, UNSPECIFIED LOCATION: ICD-10-CM

## 2023-01-17 PROCEDURE — 77080 DXA BONE DENSITY AXIAL: CPT | Mod: TC,PO

## 2023-01-24 NOTE — PROGRESS NOTES
PROGRESS NOTE    Subjective:       Patient ID: Jazz Martinez is a 74 y.o. female.    Chief Complaint:  No chief complaint on file.  History of DVT    History of Present Illness:   Jazz Martinez is a 74 y.o. female who presents for follow up for DVT history.  This occurred 9 years ago after surgery.  Patient is feeling well but is to have bladder surgery(prolapse repair),  again and needs recommendations.         Family and Social history reviewed and is unchanged from last visit.       ROS:  Review of Systems   Constitutional: Negative for fever.   Respiratory: Negative for shortness of breath.    Cardiovascular: Negative for chest pain and leg swelling.   Gastrointestinal: Negative for abdominal pain and blood in stool.   Genitourinary: Negative for hematuria.   Skin: Negative for rash.          Current Outpatient Medications:     atorvastatin (LIPITOR) 10 MG tablet, Take 1 tablet (10 mg total) by mouth once daily., Disp: 90 tablet, Rfl: 2    diclofenac sodium (VOLTAREN) 1 % Gel, Apply 2 g topically 4 (four) times daily., Disp: 100 g, Rfl: 5    estradiol (ESTRACE) 0.5 MG tablet, TAKE 1 TABLET(0.5 MG) BY MOUTH EVERY DAY, Disp: 30 tablet, Rfl: 12    fenofibrate 160 MG Tab, Take 1 tablet (160 mg total) by mouth once daily., Disp: 90 tablet, Rfl: 3    ibuprofen (ADVIL,MOTRIN) 600 MG tablet, ibuprofen 600 mg tablet  Take 1 tablet 3 times a day by oral route., Disp: , Rfl:     ibuprofen-famotidine (DUEXIS) 800-26.6 mg Tab, Take 1 tablet by mouth 3 (three) times daily., Disp: 90 tablet, Rfl: 0    magnesium oxide (MAG-OX) 400 mg (241.3 mg magnesium) tablet, Take 1 tablet (400 mg total) by mouth once daily., Disp: , Rfl: 0    ondansetron (ZOFRAN) 4 MG tablet, Take 1 tablet (4 mg total) by mouth every 8 (eight) hours as needed for Nausea., Disp: 20 tablet, Rfl: 1    sertraline (ZOLOFT) 100 MG tablet, TAKE 1.5 TABLETS (150 MG TOTAL) BY MOUTH ONCE  DAILY., Disp: 135 tablet, Rfl: 1    sumatriptan (IMITREX) 100 MG tablet, 1 tab po at start of headache, may repeat in 2 hours X1 in 24 hours. (Patient taking differently: as needed. 1 tab po at start of headache, may repeat in 2 hours X1 in 24 hours.), Disp: 10 tablet, Rfl: 11    topiramate (TOPAMAX) 25 MG tablet, topiramate 25 mg tablet  2 po bid, Disp: , Rfl:     traMADol (ULTRAM) 50 mg tablet, Ultram 50 mg tablet  1-2 po tid. No more than 6/day, Disp: , Rfl:     traMADol (ULTRAM) 50 mg tablet, Take 1 tablet (50 mg total) by mouth every 6 (six) hours as needed for Pain., Disp: 30 tablet, Rfl: 0    triamterene-hydrochlorothiazide 37.5-25 mg (MAXZIDE-25) 37.5-25 mg per tablet, 1/2 p.o. q.d., Disp: 90 tablet, Rfl: 0    valACYclovir (VALTREX) 500 MG tablet, TAKE 2 TABLETS BY MOUTH TWICE DAILY, Disp: 12 tablet, Rfl: 3    apixaban (ELIQUIS) 2.5 mg Tab, Take 1 tablet (2.5 mg total) by mouth 2 (two) times daily., Disp: 60 tablet, Rfl: 0        Objective:       Physical Examination:     /77   Pulse 66   Temp 97.8 °F (36.6 °C) (Oral)   Resp 20   Wt 70.6 kg (155 lb 9.6 oz)   BMI 25.89 kg/m²     Physical Exam   Constitutional: She appears well-developed and well-nourished.   HENT:   Head: Normocephalic and atraumatic.   Right Ear: External ear normal.   Left Ear: External ear normal.   Mouth/Throat: Oropharynx is clear and moist.   Eyes: Pupils are equal, round, and reactive to light. Conjunctivae are normal.   Neck: No tracheal deviation present. No thyromegaly present.   Cardiovascular: Normal rate, regular rhythm and normal heart sounds.   Pulmonary/Chest: Effort normal and breath sounds normal.   Abdominal: Soft. Bowel sounds are normal. She exhibits no distension and no mass. There is no tenderness.   Musculoskeletal: She exhibits no edema.   Neurological:   Neuro intact througout   Skin: No rash noted.   Psychiatric: She has a normal mood and affect. Her behavior is normal. Judgment and thought content  normal.       Labs:   No results found for this or any previous visit (from the past 336 hour(s)).  CMP  Sodium   Date Value Ref Range Status   10/16/2019 138 136 - 145 mmol/L Final     Potassium   Date Value Ref Range Status   10/16/2019 4.6 3.5 - 5.1 mmol/L Final     Chloride   Date Value Ref Range Status   10/16/2019 102 95 - 110 mmol/L Final     CO2   Date Value Ref Range Status   10/16/2019 28 23 - 29 mmol/L Final     Glucose   Date Value Ref Range Status   10/16/2019 91 70 - 110 mg/dL Final     BUN, Bld   Date Value Ref Range Status   10/16/2019 21 8 - 23 mg/dL Final     Creatinine   Date Value Ref Range Status   10/16/2019 0.8 0.5 - 1.4 mg/dL Final     Calcium   Date Value Ref Range Status   10/16/2019 10.0 8.7 - 10.5 mg/dL Final     Total Protein   Date Value Ref Range Status   10/16/2019 7.4 6.0 - 8.4 g/dL Final     Albumin   Date Value Ref Range Status   10/16/2019 4.3 3.5 - 5.2 g/dL Final     Total Bilirubin   Date Value Ref Range Status   10/16/2019 0.3 0.1 - 1.0 mg/dL Final     Comment:     For infants and newborns, interpretation of results should be based  on gestational age, weight and in agreement with clinical  observations.  Premature Infant recommended reference ranges:  Up to 24 hours.............<8.0 mg/dL  Up to 48 hours............<12.0 mg/dL  3-5 days..................<15.0 mg/dL  6-29 days.................<15.0 mg/dL       Alkaline Phosphatase   Date Value Ref Range Status   10/16/2019 69 55 - 135 U/L Final     AST   Date Value Ref Range Status   10/16/2019 27 10 - 40 U/L Final     ALT   Date Value Ref Range Status   10/16/2019 21 10 - 44 U/L Final     Anion Gap   Date Value Ref Range Status   10/16/2019 8 8 - 16 mmol/L Final     eGFR if    Date Value Ref Range Status   10/16/2019 >60.0 >60 mL/min/1.73 m^2 Final     eGFR if non    Date Value Ref Range Status   10/16/2019 >60.0 >60 mL/min/1.73 m^2 Final     Comment:     Calculation used to obtain the estimated  glomerular filtration  rate (eGFR) is the CKD-EPI equation.        No results found for: CEA  No results found for: PSA        Assessment/Plan:     Problem List Items Addressed This Visit     History of DVT in adulthood     Patient is to have bladder surgery and states a six to eight week recovery for this.  She is not on any anticoagulation at this time and has been doing well.  My suggestion is to place her on Eliquis 2.5mg bid for 4 weeks after the surgery and discussed this in detail today.  I would like to see her 3 weeks after the surgery and discussed this as well.           Relevant Medications    apixaban (ELIQUIS) 2.5 mg Tab          Discussion:     Follow up three weeks after surgery.      Electronically signed by Simone Walsh       Purse String (Simple) Text: Given the location of the defect and the characteristics of the surrounding skin a purse string closure was deemed most appropriate.  Undermining was performed circumfirentially around the surgical defect.  A purse string suture was then placed and tightened.

## 2023-03-16 ENCOUNTER — TELEPHONE (OUTPATIENT)
Dept: FAMILY MEDICINE | Facility: CLINIC | Age: 78
End: 2023-03-16

## 2023-03-16 DIAGNOSIS — R26.9 ABNORMALITY OF GAIT AND MOBILITY: ICD-10-CM

## 2023-03-16 DIAGNOSIS — R26.81 GAIT INSTABILITY: Primary | ICD-10-CM

## 2023-03-16 NOTE — TELEPHONE ENCOUNTER
Spoke to pt and she stated she needs a referral for physical therapy in [place] because she is having problems with balance. Physical therapy she would like is call [facility] on [address] in [place]     Referral for physical therapy pending below

## 2023-03-16 NOTE — TELEPHONE ENCOUNTER
----- Message from Flavia Giang sent at 3/16/2023 10:34 AM CDT -----  Pt would like a referral for her lack of balance and difficulty getting in and out of chair. [phone]

## 2023-03-31 ENCOUNTER — TELEPHONE (OUTPATIENT)
Dept: FAMILY MEDICINE | Facility: CLINIC | Age: 78
End: 2023-03-31

## 2023-03-31 DIAGNOSIS — I10 ESSENTIAL HYPERTENSION: Primary | ICD-10-CM

## 2023-03-31 DIAGNOSIS — Z79.899 ENCOUNTER FOR LONG-TERM (CURRENT) USE OF OTHER MEDICATIONS: ICD-10-CM

## 2023-03-31 DIAGNOSIS — E78.5 DYSLIPIDEMIA: ICD-10-CM

## 2023-03-31 NOTE — TELEPHONE ENCOUNTER
Left message on voice mail, verbalizing that we have placed labs for the pt to have done before pt's upcoming appointment on 04/11/2023. Lab order are with [facility]. So you can come into the office for your labs, no appointment necessary. Just make sure you are fasting for your labs. Verbalized that if they have any question or concerns, please give our office a call.

## 2023-04-03 PROBLEM — N39.0 UTI (URINARY TRACT INFECTION): Status: RESOLVED | Noted: 2022-12-27 | Resolved: 2023-04-03

## 2023-04-11 ENCOUNTER — OFFICE VISIT (OUTPATIENT)
Dept: FAMILY MEDICINE | Facility: CLINIC | Age: 78
End: 2023-04-11
Payer: MEDICARE

## 2023-04-11 VITALS
HEIGHT: 65 IN | DIASTOLIC BLOOD PRESSURE: 70 MMHG | HEART RATE: 62 BPM | BODY MASS INDEX: 24.96 KG/M2 | WEIGHT: 149.81 LBS | OXYGEN SATURATION: 99 % | SYSTOLIC BLOOD PRESSURE: 132 MMHG

## 2023-04-11 DIAGNOSIS — E78.5 DYSLIPIDEMIA: ICD-10-CM

## 2023-04-11 DIAGNOSIS — I10 ESSENTIAL HYPERTENSION: Primary | ICD-10-CM

## 2023-04-11 DIAGNOSIS — F33.0 MILD EPISODE OF RECURRENT MAJOR DEPRESSIVE DISORDER: ICD-10-CM

## 2023-04-11 DIAGNOSIS — I83.813 VARICOSE VEINS OF BOTH LOWER EXTREMITIES WITH PAIN: ICD-10-CM

## 2023-04-11 DIAGNOSIS — M19.049 HAND ARTHRITIS: ICD-10-CM

## 2023-04-11 LAB
ALBUMIN SERPL-MCNC: 4.2 G/DL (ref 3.6–5.1)
ALBUMIN/CREAT UR: 5 MCG/MG CREAT
ALBUMIN/GLOB SERPL: 1.8 (CALC) (ref 1–2.5)
ALP SERPL-CCNC: 60 U/L (ref 37–153)
ALT SERPL-CCNC: 8 U/L (ref 6–29)
APPEARANCE UR: CLEAR
AST SERPL-CCNC: 13 U/L (ref 10–35)
BACTERIA #/AREA URNS HPF: ABNORMAL /HPF
BACTERIA UR CULT: ABNORMAL
BASOPHILS # BLD AUTO: 88 CELLS/UL (ref 0–200)
BASOPHILS NFR BLD AUTO: 1.6 %
BILIRUB SERPL-MCNC: 0.4 MG/DL (ref 0.2–1.2)
BILIRUB UR QL STRIP: NEGATIVE
BUN SERPL-MCNC: 19 MG/DL (ref 7–25)
BUN/CREAT SERPL: NORMAL (CALC) (ref 6–22)
CALCIUM SERPL-MCNC: 9.3 MG/DL (ref 8.6–10.4)
CHLORIDE SERPL-SCNC: 105 MMOL/L (ref 98–110)
CHOLEST SERPL-MCNC: 154 MG/DL
CHOLEST/HDLC SERPL: 2.9 (CALC)
CO2 SERPL-SCNC: 28 MMOL/L (ref 20–32)
COLOR UR: YELLOW
CREAT SERPL-MCNC: 0.75 MG/DL (ref 0.6–1)
CREAT UR-MCNC: 59 MG/DL (ref 20–275)
EGFR: 81 ML/MIN/1.73M2
EOSINOPHIL # BLD AUTO: 193 CELLS/UL (ref 15–500)
EOSINOPHIL NFR BLD AUTO: 3.5 %
ERYTHROCYTE [DISTWIDTH] IN BLOOD BY AUTOMATED COUNT: 13.1 % (ref 11–15)
GLOBULIN SER CALC-MCNC: 2.3 G/DL (CALC) (ref 1.9–3.7)
GLUCOSE SERPL-MCNC: 79 MG/DL (ref 65–99)
GLUCOSE UR QL STRIP: NEGATIVE
HCT VFR BLD AUTO: 43.2 % (ref 35–45)
HDLC SERPL-MCNC: 53 MG/DL
HGB BLD-MCNC: 14.3 G/DL (ref 11.7–15.5)
HGB UR QL STRIP: NEGATIVE
HYALINE CASTS #/AREA URNS LPF: ABNORMAL /LPF
KETONES UR QL STRIP: NEGATIVE
LDLC SERPL CALC-MCNC: 66 MG/DL (CALC)
LEUKOCYTE ESTERASE UR QL STRIP: NEGATIVE
LYMPHOCYTES # BLD AUTO: 1524 CELLS/UL (ref 850–3900)
LYMPHOCYTES NFR BLD AUTO: 27.7 %
MCH RBC QN AUTO: 31.4 PG (ref 27–33)
MCHC RBC AUTO-ENTMCNC: 33.1 G/DL (ref 32–36)
MCV RBC AUTO: 94.7 FL (ref 80–100)
MICROALBUMIN UR-MCNC: 0.3 MG/DL
MONOCYTES # BLD AUTO: 556 CELLS/UL (ref 200–950)
MONOCYTES NFR BLD AUTO: 10.1 %
NEUTROPHILS # BLD AUTO: 3141 CELLS/UL (ref 1500–7800)
NEUTROPHILS NFR BLD AUTO: 57.1 %
NITRITE UR QL STRIP: NEGATIVE
NONHDLC SERPL-MCNC: 101 MG/DL (CALC)
PH UR STRIP: 8.5 [PH] (ref 5–8)
PLATELET # BLD AUTO: 223 THOUSAND/UL (ref 140–400)
PMV BLD REES-ECKER: 10.6 FL (ref 7.5–12.5)
POTASSIUM SERPL-SCNC: 4 MMOL/L (ref 3.5–5.3)
PROT SERPL-MCNC: 6.5 G/DL (ref 6.1–8.1)
PROT UR QL STRIP: NEGATIVE
RBC # BLD AUTO: 4.56 MILLION/UL (ref 3.8–5.1)
RBC #/AREA URNS HPF: ABNORMAL /HPF
SERVICE CMNT-IMP: ABNORMAL
SODIUM SERPL-SCNC: 139 MMOL/L (ref 135–146)
SP GR UR STRIP: 1.01 (ref 1–1.03)
SQUAMOUS #/AREA URNS HPF: ABNORMAL /HPF
TRIGL SERPL-MCNC: 265 MG/DL
TSH SERPL-ACNC: 1.71 MIU/L (ref 0.4–4.5)
WBC # BLD AUTO: 5.5 THOUSAND/UL (ref 3.8–10.8)
WBC #/AREA URNS HPF: ABNORMAL /HPF

## 2023-04-11 PROCEDURE — 99214 PR OFFICE/OUTPT VISIT, EST, LEVL IV, 30-39 MIN: ICD-10-PCS | Mod: S$GLB,,, | Performed by: NURSE PRACTITIONER

## 2023-04-11 PROCEDURE — 99214 OFFICE O/P EST MOD 30 MIN: CPT | Mod: S$GLB,,, | Performed by: NURSE PRACTITIONER

## 2023-04-11 NOTE — PROGRESS NOTES
SUBJECTIVE:    Patient ID: Jazz Martinez is a 77 y.o. female.    Chief Complaint: Hypertension (No bottles//Pt is here for a 3 month check up//ASHA )    Pt here for regular f/u- lipids, depression    Pt reports overall doing okay. C/oing of bilat hand pain- reports taking ibuprofen about once a day, occas twice a day.    Reports still under stress caring for 88 y/o brother who is ill- stays at his house several nights a week to help him get to doctor appts, etc. Reports depression is stable    Reports recently had 6 month f/u with Dr. Martinez for varicose veins- still has chronic calf pain, no swelling- wears compression socks. Had US and told she had 2 bad veins that needed injection, no further clots seen. Pt reports didn't have really any relief after previous vein procedure with Dr. Martinez last year so really doesn't want to have anything else done    Reports has been able to go to PT d/t brother's needs- hopes to start next week for knee and left sided sciatica pain          Telephone on 03/31/2023   Component Date Value Ref Range Status    WBC 04/10/2023 5.5  3.8 - 10.8 Thousand/uL Final    RBC 04/10/2023 4.56  3.80 - 5.10 Million/uL Final    Hemoglobin 04/10/2023 14.3  11.7 - 15.5 g/dL Final    Hematocrit 04/10/2023 43.2  35.0 - 45.0 % Final    MCV 04/10/2023 94.7  80.0 - 100.0 fL Final    MCH 04/10/2023 31.4  27.0 - 33.0 pg Final    MCHC 04/10/2023 33.1  32.0 - 36.0 g/dL Final    RDW 04/10/2023 13.1  11.0 - 15.0 % Final    Platelets 04/10/2023 223  140 - 400 Thousand/uL Final    MPV 04/10/2023 10.6  7.5 - 12.5 fL Final    Neutrophils, Abs 04/10/2023 3,141  1,500 - 7,800 cells/uL Final    Lymph # 04/10/2023 1,524  850 - 3,900 cells/uL Final    Mono # 04/10/2023 556  200 - 950 cells/uL Final    Eos # 04/10/2023 193  15 - 500 cells/uL Final    Baso # 04/10/2023 88  0 - 200 cells/uL Final    Neutrophils Relative 04/10/2023 57.1  % Final    Lymph % 04/10/2023 27.7  % Final    Mono % 04/10/2023 10.1  % Final     Eosinophil % 04/10/2023 3.5  % Final    Basophil % 04/10/2023 1.6  % Final    Glucose 04/10/2023 79  65 - 99 mg/dL Final    BUN 04/10/2023 19  7 - 25 mg/dL Final    Creatinine 04/10/2023 0.75  0.60 - 1.00 mg/dL Final    eGFR 04/10/2023 81  > OR = 60 mL/min/1.73m2 Final    BUN/Creatinine Ratio 04/10/2023 NOT APPLICABLE  6 - 22 (calc) Final    Sodium 04/10/2023 139  135 - 146 mmol/L Final    Potassium 04/10/2023 4.0  3.5 - 5.3 mmol/L Final    Chloride 04/10/2023 105  98 - 110 mmol/L Final    CO2 04/10/2023 28  20 - 32 mmol/L Final    Calcium 04/10/2023 9.3  8.6 - 10.4 mg/dL Final    Total Protein 04/10/2023 6.5  6.1 - 8.1 g/dL Final    Albumin 04/10/2023 4.2  3.6 - 5.1 g/dL Final    Globulin, Total 04/10/2023 2.3  1.9 - 3.7 g/dL (calc) Final    Albumin/Globulin Ratio 04/10/2023 1.8  1.0 - 2.5 (calc) Final    Total Bilirubin 04/10/2023 0.4  0.2 - 1.2 mg/dL Final    Alkaline Phosphatase 04/10/2023 60  37 - 153 U/L Final    AST 04/10/2023 13  10 - 35 U/L Final    ALT 04/10/2023 8  6 - 29 U/L Final    Cholesterol 04/10/2023 154  <200 mg/dL Final    HDL 04/10/2023 53  > OR = 50 mg/dL Final    Triglycerides 04/10/2023 265 (H)  <150 mg/dL Final    LDL Cholesterol 04/10/2023 66  mg/dL (calc) Final    HDL/Cholesterol Ratio 04/10/2023 2.9  <5.0 (calc) Final    Non HDL Chol. (LDL+VLDL) 04/10/2023 101  <130 mg/dL (calc) Final    Creatinine, Urine 04/10/2023 59  20 - 275 mg/dL Final    Microalb, Ur 04/10/2023 0.3  See Note: mg/dL Final    Microalb/Creat Ratio 04/10/2023 5  <30 mcg/mg creat Final    Color, UA 04/10/2023 YELLOW  YELLOW Final    Appearance, UA 04/10/2023 CLEAR  CLEAR Final    Specific Gravity, UA 04/10/2023 1.013  1.001 - 1.035 Final    pH, UA 04/10/2023 8.5 (H)  5.0 - 8.0 Final    Glucose, UA 04/10/2023 NEGATIVE  NEGATIVE Final    Bilirubin, UA 04/10/2023 NEGATIVE  NEGATIVE Final    Ketones, UA 04/10/2023 NEGATIVE  NEGATIVE Final    Occult Blood UA 04/10/2023 NEGATIVE  NEGATIVE Final    Protein, UA 04/10/2023  NEGATIVE  NEGATIVE Final    Nitrite, UA 04/10/2023 NEGATIVE  NEGATIVE Final    Leukocytes, UA 04/10/2023 NEGATIVE  NEGATIVE Final    WBC Casts, UA 04/10/2023 NONE SEEN  < OR = 5 /HPF Final    RBC Casts, UA 04/10/2023 NONE SEEN  < OR = 2 /HPF Final    Squam Epithel, UA 04/10/2023 NONE SEEN  < OR = 5 /HPF Final    Bacteria, UA 04/10/2023 NONE SEEN  NONE SEEN /HPF Final    Hyaline Casts, UA 04/10/2023 NONE SEEN  NONE SEEN /LPF Final    Service Cmt: 04/10/2023    Final    Reflexive Urine Culture 04/10/2023    Final    TSH w/reflex to FT4 04/10/2023 1.71  0.40 - 4.50 mIU/L Final   Admission on 12/27/2022, Discharged on 12/29/2022   Component Date Value Ref Range Status    WBC 12/27/2022 23.89 (H)  3.90 - 12.70 K/uL Final    RBC 12/27/2022 4.84  4.00 - 5.40 M/uL Final    Hemoglobin 12/27/2022 15.1  12.0 - 16.0 g/dL Final    Hematocrit 12/27/2022 44.8  37.0 - 48.5 % Final    MCV 12/27/2022 93  82 - 98 fL Final    MCH 12/27/2022 31.2 (H)  27.0 - 31.0 pg Final    MCHC 12/27/2022 33.7  32.0 - 36.0 g/dL Final    RDW 12/27/2022 14.1  11.5 - 14.5 % Final    Platelets 12/27/2022 254  150 - 450 K/uL Final    MPV 12/27/2022 10.2  9.2 - 12.9 fL Final    Immature Granulocytes 12/27/2022 0.6 (H)  0.0 - 0.5 % Final    Gran # (ANC) 12/27/2022 20.6 (H)  1.8 - 7.7 K/uL Final    Immature Grans (Abs) 12/27/2022 0.14 (H)  0.00 - 0.04 K/uL Final    Lymph # 12/27/2022 1.9  1.0 - 4.8 K/uL Final    Mono # 12/27/2022 1.1 (H)  0.3 - 1.0 K/uL Final    Eos # 12/27/2022 0.0  0.0 - 0.5 K/uL Final    Baso # 12/27/2022 0.10  0.00 - 0.20 K/uL Final    nRBC 12/27/2022 0  0 /100 WBC Final    Gran % 12/27/2022 86.3 (H)  38.0 - 73.0 % Final    Lymph % 12/27/2022 8.1 (L)  18.0 - 48.0 % Final    Mono % 12/27/2022 4.4  4.0 - 15.0 % Final    Eosinophil % 12/27/2022 0.2  0.0 - 8.0 % Final    Basophil % 12/27/2022 0.4  0.0 - 1.9 % Final    Platelet Estimate 12/27/2022 Appears normal   Final    Differential Method 12/27/2022 Automated   Final    Sodium 12/27/2022  134 (L)  136 - 145 mmol/L Final    Potassium 12/27/2022 3.1 (L)  3.5 - 5.1 mmol/L Final    Chloride 12/27/2022 98  95 - 110 mmol/L Final    CO2 12/27/2022 25  23 - 29 mmol/L Final    Glucose 12/27/2022 99  70 - 110 mg/dL Final    BUN 12/27/2022 16  8 - 23 mg/dL Final    Creatinine 12/27/2022 0.9  0.5 - 1.4 mg/dL Final    Calcium 12/27/2022 9.3  8.7 - 10.5 mg/dL Final    Total Protein 12/27/2022 7.3  6.0 - 8.4 g/dL Final    Albumin 12/27/2022 4.2  3.5 - 5.2 g/dL Final    Total Bilirubin 12/27/2022 1.1 (H)  0.1 - 1.0 mg/dL Final    Alkaline Phosphatase 12/27/2022 61  55 - 135 U/L Final    AST 12/27/2022 22  10 - 40 U/L Final    ALT 12/27/2022 13  10 - 44 U/L Final    Anion Gap 12/27/2022 11  8 - 16 mmol/L Final    eGFR 12/27/2022 >60.0  >60 mL/min/1.73 m^2 Final    Specimen UA 12/27/2022 Urine, Clean Catch   Final    Color, UA 12/27/2022 Yellow  Yellow, Straw, Santa Final    Appearance, UA 12/27/2022 Hazy (A)  Clear Final    pH, UA 12/27/2022 6.0  5.0 - 8.0 Final    Specific Gravity, UA 12/27/2022 1.020  1.005 - 1.030 Final    Protein, UA 12/27/2022 Trace (A)  Negative Final    Glucose, UA 12/27/2022 Negative  Negative Final    Ketones, UA 12/27/2022 2+ (A)  Negative Final    Bilirubin (UA) 12/27/2022 1+ (A)  Negative Final    Occult Blood UA 12/27/2022 2+ (A)  Negative Final    Nitrite, UA 12/27/2022 Negative  Negative Final    Urobilinogen, UA 12/27/2022 Negative  Negative EU/dL Final    Leukocytes, UA 12/27/2022 3+ (A)  Negative Final    Lipase 12/27/2022 27  4 - 60 U/L Final    POC Creatinine 12/27/2022 0.8  0.5 - 1.4 mg/dL Final    Sample 12/27/2022 VENOUS   Final    Group & Rh 12/27/2022 A POS   Final    Indirect Matt 12/27/2022 NEG   Final    Prothrombin Time 12/27/2022 11.5  9.0 - 12.5 sec Final    INR 12/27/2022 1.1  0.8 - 1.2 Final    SARS-CoV-2 RNA, Amplification, Qual 12/27/2022 Negative  Negative Final    Influenza A, Molecular 12/27/2022 Negative  Negative Final    Influenza B, Molecular 12/27/2022  Negative  Negative Final    Flu A & B Source 12/27/2022 Nasal swab   Final    RBC, UA 12/27/2022 4  0 - 4 /hpf Final    WBC, UA 12/27/2022 >100 (H)  0 - 5 /hpf Final    Bacteria 12/27/2022 Negative  None-Occ /hpf Final    Squam Epithel, UA 12/27/2022 0  /hpf Final    Hyaline Casts, UA 12/27/2022 42 (A)  0-1/lpf /lpf Final    Microscopic Comment 12/27/2022 SEE COMMENT   Final    Urine Culture, Routine 12/27/2022  (A)   Final                    Value:ENTEROCOCCUS FAECALIS  >100,000 cfu/ml      Blood Culture, Routine 12/27/2022 No growth after 5 days.   Final    Blood Culture, Routine 12/27/2022 No growth after 5 days.   Final    Magnesium 12/27/2022 1.9  1.6 - 2.6 mg/dL Final    CRP 12/27/2022 11.13 (H)  <0.76 mg/dL Final    Hemoglobin 12/27/2022 14.0  12.0 - 16.0 g/dL Final    Hematocrit 12/27/2022 40.6  37.0 - 48.5 % Final    Magnesium 12/28/2022 1.8  1.6 - 2.6 mg/dL Final    Hemoglobin A1C 12/28/2022 5.9  4.5 - 6.2 % Final    Estimated Avg Glucose 12/28/2022 123  68 - 131 mg/dL Final    WBC 12/28/2022 20.39 (H)  3.90 - 12.70 K/uL Final    RBC 12/28/2022 4.49  4.00 - 5.40 M/uL Final    Hemoglobin 12/28/2022 14.1  12.0 - 16.0 g/dL Final    Hematocrit 12/28/2022 42.0  37.0 - 48.5 % Final    MCV 12/28/2022 94  82 - 98 fL Final    MCH 12/28/2022 31.4 (H)  27.0 - 31.0 pg Final    MCHC 12/28/2022 33.6  32.0 - 36.0 g/dL Final    RDW 12/28/2022 14.0  11.5 - 14.5 % Final    Platelets 12/28/2022 185  150 - 450 K/uL Final    MPV 12/28/2022 10.0  9.2 - 12.9 fL Final    Immature Granulocytes 12/28/2022 0.6 (H)  0.0 - 0.5 % Final    Gran # (ANC) 12/28/2022 17.8 (H)  1.8 - 7.7 K/uL Final    Immature Grans (Abs) 12/28/2022 0.12 (H)  0.00 - 0.04 K/uL Final    Lymph # 12/28/2022 1.2  1.0 - 4.8 K/uL Final    Mono # 12/28/2022 1.1 (H)  0.3 - 1.0 K/uL Final    Eos # 12/28/2022 0.1  0.0 - 0.5 K/uL Final    Baso # 12/28/2022 0.12  0.00 - 0.20 K/uL Final    nRBC 12/28/2022 0  0 /100 WBC Final    Gran % 12/28/2022 87.2 (H)  38.0 - 73.0 %  Final    Lymph % 12/28/2022 6.1 (L)  18.0 - 48.0 % Final    Mono % 12/28/2022 5.2  4.0 - 15.0 % Final    Eosinophil % 12/28/2022 0.3  0.0 - 8.0 % Final    Basophil % 12/28/2022 0.6  0.0 - 1.9 % Final    Differential Method 12/28/2022 Automated   Final    Sodium 12/28/2022 137  136 - 145 mmol/L Final    Potassium 12/28/2022 3.7  3.5 - 5.1 mmol/L Final    Chloride 12/28/2022 104  95 - 110 mmol/L Final    CO2 12/28/2022 25  23 - 29 mmol/L Final    Glucose 12/28/2022 78  70 - 110 mg/dL Final    BUN 12/28/2022 12  8 - 23 mg/dL Final    Creatinine 12/28/2022 0.7  0.5 - 1.4 mg/dL Final    Calcium 12/28/2022 8.4 (L)  8.7 - 10.5 mg/dL Final    Total Protein 12/28/2022 6.3  6.0 - 8.4 g/dL Final    Albumin 12/28/2022 3.4 (L)  3.5 - 5.2 g/dL Final    Total Bilirubin 12/28/2022 1.1 (H)  0.1 - 1.0 mg/dL Final    Alkaline Phosphatase 12/28/2022 51 (L)  55 - 135 U/L Final    AST 12/28/2022 17  10 - 40 U/L Final    ALT 12/28/2022 13  10 - 44 U/L Final    Anion Gap 12/28/2022 8  8 - 16 mmol/L Final    eGFR 12/28/2022 >60.0  >60 mL/min/1.73 m^2 Final    Phosphorus 12/28/2022 2.0 (L)  2.7 - 4.5 mg/dL Final    Hemoglobin 12/28/2022 13.7  12.0 - 16.0 g/dL Final    Hematocrit 12/28/2022 40.9  37.0 - 48.5 % Final    Hemoglobin 12/28/2022 13.5  12.0 - 16.0 g/dL Final    Hematocrit 12/28/2022 40.8  37.0 - 48.5 % Final    Hemoglobin 12/28/2022 13.4  12.0 - 16.0 g/dL Final    Hematocrit 12/28/2022 40.1  37.0 - 48.5 % Final    Magnesium 12/29/2022 2.3  1.6 - 2.6 mg/dL Final    WBC 12/29/2022 13.60 (H)  3.90 - 12.70 K/uL Final    RBC 12/29/2022 4.28  4.00 - 5.40 M/uL Final    Hemoglobin 12/29/2022 13.3  12.0 - 16.0 g/dL Final    Hematocrit 12/29/2022 40.0  37.0 - 48.5 % Final    MCV 12/29/2022 94  82 - 98 fL Final    MCH 12/29/2022 31.1 (H)  27.0 - 31.0 pg Final    MCHC 12/29/2022 33.3  32.0 - 36.0 g/dL Final    RDW 12/29/2022 14.0  11.5 - 14.5 % Final    Platelets 12/29/2022 181  150 - 450 K/uL Final    MPV 12/29/2022 10.1  9.2 - 12.9 fL Final     Immature Granulocytes 12/29/2022 0.4  0.0 - 0.5 % Final    Gran # (ANC) 12/29/2022 11.5 (H)  1.8 - 7.7 K/uL Final    Immature Grans (Abs) 12/29/2022 0.06 (H)  0.00 - 0.04 K/uL Final    Lymph # 12/29/2022 1.1  1.0 - 4.8 K/uL Final    Mono # 12/29/2022 0.8  0.3 - 1.0 K/uL Final    Eos # 12/29/2022 0.1  0.0 - 0.5 K/uL Final    Baso # 12/29/2022 0.07  0.00 - 0.20 K/uL Final    nRBC 12/29/2022 0  0 /100 WBC Final    Gran % 12/29/2022 84.3 (H)  38.0 - 73.0 % Final    Lymph % 12/29/2022 8.2 (L)  18.0 - 48.0 % Final    Mono % 12/29/2022 5.7  4.0 - 15.0 % Final    Eosinophil % 12/29/2022 0.9  0.0 - 8.0 % Final    Basophil % 12/29/2022 0.5  0.0 - 1.9 % Final    Differential Method 12/29/2022 Automated   Final    Hemoglobin 12/29/2022 13.1  12.0 - 16.0 g/dL Final    Hematocrit 12/29/2022 39.8  37.0 - 48.5 % Final    Sodium 12/29/2022 137  136 - 145 mmol/L Final    Potassium 12/29/2022 3.6  3.5 - 5.1 mmol/L Final    Chloride 12/29/2022 108  95 - 110 mmol/L Final    CO2 12/29/2022 22 (L)  23 - 29 mmol/L Final    Glucose 12/29/2022 93  70 - 110 mg/dL Final    BUN 12/29/2022 10  8 - 23 mg/dL Final    Creatinine 12/29/2022 0.7  0.5 - 1.4 mg/dL Final    Calcium 12/29/2022 8.0 (L)  8.7 - 10.5 mg/dL Final    Total Protein 12/29/2022 5.7 (L)  6.0 - 8.4 g/dL Final    Albumin 12/29/2022 3.1 (L)  3.5 - 5.2 g/dL Final    Total Bilirubin 12/29/2022 0.9  0.1 - 1.0 mg/dL Final    Alkaline Phosphatase 12/29/2022 48 (L)  55 - 135 U/L Final    AST 12/29/2022 14  10 - 40 U/L Final    ALT 12/29/2022 11  10 - 44 U/L Final    Anion Gap 12/29/2022 7 (L)  8 - 16 mmol/L Final    eGFR 12/29/2022 >60.0  >60 mL/min/1.73 m^2 Final    Phosphorus 12/29/2022 2.2 (L)  2.7 - 4.5 mg/dL Final    CRP 12/29/2022 7.29 (H)  <0.76 mg/dL Final       Past Medical History:   Diagnosis Date    Arthritis     Corns and callosities     Deep vein thrombosis     GERD (gastroesophageal reflux disease)     Gout     H/O bladder problems     History of colonic polyps 2014     Hyperlipidemia     Hypertension     Pulmonary embolism      Past Surgical History:   Procedure Laterality Date    APPENDECTOMY  AGE 12    BLADDER SURGERY      Twice    BREAST CYST EXCISION      Benign per patient    CYSTOSCOPY N/A 1/26/2022    Procedure: CYSTOSCOPY;  Surgeon: Sánchez Salcido MD;  Location: [address];  Service: Urology;  Laterality: N/A;    CYSTOSCOPY  4/12/2022    Procedure: CYSTOSCOPY;  Surgeon: Sánchez Salcido MD;  Location: [facility] OR [address] [place] [address];  Service: Urology;;    EYE SURGERY      LASER FOR GLAUCOMA    HAND SURGERY Left     HYSTERECTOMY      Not related to cancer per patient.    INJECTION OF BOTULINUM TOXIN TYPE A  1/26/2022    Procedure: INJECTION, BOTULINUM TOXIN, 100units;  Surgeon: Sánchez Salcido MD;  Location: [facility] OR [address] [place] [address];  Service: Urology;;    INJECTION OF BOTULINUM TOXIN TYPE A  4/12/2022    Procedure: INJECTION, BOTULINUM TOXIN  100units;  Surgeon: Sánchez Salcido MD;  Location: [facility] OR [address] [place] [address];  Service: Urology;;    OOPHORECTOMY      ROBOT-ASSISTED LAPAROSCOPIC ABDOMINAL SACROCOLPOPEXY USING DA KINGA XI N/A 12/17/2019    Procedure: XI ROBOTIC SACROCOLPOPEXY, ABDOMINAL;  Surgeon: Javier Putnam MD;  Location: [facility] OR;  Service: OB/GYN;  Laterality: N/A;  removal of damaged tissue    SURGICAL PROCEDURE FOR STRESS INCONTINENCE USING TENSION FREE VAGINAL TAPE N/A 12/17/2019    Procedure: SURGICAL PROCEDURE, USING TENSION FREE VAGINAL TAPE, FOR STRESS INCONTINENCE;  Surgeon: Javier Putnam MD;  Location: [facility] OR;  Service: OB/GYN;  Laterality: N/A;    TONSILLECTOMY       Family History   Problem Relation Age of Onset    Cancer Brother         bladder    Breast cancer Neg Hx     Colon cancer Neg Hx     Ovarian cancer Neg Hx     Eczema Neg Hx     Lupus Neg Hx     Psoriasis Neg Hx     Melanoma Neg Hx     Anesthesia problems Neg Hx        The CVD Risk score (D'Agostino, et al., 2008) failed to calculate for the following reasons:    The 2008 CVD risk score is  "only valid for ages 30 to 74    The patient has a prior MI, stroke, CHF, or peripheral vascular disease diagnosis     Marital Status:   Alcohol History:  reports no history of alcohol use.  Tobacco History:  reports that she has never smoked. She has never been exposed to tobacco smoke. She has never used smokeless tobacco.  Drug History:  reports no history of drug use.    Health Maintenance Topics with due status: Not Due       Topic Last Completion Date    Colonoscopy 03/16/2021    DEXA Scan 01/17/2023    Lipid Panel 04/10/2023     Immunization History   Administered Date(s) Administered    COVID-19, MRNA, LN-S, PF (MODERNA FULL 0.5 ML DOSE) 01/22/2021, 02/19/2021    Influenza 10/17/2016, 10/01/2017, 10/15/2020, 10/18/2021    Influenza - High Dose - PF (65 years and older) 10/03/2013, 10/07/2015, 10/17/2016, 11/08/2017, 10/15/2018, 09/26/2019, 10/13/2020    Influenza - Intradermal - Quadrivalent - PF 10/15/2018    Influenza - Quadrivalent - PF *Preferred* (6 months and older) 08/31/2011    Pneumococcal Conjugate - 13 Valent 10/17/2016    Pneumococcal Polysaccharide - 23 Valent 08/31/2011, 11/08/2017    Zoster 03/14/2015, 10/14/2019    Zoster Recombinant 10/14/2019, 07/14/2020       Review of patient's allergies indicates:   Allergen Reactions    Gabapentin     Nitrofurantoin Other (See Comments)     Deathly ill , headaches, weakness, 'wiped out"    Sulfa (sulfonamide antibiotics)        Current Outpatient Medications:     aspirin 81 MG Chew, Take 1 tablet (81 mg total) by mouth once daily., Disp: 90 tablet, Rfl: 0    atorvastatin calcium (ATORVASTATIN ORAL), atorvastatin, Disp: , Rfl:     calcium carbonate/vitamin D3 (VITAMIN D-3 ORAL), Take 1 tablet by mouth once daily., Disp: , Rfl:     docusate calcium (SURFAK) 240 mg capsule, Take 240 mg by mouth 2 (two) times daily., Disp: , Rfl:     estradioL (ESTRACE) 0.01 % (0.1 mg/gram) vaginal cream, INSERT 1 GM VAGINALLY MONDAY,WEDNESDAY,AND FRIDAY AS DIRECTED, " Disp: 42.5 g, Rfl: 7    fluticasone propionate (FLONASE) 50 mcg/actuation nasal spray, 1 spray (50 mcg total) by Each Nostril route once daily., Disp: 16 g, Rfl: 2    rosuvastatin (CRESTOR) 5 MG tablet, Take 1 tablet (5 mg total) by mouth every evening., Disp: 90 tablet, Rfl: 1    sertraline (ZOLOFT) 100 MG tablet, Take 1.5 tablets (150 mg total) by mouth once daily., Disp: 135 tablet, Rfl: 3    sumatriptan (IMITREX) 100 MG tablet, 1 tab po at start of headache, may repeat in 2 hours X1 in 24 hours. (Patient taking differently: as needed. 1 tab po at start of headache, may repeat in 2 hours X1 in 24 hours.), Disp: 10 tablet, Rfl: 11    topiramate (TOPAMAX) 50 MG tablet, Take 1 tablet (50 mg total) by mouth 2 (two) times daily., Disp: 180 tablet, Rfl: 1    traMADol (ULTRAM) 50 mg tablet, Take 50 mg by mouth every 8 (eight) hours as needed for Pain., Disp: , Rfl: 5    triamterene-hydrochlorothiazide 37.5-25 mg (MAXZIDE-25) 37.5-25 mg per tablet, 1 tablet daily as needed for swelling (Patient taking differently: Take 1 tablet by mouth daily as needed. 1 tablet daily as needed for swelling), Disp: 30 tablet, Rfl: 0    valACYclovir (VALTREX) 500 MG tablet, Take 2 tablets (1,000 mg total) by mouth 2 (two) times daily. Prn fever blisters, Disp: 30 tablet, Rfl: 3    Review of Systems   Constitutional:  Negative for appetite change, chills, fatigue, fever and unexpected weight change.   HENT:  Negative for postnasal drip, sinus pain, sore throat and trouble swallowing.    Eyes:  Negative for visual disturbance.   Respiratory:  Negative for cough, shortness of breath and wheezing.    Cardiovascular:  Negative for chest pain, palpitations and leg swelling.   Gastrointestinal:  Negative for abdominal pain, constipation, diarrhea, nausea and vomiting.   Genitourinary:  Positive for frequency. Negative for dysuria, flank pain, hematuria and pelvic pain.   Musculoskeletal:  Positive for arthralgias (bilat hand arthritis, worse  "right MCP joint), back pain (left buttock pain radiating down lateral thigh) and myalgias (bilat legs). Negative for gait problem.   Skin:  Negative for rash.   Neurological:  Positive for headaches (chronic HAs stable on topamax). Negative for dizziness, syncope, weakness and numbness.   Psychiatric/Behavioral:  Positive for dysphoric mood (stable). Negative for self-injury, sleep disturbance and suicidal ideas. The patient is not nervous/anxious.         Objective:      Vitals:    04/11/23 1131   BP: 132/70   Pulse: 62   SpO2: 99%   Weight: 67.9 kg (149 lb 12.8 oz)   Height: 5' 5" (1.651 m)     Physical Exam  Vitals reviewed.   Constitutional:       General: She is not in acute distress.     Appearance: Normal appearance. She is well-developed and normal weight.      Comments: Well dressed WF   HENT:      Head: Normocephalic and atraumatic.   Neck:      Vascular: No carotid bruit.   Cardiovascular:      Rate and Rhythm: Normal rate and regular rhythm.      Heart sounds: No murmur heard.    No friction rub. No gallop.   Pulmonary:      Effort: Pulmonary effort is normal. No respiratory distress.      Breath sounds: Normal breath sounds. No wheezing or rales.   Abdominal:      Palpations: Abdomen is soft.      Tenderness: There is no abdominal tenderness.   Musculoskeletal:        Hands:       Cervical back: Neck supple.      Right lower leg: No edema.      Left lower leg: No edema.      Comments: Compression socks on   Lymphadenopathy:      Cervical: No cervical adenopathy.   Skin:     General: Skin is warm and dry.      Findings: No rash.   Neurological:      General: No focal deficit present.      Mental Status: She is alert and oriented to person, place, and time.      Gait: Gait normal.   Psychiatric:         Mood and Affect: Affect is flat.         Assessment:       1. Essential hypertension    2. Dyslipidemia    3. Hand arthritis    4. Mild episode of recurrent major depressive disorder    5. Varicose veins " of both lower extremities with pain           Plan:       1. Essential hypertension   -BP well controlled    2. Dyslipidemia   -reviewed recent labs, elevated TG though LDL improved to 66- encouraged to cut back on sweets/sugars and simple carbs    3. Hand arthritis   -recommend voltaren gel 3-4 times a day. Offered referral to hand specialist if no improvement or worsening    4. Mild episode of recurrent major depressive disorder   -stable    5. Varicose veins of both lower extremities with pain   -stable, no swelling. Reports had recent US with vascular surgeon- previous DVT resolved and has been off anticoagulation    Follow up in about 6 months (around 10/11/2023) for HTN, lipids.          Counseled on age and gender appropriate medical preventative services, including cancer screenings, immunizations, overall nutritional health, need for a consistent exercise regimen and an overall push towards maintaining a vigorous and active lifestyle.      4/11/2023 Elle Mack NP

## 2023-06-02 ENCOUNTER — TELEPHONE (OUTPATIENT)
Dept: FAMILY MEDICINE | Facility: CLINIC | Age: 78
End: 2023-06-02

## 2023-06-02 DIAGNOSIS — N64.4 BREAST PAIN: Primary | ICD-10-CM

## 2023-06-02 NOTE — TELEPHONE ENCOUNTER
----- Message from Hanane Chamorro MA sent at 6/2/2023 11:32 AM CDT -----  Regarding: order request  Pt calling to have a mammogram order due to having discomfort in the right breast. [phone]

## 2023-06-02 NOTE — TELEPHONE ENCOUNTER
Pt states she is having right breast discomfort. Would like mammo.     Diagnostic mammo ordered and right breast us. Linked to breat pain. Please sign if ok.

## 2023-06-09 DIAGNOSIS — E78.5 DYSLIPIDEMIA: ICD-10-CM

## 2023-06-09 RX ORDER — ROSUVASTATIN CALCIUM 5 MG/1
5 TABLET, COATED ORAL NIGHTLY
Qty: 90 TABLET | Refills: 1 | Status: SHIPPED | OUTPATIENT
Start: 2023-06-09

## 2023-06-09 NOTE — TELEPHONE ENCOUNTER
Pt is needing a refill on her rosuvastatin. Last office visit 04/11/2023. Next office visit 10/09/2023

## 2023-06-28 ENCOUNTER — OFFICE VISIT (OUTPATIENT)
Dept: URGENT CARE | Facility: CLINIC | Age: 78
End: 2023-06-28
Payer: MEDICARE

## 2023-06-28 VITALS
BODY MASS INDEX: 23.82 KG/M2 | TEMPERATURE: 98 F | WEIGHT: 143 LBS | HEIGHT: 65 IN | HEART RATE: 71 BPM | RESPIRATION RATE: 16 BRPM | DIASTOLIC BLOOD PRESSURE: 86 MMHG | SYSTOLIC BLOOD PRESSURE: 156 MMHG | OXYGEN SATURATION: 99 %

## 2023-06-28 DIAGNOSIS — R10.2 SUPRAPUBIC DISCOMFORT: Primary | ICD-10-CM

## 2023-06-28 DIAGNOSIS — R35.0 URINARY FREQUENCY: ICD-10-CM

## 2023-06-28 DIAGNOSIS — N30.00 ACUTE CYSTITIS WITHOUT HEMATURIA: ICD-10-CM

## 2023-06-28 LAB
BILIRUB UR QL STRIP: NEGATIVE
GLUCOSE UR QL STRIP: NEGATIVE
KETONES UR QL STRIP: NEGATIVE
LEUKOCYTE ESTERASE UR QL STRIP: POSITIVE
PH, POC UA: 7
POC BLOOD, URINE: NEGATIVE
POC NITRATES, URINE: NEGATIVE
PROT UR QL STRIP: NEGATIVE
SP GR UR STRIP: 1.01 (ref 1–1.03)
UROBILINOGEN UR STRIP-ACNC: ABNORMAL (ref 0.1–1.1)

## 2023-06-28 PROCEDURE — 99204 PR OFFICE/OUTPT VISIT, NEW, LEVL IV, 45-59 MIN: ICD-10-PCS | Mod: S$GLB,,, | Performed by: STUDENT IN AN ORGANIZED HEALTH CARE EDUCATION/TRAINING PROGRAM

## 2023-06-28 PROCEDURE — 81003 URINALYSIS AUTO W/O SCOPE: CPT | Mod: QW,S$GLB,, | Performed by: STUDENT IN AN ORGANIZED HEALTH CARE EDUCATION/TRAINING PROGRAM

## 2023-06-28 PROCEDURE — 99204 OFFICE O/P NEW MOD 45 MIN: CPT | Mod: S$GLB,,, | Performed by: STUDENT IN AN ORGANIZED HEALTH CARE EDUCATION/TRAINING PROGRAM

## 2023-06-28 PROCEDURE — 81003 POCT URINALYSIS, DIPSTICK, AUTOMATED, W/O SCOPE: ICD-10-PCS | Mod: QW,S$GLB,, | Performed by: STUDENT IN AN ORGANIZED HEALTH CARE EDUCATION/TRAINING PROGRAM

## 2023-06-28 RX ORDER — CEPHALEXIN 500 MG/1
500 CAPSULE ORAL EVERY 12 HOURS
Qty: 14 CAPSULE | Refills: 0 | Status: SHIPPED | OUTPATIENT
Start: 2023-06-28 | End: 2023-07-05

## 2023-06-28 NOTE — PROGRESS NOTES
"Subjective:      Patient ID: Jazz Martinez is a 77 y.o. female.    Vitals:  height is 5' 5" (1.651 m) and weight is 64.9 kg (143 lb). Her oral temperature is 97.5 °F (36.4 °C). Her blood pressure is 156/86 (abnormal) and her pulse is 71. Her respiration is 16 and oxygen saturation is 99%.     Chief Complaint: Urinary Tract Infection    Patient is a 77-year-old female with a past medical history of arthritis, GERD, gout, hypertension, hyperlipidemia, DVT, PE, migraines, and overactive bladder who presents to clinic for evaluation of possible UTI.  Patient reports symptoms times nearly a week.  Patient reports no over-the-counter medications for symptoms at this point.  Patient reports history of UTIs.  Patient reports symptoms do feel somewhat similar to that.  Patient states that she has experienced bladder pressure, urinary frequency, urinary urgency.  Patient denies any acute fever or chills, chest pain or shortness of breath, nausea or vomiting, diarrhea or constipation, dysuria, urinary hesitancy or retention, flank pain or pelvic pain, back pain, vaginal discharge or bleeding, vaginal odor, headaches or dizziness.    Urinary Tract Infection   This is a new problem. The current episode started 1 to 4 weeks ago. The problem occurs intermittently. The problem has been unchanged. The quality of the pain is described as aching. Associated symptoms include frequency and urgency. Pertinent negatives include no chills, flank pain, nausea, vomiting, constipation or rash.     Constitution: Negative. Negative for chills, sweating, fatigue and fever.   HENT: Negative.     Neck: neck negative.   Cardiovascular: Negative.  Negative for chest pain and palpitations.   Eyes: Negative.    Respiratory: Negative.  Negative for chest tightness, cough and shortness of breath.    Gastrointestinal:  Positive for abdominal pain (Suprapubic). Negative for nausea, vomiting, constipation and diarrhea.   Endocrine: negative. "   Genitourinary:  Positive for frequency and urgency. Negative for dysuria, urine decreased, flank pain, vaginal discharge, vaginal bleeding, vaginal odor and pelvic pain.   Musculoskeletal: Negative.  Negative for back pain and muscle ache.   Skin: Negative.  Negative for color change, pale, rash and erythema.   Allergic/Immunologic: Negative.    Neurological: Negative.  Negative for dizziness, light-headedness, passing out, headaches, disorientation and altered mental status.   Hematologic/Lymphatic: Negative.    Psychiatric/Behavioral: Negative.  Negative for altered mental status, disorientation and confusion.     Objective:     Physical Exam   Constitutional: She is oriented to person, place, and time. She appears well-developed. She is cooperative.  Non-toxic appearance. She does not appear ill. No distress.   HENT:   Head: Normocephalic and atraumatic.   Ears:   Right Ear: Hearing, tympanic membrane, external ear and ear canal normal.   Left Ear: Hearing, tympanic membrane, external ear and ear canal normal.   Nose: Nose normal. No mucosal edema or nasal deformity. No epistaxis. Right sinus exhibits no maxillary sinus tenderness and no frontal sinus tenderness. Left sinus exhibits no maxillary sinus tenderness and no frontal sinus tenderness.   Mouth/Throat: Uvula is midline, oropharynx is clear and moist and mucous membranes are normal. No trismus in the jaw. Normal dentition. No uvula swelling.   Eyes: Conjunctivae and lids are normal. Pupils are equal, round, and reactive to light.   Neck: Trachea normal and phonation normal. Neck supple.   Cardiovascular: Normal rate, regular rhythm and normal pulses.   Pulmonary/Chest: Effort normal and breath sounds normal. No respiratory distress. She has no wheezes. She has no rhonchi. She has no rales.   Abdominal: Normal appearance and bowel sounds are normal. She exhibits no distension. Soft. There is abdominal tenderness in the suprapubic area. There is no  rebound, no guarding, no left CVA tenderness and no right CVA tenderness.   Musculoskeletal: Normal range of motion.         General: Normal range of motion.   Neurological: She is alert and oriented to person, place, and time. She exhibits normal muscle tone.   Skin: Skin is warm, dry, intact, not diaphoretic, not pale and no rash. Capillary refill takes 2 to 3 seconds. No erythema   Psychiatric: Her speech is normal and behavior is normal. Judgment and thought content normal.   Nursing note and vitals reviewed.    Assessment:     1. Suprapubic discomfort    2. Acute cystitis without hematuria    3. Urinary frequency        Plan:       Suprapubic discomfort  -     POCT Urinalysis, Dipstick, Automated, W/O Scope  -     Urine culture    Acute cystitis without hematuria    Urinary frequency  -     Urine culture    Other orders  -     cephALEXin (KEFLEX) 500 MG capsule; Take 1 capsule (500 mg total) by mouth every 12 (twelve) hours. for 7 days  Dispense: 14 capsule; Refill: 0                Labs:  UA: Negative blood, negative nitrate, positive leukocyte  Urine culture ordered, will call results.    Take medications as prescribed.    Tylenol/Motrin per package instructions for any pain or fever.    Assure adequate hydration.    Follow-up with PCP in 1-2 days.    Return to clinic as needed.    To ED for any new or acutely worsening symptoms.    Patient in agreement plan of care.    DISCLAIMER: Please note that my documentation in this Electronic Healthcare Record was produced using speech recognition software and therefore may contain errors related to that software system.These could include grammar, punctuation and spelling errors or the inclusion/exclusion of phrases that were not intended. Garbled syntax, mangled pronouns, and other bizarre constructions may be attributed to that software system.

## 2023-07-01 LAB
BACTERIA UR CULT: NORMAL
BACTERIA UR CULT: NORMAL

## 2023-07-02 ENCOUNTER — TELEPHONE (OUTPATIENT)
Dept: URGENT CARE | Facility: CLINIC | Age: 78
End: 2023-07-02
Payer: MEDICARE

## 2023-07-02 DIAGNOSIS — R39.9 UTI SYMPTOMS: Primary | ICD-10-CM

## 2023-07-05 ENCOUNTER — TELEPHONE (OUTPATIENT)
Dept: URGENT CARE | Facility: CLINIC | Age: 78
End: 2023-07-05
Payer: MEDICARE

## 2023-07-14 ENCOUNTER — HOSPITAL ENCOUNTER (OUTPATIENT)
Dept: RADIOLOGY | Facility: HOSPITAL | Age: 78
Discharge: HOME | End: 2023-07-14
Attending: PHYSICIAN ASSISTANT
Payer: MEDICARE

## 2023-07-14 DIAGNOSIS — N64.4 BREAST PAIN: ICD-10-CM

## 2023-07-14 PROCEDURE — 77062 BREAST TOMOSYNTHESIS BI: CPT | Mod: TC,PO

## 2023-08-10 ENCOUNTER — OFFICE VISIT (OUTPATIENT)
Dept: FAMILY MEDICINE | Facility: CLINIC | Age: 78
End: 2023-08-10
Payer: MEDICARE

## 2023-08-10 ENCOUNTER — TELEPHONE (OUTPATIENT)
Dept: FAMILY MEDICINE | Facility: CLINIC | Age: 78
End: 2023-08-10

## 2023-08-10 VITALS
DIASTOLIC BLOOD PRESSURE: 84 MMHG | HEART RATE: 68 BPM | SYSTOLIC BLOOD PRESSURE: 136 MMHG | BODY MASS INDEX: 23.99 KG/M2 | WEIGHT: 144 LBS | HEIGHT: 65 IN

## 2023-08-10 DIAGNOSIS — R21 RASH: ICD-10-CM

## 2023-08-10 DIAGNOSIS — R53.83 FATIGUE, UNSPECIFIED TYPE: Primary | ICD-10-CM

## 2023-08-10 DIAGNOSIS — F33.0 MILD EPISODE OF RECURRENT MAJOR DEPRESSIVE DISORDER: ICD-10-CM

## 2023-08-10 DIAGNOSIS — E78.5 DYSLIPIDEMIA: ICD-10-CM

## 2023-08-10 LAB
EKG 12-LEAD: NORMAL
PR INTERVAL: 156
PRT AXES: NORMAL
QRS DURATION: 82
QT/QTC: NORMAL
VENTRICULAR RATE: 58

## 2023-08-10 PROCEDURE — 93000 ELECTROCARDIOGRAM COMPLETE: CPT | Mod: S$GLB,,, | Performed by: NURSE PRACTITIONER

## 2023-08-10 PROCEDURE — 99214 OFFICE O/P EST MOD 30 MIN: CPT | Mod: 25,S$GLB,, | Performed by: NURSE PRACTITIONER

## 2023-08-10 PROCEDURE — 99214 PR OFFICE/OUTPT VISIT, EST, LEVL IV, 30-39 MIN: ICD-10-PCS | Mod: 25,S$GLB,, | Performed by: NURSE PRACTITIONER

## 2023-08-10 PROCEDURE — 93000 POCT EKG 12-LEAD: ICD-10-PCS | Mod: S$GLB,,, | Performed by: NURSE PRACTITIONER

## 2023-08-10 NOTE — TELEPHONE ENCOUNTER
Spoke with pt in regard to message sent. Appointment made for 09/18/2023 @ 3:40 pm. Pt acknowledged understanding.

## 2023-08-10 NOTE — PATIENT INSTRUCTIONS
Sam Verma MD- dermatologist- call to schedule appointment  [address]  [address]  [facility] [address]  Phone: [phone]

## 2023-08-10 NOTE — PROGRESS NOTES
"  SUBJECTIVE:    Patient ID: Jazz Martinez is a 78 y.o. female.    Chief Complaint: Rash (Bilateral shoulders and stomach for a few months and seems like it is spreading has been to a dermatologist, extreme fatigue, went over meds verbally// SW)    This is an 78-year-old female presenting for evaluation of rash and fatigue.     Pt reports has had rash to bilat upper back/shoulders and lower abdomen for approx 3 months. She   Reports she was working in her garden when the rash first developed. Denies pruritus, vesicles or pain to rash She saw dermatology Dr. Hopson in May- was prescribed TAC cream BID, her note states that upper back rash is c/w tumid lupus and will need biopsy if areas do not resolve. Pt reports rash has not changed with TAC cream since that visit but has not followed up with derm.    Pt reports her main concern is significant fatigue she's had over the past couple months and feels it could be related to rash. Pt reports has been caring for her ill brother other the past year- admits this has been very stressful and has kept her very busy. States gets up in the morning and "does everything I need to do" but on days she sleeps in will some days sleep till 1pm or at least feel like she could sleep till 1pm. Has continued to take care of the house and do all her errands. Sleeps good at night though does often have to get up couple times a night to urinate. Denies any fever/chills, wt. Loss, change in appetite, CP, palpitations or SOB.     Hx of depression though doesn't feel this has changed much or had significant worsening. Admits to loss of interest, denies suicidal thoughts/plan          Office Visit on 08/10/2023   Component Date Value Ref Range Status    EKG 12-Lead 08/10/2023 SB   Final    Ventricular Rate 08/10/2023 58   Final    PA Interval 08/10/2023 156   Final    QRS Duration 08/10/2023 82   Final    QT/QTc 08/10/2023 400/392   Final    PRT Axes 08/10/2023 74  -1  99   Final   Office " Visit on 07/02/2023   Component Date Value Ref Range Status    Urine Culture, Routine 07/02/2023 Final report   Final    Result 1 07/02/2023 No growth   Final   Office Visit on 06/28/2023   Component Date Value Ref Range Status    POC Blood, Urine 06/28/2023 Negative  Negative Final    POC Bilirubin, Urine 06/28/2023 Negative  Negative Final    POC Urobilinogen, Urine 06/28/2023 norm  0.1 - 1.1 Final    POC Ketones, Urine 06/28/2023 Negative  Negative Final    POC Protein, Urine 06/28/2023 Negative  Negative Final    POC Nitrates, Urine 06/28/2023 Negative  Negative Final    POC Glucose, Urine 06/28/2023 Negative  Negative Final    pH, UA 06/28/2023 7.0   Final    POC Specific Gravity, Urine 06/28/2023 1.010  1.003 - 1.029 Final    POC Leukocytes, Urine 06/28/2023 Positive (A)  Negative Final    Urine Culture, Routine 06/28/2023 Final report   Final    Result 1 06/28/2023 Comment   Final   Telephone on 03/31/2023   Component Date Value Ref Range Status    WBC 04/10/2023 5.5  3.8 - 10.8 Thousand/uL Final    RBC 04/10/2023 4.56  3.80 - 5.10 Million/uL Final    Hemoglobin 04/10/2023 14.3  11.7 - 15.5 g/dL Final    Hematocrit 04/10/2023 43.2  35.0 - 45.0 % Final    MCV 04/10/2023 94.7  80.0 - 100.0 fL Final    MCH 04/10/2023 31.4  27.0 - 33.0 pg Final    MCHC 04/10/2023 33.1  32.0 - 36.0 g/dL Final    RDW 04/10/2023 13.1  11.0 - 15.0 % Final    Platelets 04/10/2023 223  140 - 400 Thousand/uL Final    MPV 04/10/2023 10.6  7.5 - 12.5 fL Final    Neutrophils, Abs 04/10/2023 3,141  1,500 - 7,800 cells/uL Final    Lymph # 04/10/2023 1,524  850 - 3,900 cells/uL Final    Mono # 04/10/2023 556  200 - 950 cells/uL Final    Eos # 04/10/2023 193  15 - 500 cells/uL Final    Baso # 04/10/2023 88  0 - 200 cells/uL Final    Neutrophils Relative 04/10/2023 57.1  % Final    Lymph % 04/10/2023 27.7  % Final    Mono % 04/10/2023 10.1  % Final    Eosinophil % 04/10/2023 3.5  % Final    Basophil % 04/10/2023 1.6  % Final    Glucose  04/10/2023 79  65 - 99 mg/dL Final    BUN 04/10/2023 19  7 - 25 mg/dL Final    Creatinine 04/10/2023 0.75  0.60 - 1.00 mg/dL Final    eGFR 04/10/2023 81  > OR = 60 mL/min/1.73m2 Final    BUN/Creatinine Ratio 04/10/2023 NOT APPLICABLE  6 - 22 (calc) Final    Sodium 04/10/2023 139  135 - 146 mmol/L Final    Potassium 04/10/2023 4.0  3.5 - 5.3 mmol/L Final    Chloride 04/10/2023 105  98 - 110 mmol/L Final    CO2 04/10/2023 28  20 - 32 mmol/L Final    Calcium 04/10/2023 9.3  8.6 - 10.4 mg/dL Final    Total Protein 04/10/2023 6.5  6.1 - 8.1 g/dL Final    Albumin 04/10/2023 4.2  3.6 - 5.1 g/dL Final    Globulin, Total 04/10/2023 2.3  1.9 - 3.7 g/dL (calc) Final    Albumin/Globulin Ratio 04/10/2023 1.8  1.0 - 2.5 (calc) Final    Total Bilirubin 04/10/2023 0.4  0.2 - 1.2 mg/dL Final    Alkaline Phosphatase 04/10/2023 60  37 - 153 U/L Final    AST 04/10/2023 13  10 - 35 U/L Final    ALT 04/10/2023 8  6 - 29 U/L Final    Cholesterol 04/10/2023 154  <200 mg/dL Final    HDL 04/10/2023 53  > OR = 50 mg/dL Final    Triglycerides 04/10/2023 265 (H)  <150 mg/dL Final    LDL Cholesterol 04/10/2023 66  mg/dL (calc) Final    HDL/Cholesterol Ratio 04/10/2023 2.9  <5.0 (calc) Final    Non HDL Chol. (LDL+VLDL) 04/10/2023 101  <130 mg/dL (calc) Final    Creatinine, Urine 04/10/2023 59  20 - 275 mg/dL Final    Microalb, Ur 04/10/2023 0.3  See Note: mg/dL Final    Microalb/Creat Ratio 04/10/2023 5  <30 mcg/mg creat Final    Color, UA 04/10/2023 YELLOW  YELLOW Final    Appearance, UA 04/10/2023 CLEAR  CLEAR Final    Specific Gravity, UA 04/10/2023 1.013  1.001 - 1.035 Final    pH, UA 04/10/2023 8.5 (H)  5.0 - 8.0 Final    Glucose, UA 04/10/2023 NEGATIVE  NEGATIVE Final    Bilirubin, UA 04/10/2023 NEGATIVE  NEGATIVE Final    Ketones, UA 04/10/2023 NEGATIVE  NEGATIVE Final    Occult Blood UA 04/10/2023 NEGATIVE  NEGATIVE Final    Protein, UA 04/10/2023 NEGATIVE  NEGATIVE Final    Nitrite, UA 04/10/2023 NEGATIVE  NEGATIVE Final    Leukocytes,  How Severe Are Your Spot(S)?: mild UA 04/10/2023 NEGATIVE  NEGATIVE Final    WBC Casts, UA 04/10/2023 NONE SEEN  < OR = 5 /HPF Final    RBC Casts, UA 04/10/2023 NONE SEEN  < OR = 2 /HPF Final    Squam Epithel, UA 04/10/2023 NONE SEEN  < OR = 5 /HPF Final    Bacteria, UA 04/10/2023 NONE SEEN  NONE SEEN /HPF Final    Hyaline Casts, UA 04/10/2023 NONE SEEN  NONE SEEN /LPF Final    Service Cmt: 04/10/2023    Final    Reflexive Urine Culture 04/10/2023    Final    TSH w/reflex to FT4 04/10/2023 1.71  0.40 - 4.50 mIU/L Final       Past Medical History:   Diagnosis Date    Arthritis     Corns and callosities     Deep vein thrombosis     GERD (gastroesophageal reflux disease)     Gout     H/O bladder problems     History of colonic polyps 2014    Hyperlipidemia     Hypertension     Pulmonary embolism      Social History     Socioeconomic History    Marital status:    Tobacco Use    Smoking status: Never     Passive exposure: Never    Smokeless tobacco: Never   Substance and Sexual Activity    Alcohol use: No    Drug use: No    Sexual activity: Never     Social Determinants of Health     Financial Resource Strain: Medium Risk (9/22/2020)    Overall Financial Resource Strain (CARDIA)     Difficulty of Paying Living Expenses: Somewhat hard   Food Insecurity: No Food Insecurity (12/28/2022)    Hunger Vital Sign     Worried About Running Out of Food in the Last Year: Never true     Ran Out of Food in the Last Year: Never true   Transportation Needs: No Transportation Needs (12/28/2022)    PRAPARE - Transportation     Lack of Transportation (Medical): No     Lack of Transportation (Non-Medical): No   Physical Activity: Unknown (12/28/2022)    Exercise Vital Sign     Days of Exercise per Week: Patient refused     Minutes of Exercise per Session: Patient refused   Stress: Unknown (12/28/2022)    [employer] [place] [employer] - Occupational Stress Questionnaire     Feeling of Stress : Patient refused   Social Connections: Unknown (12/28/2022)     What Type Of Note Output Would You Prefer (Optional)?: Standard Output Social Connection and Isolation Panel [NHANES]     Frequency of Communication with Friends and Family: Patient refused     Frequency of Social Gatherings with Friends and Family: Patient refused     Attends [employer] Services: Patient refused     Active Member of Clubs or Organizations: Patient refused     Attends Club or Organization Meetings: Patient refused   Housing Stability: Unknown (12/28/2022)    Housing Stability Vital Sign     Unable to Pay for Housing in the Last Year: Patient refused     Unstable Housing in the Last Year: No     Past Surgical History:   Procedure Laterality Date    APPENDECTOMY  AGE 12    BLADDER SURGERY      Twice    BREAST CYST EXCISION      Benign per patient    CYSTOSCOPY N/A 1/26/2022    Procedure: CYSTOSCOPY;  Surgeon: Sánchze Salcido MD;  Location: [address];  Service: Urology;  Laterality: N/A;    CYSTOSCOPY  4/12/2022    Procedure: CYSTOSCOPY;  Surgeon: Sánchez Salcido MD;  Location: [address];  Service: Urology;;    EYE SURGERY      LASER FOR GLAUCOMA    HAND SURGERY Left     HYSTERECTOMY      Not related to cancer per patient.    INJECTION OF BOTULINUM TOXIN TYPE A  1/26/2022    Procedure: INJECTION, BOTULINUM TOXIN, 100units;  Surgeon: Sánchez Salcido MD;  Location: [address];  Service: Urology;;    INJECTION OF BOTULINUM TOXIN TYPE A  4/12/2022    Procedure: INJECTION, BOTULINUM TOXIN  100units;  Surgeon: Sánchez Salcido MD;  Location: [address];  Service: Urology;;    OOPHORECTOMY      ROBOT-ASSISTED LAPAROSCOPIC ABDOMINAL SACROCOLPOPEXY USING DA KINGA XI N/A 12/17/2019    Procedure: XI ROBOTIC SACROCOLPOPEXY, ABDOMINAL;  Surgeon: Javier Putnam MD;  Location: [facility];  Service: OB/GYN;  Laterality: N/A;  removal of damaged tissue    SURGICAL PROCEDURE FOR STRESS INCONTINENCE USING TENSION FREE VAGINAL TAPE N/A 12/17/2019    Procedure: SURGICAL PROCEDURE, USING TENSION FREE VAGINAL TAPE, FOR STRESS INCONTINENCE;  Surgeon: Javier  What Is The Reason For Today's Visit?: Full Body Skin Examination "MD [place];  Location: [facility];  Service: OB/GYN;  Laterality: N/A;    TONSILLECTOMY       Family History   Problem Relation Age of Onset    Cancer Brother         bladder    Breast cancer Neg Hx     Colon cancer Neg Hx     Ovarian cancer Neg Hx     Eczema Neg Hx     Lupus Neg Hx     Psoriasis Neg Hx     Melanoma Neg Hx     Anesthesia problems Neg Hx        Review of patient's allergies indicates:   Allergen Reactions    Gabapentin     Nitrofurantoin Other (See Comments)     Deathly ill , headaches, weakness, 'wiped out"    Sulfa (sulfonamide antibiotics)        Current Outpatient Medications:     aspirin 81 MG Chew, Take 1 tablet (81 mg total) by mouth once daily., Disp: 90 tablet, Rfl: 0    atorvastatin calcium (ATORVASTATIN ORAL), atorvastatin, Disp: , Rfl:     calcium carbonate/vitamin D3 (VITAMIN D-3 ORAL), Take 1 tablet by mouth once daily., Disp: , Rfl:     docusate calcium (SURFAK) 240 mg capsule, Take 240 mg by mouth 2 (two) times daily., Disp: , Rfl:     estradioL (ESTRACE) 0.01 % (0.1 mg/gram) vaginal cream, INSERT 1 GM VAGINALLY MONDAY,WEDNESDAY,AND FRIDAY AS DIRECTED, Disp: 42.5 g, Rfl: 7    fluticasone propionate (FLONASE) 50 mcg/actuation nasal spray, 1 spray (50 mcg total) by Each Nostril route once daily., Disp: 16 g, Rfl: 2    rosuvastatin (CRESTOR) 5 MG tablet, Take 1 tablet (5 mg total) by mouth every evening., Disp: 90 tablet, Rfl: 1    sertraline (ZOLOFT) 100 MG tablet, Take 1.5 tablets (150 mg total) by mouth once daily., Disp: 135 tablet, Rfl: 3    sumatriptan (IMITREX) 100 MG tablet, 1 tab po at start of headache, may repeat in 2 hours X1 in 24 hours. (Patient taking differently: as needed. 1 tab po at start of headache, may repeat in 2 hours X1 in 24 hours.), Disp: 10 tablet, Rfl: 11    topiramate (TOPAMAX) 50 MG tablet, Take 1 tablet (50 mg total) by mouth 2 (two) times daily., Disp: 180 tablet, Rfl: 1    traMADol (ULTRAM) 50 mg tablet, Take 50 mg by mouth every 8 (eight) hours as " What Is The Reason For Today's Visit? (Being Monitored For X): concerning skin lesions on an annual basis "needed for Pain., Disp: , Rfl: 5    triamcinolone acetonide 0.1% (KENALOG) 0.1 % ointment, Apply twice daily to affected areas of skin until improved, then can stop and restart as needed, Disp: 80 g, Rfl: 1    triamterene-hydrochlorothiazide 37.5-25 mg (MAXZIDE-25) 37.5-25 mg per tablet, 1 tablet daily as needed for swelling (Patient taking differently: Take 1 tablet by mouth daily as needed. 1 tablet daily as needed for swelling), Disp: 30 tablet, Rfl: 0    valACYclovir (VALTREX) 500 MG tablet, Take 2 tablets (1,000 mg total) by mouth 2 (two) times daily. Prn fever blisters, Disp: 30 tablet, Rfl: 3    Review of Systems   Constitutional:  Positive for fatigue. Negative for appetite change, chills, fever and unexpected weight change.   HENT:  Negative for congestion, sore throat and trouble swallowing.    Eyes:  Negative for visual disturbance.   Respiratory:  Negative for cough, chest tightness and shortness of breath.    Cardiovascular:  Negative for chest pain, palpitations and leg swelling.   Gastrointestinal:  Negative for abdominal distention, abdominal pain, constipation, diarrhea, nausea and vomiting.   Endocrine: Negative for cold intolerance and heat intolerance.   Genitourinary:  Negative for difficulty urinating, dysuria, flank pain and hematuria.   Musculoskeletal:  Negative for arthralgias and back pain.   Skin:  Positive for rash (see HPI).   Neurological:  Negative for dizziness, syncope, speech difficulty, weakness and headaches.   Psychiatric/Behavioral:  Positive for dysphoric mood (stable). Negative for self-injury, sleep disturbance and suicidal ideas. The patient is not nervous/anxious.           Objective:      Vitals:    08/10/23 1032   BP: 136/84   Pulse: 68   Weight: 65.3 kg (144 lb)   Height: 5' 5" (1.651 m)     Physical Exam  Constitutional:       General: She is not in acute distress.     Appearance: Normal appearance. She is well-developed.   HENT:      Head: Normocephalic and " atraumatic.   Neck:      Thyroid: No thyromegaly.      Vascular: No carotid bruit.   Cardiovascular:      Rate and Rhythm: Normal rate and regular rhythm.      Heart sounds: No murmur heard.  Pulmonary:      Effort: Pulmonary effort is normal. No respiratory distress.      Breath sounds: Normal breath sounds.   Abdominal:      General: Bowel sounds are normal. There is no distension.      Palpations: Abdomen is soft.      Tenderness: There is no abdominal tenderness.   Musculoskeletal:      Cervical back: Neck supple.      Right lower leg: No edema.      Left lower leg: No edema.   Lymphadenopathy:      Cervical: No cervical adenopathy.   Skin:     General: Skin is warm and dry.      Findings: Lesion and rash present. Rash is not purpuric, pustular, urticarial or vesicular.             Comments: Bilat shoulder/upper back with erythematous, slightly serpingous lesions, minimally raised.  Diffuse faintly erythematous macular lesions to bilat lower/lateral abdomen.   Neurological:      General: No focal deficit present.      Mental Status: She is alert and oriented to person, place, and time.      Cranial Nerves: No cranial nerve deficit.   Psychiatric:         Mood and Affect: Mood normal.       Left abdomen    Right shoulder      Assessment:       1. Fatigue, unspecified type    2. Rash    3. Dyslipidemia         Plan:       Fatigue, unspecified type  -c/oing of fatigue for several months, no exertional component and actually stays quite busy during the day caring for her brother and household, errands, etc. No CP or SOB. EKG today without acute changes. Will check some labs and discussed possible link with rash and fatigue if this is lupus related?  -     CBC Auto Differential; Future; Expected date: 08/10/2023  -     Comprehensive Metabolic Panel; Future; Expected date: 08/10/2023  -     TSH w/reflex to FT4; Future; Expected date: 08/10/2023  -     Urinalysis, Reflex to Urine Culture Urine, Clean Catch; Future;  Expected date: 08/10/2023  -     POCT EKG 12-LEAD (NOT FOR [facility] USE)    Rash  -pt advised I recommend she f/u with derm, may need biopsy- she's requesting local derm referral  -     Sedimentation rate; Future; Expected date: 08/10/2023  -     CHARLENE Screen w/Reflex; Future; Expected date: 08/10/2023  -     Cyclic citrul peptide antibody, IgG; Future; Expected date: 08/10/2023  -     Rheumatoid Factor; Future; Expected date: 08/10/2023  -     C-reactive protein; Future; Expected date: 08/10/2023  -     Ambulatory referral/consult to Dermatology; Future; Expected date: 08/17/2023    Dyslipidemia   -TG slightly elevated on last labs, LDL controlled    Mild episode of recurrent major depressive disorder   -hx of chronic depression though pt declines worsneing depression as possible cause of fatigue c/os      Follow up in about 3 weeks (around 8/31/2023) for fatigue, labs to be drawn today.        8/10/2023 Elle Mack

## 2023-08-10 NOTE — TELEPHONE ENCOUNTER
----- Message from Randa Wilson sent at 8/10/2023 12:00 PM CDT -----  Pt needs to schedule a 3 week f/u.  [phone]

## 2023-08-12 LAB
ALBUMIN SERPL-MCNC: 4.5 G/DL (ref 3.6–5.1)
ALBUMIN/GLOB SERPL: 2.1 (CALC) (ref 1–2.5)
ALP SERPL-CCNC: 57 U/L (ref 37–153)
ALT SERPL-CCNC: 6 U/L (ref 6–29)
ANA PAT SER IF-IMP: ABNORMAL
ANA PAT SER IF-IMP: ABNORMAL
ANA SER QL IF: POSITIVE
ANA TITR SER IF: ABNORMAL TITER
ANA TITR SER IF: ABNORMAL TITER
AST SERPL-CCNC: 15 U/L (ref 10–35)
BASOPHILS # BLD AUTO: 121 CELLS/UL (ref 0–200)
BASOPHILS NFR BLD AUTO: 1.8 %
BILIRUB SERPL-MCNC: 0.5 MG/DL (ref 0.2–1.2)
BUN SERPL-MCNC: 27 MG/DL (ref 7–25)
BUN/CREAT SERPL: 34 (CALC) (ref 6–22)
CALCIUM SERPL-MCNC: 9.3 MG/DL (ref 8.6–10.4)
CCP IGG SERPL-ACNC: <16 UNITS
CHLORIDE SERPL-SCNC: 104 MMOL/L (ref 98–110)
CO2 SERPL-SCNC: 28 MMOL/L (ref 20–32)
CREAT SERPL-MCNC: 0.8 MG/DL (ref 0.6–0.95)
CRP SERPL-MCNC: 1 MG/L
EGFR: 74 ML/MIN/1.73M2
EOSINOPHIL # BLD AUTO: 194 CELLS/UL (ref 15–500)
EOSINOPHIL NFR BLD AUTO: 2.9 %
ERYTHROCYTE [DISTWIDTH] IN BLOOD BY AUTOMATED COUNT: 13.2 % (ref 11–15)
ERYTHROCYTE [SEDIMENTATION RATE] IN BLOOD BY WESTERGREN METHOD: 2 MM/H
GLOBULIN SER CALC-MCNC: 2.1 G/DL (CALC) (ref 1.9–3.7)
GLUCOSE SERPL-MCNC: 81 MG/DL (ref 65–99)
HCT VFR BLD AUTO: 43.4 % (ref 35–45)
HGB BLD-MCNC: 14.1 G/DL (ref 11.7–15.5)
LYMPHOCYTES # BLD AUTO: 1963 CELLS/UL (ref 850–3900)
LYMPHOCYTES NFR BLD AUTO: 29.3 %
MCH RBC QN AUTO: 31 PG (ref 27–33)
MCHC RBC AUTO-ENTMCNC: 32.5 G/DL (ref 32–36)
MCV RBC AUTO: 95.4 FL (ref 80–100)
MONOCYTES # BLD AUTO: 596 CELLS/UL (ref 200–950)
MONOCYTES NFR BLD AUTO: 8.9 %
NEUTROPHILS # BLD AUTO: 3826 CELLS/UL (ref 1500–7800)
NEUTROPHILS NFR BLD AUTO: 57.1 %
PLATELET # BLD AUTO: 213 THOUSAND/UL (ref 140–400)
PMV BLD REES-ECKER: 10.6 FL (ref 7.5–12.5)
POTASSIUM SERPL-SCNC: 4.2 MMOL/L (ref 3.5–5.3)
PROT SERPL-MCNC: 6.6 G/DL (ref 6.1–8.1)
RBC # BLD AUTO: 4.55 MILLION/UL (ref 3.8–5.1)
RHEUMATOID FACT SERPL-ACNC: <14 IU/ML
SODIUM SERPL-SCNC: 139 MMOL/L (ref 135–146)
TSH SERPL-ACNC: 2.15 MIU/L (ref 0.4–4.5)
WBC # BLD AUTO: 6.7 THOUSAND/UL (ref 3.8–10.8)

## 2023-08-14 ENCOUNTER — TELEPHONE (OUTPATIENT)
Dept: FAMILY MEDICINE | Facility: CLINIC | Age: 78
End: 2023-08-14

## 2023-08-14 DIAGNOSIS — R76.8 POSITIVE ANA (ANTINUCLEAR ANTIBODY): Primary | ICD-10-CM

## 2023-08-14 NOTE — TELEPHONE ENCOUNTER
----- Message from Elle Mack NP sent at 8/13/2023  7:57 PM CDT -----  Please call patient and let her know recent labs show blood sugar, kidney, liver, thyroid and blood count all within normal range.  One of the rheumatology labs ordered, CHARLENE is weakly positive at 1:40 though all other rheumatology labs and markers of inflammation were negative.  Hard to say the significance of this weakly positive CHARLENE though she would need to see Rheumatology for further testing.  It can be difficult to make new pt appt with   rheumatologist- options include Dr. Kumar in [place], [facility]ology in [place], [place] or Dr. Moulton in [place], [place].

## 2023-08-14 NOTE — TELEPHONE ENCOUNTER
Spoke to pt verbatim per Elle. Pt voiced  understanding.   Pt agreeable to referral for [facility].

## 2023-08-15 ENCOUNTER — TELEPHONE (OUTPATIENT)
Dept: FAMILY MEDICINE | Facility: CLINIC | Age: 78
End: 2023-08-15

## 2023-08-15 NOTE — TELEPHONE ENCOUNTER
I really don't know any of the rheumatologists in [place]- these are just names Dr. Bailon, rheumatologist was giving his patients when he retired last year

## 2023-08-15 NOTE — TELEPHONE ENCOUNTER
----- Message from Vanessa Lew sent at 8/15/2023  2:33 PM CDT -----  Pt returning a callback from [facility] please follow up [phone]

## 2023-08-15 NOTE — TELEPHONE ENCOUNTER
Spoke with pt in regards to recent message sent. Verbalized per Elle that she does not know any of the rheumatologists in [place]- these are just names Dr. Bailon, rheumatologist was giving his patients when he retired last year. Pt acknowledged understanding.

## 2023-08-15 NOTE — TELEPHONE ENCOUNTER
----- Message from Vanessa Lew sent at 8/15/2023 10:15 AM CDT -----  Pt calling back in regard to speaking with a nurse from our office abut her lab results and possibly being referred to Dr. Kumar Please advise    [phone]

## 2023-08-15 NOTE — TELEPHONE ENCOUNTER
Spoke with pt. Dr. Kumar is not seeing new patients at this time. Out of the 2 in [place] who does omaira prefer?

## 2023-08-16 ENCOUNTER — PATIENT MESSAGE (OUTPATIENT)
Dept: FAMILY MEDICINE | Facility: CLINIC | Age: 78
End: 2023-08-16

## 2023-08-17 ENCOUNTER — TELEPHONE (OUTPATIENT)
Dept: RHEUMATOLOGY | Facility: CLINIC | Age: 78
End: 2023-08-17
Payer: MEDICARE

## 2023-08-17 NOTE — TELEPHONE ENCOUNTER
----- Message from Ruth Ann Garrett sent at 8/16/2023 12:20 PM CDT -----  Contact: Patient  Type:  Sooner Appointment Request    Caller is requesting a sooner appointment.  Caller declined first available appointment listed below.  Caller will not accept being placed on the waitlist and is requesting a message be sent to doctor.    Name of Caller:  Pt   When is the first available appointment?  na  Symptoms:  has referral for Dr. José Amado Call Back Number:  [phone]  Additional Information: Please call the pt back to advise. Thanks!

## 2023-09-08 NOTE — PROGRESS NOTES
"Subjective:      Patient ID: Jazz Martinez is a 78 y.o. female.    Chief Complaint: Disease Management    HPI    Rheumatologic History:     - Diagnosis/es:    - Erosive osteoarthritis  - Family History: No autoimmune conditions  - Gyn History:  (100)  - Positive serologies: +CHARLENE 1:40 nuclear, homogenous, and cytoplasmic  - Negative serologies: RF  - Infectious screening labs: -  - Imaging:   - Xray hands (2021) Severe degenerative change and joint space narrowing at the carpal 1st metacarpal joint.  Scattered degenerative changes elsewhere more so distal than proximal interphalangeal joints and also fairly severe at the distal interphalangeal joint of the index finger and the interphalangeal joint of the thumb..  No acute fracture or dislocation.  The skeletal structures are osteopenic.  - Previous Treatments:    - Turmeric  - Current Treatments:    - Tramadol  Interval History:   This is an 78-year-old woman with history of migraines, lumbar radiculopathy, depression, HLD, HTN, GERD, esophageal stricture, colitis (2022 patient had diarrhea and bloody stool; colonoscopy normal , treated with antibiotics), DVT ( after "vascular work"; and in  after bladder surgery) previously in [facility] not currently on AC, osteoarthritis, she was referred to rheumatology for fatigue, rash, and a positive CHARLENE. About 6 months ago, she developed a diffuse rash while caring for her brother. She saw Dr Hopson (Dermatology) who was not sure what was causing it and called it dermatitis. She was advised to return in 8 weeks if it did not resolve, but did not follow up and the rash has worsened. She then developed fatigue, and 2 weeks ago she had recurrence of her abdominal pain and diarrhea but it resolved spontaneously. She reports dry eyes, photosensitivity, arthralgia in the hands and occasionally the knees, and poor dexterity.    Currently, the patient denies fevers, patchy alopecia, oral/nasal ulcers, dry " "mouth, dysphagia, ear infections, sinus infections, joint swelling, pleuritic chest pain, shortness of breath, weakness, numbness/tingling, and Raynaud's.     Objective:   BP (!) 149/78   Pulse 78   Ht 5' 5" (1.651 m)   Wt 65 kg (143 lb 4.8 oz)   BMI 23.85 kg/m²   Physical Exam   Constitutional: normal appearance.   HENT:   Head: Normocephalic and atraumatic.   Cardiovascular: Normal rate, regular rhythm and normal heart sounds.   Pulmonary/Chest: Effort normal and breath sounds normal.   Musculoskeletal:      Comments: +Heberden's nodes, bilateral CMC squaring  No synovitis, dactylitis, enthesitis, effusions     Neurological: She is alert.   Skin: Skin is warm and dry. Rash noted.       Right Side Rheumatological Exam     Muscle Strength (0-5 scale):  Deltoid:  5  Biceps: 5/5   Triceps:  5  : 5/5   Iliopsoas: 5  Quadriceps:  5   Distal Lower Extremity: 5    Left Side Rheumatological Exam     Muscle Strength (0-5 scale):  Deltoid:  5  Biceps: 5/5   Triceps:  5  :  5/5   Iliopsoas: 5  Quadriceps:  5   Distal Lower Extremity: 5        No data to display                           Labs independently reviewed by me (08/10/2023)   CBC WBC, HGB, PLT WNL  CMP Cr, LFTs WNL  ESR WNL  CRP WNL  UA UPC (4/2023) WNL    Assessment:     1. Positive CHARLENE (antinuclear antibody)    2. Rash    3. Fatigue, unspecified type    4. Unintentional weight loss    5. Primary osteoarthritis involving multiple joints    6. Unexplained night sweats      This is an 78-year-old woman with history of migraines, lumbar radiculopathy, depression, HLD, HTN, GERD, esophageal stricture, colitis (12/2022 patient had diarrhea and bloody stool; colonoscopy normal 2022, treated with antibiotics), DVT (2021 after "vascular work"; and in 2013 after bladder surgery) previously in [place] not currently on AC, osteoarthritis, she was referred to rheumatology for fatigue, rash, and a positive CHARLENE 1:40. She presents with a photosensitive rash over her " chest, back, abdomen, and groin, fatigue, dry eyes, photosensitivity, arthralgia in the hands and occasionally the knees, and poor dexterity. Physical examination shows a rash and degenerative changes in the hands. The rash does look like SCLE but she will need a biopsy. She may have cutaneous lupus, but I do not find any objective features of systemic lupus and her CHARLENE is only 1:40 and does not meet criteria. I will obtain the remaining lupus labs to confirm. In terms of her fatigue, night sweats, and unintentional weight loss, I will refer her to Hematology.     Plan:     Problem List Items Addressed This Visit    None  Visit Diagnoses       Positive CHARLENE (antinuclear antibody)    -  Primary    Relevant Orders    CHARLENE Screen w/Reflex    Complement, Total    C4 COMPLEMENT    C3 COMPLEMENT    DRVVT    Cardiolipin antibody    Beta-2 Glycoprotein Abs (IgA, IgG, IgM)    Sjogrens syndrome-A extractable nuclear antibody    Direct antiglobulin test    Rash        Relevant Orders    CHARLENE Screen w/Reflex    Complement, Total    C4 COMPLEMENT    C3 COMPLEMENT    DRVVT    Cardiolipin antibody    Beta-2 Glycoprotein Abs (IgA, IgG, IgM)    Sjogrens syndrome-A extractable nuclear antibody    Direct antiglobulin test    Fatigue, unspecified type        Relevant Orders    Ambulatory referral/consult to Physical/Occupational Therapy    Ambulatory referral/consult to Hematology / Oncology    CHARLENE Screen w/Reflex    Complement, Total    C4 COMPLEMENT    C3 COMPLEMENT    DRVVT    Cardiolipin antibody    Beta-2 Glycoprotein Abs (IgA, IgG, IgM)    Sjogrens syndrome-A extractable nuclear antibody    Direct antiglobulin test    Unintentional weight loss        Relevant Orders    Ambulatory referral/consult to Physical/Occupational Therapy    Ambulatory referral/consult to Hematology / Oncology    CHARLENE Screen w/Reflex    Complement, Total    C4 COMPLEMENT    C3 COMPLEMENT    DRVVT    Cardiolipin antibody    Beta-2 Glycoprotein Abs (IgA, IgG,  IgM)    Sjogrens syndrome-A extractable nuclear antibody    Direct antiglobulin test    Primary osteoarthritis involving multiple joints        Unexplained night sweats              1.) Rash, positive CHARLENE, history of DVT  - check remaining lupus labs  - Patient to return to derm for skin biopsy     2.) Night sweats, unintentional weight loss, fatigue  - Refer to Hematology    3.) OA  - paraffin wax baths  - OT    Follow up in 6 months, or sooner if necessary    65 minutes of total time spent on the encounter, which includes face to face time and non-face to face time preparing to see the patient (eg, review of tests), Obtaining and/or reviewing separately obtained history, Documenting clinical information in the electronic or other health record, Independently interpreting results (not separately reported) and communicating results to the patient/family/caregiver, or Care coordination (not separately reported).       Bettye Desai M.D.  Rheumatology Dept  [place], [place]

## 2023-09-11 ENCOUNTER — OFFICE VISIT (OUTPATIENT)
Dept: RHEUMATOLOGY | Facility: CLINIC | Age: 78
End: 2023-09-11
Payer: MEDICARE

## 2023-09-11 VITALS
HEART RATE: 78 BPM | SYSTOLIC BLOOD PRESSURE: 149 MMHG | DIASTOLIC BLOOD PRESSURE: 78 MMHG | BODY MASS INDEX: 23.88 KG/M2 | WEIGHT: 143.31 LBS | HEIGHT: 65 IN

## 2023-09-11 DIAGNOSIS — R63.4 UNINTENTIONAL WEIGHT LOSS: ICD-10-CM

## 2023-09-11 DIAGNOSIS — R76.8 POSITIVE ANA (ANTINUCLEAR ANTIBODY): Primary | ICD-10-CM

## 2023-09-11 DIAGNOSIS — R61 UNEXPLAINED NIGHT SWEATS: ICD-10-CM

## 2023-09-11 DIAGNOSIS — R21 RASH: ICD-10-CM

## 2023-09-11 DIAGNOSIS — R53.83 FATIGUE, UNSPECIFIED TYPE: ICD-10-CM

## 2023-09-11 DIAGNOSIS — M15.9 PRIMARY OSTEOARTHRITIS INVOLVING MULTIPLE JOINTS: ICD-10-CM

## 2023-09-11 PROCEDURE — 99999 PR PBB SHADOW E&M-EST. PATIENT-LVL V: CPT | Mod: PBBFAC,,, | Performed by: STUDENT IN AN ORGANIZED HEALTH CARE EDUCATION/TRAINING PROGRAM

## 2023-09-11 PROCEDURE — 99205 OFFICE O/P NEW HI 60 MIN: CPT | Mod: S$PBB,,, | Performed by: STUDENT IN AN ORGANIZED HEALTH CARE EDUCATION/TRAINING PROGRAM

## 2023-09-11 PROCEDURE — 99215 OFFICE O/P EST HI 40 MIN: CPT | Mod: PBBFAC,PN | Performed by: STUDENT IN AN ORGANIZED HEALTH CARE EDUCATION/TRAINING PROGRAM

## 2023-09-11 PROCEDURE — 99999 PR PBB SHADOW E&M-EST. PATIENT-LVL V: ICD-10-PCS | Mod: PBBFAC,,, | Performed by: STUDENT IN AN ORGANIZED HEALTH CARE EDUCATION/TRAINING PROGRAM

## 2023-09-11 PROCEDURE — 99205 PR OFFICE/OUTPT VISIT, NEW, LEVL V, 60-74 MIN: ICD-10-PCS | Mod: S$PBB,,, | Performed by: STUDENT IN AN ORGANIZED HEALTH CARE EDUCATION/TRAINING PROGRAM

## 2023-09-11 ASSESSMENT — ROUTINE ASSESSMENT OF PATIENT INDEX DATA (RAPID3)
PAIN SCORE: 2.2
FATIGUE SCORE: 2.2
PSYCHOLOGICAL DISTRESS SCORE: 8
PATIENT GLOBAL ASSESSMENT SCORE: 8
TOTAL RAPID3 SCORE: 4.62
MDHAQ FUNCTION SCORE: 1.1

## 2023-09-14 ENCOUNTER — TELEPHONE (OUTPATIENT)
Dept: RHEUMATOLOGY | Facility: CLINIC | Age: 78
End: 2023-09-14
Payer: MEDICARE

## 2023-09-14 NOTE — TELEPHONE ENCOUNTER
Patient states she could not have labs done at [facility] today due to orders say external. Will route to provider to send orders to [facility].

## 2023-09-14 NOTE — TELEPHONE ENCOUNTER
----- Message from Donnell Hough sent at 9/14/2023 11:34 AM CDT -----  Type: Needs Medical Advice  Who Called:  pt  Best Call Back Number: [phone]  Additional Information: pt states she is at [facility] to do her blood work and they told her it say external and not [facility], pl call bk to advise thanks

## 2023-09-15 ENCOUNTER — TELEPHONE (OUTPATIENT)
Dept: RHEUMATOLOGY | Facility: CLINIC | Age: 78
End: 2023-09-15
Payer: MEDICARE

## 2023-09-15 DIAGNOSIS — R76.8 POSITIVE ANA (ANTINUCLEAR ANTIBODY): Primary | ICD-10-CM

## 2023-09-20 ENCOUNTER — TELEPHONE (OUTPATIENT)
Dept: FAMILY MEDICINE | Facility: CLINIC | Age: 78
End: 2023-09-20

## 2023-09-20 NOTE — TELEPHONE ENCOUNTER
----- Message from Genevieve Magaña sent at 9/20/2023  9:29 AM CDT -----  Lubna with [facility] called and stated that she sent over a plan of care on 09/14/23 and they have not received it back as of yet and she was checking the status. Please give her a call at [phone] and her fax # [phone]

## 2023-09-20 NOTE — TELEPHONE ENCOUNTER
Spoke to Lubna with [facility] and let her know we have not received it. Lubna is refaxing form over. Confirmed fax number with Lubna

## 2023-09-28 LAB
ANA PAT SER IF-IMP: ABNORMAL
ANA SER QL IF: POSITIVE
ANA TITR SER IF: ABNORMAL TITER
B2 GLYCOPROT1 IGA SER-ACNC: <2 U/ML
B2 GLYCOPROT1 IGG SER-ACNC: <2 U/ML
B2 GLYCOPROT1 IGM SER-ACNC: <2 U/ML
C3 SERPL-MCNC: 133 MG/DL (ref 83–193)
C4 SERPL-MCNC: 32 MG/DL (ref 15–57)
CARDIOLIPIN IGA SER IA-ACNC: <2 APL-U/ML
CARDIOLIPIN IGG SER IA-ACNC: <2 GPL-U/ML
CARDIOLIPIN IGM SER IA-ACNC: <2 MPL-U/ML
DAT POLY-SP REAG RBC QL: NEGATIVE
ENA SS-A AB SER IA-ACNC: NORMAL AI
LA 2 SCREEN W REFLEX-IMP: NORMAL
PS-PROTHROM CMPLX IGG SERPL IA-ACNC: <9 U
PS-PROTHROM CMPLX IGM SERPL IA-ACNC: 9 U
SCREEN APTT: 32 SECONDS
SCREEN DRVVT: 40 SECONDS
SERVICE CMNT-IMP: NORMAL

## 2023-10-03 ENCOUNTER — PATIENT MESSAGE (OUTPATIENT)
Dept: RHEUMATOLOGY | Facility: CLINIC | Age: 78
End: 2023-10-03
Payer: MEDICARE

## 2023-10-09 ENCOUNTER — OFFICE VISIT (OUTPATIENT)
Dept: FAMILY MEDICINE | Facility: CLINIC | Age: 78
End: 2023-10-09
Payer: MEDICARE

## 2023-10-09 VITALS
BODY MASS INDEX: 24.24 KG/M2 | HEART RATE: 63 BPM | SYSTOLIC BLOOD PRESSURE: 130 MMHG | OXYGEN SATURATION: 96 % | HEIGHT: 65 IN | WEIGHT: 145.5 LBS | DIASTOLIC BLOOD PRESSURE: 72 MMHG

## 2023-10-09 DIAGNOSIS — E78.5 DYSLIPIDEMIA: ICD-10-CM

## 2023-10-09 DIAGNOSIS — L92.0 GRANULOMA ANNULARE: Primary | ICD-10-CM

## 2023-10-09 DIAGNOSIS — R61 NIGHT SWEATS: ICD-10-CM

## 2023-10-09 DIAGNOSIS — F33.0 MILD EPISODE OF RECURRENT MAJOR DEPRESSIVE DISORDER: ICD-10-CM

## 2023-10-09 PROCEDURE — 99214 PR OFFICE/OUTPT VISIT, EST, LEVL IV, 30-39 MIN: ICD-10-PCS | Mod: S$GLB,,, | Performed by: NURSE PRACTITIONER

## 2023-10-09 PROCEDURE — 99214 OFFICE O/P EST MOD 30 MIN: CPT | Mod: S$GLB,,, | Performed by: NURSE PRACTITIONER

## 2023-10-09 NOTE — PROGRESS NOTES
SUBJECTIVE:    Patient ID: Jazz Martinez is a 78 y.o. female.    Chief Complaint: Follow-up (No bottles//Pt is here for a 6 month check up//decline flu vaccine//ASHA )    Pt here for regular f/u- fatigue c/o's and rash    Since last visit has seen Dr. Kumar, rheumatology and Dr. Hopson derm for the rash and +CHARLENE. 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 Reports has continued with the rash to back and abdomen but it hasn't really gotten any worse. Denies any pruritus. Dr. Kumar had ordered further rheumatology labs and also referred her to hematology d/t c/o's of fatigue, night sweats and wt loss- pt states never heard anything about scheduling appt and doesn't think she needs to see hem-onc    Pt reports she's actually been feeling really good the past week or so. Reports energy level seems better. Appetite is good, no wt loss. Reports still having some night sweats, has been occurring for over a year- reports night sweats are similar to hot flashes she had years ago with menopause. Denies cough, sputum production, CP or SOB.     Dr. Kumar ordered PT and pt reports that seems to really be helping    Hx of depression though doesn't feel this has changed much or had significant worsening.         Orders Only on 09/15/2023   Component Date Value Ref Range Status    Lupus Anticoagulant 09/21/2023 SEE NOTE  NOT DETECTED Final    PTT LA Screen 09/21/2023 32  < OR = 40 seconds Final    DRVVT Screen 09/21/2023 40  < OR = 45 seconds Final    Interpretation 09/21/2023 SEE NOTE   Final    B2 Glycoprotein I IgG 09/21/2023 <2.0  U/mL Final    B2 Glycoprotein I IgA 09/21/2023 <2.0  U/mL Final    B2 Glycoprotein I IgM 09/21/2023 <2.0  U/mL Final    PHOSPHATIDYLSERINE/ PROTHROMBIN AB* 09/21/2023 <9  < OR = 30 U Final    PHOSPHATIDYLSERINE/ PROTHROMBIN AB* 09/21/2023 9  < OR = 30 U Final    Cardiolipin Ab IgA 09/21/2023 <2.0   APL-U/mL Final    Cardiolipin Ab IgG 09/21/2023 <2.0  GPL-U/mL Final    Cardiolipin Ab IgM 09/21/2023 <2.0  MPL-U/mL Final    CHARLENE 09/21/2023 POSITIVE (A)  NEGATIVE Final    Anti-SSA Antibody 09/21/2023 <1.0 NEG  <1.0 NEG AI Final    Complement (C-3) 09/21/2023 133  83 - 193 mg/dL Final    Complement (C-4) 09/21/2023 32  15 - 57 mg/dL Final    MIRTHA POLYSPECIFIC INTERP 09/21/2023 NEGATIVE  NEGATIVE Final    CHARLENE Titer 09/21/2023 1:40 (H)  titer Final    CHARLENE Pattern 09/21/2023 Nuclear, Homogeneous (A)   Final    CHARLENE Titer 09/21/2023 1:40 (H)  titer Final    CHARLENE Pattern 09/21/2023 Nuclear, Speckled (A)   Final    CHARLENE Titer 09/21/2023 1:40 (H)  titer Final    CHARLENE Pattern 09/21/2023 Cytoplasmic (A)   Final   Office Visit on 08/10/2023   Component Date Value Ref Range Status    EKG 12-Lead 08/10/2023 SB   Final    Ventricular Rate 08/10/2023 58   Final    MO Interval 08/10/2023 156   Final    QRS Duration 08/10/2023 82   Final    QT/QTc 08/10/2023 400/392   Final    PRT Axes 08/10/2023 74  -1  99   Final    WBC 08/10/2023 6.7  3.8 - 10.8 Thousand/uL Final    RBC 08/10/2023 4.55  3.80 - 5.10 Million/uL Final    Hemoglobin 08/10/2023 14.1  11.7 - 15.5 g/dL Final    Hematocrit 08/10/2023 43.4  35.0 - 45.0 % Final    MCV 08/10/2023 95.4  80.0 - 100.0 fL Final    MCH 08/10/2023 31.0  27.0 - 33.0 pg Final    MCHC 08/10/2023 32.5  32.0 - 36.0 g/dL Final    RDW 08/10/2023 13.2  11.0 - 15.0 % Final    Platelets 08/10/2023 213  140 - 400 Thousand/uL Final    MPV 08/10/2023 10.6  7.5 - 12.5 fL Final    Neutrophils, Abs 08/10/2023 3,826  1,500 - 7,800 cells/uL Final    Lymph # 08/10/2023 1,963  850 - 3,900 cells/uL Final    Mono # 08/10/2023 596  200 - 950 cells/uL Final    Eos # 08/10/2023 194  15 - 500 cells/uL Final    Baso # 08/10/2023 121  0 - 200 cells/uL Final    Neutrophils Relative 08/10/2023 57.1  % Final    Lymph % 08/10/2023 29.3  % Final    Mono % 08/10/2023 8.9  % Final    Eosinophil % 08/10/2023 2.9  % Final    Basophil %  08/10/2023 1.8  % Final    Glucose 08/10/2023 81  65 - 99 mg/dL Final    BUN 08/10/2023 27 (H)  7 - 25 mg/dL Final    Creatinine 08/10/2023 0.80  0.60 - 0.95 mg/dL Final    eGFR 08/10/2023 74  > OR = 60 mL/min/1.73m2 Final    BUN/Creatinine Ratio 08/10/2023 34 (H)  6 - 22 (calc) Final    Sodium 08/10/2023 139  135 - 146 mmol/L Final    Potassium 08/10/2023 4.2  3.5 - 5.3 mmol/L Final    Chloride 08/10/2023 104  98 - 110 mmol/L Final    CO2 08/10/2023 28  20 - 32 mmol/L Final    Calcium 08/10/2023 9.3  8.6 - 10.4 mg/dL Final    Total Protein 08/10/2023 6.6  6.1 - 8.1 g/dL Final    Albumin 08/10/2023 4.5  3.6 - 5.1 g/dL Final    Globulin, Total 08/10/2023 2.1  1.9 - 3.7 g/dL (calc) Final    Albumin/Globulin Ratio 08/10/2023 2.1  1.0 - 2.5 (calc) Final    Total Bilirubin 08/10/2023 0.5  0.2 - 1.2 mg/dL Final    Alkaline Phosphatase 08/10/2023 57  37 - 153 U/L Final    AST 08/10/2023 15  10 - 35 U/L Final    ALT 08/10/2023 6  6 - 29 U/L Final    TSH w/reflex to FT4 08/10/2023 2.15  0.40 - 4.50 mIU/L Final    CHARLENE 08/10/2023 POSITIVE (A)  NEGATIVE Final    CHARLENE Titer 08/10/2023 1:40 (H)  titer Final    CHARLENE Pattern 08/10/2023 Nuclear, Homogeneous (A)   Final    CHARLENE Titer 08/10/2023 1:40 (H)  titer Final    CHARLENE Pattern 08/10/2023 Cytoplasmic (A)   Final    Cyclic Citrullinated Peptide (CCP)* 08/10/2023 <16  UNITS Final    Rheumatoid Factor 08/10/2023 <14  <14 IU/mL Final    CRP 08/10/2023 1.0  <8.0 mg/L Final    Sed Rate 08/10/2023 2  < OR = 30 mm/h Final   Office Visit on 07/02/2023   Component Date Value Ref Range Status    Urine Culture, Routine 07/02/2023 Final report   Final    Result 1 07/02/2023 No growth   Final   Office Visit on 06/28/2023   Component Date Value Ref Range Status    POC Blood, Urine 06/28/2023 Negative  Negative Final    POC Bilirubin, Urine 06/28/2023 Negative  Negative Final    POC Urobilinogen, Urine 06/28/2023 norm  0.1 - 1.1 Final    POC Ketones, Urine 06/28/2023 Negative  Negative Final    POC  Protein, Urine 06/28/2023 Negative  Negative Final    POC Nitrates, Urine 06/28/2023 Negative  Negative Final    POC Glucose, Urine 06/28/2023 Negative  Negative Final    pH, UA 06/28/2023 7.0   Final    POC Specific Gravity, Urine 06/28/2023 1.010  1.003 - 1.029 Final    POC Leukocytes, Urine 06/28/2023 Positive (A)  Negative Final    Urine Culture, Routine 06/28/2023 Final report   Final    Result 1 06/28/2023 Comment   Final       Past Medical History:   Diagnosis Date    Arthritis     Corns and callosities     Deep vein thrombosis     GERD (gastroesophageal reflux disease)     Gout     H/O bladder problems     History of colonic polyps 2014    Hyperlipidemia     Hypertension     Pulmonary embolism      Past Surgical History:   Procedure Laterality Date    APPENDECTOMY  AGE 12    BLADDER SURGERY      Twice    BREAST CYST EXCISION      Benign per patient    CYSTOSCOPY N/A 1/26/2022    Procedure: CYSTOSCOPY;  Surgeon: Sánchez Salcido MD;  Location: [address];  Service: Urology;  Laterality: N/A;    CYSTOSCOPY  4/12/2022    Procedure: CYSTOSCOPY;  Surgeon: Sánchez Salcido MD;  Location: [facility] OR [address] [place] [address];  Service: Urology;;    EYE SURGERY      LASER FOR GLAUCOMA    HAND SURGERY Left     HYSTERECTOMY      Not related to cancer per patient.    INJECTION OF BOTULINUM TOXIN TYPE A  1/26/2022    Procedure: INJECTION, BOTULINUM TOXIN, 100units;  Surgeon: Sánchez Salcido MD;  Location: [address];  Service: Urology;;    INJECTION OF BOTULINUM TOXIN TYPE A  4/12/2022    Procedure: INJECTION, BOTULINUM TOXIN  100units;  Surgeon: Sánchez Salcido MD;  Location: [address];  Service: Urology;;    OOPHORECTOMY      ROBOT-ASSISTED LAPAROSCOPIC ABDOMINAL SACROCOLPOPEXY USING DA KINGA XI N/A 12/17/2019    Procedure: XI ROBOTIC SACROCOLPOPEXY, ABDOMINAL;  Surgeon: Javier Putnam MD;  Location: [facility];  Service: OB/GYN;  Laterality: N/A;  removal of damaged tissue    SURGICAL PROCEDURE FOR STRESS  INCONTINENCE USING TENSION FREE VAGINAL TAPE N/A 12/17/2019    Procedure: SURGICAL PROCEDURE, USING TENSION FREE VAGINAL TAPE, FOR STRESS INCONTINENCE;  Surgeon: Javier Putnam MD;  Location: [facility];  Service: OB/GYN;  Laterality: N/A;    TONSILLECTOMY       Family History   Problem Relation Age of Onset    Cancer Brother         bladder    Breast cancer Neg Hx     Colon cancer Neg Hx     Ovarian cancer Neg Hx     Eczema Neg Hx     Lupus Neg Hx     Psoriasis Neg Hx     Melanoma Neg Hx     Anesthesia problems Neg Hx        All of your core healthy metrics are met.      The CVD Risk score (JAMARIAgostino, et al., 2008) failed to calculate for the following reasons:    The 2008 CVD risk score is only valid for ages 30 to 74    The patient has a prior MI, stroke, CHF, or peripheral vascular disease diagnosis     Marital Status:   Alcohol History:  reports no history of alcohol use.  Tobacco History:  reports that she has never smoked. She has never been exposed to tobacco smoke. She has never used smokeless tobacco.  Drug History:  reports no history of drug use.    Health Maintenance Topics with due status: Not Due       Topic Last Completion Date    Colonoscopy 03/16/2021    DEXA Scan 01/17/2023    Lipid Panel 04/10/2023     Immunization History   Administered Date(s) Administered    COVID-19, MRNA, LN-S, PF (MODERNA FULL 0.5 ML DOSE) 01/22/2021, 02/19/2021    Influenza 10/17/2016, 10/01/2017, 10/15/2020, 10/18/2021    Influenza - High Dose - PF (65 years and older) 10/03/2013, 10/07/2015, 10/17/2016, 11/08/2017, 10/15/2018, 09/26/2019, 10/13/2020    Influenza - Intradermal - Quadrivalent - PF 10/15/2018    Influenza - Quadrivalent - PF *Preferred* (6 months and older) 08/31/2011    Pneumococcal Conjugate - 13 Valent 10/17/2016    Pneumococcal Polysaccharide - 23 Valent 08/31/2011, 11/08/2017    Zoster 03/14/2015, 10/14/2019    Zoster Recombinant 10/14/2019, 07/14/2020       Review of patient's allergies  "indicates:   Allergen Reactions    Gabapentin     Nitrofurantoin Other (See Comments)     Deathly ill , headaches, weakness, 'wiped out"    Sulfa (sulfonamide antibiotics)        Current Outpatient Medications:     aspirin 81 MG Chew, Take 1 tablet (81 mg total) by mouth once daily., Disp: 90 tablet, Rfl: 0    atorvastatin calcium (ATORVASTATIN ORAL), atorvastatin, Disp: , Rfl:     calcium carbonate/vitamin D3 (VITAMIN D-3 ORAL), Take 1 tablet by mouth once daily., Disp: , Rfl:     docusate calcium (SURFAK) 240 mg capsule, Take 240 mg by mouth 2 (two) times daily., Disp: , Rfl:     estradioL (ESTRACE) 0.01 % (0.1 mg/gram) vaginal cream, INSERT 1 GM VAGINALLY MONDAY,WEDNESDAY,AND FRIDAY AS DIRECTED, Disp: 42.5 g, Rfl: 7    fluticasone propionate (FLONASE) 50 mcg/actuation nasal spray, 1 spray (50 mcg total) by Each Nostril route once daily., Disp: 16 g, Rfl: 2    rosuvastatin (CRESTOR) 5 MG tablet, Take 1 tablet (5 mg total) by mouth every evening., Disp: 90 tablet, Rfl: 1    sertraline (ZOLOFT) 100 MG tablet, Take 1.5 tablets (150 mg total) by mouth once daily., Disp: 135 tablet, Rfl: 3    sumatriptan (IMITREX) 100 MG tablet, 1 tab po at start of headache, may repeat in 2 hours X1 in 24 hours. (Patient taking differently: as needed. 1 tab po at start of headache, may repeat in 2 hours X1 in 24 hours.), Disp: 10 tablet, Rfl: 11    topiramate (TOPAMAX) 50 MG tablet, Take 1 tablet (50 mg total) by mouth 2 (two) times daily., Disp: 180 tablet, Rfl: 1    traMADol (ULTRAM) 50 mg tablet, Take 50 mg by mouth every 8 (eight) hours as needed for Pain., Disp: , Rfl: 5    triamcinolone acetonide 0.1% (KENALOG) 0.1 % ointment, Apply twice daily to affected areas of skin until improved, then can stop and restart as needed, Disp: 80 g, Rfl: 1    triamterene-hydrochlorothiazide 37.5-25 mg (MAXZIDE-25) 37.5-25 mg per tablet, 1 tablet daily as needed for swelling (Patient taking differently: Take 1 tablet by mouth daily as needed. 1 " "tablet daily as needed for swelling), Disp: 30 tablet, Rfl: 0    valACYclovir (VALTREX) 500 MG tablet, Take 2 tablets (1,000 mg total) by mouth 2 (two) times daily. Prn fever blisters, Disp: 30 tablet, Rfl: 3    Review of Systems   Constitutional:  Positive for fatigue (improved since last visit). Negative for appetite change, chills, fever and unexpected weight change.   HENT:  Negative for congestion, sore throat and trouble swallowing.    Eyes:  Negative for visual disturbance.   Respiratory:  Negative for cough, chest tightness and shortness of breath.    Cardiovascular:  Negative for chest pain, palpitations and leg swelling.   Gastrointestinal:  Negative for abdominal distention, abdominal pain, constipation, diarrhea, nausea and vomiting.   Endocrine: Negative for cold intolerance and heat intolerance.        +night sweats   Genitourinary:  Negative for difficulty urinating, dysuria, flank pain and hematuria.   Musculoskeletal:  Negative for arthralgias and back pain.   Skin:  Positive for rash (unchaged to bilat shoulders and abdomen).   Neurological:  Negative for dizziness, syncope, speech difficulty, weakness and headaches.   Psychiatric/Behavioral:  Positive for dysphoric mood (stable). Negative for self-injury, sleep disturbance and suicidal ideas. The patient is not nervous/anxious.           Objective:      Vitals:    10/09/23 1126   BP: 130/72   Pulse: 63   SpO2: 96%   Weight: 66 kg (145 lb 8 oz)   Height: 5' 5" (1.651 m)     Physical Exam  Constitutional:       General: She is not in acute distress.     Appearance: Normal appearance. She is well-developed.   HENT:      Head: Normocephalic and atraumatic.   Neck:      Thyroid: No thyromegaly.      Vascular: No carotid bruit.   Cardiovascular:      Rate and Rhythm: Normal rate and regular rhythm.      Heart sounds: No murmur heard.  Pulmonary:      Effort: Pulmonary effort is normal. No respiratory distress.      Breath sounds: Normal breath sounds. "   Abdominal:      General: Bowel sounds are normal. There is no distension.      Palpations: Abdomen is soft.      Tenderness: There is no abdominal tenderness.   Musculoskeletal:      Cervical back: Neck supple.      Right lower leg: No edema.      Left lower leg: No edema.   Lymphadenopathy:      Cervical: No cervical adenopathy.   Skin:     General: Skin is warm and dry.      Findings: Lesion and rash present. Rash is not purpuric, pustular, urticarial or vesicular.             Comments: Bilat shoulder/upper back lesions slightly fainter than last exam. diffuse faintly erythematous macular lesions to bilat lower/lateral abdomen.   Neurological:      General: No focal deficit present.      Mental Status: She is alert and oriented to person, place, and time.      Cranial Nerves: No cranial nerve deficit.   Psychiatric:         Mood and Affect: Mood normal.           Assessment:       1. Granuloma annulare    2. Night sweats    3. Dyslipidemia    4. Mild episode of recurrent major depressive disorder           Plan:       1. Granuloma annulare   -biopsy showed granuloma annulare- f/u with derm as needed. She has f/u with Dr. Kumar, rheumatology also    2. Night sweats   -discussed DDX for night sweats- including infectious such as TB vs malignancy- she denies actual fever, no sputum production or cough. No further wt loss and reports overall feeling better. Offered CXR and we discussed referral to hem-onc but she declines both at this time    3. Dyslipidemia  -mildly elevated TG, well controlled LDL on last labs  -     CBC Auto Differential; Future; Expected date: 10/09/2023  -     Comprehensive Metabolic Panel; Future; Expected date: 10/09/2023  -     Lipid Panel; Future; Expected date: 10/09/2023  -     Urinalysis, Reflex to Urine Culture Urine, Clean Catch; Future; Expected date: 10/09/2023  -     TSH w/reflex to FT4; Future; Expected date: 10/09/2023  -     Microalbumin/Creatinine Ratio, Urine; Future;  Expected date: 10/09/2023    4. Mild episode of recurrent major depressive disorder   -pt reports stable on current med    Follow up in about 6 months (around 4/9/2024) for lipids.          Counseled on age and gender appropriate medical preventative services, including cancer screenings, immunizations, overall nutritional health, need for a consistent exercise regimen and an overall push towards maintaining a vigorous and active lifestyle.      10/9/2023 Elle Mack NP

## 2023-10-23 ENCOUNTER — TELEPHONE (OUTPATIENT)
Dept: FAMILY MEDICINE | Facility: CLINIC | Age: 78
End: 2023-10-23

## 2023-10-23 DIAGNOSIS — Z13.6 ENCOUNTER FOR SCREENING FOR CARDIOVASCULAR DISORDERS: ICD-10-CM

## 2023-10-23 DIAGNOSIS — R53.83 FATIGUE, UNSPECIFIED TYPE: Primary | ICD-10-CM

## 2023-10-23 DIAGNOSIS — I10 ESSENTIAL HYPERTENSION: ICD-10-CM

## 2023-10-23 DIAGNOSIS — I95.9 HYPOTENSION, UNSPECIFIED HYPOTENSION TYPE: ICD-10-CM

## 2023-10-23 NOTE — TELEPHONE ENCOUNTER
Spoke with patient, states she has not taken the triamterene-hctz. States she ate chips with salt and it is better and she feels much better. States she just started looking it up online and worried herself. Denies SOB and palpitations. Aware to go to ER with worsening sx. Agrees to have labs drawn at [facility] around 8am - no cortisol level in computer?

## 2023-10-23 NOTE — TELEPHONE ENCOUNTER
Is she feeling dizzy or having SOB/palpitations?  I would recommend holding triamterne- HCTZ and I will order some labs. I'm ordering a cortisol level so labs should be drawn around 8am  If her symptoms are severe however recommend ER evaluation

## 2023-10-23 NOTE — TELEPHONE ENCOUNTER
----- Message from Hilaria Hull sent at 10/23/2023 11:55 AM CDT -----  Pt would like to be seen this afternoon for pressure dropping. She has slept all day and night yesterday. Her bp was 99/71 this morning. She has tried walking around to make herself feeling better and it dropped 96/69. Would like to know what do to   [phone]

## 2023-10-23 NOTE — TELEPHONE ENCOUNTER
"Spoke with patient who states her blood pressure has been dropping low. Said this morning after she was up and active it was 99/71. She got up and walked around and made sure she was very active and then it dropped again. States she slept all day Saturday. She isn't sure what is going on, states she has been feeling weak and "lifeless." States she is caring for her older brother who had surgery, she hasn't been to her own house in over a week but states everything is the same otherwise. She does not have much of an appetite but she is staying plenty hydrated. Wants to know if she needs to come back in or have labs or what is recommended.   "

## 2023-11-16 ENCOUNTER — TELEPHONE (OUTPATIENT)
Dept: FAMILY MEDICINE | Facility: CLINIC | Age: 78
End: 2023-11-16

## 2023-11-16 NOTE — TELEPHONE ENCOUNTER
----- Message from Krysten Oliveros sent at 11/16/2023  2:57 PM CST -----  Lubna from [facility] faxed a form over that needed to be signed. She will fax again. Please Fax it back to # [phone] [facility]

## 2023-11-20 ENCOUNTER — TELEPHONE (OUTPATIENT)
Dept: FAMILY MEDICINE | Facility: CLINIC | Age: 78
End: 2023-11-20

## 2023-11-20 NOTE — TELEPHONE ENCOUNTER
----- Message from Elle Lin sent at 11/20/2023  9:33 AM CST -----  Contact: Arturo Pepper [facility] is calling concerning the paperwork for plan of care. Please advise. Evgeny LEE @[phone], Fax #[phone]     moderate

## 2023-12-14 ENCOUNTER — OFFICE VISIT (OUTPATIENT)
Dept: PODIATRY | Facility: CLINIC | Age: 78
End: 2023-12-14
Payer: MEDICARE

## 2023-12-14 VITALS
DIASTOLIC BLOOD PRESSURE: 84 MMHG | HEART RATE: 69 BPM | BODY MASS INDEX: 23.32 KG/M2 | SYSTOLIC BLOOD PRESSURE: 157 MMHG | RESPIRATION RATE: 18 BRPM | HEIGHT: 65 IN | WEIGHT: 140 LBS

## 2023-12-14 DIAGNOSIS — M20.5X9 ACQUIRED ADDUCTOVARUS ROTATION OF TOE, UNSPECIFIED LATERALITY: ICD-10-CM

## 2023-12-14 DIAGNOSIS — M72.2 PLANTAR FASCIITIS OF LEFT FOOT: Primary | ICD-10-CM

## 2023-12-14 DIAGNOSIS — M20.11 HALLUX ABDUCTO VALGUS, BILATERAL: ICD-10-CM

## 2023-12-14 DIAGNOSIS — M20.12 HALLUX ABDUCTO VALGUS, BILATERAL: ICD-10-CM

## 2023-12-14 DIAGNOSIS — M19.071 ARTHRITIS OF METATARSOPHALANGEAL (MTP) JOINT OF GREAT TOES OF BOTH FEET: ICD-10-CM

## 2023-12-14 DIAGNOSIS — M19.072 ARTHRITIS OF METATARSOPHALANGEAL (MTP) JOINT OF GREAT TOES OF BOTH FEET: ICD-10-CM

## 2023-12-14 PROCEDURE — 99999 PR PBB SHADOW E&M-EST. PATIENT-LVL IV: CPT | Mod: PBBFAC,,, | Performed by: PODIATRIST

## 2023-12-14 PROCEDURE — 99214 OFFICE O/P EST MOD 30 MIN: CPT | Mod: PBBFAC | Performed by: PODIATRIST

## 2023-12-14 PROCEDURE — 99999 PR PBB SHADOW E&M-EST. PATIENT-LVL IV: ICD-10-PCS | Mod: PBBFAC,,, | Performed by: PODIATRIST

## 2023-12-14 PROCEDURE — 99999PBSHW PR PBB SHADOW TECHNICAL ONLY FILED TO HB: Mod: PBBFAC,,,

## 2023-12-14 PROCEDURE — 99999PBSHW PR PBB SHADOW TECHNICAL ONLY FILED TO HB: ICD-10-PCS | Mod: PBBFAC,,,

## 2023-12-14 PROCEDURE — 99215 PR OFFICE/OUTPT VISIT, EST, LEVL V, 40-54 MIN: ICD-10-PCS | Mod: S$PBB,,, | Performed by: PODIATRIST

## 2023-12-14 PROCEDURE — 96372 THER/PROPH/DIAG INJ SC/IM: CPT | Mod: PBBFAC,59

## 2023-12-14 PROCEDURE — 99215 OFFICE O/P EST HI 40 MIN: CPT | Mod: S$PBB,,, | Performed by: PODIATRIST

## 2023-12-14 RX ORDER — DICLOFENAC SODIUM 10 MG/G
2 GEL TOPICAL 3 TIMES DAILY
Qty: 100 G | Refills: 2 | Status: SHIPPED | OUTPATIENT
Start: 2023-12-14

## 2023-12-14 RX ORDER — TRIAMCINOLONE ACETONIDE 40 MG/ML
80 INJECTION, SUSPENSION INTRA-ARTICULAR; INTRAMUSCULAR
Status: COMPLETED | OUTPATIENT
Start: 2023-12-14 | End: 2023-12-14

## 2023-12-14 RX ORDER — KETOROLAC TROMETHAMINE 30 MG/ML
30 INJECTION, SOLUTION INTRAMUSCULAR; INTRAVENOUS
Status: COMPLETED | OUTPATIENT
Start: 2023-12-14 | End: 2023-12-14

## 2023-12-14 RX ORDER — LIDOCAINE HYDROCHLORIDE 10 MG/ML
1 INJECTION INFILTRATION; PERINEURAL
Status: COMPLETED | OUTPATIENT
Start: 2023-12-14 | End: 2023-12-14

## 2023-12-14 RX ADMIN — TRIAMCINOLONE ACETONIDE 80 MG: 40 INJECTION, SUSPENSION INTRA-ARTICULAR; INTRAMUSCULAR at 04:12

## 2023-12-14 RX ADMIN — KETOROLAC TROMETHAMINE 30 MG: 30 INJECTION, SOLUTION INTRAMUSCULAR; INTRAVENOUS at 04:12

## 2023-12-14 RX ADMIN — LIDOCAINE HYDROCHLORIDE 1 ML: 10 INJECTION, SOLUTION INFILTRATION; PERINEURAL at 04:12

## 2023-12-14 NOTE — PROGRESS NOTES
Subjective:       Patient ID: Jazz Martinez is a 78 y.o. female.    Chief Complaint: Heel Pain  Patient presents with complaint of left heel pain 1-1 and half months.  Patient relates there has been no change in shoes or activity.  Typically wears a good tennis shoes.  Presents in [place] type running shoe today.  Relates increased pain 1st steps in the morning, pain throughout the day and increased at night.  Pain level 9/10        Past Medical History:   Diagnosis Date    Arthritis     Corns and callosities     Deep vein thrombosis     GERD (gastroesophageal reflux disease)     Gout     H/O bladder problems     History of colonic polyps 2014    Hyperlipidemia     Hypertension     Pulmonary embolism      Past Surgical History:   Procedure Laterality Date    APPENDECTOMY  AGE 12    BLADDER SURGERY      Twice    BREAST CYST EXCISION      Benign per patient    CYSTOSCOPY N/A 1/26/2022    Procedure: CYSTOSCOPY;  Surgeon: Sánchez Salcido MD;  Location: [facility] OR [address] [place] [address];  Service: Urology;  Laterality: N/A;    CYSTOSCOPY  4/12/2022    Procedure: CYSTOSCOPY;  Surgeon: Sánchez Salcido MD;  Location: [facility] OR [address] [place] [address];  Service: Urology;;    EYE SURGERY      LASER FOR GLAUCOMA    HAND SURGERY Left     HYSTERECTOMY      Not related to cancer per patient.    INJECTION OF BOTULINUM TOXIN TYPE A  1/26/2022    Procedure: INJECTION, BOTULINUM TOXIN, 100units;  Surgeon: Sánchez Salcido MD;  Location: [address];  Service: Urology;;    INJECTION OF BOTULINUM TOXIN TYPE A  4/12/2022    Procedure: INJECTION, BOTULINUM TOXIN  100units;  Surgeon: Sánchez Salcido MD;  Location: [facility] OR [address] [place] [address];  Service: Urology;;    OOPHORECTOMY      ROBOT-ASSISTED LAPAROSCOPIC ABDOMINAL SACROCOLPOPEXY USING DA KINGA XI N/A 12/17/2019    Procedure: XI ROBOTIC SACROCOLPOPEXY, ABDOMINAL;  Surgeon: Javier Putnam MD;  Location: [facility] OR;  Service: OB/GYN;  Laterality: N/A;  removal of damaged tissue    SURGICAL PROCEDURE FOR  STRESS INCONTINENCE USING TENSION FREE VAGINAL TAPE N/A 12/17/2019    Procedure: SURGICAL PROCEDURE, USING TENSION FREE VAGINAL TAPE, FOR STRESS INCONTINENCE;  Surgeon: Javier Putnam MD;  Location: [facility];  Service: OB/GYN;  Laterality: N/A;    TONSILLECTOMY       Family History   Problem Relation Age of Onset    Cancer Brother         bladder    Breast cancer Neg Hx     Colon cancer Neg Hx     Ovarian cancer Neg Hx     Eczema Neg Hx     Lupus Neg Hx     Psoriasis Neg Hx     Melanoma Neg Hx     Anesthesia problems Neg Hx      Social History     Socioeconomic History    Marital status:    Tobacco Use    Smoking status: Never     Passive exposure: Never    Smokeless tobacco: Never   Substance and Sexual Activity    Alcohol use: No    Drug use: No    Sexual activity: Never     Social Determinants of Health     Financial Resource Strain: Medium Risk (9/22/2020)    Overall Financial Resource Strain (CARDIA)     Difficulty of Paying Living Expenses: Somewhat hard   Food Insecurity: No Food Insecurity (12/28/2022)    Hunger Vital Sign     Worried About Running Out of Food in the Last Year: Never true     Ran Out of Food in the Last Year: Never true   Transportation Needs: No Transportation Needs (12/28/2022)    PRAPARE - Transportation     Lack of Transportation (Medical): No     Lack of Transportation (Non-Medical): No   Physical Activity: Patient Declined (12/28/2022)    Exercise Vital Sign     Days of Exercise per Week: Patient declined     Minutes of Exercise per Session: Patient declined   Stress: Patient Declined (12/28/2022)    [employer] - Occupational Stress Questionnaire     Feeling of Stress : Patient declined   Social Connections: Unknown (12/28/2022)    Social Connection and Isolation Panel [NHANES]     Frequency of Communication with Friends and Family: Patient declined     Frequency of Social Gatherings with Friends and Family: Patient declined     Attends Yarsanism  Services: Patient declined     Active Member of Clubs or Organizations: Patient declined     Attends Club or Organization Meetings: Patient declined   Housing Stability: Unknown (12/28/2022)    Housing Stability Vital Sign     Unable to Pay for Housing in the Last Year: Patient refused     Unstable Housing in the Last Year: No       Current Outpatient Medications   Medication Sig Dispense Refill    atorvastatin calcium (ATORVASTATIN ORAL) atorvastatin      calcium carbonate/vitamin D3 (VITAMIN D-3 ORAL) Take 1 tablet by mouth once daily.      docusate calcium (SURFAK) 240 mg capsule Take 240 mg by mouth 2 (two) times daily.      estradioL (ESTRACE) 0.01 % (0.1 mg/gram) vaginal cream INSERT 1 GM VAGINALLY MONDAY,WEDNESDAY,AND FRIDAY AS DIRECTED 42.5 g 7    fluticasone propionate (FLONASE) 50 mcg/actuation nasal spray 1 spray (50 mcg total) by Each Nostril route once daily. 16 g 2    rosuvastatin (CRESTOR) 5 MG tablet Take 1 tablet (5 mg total) by mouth every evening. 90 tablet 1    sertraline (ZOLOFT) 100 MG tablet Take 1.5 tablets (150 mg total) by mouth once daily. 135 tablet 3    sumatriptan (IMITREX) 100 MG tablet 1 tab po at start of headache, may repeat in 2 hours X1 in 24 hours. (Patient taking differently: as needed. 1 tab po at start of headache, may repeat in 2 hours X1 in 24 hours.) 10 tablet 11    topiramate (TOPAMAX) 50 MG tablet Take 1 tablet (50 mg total) by mouth 2 (two) times daily. 180 tablet 1    traMADol (ULTRAM) 50 mg tablet Take 50 mg by mouth every 8 (eight) hours as needed for Pain.  5    triamcinolone acetonide 0.1% (KENALOG) 0.1 % ointment Apply twice daily to affected areas of skin until improved, then can stop and restart as needed 80 g 1    aspirin 81 MG Chew Take 1 tablet (81 mg total) by mouth once daily. 90 tablet 0    diclofenac sodium (VOLTAREN) 1 % Gel Apply 2 g topically 3 (three) times daily. 100 g 2    valACYclovir (VALTREX) 500 MG tablet Take 2 tablets (1,000 mg total) by mouth  "2 (two) times daily. Prn fever blisters (Patient not taking: Reported on 12/14/2023) 30 tablet 3     No current facility-administered medications for this visit.     Review of patient's allergies indicates:   Allergen Reactions    Gabapentin     Hydrocodone-acetaminophen Other (See Comments)     Pt states she doesn t know how this got on there" no    Nitrofurantoin Other (See Comments)     Deathly ill , headaches, weakness, 'wiped out"    Sulfa (sulfonamide antibiotics)        Review of Systems   Cardiovascular:  Negative for leg swelling.   Musculoskeletal:  Negative for gait problem.   All other systems reviewed and are negative.      Objective:      Vitals:    12/14/23 1509   BP: (!) 157/84   Pulse: 69   Resp: 18   Weight: 63.5 kg (140 lb)   Height: 5' 5" (1.651 m)     Physical Exam  Vitals and nursing note reviewed.   Constitutional:       General: She is not in acute distress.     Appearance: Normal appearance.   Cardiovascular:      Pulses:           Dorsalis pedis pulses are 1+ on the right side and 1+ on the left side.        Posterior tibial pulses are 1+ on the right side and 1+ on the left side.   Pulmonary:      Effort: Pulmonary effort is normal.   Musculoskeletal:         General: Tenderness present.      Right foot: Decreased range of motion (Arthritic and degenerative changes forefoot bilateral). Bunion (Mild HAV deformity bilateral with prominent osteoarthritis IPJ bilateral hallux, mild tailor's bunion deformity and rotation 5th digit bilateral) present.      Left foot: Decreased range of motion. Bunion present.      Comments: Moderate pain plantar fascial insertion at the calcaneus, firm, tense, edema with no calor at this time   Feet:      Right foot:      Skin integrity: No skin breakdown or erythema.      Left foot:      Skin integrity: No skin breakdown or erythema.   Skin:     Capillary Refill: Capillary refill takes 2 to 3 seconds.   Neurological:      General: No focal deficit present.      " Mental Status: She is alert.   Psychiatric:         Behavior: Behavior normal.         Thought Content: Thought content normal.                                Assessment:       1. Plantar fasciitis of left foot    2. 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 Hallux abducto valgus, bilateral    4. Acquired adductovarus rotation of toe, unspecified laterality          Plan:         DICLOFENAC GEL 1% APPLY T.I.D. LEFT HEEL  80 MG TRIAMCINOLONE IM LEFT HIP   30 MG TORADOL IM RIGHT HIP      Reviewed plantar fasciitis at length with patient.   Explained this is a strain/sprain of the supportive band through the arch on the bottom of the foot and needs to be supported properly along with treatment for inflammation.   Explained bracing of the plantar fascia through appropriate arch support is crucial.    We discussed appropriate firm full length arch support and appropriate tennis shoes.  Advised patient tennis shoe she presents in today are appropriate, they have a thick sole for shock absorption, remove existing insole and start to gradually adjust to wearing arch supports.  If it takes a few days she needs to wear the tennis shoes indoors until she can adjust comfortably.  We discussed stretching before getting out of bed in the morning, shoes at bedside.  Absolutely must have tennis shoes, preferably with arch supports, on at all times of weight-bearing indoors and out.  No flat shoes slippers or walking in sock or bare feet.  Once pain starts to improve we discussed appropriate indoor shoe for short periods of time.  Advised patient she will need to wear a good supportive shoe for the next several months, even once her pain resolves, to prevent recurrence  Advised treatment for inflammation is crucial and we discussed ice/cool therapy in frequency this should be performed as long as well tolerated and beneficial  Discussed diclofenac gel application 4 times daily to the left heel x1  week then 3 times daily until pain-free  Due to high pain level and amount of inflammation under the left heel we discussed IM cortisone and IM Toradol injection.  Reviewed medications with patient, effectiveness in decreasing inflammation, potential side effects and length of time injection may be beneficial.  Do not take any other over-the-counter or prescription NSAID with a Toradol injection administered today.  Advised patient these are used to decrease inflammation, start the healing process, she needs to follow all above conservative treatments daily for best results until pain has resolved   Reviewed use of Voltaren gel for arthritic pain, toes bunions 5th digits in feet, apply as directed    Patient was in understanding and agreement with treatment plan.  I counseled the patient on their conditions, implications and medical management.  Instructed patient to contact the office with any changes, questions, concerns, worsening of symptoms.   Total face to face time 45 minutes, exam, assessment, treatment, discussion, additional time for review of chart prior to and following appointment and visit documentation, consultation and coordination of care.    Follow up if not significant improvement in 1-2 weeks or pain is not resolved in 3 weeks.      This note was created using [employer] voice recognition software that occasionally misinterpreted phrases or words.

## 2024-01-10 ENCOUNTER — TELEPHONE (OUTPATIENT)
Dept: FAMILY MEDICINE | Facility: CLINIC | Age: 79
End: 2024-01-10
Payer: MEDICARE

## 2024-01-10 NOTE — TELEPHONE ENCOUNTER
----- Message from Randa Wilson sent at 1/10/2024  2:11 PM CST -----  Blake from [facility] sent a fax of a plan of care to for this pt on December 19th, however, they haven't heard anything back or receive a fax. She will be faxing over a new form. And I have confirmed the fax number was correct.    Fax: [phone]  Phone: [phone]

## 2024-01-11 NOTE — TELEPHONE ENCOUNTER
Spoke with [facility] and informed them we did not receive a fax. She voiced understanding and stated she will fax it over again. Confirmed our fax number with her.

## 2024-01-18 ENCOUNTER — TELEPHONE (OUTPATIENT)
Dept: FAMILY MEDICINE | Facility: CLINIC | Age: 79
End: 2024-01-18
Payer: MEDICARE

## 2024-01-18 NOTE — TELEPHONE ENCOUNTER
----- Message from Randa Wilson sent at 1/18/2024  8:57 AM CST -----  Lubna with [facility] called to ask if we have received their progress notes for this pt. They need that paperwork to be faxed back over to them and if you have any questions please give her a call back.      Fax: [phone]  Phone: [phone]

## 2024-01-18 NOTE — TELEPHONE ENCOUNTER
Spoke with Lubna at [facility] and informed her that we have not received any progress notes for the Pt. She voiced understanding and states she will fax them over again. Fax number confirmed.

## 2024-01-23 ENCOUNTER — TELEPHONE (OUTPATIENT)
Dept: FAMILY MEDICINE | Facility: CLINIC | Age: 79
End: 2024-01-23
Payer: MEDICARE

## 2024-01-23 NOTE — TELEPHONE ENCOUNTER
Spoke with carline with [facility] and informed her that it looks like we have not received progress notes. Confirmed fax number and gave alternate. She voiced understanding and states she will fax it again.

## 2024-01-23 NOTE — TELEPHONE ENCOUNTER
----- Message from Randa Wilson sent at 1/23/2024  2:45 PM CST -----  Lubna sanz [facility] called progress notes or plan of care for this pt and has not received that information back to to them. Please give a call back    Fax: [phone]  Phone: [phone]

## 2024-01-30 ENCOUNTER — TELEPHONE (OUTPATIENT)
Dept: FAMILY MEDICINE | Facility: CLINIC | Age: 79
End: 2024-01-30
Payer: MEDICARE

## 2024-01-30 NOTE — TELEPHONE ENCOUNTER
----- Message from Hilaria Hull sent at 1/30/2024 10:06 AM CST -----  Vm- 9:35-carline with [facility] is calling for plan of care they faxed over and have not received   [phone] phone   [phone] fax

## 2024-02-16 ENCOUNTER — TELEPHONE (OUTPATIENT)
Dept: FAMILY MEDICINE | Facility: CLINIC | Age: 79
End: 2024-02-16
Payer: MEDICARE

## 2024-02-16 NOTE — TELEPHONE ENCOUNTER
Spoke with Lubna at [facility] and let them know we have not received any forms since November in media - she is going to refax them now.

## 2024-02-16 NOTE — TELEPHONE ENCOUNTER
----- Message from Randa Wilson sent at 2/16/2024  9:09 AM CST -----  [facility] is calling to state that they haven't received the plan of care for this pt.    Blake: [phone]  Fax: [phone]

## 2024-02-23 ENCOUNTER — TELEPHONE (OUTPATIENT)
Dept: FAMILY MEDICINE | Facility: CLINIC | Age: 79
End: 2024-02-23
Payer: MEDICARE

## 2024-02-23 NOTE — TELEPHONE ENCOUNTER
----- Message from Randa Wilson sent at 2/23/2024  8:59 AM CST -----  Lubna with [facility] called to get an update on the pt plan of care that was faxed over.       Lubna: [phone]  Fax: [phone]

## 2024-02-26 ENCOUNTER — OFFICE VISIT (OUTPATIENT)
Dept: UROLOGY | Facility: CLINIC | Age: 79
End: 2024-02-26
Payer: MEDICARE

## 2024-02-26 VITALS — WEIGHT: 143.5 LBS | HEIGHT: 65 IN | BODY MASS INDEX: 23.91 KG/M2

## 2024-02-26 DIAGNOSIS — N32.81 OAB (OVERACTIVE BLADDER): Primary | ICD-10-CM

## 2024-02-26 LAB — POC RESIDUAL URINE VOLUME: 0 ML (ref 0–100)

## 2024-02-26 PROCEDURE — 99213 OFFICE O/P EST LOW 20 MIN: CPT | Mod: PBBFAC,PO | Performed by: UROLOGY

## 2024-02-26 PROCEDURE — 51798 US URINE CAPACITY MEASURE: CPT | Mod: PBBFAC,PO | Performed by: UROLOGY

## 2024-02-26 PROCEDURE — 99214 OFFICE O/P EST MOD 30 MIN: CPT | Mod: S$PBB,,, | Performed by: UROLOGY

## 2024-02-26 PROCEDURE — 99999 PR PBB SHADOW E&M-EST. PATIENT-LVL III: CPT | Mod: PBBFAC,,, | Performed by: UROLOGY

## 2024-02-26 PROCEDURE — 99999PBSHW POCT BLADDER SCAN: Mod: PBBFAC,,,

## 2024-02-26 RX ORDER — VIBEGRON 75 MG/1
75 TABLET, FILM COATED ORAL DAILY
Qty: 30 TABLET | Refills: 11 | Status: SHIPPED | OUTPATIENT
Start: 2024-02-26 | End: 2024-03-26

## 2024-02-26 NOTE — PROGRESS NOTES
[facility] Department of Urology         Overactive Bladder (OAB) Return Note     2/26/2024     Referred by:  Javier Putnam MD     HPI: Jazz Martinez is a very pleasant 78 y.o.woman who is a return patient to our department 2 years after Botox 100 units were injected. This was the second injection of 100 units, about 3 months apart. She was initially referred for evaluation of urinary incontinence of several years duration. She reports bother was associated without urinary daytime frequency though that has largely resolved. However, she is most bothered by nocturia (3-4x per night) and with urgency that results in urinary incontinence 2-3 x daily, now, always at night. She reports no stress urinary incontinence associated with exertion. She requires daily pads (1-4 pads/day).  She has decreased overall fluid intake and takes in very little fluid prior to bed.       She denies symptoms of irritative voiding including dysuria. She denies symptoms of obstructive voiding including decreased stream, hesitancy, intermittency, post void dribbling and sense of incomplete emptying. Bladder scan PVR was 0 mL. Her history includes pelvic organ prolapse repair as detailed in Dr. Putnam's notes.         Previous Incontinence Treatment:  Medication:   Currently: Myrbetriq 50 mg and Oxybutynin 10 mg (10/29-present)  Gemtesa  75 mg (9/29/21-10/29/21)   Myrbetriq 25/50 mg (01/20-03/20, 2021) Reason for d/c: therapy not effective  Oxybutynin 10 mg  (11/13-01/14, 2020) Reason for d/c: memory issues  Solifenacin 10 mg  (4/12-11/13) Reason for d/c: cost prohibitive  Trospium 20 mg (1/20/14- 10/14)     Dietary modification (less caffeine/soda)   PFE  Pessary: ([id number]) irritation, bleeding, infections  PTNS: 2018- Therapy not effective  Surgery: Sacrocolpopexy, bladder suspensions     A review of 10+ systems was conducted with pertinent positive and negative findings documented in HPI with all other systems reviewed and  negative.     Past medical, family, surgical and social history reviewed as documented in chart with pertinent positive medical, family, surgical and social history detailed in HPI.     Exam Findings:     Const: no acute distress, conversant and alert  Eyes: anicteric, extraocular muscles intact  ENMT: normocephalic, Nl oral membranes  Cardio: no cyanosis, nl cap refill  Pulm: no tachypnea; no resp distress  Abd: soft, no tenderness  Musc: no laceration, no tenderness  Neuro: alert; oriented x 3  Skin: warm, dry; no petichiae  Psych: no anxiety; normal speech      Assessment/Plan:     Overactive Bladder with Urgency Incontinence (established,goals not met): She reports bothersome UUI both day and night. We discussed options, though she has multiple therapies as detailed above. She would like to resume medication and given her age I would plan to use Myrbetriq or Gemtesa only. Gemtesa 75 mg sent to pharmacy today.

## 2024-03-01 ENCOUNTER — TELEPHONE (OUTPATIENT)
Dept: FAMILY MEDICINE | Facility: CLINIC | Age: 79
End: 2024-03-01
Payer: MEDICARE

## 2024-03-01 ENCOUNTER — OFFICE VISIT (OUTPATIENT)
Dept: URGENT CARE | Facility: CLINIC | Age: 79
End: 2024-03-01
Payer: MEDICARE

## 2024-03-01 VITALS
SYSTOLIC BLOOD PRESSURE: 146 MMHG | WEIGHT: 138 LBS | HEIGHT: 65 IN | OXYGEN SATURATION: 96 % | RESPIRATION RATE: 18 BRPM | TEMPERATURE: 98 F | DIASTOLIC BLOOD PRESSURE: 82 MMHG | BODY MASS INDEX: 22.99 KG/M2 | HEART RATE: 75 BPM

## 2024-03-01 DIAGNOSIS — Z63.6 CAREGIVER STRESS: ICD-10-CM

## 2024-03-01 DIAGNOSIS — Z86.59 HISTORY OF ANXIETY: ICD-10-CM

## 2024-03-01 DIAGNOSIS — Z76.0 ENCOUNTER FOR MEDICATION REFILL: ICD-10-CM

## 2024-03-01 DIAGNOSIS — I10 ELEVATED BLOOD PRESSURE READING IN OFFICE WITH DIAGNOSIS OF HYPERTENSION: Primary | ICD-10-CM

## 2024-03-01 PROCEDURE — 99213 OFFICE O/P EST LOW 20 MIN: CPT | Mod: S$GLB,,, | Performed by: NURSE PRACTITIONER

## 2024-03-01 SDOH — SOCIAL DETERMINANTS OF HEALTH (SDOH): DEPENDENT RELATIVE NEEDING CARE AT HOME: Z63.6

## 2024-03-01 NOTE — TELEPHONE ENCOUNTER
Spoke with pt and informed her that because she is symptomatic and her BP's have been running high We would recommend going to an UC. Pt states that she is taking medication that was previously prescribed by Elle and was wondering if she could get a new Rx. Informed her that Elle is out of the office today so we would highly recommend going to an UC. She voiced understanding. CHERELLE.

## 2024-03-01 NOTE — TELEPHONE ENCOUNTER
----- Message from Krysten Oliveros sent at 3/1/2024 11:37 AM CST -----  This message is for Nieves. The patient is at [facility] in [place]. We sent her there. Her B/P  was high. They refuse to give her any medication unless she has labs.  She has an old B/P prescription and she took one and her pressure is coming down. Pt's #  [phone] GH

## 2024-03-01 NOTE — PROGRESS NOTES
"Subjective:      Patient ID: Jazz Martinez is a 78 y.o. female.    Vitals:  height is 5' 5" (1.651 m) and weight is 62.6 kg (138 lb). Her temperature is 98.4 °F (36.9 °C). Her blood pressure is 146/82 (abnormal) and her pulse is 75. Her respiration is 18 and oxygen saturation is 96%.     Chief Complaint: Hypertension    Pt is experiencing high BP. Pt began monitoring BP on the 25th of February pt stated the highest the reading was 169/91. Pt states she has been under a lot of stress lately and has been feeling weak and having more headaches as well. Pt has a hx of hypertension she was prescribed tramadol for it.    Hypertension  This is a new problem. The current episode started 1 to 4 weeks ago. The problem has been gradually worsening since onset. Associated symptoms include headaches and malaise/fatigue.       Neurological:  Positive for headaches.      Objective:     Physical Exam   Constitutional:  Non-toxic appearance. She does not appear ill. No distress. normal  HENT:   Head: Normocephalic and atraumatic.   Ears:   Right Ear: Tympanic membrane, external ear and ear canal normal. impacted cerumen  Left Ear: Tympanic membrane, external ear and ear canal normal.   Nose: Nose normal.   Mouth/Throat: Mucous membranes are moist. Oropharynx is clear.   Eyes: Extraocular movement intact   Cardiovascular: Normal heart sounds.   Pulmonary/Chest: Effort normal and breath sounds normal.   Abdominal: Normal appearance.   Neurological: no focal deficit. She is alert.   Skin: Skin is not diaphoretic.   Nursing note and vitals reviewed.      Assessment:     1. Elevated blood pressure reading in office with diagnosis of hypertension    2. Encounter for medication refill        Plan:   Not symptomatic at this time, but does report headaches and dizziness at home prior to starting her medication on 2/25. She does not wish to be evaluated at the ED. She is requesting to refill a medication that her doctor has taken her off " of. I explained that I am unable to refill due to her being taken off over a year ago. She will have to follow up with PCP who prescribed medication. Discussed ED for worsening symptoms, new, or continuing symptoms. Pt verbalized understanding. States she will follow up with PCP.     Elevated blood pressure reading in office with diagnosis of hypertension    Encounter for medication refill                Patient Instructions     Elevated Blood Pressure  Your blood pressure was elevated during your visit to the urgent care.  It was not so high that immediate care was needed but it is recommended that you monitor your blood pressure over the next week or two to make sure that it is not staying elevated.  Please have your blood pressure taken 2-3 times daily at different times of the day.  Write all of those blood pressures down and record the time that they were taken.  Keep all that information and take it with you to see your Primary Care Physician.  If your blood pressure is consistently above 140/90 you will need to follow up with your PCP more quickly         If your condition worsens or fails to improve we recommend that you receive another evaluation at the ER immediately or contact your PCP to discuss your concerns or return here. You must understand that you've received an urgent care treatment only and that you may be released before all your medical problems are known or treated. You the patient will arrange for follw care as instructed.

## 2024-03-01 NOTE — TELEPHONE ENCOUNTER
Spoke with pt who states she took one of her old Bp medications, Triamtere-me 37.5-25 MG, and states that her BP is lowering. She is wondering if she could get a new rx or if she needed to be seen. Pt aware Elle is out of the office today. Informed her we will get her this message for Monday, if her BP starts getting high like it previously was we would recommend going to the ER to be evaluated. She voiced understanding and states she will go to the ER if its gets as high as it was.

## 2024-03-01 NOTE — PATIENT INSTRUCTIONS
Elevated Blood Pressure  Your blood pressure was elevated during your visit to the urgent care.  It was not so high that immediate care was needed but it is recommended that you monitor your blood pressure over the next week or two to make sure that it is not staying elevated.  Please have your blood pressure taken 2-3 times daily at different times of the day.  Write all of those blood pressures down and record the time that they were taken.  Keep all that information and take it with you to see your Primary Care Physician.  If your blood pressure is consistently above 140/90 you will need to follow up with your PCP more quickly         If your condition worsens or fails to improve we recommend that you receive another evaluation at the ER immediately or contact your PCP to discuss your concerns or return here. You must understand that you've received an urgent care treatment only and that you may be released before all your medical problems are known or treated. You the patient will arrange for follouwp care as instructed.

## 2024-03-01 NOTE — TELEPHONE ENCOUNTER
===View-only below this line===      ----- Message -----       From:Jazz Martinez       Sent:2024  6:56 PM CST         To:Patient Appointment Schedule Request Message List    Subject:Appointment Request    I started taking my blood pressure because if was feeling very light headed.  Starting on  163/87 AM,  169/91,  165/99.  That night of the  I found my old medicine that   G.E. Triamtere-me 37.5-25 MG TB. and on  148/90.  This morning  155/88.  I can see it is doing better, but very concerned.  My stress level is off the chart because of caring for a sick family member.  I just can't afford to get ill from the blood pressure issue.  Any assistance and advice would be greatly appreciated.  Thank you,    Jazz Martinez      ----- Message -----       From:Nurse Stanton       Sent:2024  9:58 AM CST         To:Jazz Martinez    Subject:Appointment Request    Good morning!  What number has your blood pressure been running? How long have you been experiencing it?      ----- Message -----       From:Jazz Martinez       Sent:2024  9:49 AM CST         To:Elle Mack NP    Subject:Appointment Request    Appointment Request From: Jazz Martinez    With Provider: Elle Mack NP [[facility]]    Preferred Date Range: Any date 3/1/2024 or later    Preferred Times: Any Time    Reason for visit: constant high blood pressure    Comments:  now high blood  pressure

## 2024-03-04 RX ORDER — TRIAMTERENE/HYDROCHLOROTHIAZID 37.5-25 MG
1 TABLET ORAL DAILY
Qty: 30 TABLET | Refills: 5 | Status: SHIPPED | OUTPATIENT
Start: 2024-03-04

## 2024-03-04 NOTE — TELEPHONE ENCOUNTER
Spoke with patient and let her know the below per omaira verbatim. States she needs a new prescription to the pharmarcy.

## 2024-03-04 NOTE — TELEPHONE ENCOUNTER
Please call patient and ask her how her blood pressure has been running over the weekend.  Ask if she has been she taking the triamterene/HCTZ daily?

## 2024-03-04 NOTE — TELEPHONE ENCOUNTER
"Spoke with patient, states she spoke with Roym last week.   See other message, states over the weekend it was "all over the place" She states she is taking the old blood pressure medication and she is not able to get it to go down.   She takes the medication at night.   163/89 - in the morning.   138/86 - in the morning.  98/63 - before medication in the afternoon  104/64 - before medication in the afternoon  157/84 - this morning.    Has been taking Triamterine/HCTZ  every afternoon since 2/21/24 when she started feeling lightheaded.   States she bought a new machine because she wanted to make sure it wasn't that, she got it on Thursday and is having the same readings.   "

## 2024-03-04 NOTE — TELEPHONE ENCOUNTER
I would recommend she take the triamterne-HCTZ every morning- monitor BP 2 hours after med and later in the day/evening for the next week and let us know how her BP is running

## 2024-03-04 NOTE — TELEPHONE ENCOUNTER
----- Message from Elle Lin sent at 3/4/2024 10:44 AM CST -----  Pt is wanting to speak with the nurse. Pt #[phone]

## 2024-03-13 NOTE — PROGRESS NOTES
"Subjective:      Patient ID: Jazz Martinez is a 78 y.o. female.    Chief Complaint: Disease Management    HPI    Rheumatologic History:      - Diagnosis/es:               - Erosive osteoarthritis  - Family History: No autoimmune conditions  - Gyn History:  (100)  - Positive serologies: +CHARLENE 1:40 nuclear, homogenous, and cytoplasmic  - Negative serologies: RF  - Pathology:   - Skin biopsy: palisaded granulomatous dermatitis  - Infectious screening labs: -  - Imaging:              - Xray hands (2021) Severe degenerative change and joint space narrowing at the carpal 1st metacarpal joint.  Scattered degenerative changes elsewhere more so distal than proximal interphalangeal joints and also fairly severe at the distal interphalangeal joint of the index finger and the interphalangeal joint of the thumb..  No acute fracture or dislocation.  The skeletal structures are osteopenic.  - Previous Treatments:               - Turmeric  - Current Treatments:               - Tramadol  Interval History:   Since her last visit, she was seen by Dermatology for skin biopsy and diagnosed with granuloma annulare.  She was treated with N acetylcysteine. Currently, she reports some pain in her back and hips.     Objective:   BP (!) 143/84   Pulse 74   Ht 5' 5" (1.651 m)   Wt 63 kg (138 lb 14.2 oz)   BMI 23.11 kg/m²   Physical Exam   Constitutional: normal appearance.   HENT:   Head: Normocephalic and atraumatic.   Cardiovascular: Normal rate, regular rhythm and normal heart sounds.   Pulmonary/Chest: Effort normal and breath sounds normal.   Musculoskeletal:      Comments: +Heberden's nodes, bilateral CMC squaring  No synovitis, dactylitis, enthesitis, effusions     Neurological: She is alert.   Skin: Skin is warm and dry. Rash noted.       No data to display     Assessment:     1. Back pain, unspecified back location, unspecified back pain laterality, unspecified chronicity    2. Primary osteoarthritis involving multiple " "joints    3. Granuloma annulare      This is an 78-year-old woman with history of migraines, lumbar radiculopathy, depression, HLD, HTN, GERD, esophageal stricture, colitis (12/2022 patient had diarrhea and bloody stool; colonoscopy normal 2022, treated with antibiotics), DVT (2021 after "vascular work"; and in 2013 after bladder surgery) previously in Xarelto not currently on AC, osteoarthritis, and granuloma annulare. Tramadol 50mg BID PRN for osteoarthritis, add Robaxin PRN for back pain.     Plan:     Problem List Items Addressed This Visit    None  Visit Diagnoses       Back pain, unspecified back location, unspecified back pain laterality, unspecified chronicity    -  Primary    Relevant Medications    methocarbamoL (ROBAXIN) 750 MG Tab    traMADoL (ULTRAM) 50 mg tablet    Primary osteoarthritis involving multiple joints        Granuloma annulare              Follow up in 6 months    30 minutes of total time spent on the encounter, which includes face to face time and non-face to face time preparing to see the patient (eg, review of tests), Obtaining and/or reviewing separately obtained history, Documenting clinical information in the electronic or other health record, Independently interpreting results (not separately reported) and communicating results to the patient/family/caregiver, or Care coordination (not separately reported).     This note was prepared with [employer] Direct voice recognition transcription software. Garbled syntax, mangled pronouns, and other bizarre constructions may be attributed to that software system       Bettye Desai M.D.  Rheumatology Dept  [place]    "

## 2024-03-14 ENCOUNTER — OFFICE VISIT (OUTPATIENT)
Dept: RHEUMATOLOGY | Facility: CLINIC | Age: 79
End: 2024-03-14
Payer: MEDICARE

## 2024-03-14 VITALS
HEART RATE: 74 BPM | WEIGHT: 138.88 LBS | BODY MASS INDEX: 23.14 KG/M2 | SYSTOLIC BLOOD PRESSURE: 143 MMHG | HEIGHT: 65 IN | DIASTOLIC BLOOD PRESSURE: 84 MMHG

## 2024-03-14 DIAGNOSIS — M15.9 PRIMARY OSTEOARTHRITIS INVOLVING MULTIPLE JOINTS: ICD-10-CM

## 2024-03-14 DIAGNOSIS — M54.9 BACK PAIN, UNSPECIFIED BACK LOCATION, UNSPECIFIED BACK PAIN LATERALITY, UNSPECIFIED CHRONICITY: Primary | ICD-10-CM

## 2024-03-14 DIAGNOSIS — L92.0 GRANULOMA ANNULARE: ICD-10-CM

## 2024-03-14 PROCEDURE — 99999 PR PBB SHADOW E&M-EST. PATIENT-LVL IV: CPT | Mod: PBBFAC,,, | Performed by: STUDENT IN AN ORGANIZED HEALTH CARE EDUCATION/TRAINING PROGRAM

## 2024-03-14 PROCEDURE — 99214 OFFICE O/P EST MOD 30 MIN: CPT | Mod: PBBFAC,PN | Performed by: STUDENT IN AN ORGANIZED HEALTH CARE EDUCATION/TRAINING PROGRAM

## 2024-03-14 PROCEDURE — 99214 OFFICE O/P EST MOD 30 MIN: CPT | Mod: S$PBB,,, | Performed by: STUDENT IN AN ORGANIZED HEALTH CARE EDUCATION/TRAINING PROGRAM

## 2024-03-14 RX ORDER — METHOCARBAMOL 750 MG/1
750 TABLET, FILM COATED ORAL 3 TIMES DAILY PRN
Qty: 90 TABLET | Refills: 1 | Status: SHIPPED | OUTPATIENT
Start: 2024-03-14

## 2024-03-14 RX ORDER — TRAMADOL HYDROCHLORIDE 50 MG/1
50 TABLET ORAL EVERY 12 HOURS PRN
Qty: 180 TABLET | Refills: 0 | Status: SHIPPED | OUTPATIENT
Start: 2024-03-14 | End: 2024-03-14

## 2024-03-14 RX ORDER — TRAMADOL HYDROCHLORIDE 50 MG/1
50 TABLET ORAL EVERY 12 HOURS PRN
Qty: 180 TABLET | Refills: 0 | Status: SHIPPED | OUTPATIENT
Start: 2024-03-14

## 2024-03-14 ASSESSMENT — ROUTINE ASSESSMENT OF PATIENT INDEX DATA (RAPID3)
PAIN SCORE: 8.5
PSYCHOLOGICAL DISTRESS SCORE: 3.3
MDHAQ FUNCTION SCORE: 0.6
PATIENT GLOBAL ASSESSMENT SCORE: 4
FATIGUE SCORE: 1.1
TOTAL RAPID3 SCORE: 4.83

## 2024-03-18 ENCOUNTER — TELEPHONE (OUTPATIENT)
Dept: FAMILY MEDICINE | Facility: CLINIC | Age: 79
End: 2024-03-18
Payer: MEDICARE

## 2024-03-18 NOTE — TELEPHONE ENCOUNTER
----- Message from Cinda Sanchez sent at 3/18/2024 11:05 AM CDT -----  Contact: carline Calvo from [facility] physical therapy calling to check for plan of care for pt.   [phone]

## 2024-03-26 ENCOUNTER — TELEPHONE (OUTPATIENT)
Dept: FAMILY MEDICINE | Facility: CLINIC | Age: 79
End: 2024-03-26
Payer: MEDICARE

## 2024-03-26 RX ORDER — MIRABEGRON 50 MG/1
50 TABLET, EXTENDED RELEASE ORAL DAILY
Qty: 30 TABLET | Refills: 11 | Status: SHIPPED | OUTPATIENT
Start: 2024-03-26 | End: 2024-04-02

## 2024-03-27 ENCOUNTER — PATIENT MESSAGE (OUTPATIENT)
Dept: UROLOGY | Facility: CLINIC | Age: 79
End: 2024-03-27
Payer: MEDICARE

## 2024-04-02 RX ORDER — VIBEGRON 75 MG/1
75 TABLET, FILM COATED ORAL DAILY
Qty: 30 TABLET | Refills: 11 | Status: SHIPPED | OUTPATIENT
Start: 2024-04-02

## 2024-04-07 ENCOUNTER — PATIENT MESSAGE (OUTPATIENT)
Dept: FAMILY MEDICINE | Facility: CLINIC | Age: 79
End: 2024-04-07
Payer: MEDICARE

## 2024-04-07 DIAGNOSIS — F33.0 MILD EPISODE OF RECURRENT MAJOR DEPRESSIVE DISORDER: ICD-10-CM

## 2024-04-08 ENCOUNTER — TELEPHONE (OUTPATIENT)
Dept: UROLOGY | Facility: CLINIC | Age: 79
End: 2024-04-08
Payer: MEDICARE

## 2024-04-08 NOTE — TELEPHONE ENCOUNTER
Called pt in regards to message in portal regarding RX for Gamtesa. Informed pt that RX was faxed to [phone] on 4/2/24. Pt voiced understanding.

## 2024-04-09 ENCOUNTER — OFFICE VISIT (OUTPATIENT)
Dept: FAMILY MEDICINE | Facility: CLINIC | Age: 79
End: 2024-04-09
Payer: MEDICARE

## 2024-04-09 VITALS
HEART RATE: 73 BPM | DIASTOLIC BLOOD PRESSURE: 68 MMHG | BODY MASS INDEX: 23.82 KG/M2 | SYSTOLIC BLOOD PRESSURE: 138 MMHG | WEIGHT: 143 LBS | HEIGHT: 65 IN | OXYGEN SATURATION: 98 %

## 2024-04-09 DIAGNOSIS — N39.46 MIXED STRESS AND URGE URINARY INCONTINENCE: ICD-10-CM

## 2024-04-09 DIAGNOSIS — I10 ESSENTIAL HYPERTENSION: Primary | ICD-10-CM

## 2024-04-09 DIAGNOSIS — E78.5 DYSLIPIDEMIA: ICD-10-CM

## 2024-04-09 DIAGNOSIS — M15.9 PRIMARY OSTEOARTHRITIS INVOLVING MULTIPLE JOINTS: ICD-10-CM

## 2024-04-09 DIAGNOSIS — D69.2 SENILE PURPURA: ICD-10-CM

## 2024-04-09 DIAGNOSIS — F33.0 MILD EPISODE OF RECURRENT MAJOR DEPRESSIVE DISORDER: ICD-10-CM

## 2024-04-09 DIAGNOSIS — L92.0 GRANULOMA ANNULARE: ICD-10-CM

## 2024-04-09 PROCEDURE — 99214 OFFICE O/P EST MOD 30 MIN: CPT | Mod: S$GLB,,, | Performed by: NURSE PRACTITIONER

## 2024-04-09 RX ORDER — SERTRALINE HYDROCHLORIDE 100 MG/1
150 TABLET, FILM COATED ORAL DAILY
Qty: 135 TABLET | Refills: 3 | Status: SHIPPED | OUTPATIENT
Start: 2024-04-09

## 2024-04-09 RX ORDER — ESTRADIOL 0.1 MG/G
1 CREAM VAGINAL
Qty: 42.5 G | Refills: 7 | Status: SHIPPED | OUTPATIENT
Start: 2024-04-10

## 2024-04-09 RX ORDER — ROSUVASTATIN CALCIUM 5 MG/1
5 TABLET, COATED ORAL NIGHTLY
Qty: 90 TABLET | Refills: 3 | Status: SHIPPED | OUTPATIENT
Start: 2024-04-09

## 2024-04-09 NOTE — PROGRESS NOTES
"  SUBJECTIVE:    Patient ID: Jazz Martinez is a 78 y.o. female.    Chief Complaint: Follow-up (No bottles//Pt is here for a check up and medication refills//discuss back and hip pain x 3 week, denies falls or injuries//ASHA )    Pt here for regular f/u- depression, HTN, lipids, arthritis    Patient reports overall doing "okay" though continues with diffuse pain complaints    Since last visit here had f/u with Dr. Kumar, rheumatology for arthritis, +CHARLENE, told she did not have lupus- robaxin added at last visit which hasn't helped much. Has been going to PT which does help with her chronic joint pain but can't always go due to either caring for her brother or her pain c/os. Reports lower back pain and hip pain is the most bothersome pain- will occas have sciatica pain radiating down leg. Denies any falls    Had f/u with Dr.Karlin allison for granuloma annulare- he recommended N acetylcysteine supplement    Admits was eating a lot of potato chips and her BP went up so has cut back on salt and BP readings have improved.  Cut out eating candy for lent    Was recently prescribed gemtessa for large volume incontinence at night but can't afford $500 prescription- was then prescribed myrbetriq which can't afford either. Previously tried oxybutynin     Hx of chronic depression-admits she had increased her sertraline back to 150 mg daily which may be helping a little more.  Does a lot of errands, transportation for 89 y/o brother who lives alone but she has to check on him frequently        Office Visit on 02/26/2024   Component Date Value Ref Range Status    POC Residual Urine Volume 02/26/2024 0  0 - 100 mL Final   Telephone on 10/23/2023   Component Date Value Ref Range Status    WBC 04/04/2024 6.7  3.8 - 10.8 Thousand/uL Final    RBC 04/04/2024 4.51  3.80 - 5.10 Million/uL Final    Hemoglobin 04/04/2024 14.4  11.7 - 15.5 g/dL Final    Hematocrit 04/04/2024 43.6  35.0 - 45.0 % Final    MCV 04/04/2024 96.7  80.0 - 100.0 " fL Final    MCH 04/04/2024 31.9  27.0 - 33.0 pg Final    MCHC 04/04/2024 33.0  32.0 - 36.0 g/dL Final    RDW 04/04/2024 12.8  11.0 - 15.0 % Final    Platelets 04/04/2024 238  140 - 400 Thousand/uL Final    MPV 04/04/2024 10.7  7.5 - 12.5 fL Final    Neutrophils, Abs 04/04/2024 4,248  1,500 - 7,800 cells/uL Final    Lymph # 04/04/2024 1,574.5  850 - 3,900 cells/uL Final    Mono # 04/04/2024 610  200 - 950 cells/uL Final    Eos # 04/04/2024 168  15 - 500 cells/uL Final    Baso # 04/04/2024 101  0 - 200 cells/uL Final    Neutrophils Relative 04/04/2024 63.4  % Final    Lymph % 04/04/2024 23.5  % Final    Mono % 04/04/2024 9.1  % Final    Eosinophil % 04/04/2024 2.5  % Final    Basophil % 04/04/2024 1.5  % Final    Glucose 04/04/2024 78  65 - 99 mg/dL Final    BUN 04/04/2024 16  7 - 25 mg/dL Final    Creatinine 04/04/2024 0.68  0.60 - 0.95 mg/dL Final    eGFR 04/04/2024 88  > OR = 60 mL/min/1.73m2 Final    BUN/Creatinine Ratio 04/04/2024 SEE NOTE:  6 - 22 (calc) Final    Sodium 04/04/2024 143  135 - 146 mmol/L Final    Potassium 04/04/2024 3.7  3.5 - 5.3 mmol/L Final    Chloride 04/04/2024 104  98 - 110 mmol/L Final    CO2 04/04/2024 27  20 - 32 mmol/L Final    Calcium 04/04/2024 9.3  8.6 - 10.4 mg/dL Final    Total Protein 04/04/2024 6.6  6.1 - 8.1 g/dL Final    Albumin 04/04/2024 4.3  3.6 - 5.1 g/dL Final    Globulin, Total 04/04/2024 2.3  1.9 - 3.7 g/dL (calc) Final    Albumin/Globulin Ratio 04/04/2024 1.9  1.0 - 2.5 (calc) Final    Total Bilirubin 04/04/2024 0.5  0.2 - 1.2 mg/dL Final    Alkaline Phosphatase 04/04/2024 62  37 - 153 U/L Final    AST 04/04/2024 17  10 - 35 U/L Final    ALT 04/04/2024 12  6 - 29 U/L Final    Cholesterol 04/04/2024 145  <200 mg/dL Final    HDL 04/04/2024 67  > OR = 50 mg/dL Final    Triglycerides 04/04/2024 100  <150 mg/dL Final    LDL Cholesterol 04/04/2024 59  mg/dL (calc) Final    HDL/Cholesterol Ratio 04/04/2024 2.2  <5.0 (calc) Final    Non HDL Chol. (LDL+VLDL) 04/04/2024 78  <130  mg/dL (calc) Final    Creatinine, Urine 04/04/2024 104  20 - 275 mg/dL Final    Microalb, Ur 04/04/2024 0.7  See Note: mg/dL Final    Microalb/Creat Ratio 04/04/2024 7  <30 mg/g creat Final    TSH w/reflex to FT4 04/04/2024 1.21  0.40 - 4.50 mIU/L Final    Color, UA 04/04/2024 YELLOW  YELLOW Final    Appearance, UA 04/04/2024 CLOUDY (A)  CLEAR Final    Specific Gravity, UA 04/04/2024 1.016  1.001 - 1.035 Final    pH, UA 04/04/2024 5.5  5.0 - 8.0 Final    Glucose, UA 04/04/2024 NEGATIVE  NEGATIVE Final    Bilirubin, UA 04/04/2024 NEGATIVE  NEGATIVE Final    Ketones, UA 04/04/2024 NEGATIVE  NEGATIVE Final    Occult Blood UA 04/04/2024 NEGATIVE  NEGATIVE Final    Protein, UA 04/04/2024 NEGATIVE  NEGATIVE Final    Nitrite, UA 04/04/2024 NEGATIVE  NEGATIVE Final    Leukocytes, UA 04/04/2024 NEGATIVE  NEGATIVE Final    WBC Casts, UA 04/04/2024 NONE SEEN  < OR = 5 /HPF Final    RBC Casts, UA 04/04/2024 NONE SEEN  < OR = 2 /HPF Final    Squam Epithel, UA 04/04/2024 0-5  < OR = 5 /HPF Final    Bacteria, UA 04/04/2024 NONE SEEN  NONE SEEN /HPF Final    Ca Oxalate Beatriz, UA 04/04/2024 MANY (A)  NONE OR FEW /HPF Final    Hyaline Casts, UA 04/04/2024 NONE SEEN  NONE SEEN /LPF Final    Service Cmt: 04/04/2024    Final    Reflexive Urine Culture 04/04/2024    Final       Past Medical History:   Diagnosis Date    Arthritis     Corns and callosities     Deep vein thrombosis     GERD (gastroesophageal reflux disease)     Gout     H/O bladder problems     History of colonic polyps 2014    Hyperlipidemia     Hypertension     Pulmonary embolism      Past Surgical History:   Procedure Laterality Date    APPENDECTOMY  AGE 12    BLADDER SURGERY      Twice    BREAST CYST EXCISION      Benign per patient    CYSTOSCOPY N/A 1/26/2022    Procedure: CYSTOSCOPY;  Surgeon: Sánchez Salcido MD;  Location: [facility] OR [address] [place] [address];  Service: Urology;  Laterality: N/A;    CYSTOSCOPY  4/12/2022    Procedure: CYSTOSCOPY;  Surgeon: Sánchez Salcido MD;   Location: [facility] OR [address] [place] [address];  Service: Urology;;    EYE SURGERY      LASER FOR GLAUCOMA    HAND SURGERY Left     HYSTERECTOMY      Not related to cancer per patient.    INJECTION OF BOTULINUM TOXIN TYPE A  1/26/2022    Procedure: INJECTION, BOTULINUM TOXIN, 100units;  Surgeon: Sánchez Salcido MD;  Location: [facility] OR [address] [place] [address];  Service: Urology;;    INJECTION OF BOTULINUM TOXIN TYPE A  4/12/2022    Procedure: INJECTION, BOTULINUM TOXIN  100units;  Surgeon: Sánchez Salcido MD;  Location: [facility] OR [address] [place] [address];  Service: Urology;;    OOPHORECTOMY      ROBOT-ASSISTED LAPAROSCOPIC ABDOMINAL SACROCOLPOPEXY USING DA KINGA XI N/A 12/17/2019    Procedure: XI ROBOTIC SACROCOLPOPEXY, ABDOMINAL;  Surgeon: Javier Putnam MD;  Location: [facility];  Service: OB/GYN;  Laterality: N/A;  removal of damaged tissue    SURGICAL PROCEDURE FOR STRESS INCONTINENCE USING TENSION FREE VAGINAL TAPE N/A 12/17/2019    Procedure: SURGICAL PROCEDURE, USING TENSION FREE VAGINAL TAPE, FOR STRESS INCONTINENCE;  Surgeon: Javier Putnam MD;  Location: [facility];  Service: OB/GYN;  Laterality: N/A;    TONSILLECTOMY       Family History   Problem Relation Age of Onset    Cancer Brother         bladder    Breast cancer Neg Hx     Colon cancer Neg Hx     Ovarian cancer Neg Hx     Eczema Neg Hx     Lupus Neg Hx     Psoriasis Neg Hx     Melanoma Neg Hx     Anesthesia problems Neg Hx        All of your core healthy metrics are met.      The CVD Risk score (D'Agostino, et al., 2008) failed to calculate for the following reasons:    The 2008 CVD risk score is only valid for ages 30 to 74    The patient has a prior MI, stroke, CHF, or peripheral vascular disease diagnosis     Marital Status:   Alcohol History:  reports no history of alcohol use.  Tobacco History:  reports that she has never smoked. She has never been exposed to tobacco smoke. She has never used smokeless tobacco.  Drug History:  reports no history of drug use.    Health Maintenance  "Topics with due status: Not Due       Topic Last Completion Date    Colonoscopy 03/16/2021    DEXA Scan 01/17/2023    Lipid Panel 04/04/2024     Immunization History   Administered Date(s) Administered    COVID-19, MRNA, LN-S, PF (MODERNA FULL 0.5 ML DOSE) 01/22/2021, 02/19/2021    Influenza 10/17/2016, 10/01/2017, 10/15/2020, 10/18/2021, 10/20/2022    Influenza - High Dose - PF (65 years and older) 10/03/2013, 10/07/2015, 10/17/2016, 11/08/2017, 10/15/2018, 09/26/2019, 10/13/2020    Influenza - Intradermal - Quadrivalent - PF 10/15/2018    Influenza - Quadrivalent - PF *Preferred* (6 months and older) 08/31/2011    Pneumococcal Conjugate - 13 Valent 10/17/2016    Pneumococcal Polysaccharide - 23 Valent 08/31/2011, 11/08/2017    Zoster 03/14/2015, 10/14/2019    Zoster Recombinant 10/14/2019, 07/14/2020       Review of patient's allergies indicates:   Allergen Reactions    Gabapentin     Hydrocodone-acetaminophen Other (See Comments)     Pt states she doesn t know how this got on there" no    Nitrofurantoin Other (See Comments)     Deathly ill , headaches, weakness, 'wiped out"    Sulfa (sulfonamide antibiotics)        Current Outpatient Medications:     aspirin 81 MG Chew, Take 1 tablet (81 mg total) by mouth once daily., Disp: 90 tablet, Rfl: 0    calcium carbonate/vitamin D3 (VITAMIN D-3 ORAL), Take 1 tablet by mouth once daily., Disp: , Rfl:     diclofenac sodium (VOLTAREN) 1 % Gel, Apply 2 g topically 3 (three) times daily., Disp: 100 g, Rfl: 2    docusate calcium (SURFAK) 240 mg capsule, Take 240 mg by mouth 2 (two) times daily., Disp: , Rfl:     [START ON 4/10/2024] estradioL (ESTRACE) 0.01 % (0.1 mg/gram) vaginal cream, Place 1 g vaginally 3 (three) times a week., Disp: 42.5 g, Rfl: 7    fluticasone propionate (FLONASE) 50 mcg/actuation nasal spray, 1 spray (50 mcg total) by Each Nostril route once daily., Disp: 16 g, Rfl: 2    GEMTESA 75 mg Tab, Take 1 tablet (75 mg total) by mouth once daily., Disp: 30 " tablet, Rfl: 11    methocarbamoL (ROBAXIN) 750 MG Tab, Take 1 tablet (750 mg total) by mouth 3 (three) times daily as needed (Pain)., Disp: 90 tablet, Rfl: 1    rosuvastatin (CRESTOR) 5 MG tablet, Take 1 tablet (5 mg total) by mouth every evening., Disp: 90 tablet, Rfl: 3    sertraline (ZOLOFT) 100 MG tablet, Take 1.5 tablets (150 mg total) by mouth once daily., Disp: 135 tablet, Rfl: 3    sumatriptan (IMITREX) 100 MG tablet, 1 tab po at start of headache, may repeat in 2 hours X1 in 24 hours. (Patient taking differently: as needed. 1 tab po at start of headache, may repeat in 2 hours X1 in 24 hours.), Disp: 10 tablet, Rfl: 11    topiramate (TOPAMAX) 50 MG tablet, Take 1 tablet (50 mg total) by mouth 2 (two) times daily., Disp: 180 tablet, Rfl: 1    traMADoL (ULTRAM) 50 mg tablet, Take 1 tablet (50 mg total) by mouth every 12 (twelve) hours as needed for Pain., Disp: 180 tablet, Rfl: 0    triamcinolone acetonide 0.1% (KENALOG) 0.1 % ointment, Apply twice daily to affected areas of skin until improved, then can stop and restart as needed, Disp: 80 g, Rfl: 1    triamterene-hydrochlorothiazide 37.5-25 mg (MAXZIDE-25) 37.5-25 mg per tablet, Take 1 tablet by mouth once daily., Disp: 30 tablet, Rfl: 5    Review of Systems   Constitutional:  Positive for fatigue (chronic). Negative for appetite change, chills, fever and unexpected weight change.   HENT:  Negative for congestion, sore throat and trouble swallowing.    Eyes:  Negative for visual disturbance.   Respiratory:  Negative for cough, chest tightness and shortness of breath.    Cardiovascular:  Negative for chest pain, palpitations and leg swelling.   Gastrointestinal:  Negative for abdominal distention, abdominal pain, constipation, diarrhea, nausea and vomiting.   Endocrine: Negative for cold intolerance and heat intolerance.        +night sweats   Genitourinary:  Negative for difficulty urinating, dysuria, flank pain and hematuria.   Musculoskeletal:  Positive  "for arthralgias and back pain. Negative for gait problem and joint swelling.   Skin:  Positive for rash (rash to upper back fading).   Neurological:  Negative for dizziness, syncope, speech difficulty, weakness and headaches.   Hematological:  Bruises/bleeds easily.   Psychiatric/Behavioral:  Positive for dysphoric mood (stable). Negative for self-injury, sleep disturbance and suicidal ideas. The patient is not nervous/anxious.           Objective:      Vitals:    04/09/24 1114   BP: 138/68   Pulse: 73   SpO2: 98%   Weight: 64.9 kg (143 lb)   Height: 5' 5" (1.651 m)     Physical Exam  Constitutional:       General: She is not in acute distress.     Appearance: Normal appearance. She is well-developed.   HENT:      Head: Normocephalic and atraumatic.   Neck:      Thyroid: No thyromegaly.      Vascular: No carotid bruit.   Cardiovascular:      Rate and Rhythm: Normal rate and regular rhythm.      Heart sounds: No murmur heard.  Pulmonary:      Effort: Pulmonary effort is normal. No respiratory distress.      Breath sounds: Normal breath sounds.   Abdominal:      General: Bowel sounds are normal. There is no distension.      Palpations: Abdomen is soft.      Tenderness: There is no abdominal tenderness.   Musculoskeletal:      Cervical back: Neck supple.      Right lower leg: No edema.      Left lower leg: No edema.   Lymphadenopathy:      Cervical: No cervical adenopathy.   Skin:     General: Skin is warm and dry.      Findings: Ecchymosis (mild ecchymosis to forearms) and rash present.      Comments: Bilat shoulder/upper back rash much lighter, nearly resolved since last visit   Neurological:      General: No focal deficit present.      Mental Status: She is alert and oriented to person, place, and time.      Cranial Nerves: No cranial nerve deficit.   Psychiatric:         Mood and Affect: Mood normal.           Assessment:       1. Essential hypertension    2. Dyslipidemia    3. Primary osteoarthritis involving " multiple joints    4. Granuloma annulare    5. Mixed stress and urge urinary incontinence    6. Mild episode of recurrent major depressive disorder    7. Senile purpura           Plan:       1. Essential hypertension   -BP well controlled, has cut back on salt intake    2. Dyslipidemia  -reviewed recent labs with patient, triglycerides and LDL improved  -     rosuvastatin (CRESTOR) 5 MG tablet; Take 1 tablet (5 mg total) by mouth every evening.  Dispense: 90 tablet; Refill: 3    3. Primary osteoarthritis involving multiple joints   -complains of chronic arthritis pain, using tramadol p.r.n..  Encouraged to continue with PT    4. Granuloma annulare   -improved    5. Mixed stress and urge urinary incontinence  -reports can not afford gemtessa or Myrbetriq, no improvement with anticholinergics  -     estradioL (ESTRACE) 0.01 % (0.1 mg/gram) vaginal cream; Place 1 g vaginally 3 (three) times a week.  Dispense: 42.5 g; Refill: 7    6. Mild episode of recurrent major depressive disorder  -patient reports stable on sertraline 150 mg daily  -     sertraline (ZOLOFT) 100 MG tablet; Take 1.5 tablets (150 mg total) by mouth once daily.  Dispense: 135 tablet; Refill: 3    7. Senile purpura   -stable, easy bruising, no bleeding      Follow up in about 4 months (around 8/9/2024) for HTN, lipids, arthritis.          Counseled on age and gender appropriate medical preventative services, including cancer screenings, immunizations, overall nutritional health, need for a consistent exercise regimen and an overall push towards maintaining a vigorous and active lifestyle.      4/9/2024 Elle Mack NP

## 2024-04-17 ENCOUNTER — OFFICE VISIT (OUTPATIENT)
Dept: PODIATRY | Facility: CLINIC | Age: 79
End: 2024-04-17
Payer: MEDICARE

## 2024-04-17 VITALS
DIASTOLIC BLOOD PRESSURE: 75 MMHG | HEIGHT: 65 IN | HEART RATE: 67 BPM | WEIGHT: 139 LBS | SYSTOLIC BLOOD PRESSURE: 135 MMHG | BODY MASS INDEX: 23.16 KG/M2

## 2024-04-17 DIAGNOSIS — G57.92 NEURITIS OF LEFT FOOT: ICD-10-CM

## 2024-04-17 DIAGNOSIS — M72.2 PLANTAR FASCIITIS OF LEFT FOOT: Primary | ICD-10-CM

## 2024-04-17 DIAGNOSIS — G57.91 NEURITIS OF RIGHT FOOT: ICD-10-CM

## 2024-04-17 DIAGNOSIS — M20.12 HALLUX ABDUCTO VALGUS, BILATERAL: ICD-10-CM

## 2024-04-17 DIAGNOSIS — M20.11 HALLUX ABDUCTO VALGUS, BILATERAL: ICD-10-CM

## 2024-04-17 DIAGNOSIS — M19.072 ARTHRITIS OF METATARSOPHALANGEAL (MTP) JOINT OF GREAT TOES OF BOTH FEET: ICD-10-CM

## 2024-04-17 DIAGNOSIS — M19.071 ARTHRITIS OF METATARSOPHALANGEAL (MTP) JOINT OF GREAT TOES OF BOTH FEET: ICD-10-CM

## 2024-04-17 PROCEDURE — 99999 PR PBB SHADOW E&M-EST. PATIENT-LVL IV: CPT | Mod: PBBFAC,,, | Performed by: PODIATRIST

## 2024-04-17 PROCEDURE — 99999PBSHW PR PBB SHADOW TECHNICAL ONLY FILED TO HB: Mod: PBBFAC,,,

## 2024-04-17 PROCEDURE — 96372 THER/PROPH/DIAG INJ SC/IM: CPT | Mod: PBBFAC

## 2024-04-17 PROCEDURE — 99214 OFFICE O/P EST MOD 30 MIN: CPT | Mod: PBBFAC,25 | Performed by: PODIATRIST

## 2024-04-17 PROCEDURE — 99214 OFFICE O/P EST MOD 30 MIN: CPT | Mod: S$PBB,,, | Performed by: PODIATRIST

## 2024-04-17 RX ORDER — KETOROLAC TROMETHAMINE 30 MG/ML
30 INJECTION, SOLUTION INTRAMUSCULAR; INTRAVENOUS
Status: COMPLETED | OUTPATIENT
Start: 2024-04-17 | End: 2024-04-17

## 2024-04-17 RX ORDER — LIDOCAINE HYDROCHLORIDE 10 MG/ML
1 INJECTION INFILTRATION; PERINEURAL
Status: COMPLETED | OUTPATIENT
Start: 2024-04-17 | End: 2024-04-17

## 2024-04-17 RX ORDER — TRIAMCINOLONE ACETONIDE 40 MG/ML
80 INJECTION, SUSPENSION INTRA-ARTICULAR; INTRAMUSCULAR
Status: COMPLETED | OUTPATIENT
Start: 2024-04-17 | End: 2024-04-17

## 2024-04-17 RX ADMIN — LIDOCAINE HYDROCHLORIDE 1 ML: 10 INJECTION, SOLUTION INFILTRATION; PERINEURAL at 11:04

## 2024-04-17 RX ADMIN — KETOROLAC TROMETHAMINE 30 MG: 30 INJECTION, SOLUTION INTRAMUSCULAR at 11:04

## 2024-04-17 RX ADMIN — TRIAMCINOLONE ACETONIDE 80 MG: 40 INJECTION, SUSPENSION INTRA-ARTICULAR; INTRAMUSCULAR at 11:04

## 2024-04-17 NOTE — PROGRESS NOTES
Subjective:       Patient ID: Jazz Martinez is a 78 y.o. female.    Chief Complaint: Plantar Fasciitis and Heel Pain (Left foot)  Patient presents with complaint of left heel pain.  She was getting up last week and he will made a large pop noise, has been really sore and painful since. Denies bruising. Does confirm she wears very good running shoes just for day-to-day walking, rarely barefoot.  Relates her feet are achy all the time especially in the front of her feet  Pain 6/10 left heel      Past Medical History:   Diagnosis Date    Arthritis     Corns and callosities     Deep vein thrombosis     GERD (gastroesophageal reflux disease)     Gout     H/O bladder problems     History of colonic polyps 2014    Hyperlipidemia     Hypertension     Pulmonary embolism      Past Surgical History:   Procedure Laterality Date    APPENDECTOMY  AGE 12    BLADDER SURGERY      Twice    BREAST CYST EXCISION      Benign per patient    CYSTOSCOPY N/A 1/26/2022    Procedure: CYSTOSCOPY;  Surgeon: Sánchez Salcido MD;  Location: [facility] OR [address] [place] [address];  Service: Urology;  Laterality: N/A;    CYSTOSCOPY  4/12/2022    Procedure: CYSTOSCOPY;  Surgeon: Sánchez Salcido MD;  Location: [facility] OR [address] [place] [address];  Service: Urology;;    EYE SURGERY      LASER FOR GLAUCOMA    HAND SURGERY Left     HYSTERECTOMY      Not related to cancer per patient.    INJECTION OF BOTULINUM TOXIN TYPE A  1/26/2022    Procedure: INJECTION, BOTULINUM TOXIN, 100units;  Surgeon: Sánchez Salcido MD;  Location: [facility] OR [address] [place] [address];  Service: Urology;;    INJECTION OF BOTULINUM TOXIN TYPE A  4/12/2022    Procedure: INJECTION, BOTULINUM TOXIN  100units;  Surgeon: Sánchez Salcido MD;  Location: [facility] OR [address] [place] [address];  Service: Urology;;    OOPHORECTOMY      ROBOT-ASSISTED LAPAROSCOPIC ABDOMINAL SACROCOLPOPEXY USING DA KINGA XI N/A 12/17/2019    Procedure: XI ROBOTIC SACROCOLPOPEXY, ABDOMINAL;  Surgeon: Javier Putnam MD;  Location: [facility] OR;  Service: OB/GYN;   Laterality: N/A;  removal of damaged tissue    SURGICAL PROCEDURE FOR STRESS INCONTINENCE USING TENSION FREE VAGINAL TAPE N/A 12/17/2019    Procedure: SURGICAL PROCEDURE, USING TENSION FREE VAGINAL TAPE, FOR STRESS INCONTINENCE;  Surgeon: Javier Putnam MD;  Location: [facility];  Service: OB/GYN;  Laterality: N/A;    TONSILLECTOMY       Family History   Problem Relation Name Age of Onset    Cancer Brother          bladder    Breast cancer Neg Hx      Colon cancer Neg Hx      Ovarian cancer Neg Hx      Eczema Neg Hx      Lupus Neg Hx      Psoriasis Neg Hx      Melanoma Neg Hx      Anesthesia problems Neg Hx       Social History     Socioeconomic History    Marital status:    Tobacco Use    Smoking status: Never     Passive exposure: Never    Smokeless tobacco: Never   Substance and Sexual Activity    Alcohol use: No    Drug use: No    Sexual activity: Never     Social Determinants of Health     Financial Resource Strain: Medium Risk (9/22/2020)    Overall Financial Resource Strain (CARDIA)     Difficulty of Paying Living Expenses: Somewhat hard   Food Insecurity: No Food Insecurity (12/28/2022)    Hunger Vital Sign     Worried About Running Out of Food in the Last Year: Never true     Ran Out of Food in the Last Year: Never true   Transportation Needs: No Transportation Needs (12/28/2022)    PRAPARE - Transportation     Lack of Transportation (Medical): No     Lack of Transportation (Non-Medical): No   Physical Activity: Patient Declined (12/28/2022)    Exercise Vital Sign     Days of Exercise per Week: Patient declined     Minutes of Exercise per Session: Patient declined   Stress: Patient Declined (12/28/2022)    [employer] [place] [employer] - Occupational Stress Questionnaire     Feeling of Stress : Patient declined   Social Connections: Unknown (12/28/2022)    Social Connection and Isolation Panel [NHANES]     Frequency of Communication with Friends and Family: Patient declined     Frequency  of Social Gatherings with Friends and Family: Patient declined     Attends [employer] Services: Patient declined     Active Member of Clubs or Organizations: Patient declined     Attends Club or Organization Meetings: Patient declined   Housing Stability: Unknown (12/28/2022)    Housing Stability Vital Sign     Unable to Pay for Housing in the Last Year: Patient refused     Unstable Housing in the Last Year: No       Current Outpatient Medications   Medication Sig Dispense Refill    aspirin 81 MG Chew Take 1 tablet (81 mg total) by mouth once daily. 90 tablet 0    calcium carbonate/vitamin D3 (VITAMIN D-3 ORAL) Take 1 tablet by mouth once daily.      diclofenac sodium (VOLTAREN) 1 % Gel Apply 2 g topically 3 (three) times daily. 100 g 2    docusate calcium (SURFAK) 240 mg capsule Take 240 mg by mouth 2 (two) times daily.      estradioL (ESTRACE) 0.01 % (0.1 mg/gram) vaginal cream Place 1 g vaginally 3 (three) times a week. 42.5 g 7    fluticasone propionate (FLONASE) 50 mcg/actuation nasal spray 1 spray (50 mcg total) by Each Nostril route once daily. 16 g 2    GEMTESA 75 mg Tab Take 1 tablet (75 mg total) by mouth once daily. 30 tablet 11    methocarbamoL (ROBAXIN) 750 MG Tab Take 1 tablet (750 mg total) by mouth 3 (three) times daily as needed (Pain). 90 tablet 1    rosuvastatin (CRESTOR) 5 MG tablet Take 1 tablet (5 mg total) by mouth every evening. 90 tablet 3    sertraline (ZOLOFT) 100 MG tablet Take 1.5 tablets (150 mg total) by mouth once daily. 135 tablet 3    sumatriptan (IMITREX) 100 MG tablet 1 tab po at start of headache, may repeat in 2 hours X1 in 24 hours. (Patient taking differently: as needed. 1 tab po at start of headache, may repeat in 2 hours X1 in 24 hours.) 10 tablet 11    topiramate (TOPAMAX) 50 MG tablet Take 1 tablet (50 mg total) by mouth 2 (two) times daily. 180 tablet 1    traMADoL (ULTRAM) 50 mg tablet Take 1 tablet (50 mg total) by mouth every 12 (twelve) hours as needed for Pain. 180  "tablet 0    triamcinolone acetonide 0.1% (KENALOG) 0.1 % ointment Apply twice daily to affected areas of skin until improved, then can stop and restart as needed 80 g 1    triamterene-hydrochlorothiazide 37.5-25 mg (MAXZIDE-25) 37.5-25 mg per tablet Take 1 tablet by mouth once daily. 30 tablet 5     No current facility-administered medications for this visit.     Review of patient's allergies indicates:   Allergen Reactions    Gabapentin     Hydrocodone-acetaminophen Other (See Comments)     Pt states she doesn t know how this got on there" no    Nitrofurantoin Other (See Comments)     Deathly ill , headaches, weakness, 'wiped out"    Sulfa (sulfonamide antibiotics)        Review of Systems   Cardiovascular:  Negative for leg swelling.   All other systems reviewed and are negative.      Objective:      Vitals:    04/17/24 1051   BP: 135/75   BP Location: Left arm   Patient Position: Sitting   Pulse: 67   Weight: 63 kg (139 lb)   Height: 5' 5" (1.651 m)     Physical Exam  Vitals and nursing note reviewed.   Constitutional:       General: She is not in acute distress.     Appearance: Normal appearance.   Cardiovascular:      Pulses:           Dorsalis pedis pulses are 1+ on the right side and 1+ on the left side.        Posterior tibial pulses are 1+ on the right side and 1+ on the left side.   Pulmonary:      Effort: Pulmonary effort is normal.   Musculoskeletal:         General: Tenderness present.      Right foot: Decreased range of motion (Arthritic and degenerative changes forefoot bilateral). Bunion (Mild HAV deformity bilateral with prominent osteoarthritis IPJ bilateral hallux, mild tailor's bunion deformity and rotation 5th digit bilateral) present.      Left foot: Decreased range of motion. Bunion present.      Comments: Moderate pain plantar fascial insertion at the calcaneus, mild edema   Feet:      Right foot:      Skin integrity: Skin integrity normal.      Left foot:      Skin integrity: Skin integrity " normal.   Skin:     Capillary Refill: Capillary refill takes 2 to 3 seconds.      Findings: No bruising or erythema.      Comments: Varicose veins   Neurological:      General: No focal deficit present.      Mental Status: She is alert.      Comments: Tender across all common plantar digital nerves with mild neuritis of the forefoot bilateral, no edema or calor   Psychiatric:         Behavior: Behavior normal.         Thought Content: Thought content normal.                          Assessment:       1. Plantar fasciitis of left foot    2. Hallux abducto valgus, bilateral    3. Neuritis of right foot    4. 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 Neuritis of left foot            Plan:           80 MG TRIAMCINOLONE IM LEFT HIP   30 MG TORADOL IM RIGHT HIP  RESUME DICLOFENAC GEL 1% APPLY T.I.D. BOTH FEET      Advised patient she had pain of the left heel 4 months ago and has most likely irritated or flared up plantar fasciitis  Reviewed plantar fasciitis at length with patient.   Explained this is a strain/sprain of the supportive band through the arch on the bottom of the foot and needs to be supported properly along with treatment for inflammation.   Advised patient her tennis shoes are excellent, however she absolutely needs arch supports, without them this may not heal completely and without them she is more likely to have recurrence  We discussed appropriate firm full length arch support and the role arch support plays in bracing the plantar fascia  Instructed to remove existing insoles and shoes and we discussed how to gradually adjust to wearing comfortably.  At that point she is to wear them at all times indoors and out, tennis shoes at bedside with arch supports in the morning.  Once she is pain-free she can wear a different slip-on with arch support for an hour in the morning and an hour in the evening and she needs to have tennis shoes with supports the rest of the day  to prevent recurrence  Had a lengthy discussion regarding bunions, arthritis in the front of the feet and neuritis/inflammation of nerves through the front of both feet.  Advised patient she needs to use diclofenac/Voltaren gel 3 times daily to the front of both feet in the left heel.  Once the left heel has improved continue to apply to the front of both feet twice a day for 2 weeks and then eventually once a day indefinitely.  Explained topical treatment for the pain and arthritis in the front of her feet needs to be done long term, every evening  Explained to patient these treatments which are not only successful for her current pain but important in prevention need to be done long term for best results  Due to high pain level of the left heel and the recent pop/injury along with multiple areas of inflammation in the front of her feet we discussed IM cortisone and IM Toradol injection.  Reviewed medications with patient, effectiveness in decreasing inflammation, potential side effects and length of time injection may be beneficial.  Do not take any other over-the-counter or prescription NSAID for 48 hours/2 days with  Toradol injection administered today.    Patient was in understanding and agreement with treatment plan.  I counseled the patient on their conditions, implications and medical management.  Instructed patient to contact the office with any changes, questions, concerns, worsening of symptoms.   Total face to face time 30 minutes, exam, assessment, treatment, discussion, additional time for review of chart prior to and following appointment and visit documentation, consultation and coordination of care.    Follow up prn 2 weeks       This note was created using [employer] voice recognition software that occasionally misinterpreted phrases or words.

## 2024-04-24 ENCOUNTER — TELEPHONE (OUTPATIENT)
Dept: FAMILY MEDICINE | Facility: CLINIC | Age: 79
End: 2024-04-24
Payer: MEDICARE

## 2024-04-24 NOTE — TELEPHONE ENCOUNTER
----- Message from Hilaria Hull sent at 4/24/2024  9:54 AM CDT -----  [facility] is calling to check on when the plan of care they faxed over on the 22nd will be complete   [phone]

## 2024-05-17 ENCOUNTER — PATIENT MESSAGE (OUTPATIENT)
Dept: FAMILY MEDICINE | Facility: CLINIC | Age: 79
End: 2024-05-17
Payer: MEDICARE

## 2024-05-17 RX ORDER — VALACYCLOVIR HYDROCHLORIDE 500 MG/1
500 TABLET, FILM COATED ORAL 2 TIMES DAILY
Qty: 60 TABLET | Refills: 3 | Status: SHIPPED | OUTPATIENT
Start: 2024-05-17 | End: 2025-05-17

## 2024-06-03 ENCOUNTER — TELEPHONE (OUTPATIENT)
Dept: OTOLARYNGOLOGY | Facility: CLINIC | Age: 79
End: 2024-06-03
Payer: MEDICARE

## 2024-06-03 NOTE — TELEPHONE ENCOUNTER
Spoke w/pt per call back msg received.  W/pt, advised that next available at ENT office is not till the end of July, so for her currently scheduled appt.  Did advise pt that appt was placed on wait-list; audio appt scheduled as well.

## 2024-06-03 NOTE — TELEPHONE ENCOUNTER
----- Message from Izabella De La Garza sent at 6/3/2024  9:30 AM CDT -----  Regarding: appointment  Contact: patient  Type:  Sooner Appointment Request    Caller is requesting a sooner appointment.  Caller declined first available appointment listed below.  Caller will not accept being placed on the waitlist and is requesting a message be sent to doctor.    Name of Caller:  patient  When is the first available appointment?  07/30/24  Symptoms:  loud noise in ear  Would the patient rather a call back or a response via [facility]? call  Best Call Back Number:  [phone] (home)     Additional Information:  Please call patient to advise.  Thanks!

## 2024-06-07 NOTE — H&P
[facility]  Discharge Note  Short Stay    Admit Date: 4/12/2022    Discharge Date and Time: 04/12/2022 9:51 AM      Attending Physician: Sánchez Salcido MD     Discharge Provider: Jesse Smith MD    Diagnoses:  Active Hospital Problems    Diagnosis  POA    *OAB (overactive bladder) [N32.81]  Unknown      Resolved Hospital Problems   No resolved problems to display.       Discharged Condition: stable    Hospital Course: Patient was admitted for botox injections to the bladder and tolerated the procedure well with no complications. She was discharged home in good condition on the same day.       Final Diagnoses: Same as principal problem.    Disposition: Home or Self Care    Follow up/Patient Instructions:    Medications:  Reconciled Home Medications:   Current Discharge Medication List      START taking these medications    Details   cephALEXin (KEFLEX) 500 MG capsule Take 1 capsule (500 mg total) by mouth every 12 (twelve) hours. for 2 days  Qty: 4 capsule, Refills: 0         CONTINUE these medications which have NOT CHANGED    Details   calcium carbonate/vitamin D3 (VITAMIN D-3 ORAL) Take by mouth.      docusate calcium (SURFAK) 240 mg capsule Take 240 mg by mouth 2 (two) times daily.      pumpkin seed extract/soy germ (AZO BLADDER CONTROL ORAL) Take by mouth.      rosuvastatin (CRESTOR) 5 MG tablet Take 1 tablet (5 mg total) by mouth every evening.  Qty: 90 tablet, Refills: 1    Associated Diagnoses: Dyslipidemia      sertraline (ZOLOFT) 100 MG tablet Take 1.5 tablets (150 mg total) by mouth once daily.  Qty: 135 tablet, Refills: 3    Associated Diagnoses: Mild episode of recurrent major depressive disorder      sumatriptan (IMITREX) 100 MG tablet 1 tab po at start of headache, may repeat in 2 hours X1 in 24 hours.  Qty: 10 tablet, Refills: 11      topiramate (TOPAMAX) 50 MG tablet Take 1 tablet (50 mg total) by mouth 2 (two) times daily.  Qty: 180 tablet, Refills: 1      traMADol (ULTRAM) 50  mg tablet Take 50 mg by mouth every 8 (eight) hours as needed for Pain.  Refills: 5    Comments:       triamterene-hydrochlorothiazide 37.5-25 mg (MAXZIDE-25) 37.5-25 mg per tablet 1/2 p.o. q.d.  Qty: 90 tablet, Refills: 0    Comments: .  Associated Diagnoses: HTN (hypertension), benign      valACYclovir (VALTREX) 500 MG tablet Take 2 tablets (1,000 mg total) by mouth 2 (two) times daily.  Qty: 12 tablet, Refills: 3      estradioL (ESTRACE) 0.01 % (0.1 mg/gram) vaginal cream INSERT 1 GM VAGINALLY MONDAY,WEDNESDAY,AND FRIDAY AS DIRECTED  Qty: 42.5 g, Refills: 7      fluticasone propionate (FLONASE) 50 mcg/actuation nasal spray 1 spray (50 mcg total) by Each Nostril route once daily.  Qty: 16 g, Refills: 2    Associated Diagnoses: Cough      triamcinolone acetonide 0.025% (KENALOG) 0.025 % cream            Discharge Procedure Orders   Diet Adult Regular     Activity as tolerated      Follow-up Information     Sánchez Salcido MD Follow up in 6 week(s).    Specialty: Urology  Why: post op botox  Contact information:  [address]  [facility] [address] [phone]                         radiology results pending awaiting radiology

## 2024-06-21 ENCOUNTER — PATIENT MESSAGE (OUTPATIENT)
Dept: ORTHOPEDICS | Facility: CLINIC | Age: 79
End: 2024-06-21
Payer: MEDICARE

## 2024-07-15 ENCOUNTER — PATIENT MESSAGE (OUTPATIENT)
Dept: PODIATRY | Facility: CLINIC | Age: 79
End: 2024-07-15
Payer: MEDICARE

## 2024-07-31 ENCOUNTER — OFFICE VISIT (OUTPATIENT)
Dept: PODIATRY | Facility: CLINIC | Age: 79
End: 2024-07-31
Payer: MEDICARE

## 2024-07-31 ENCOUNTER — HOSPITAL ENCOUNTER (OUTPATIENT)
Dept: RADIOLOGY | Facility: HOSPITAL | Age: 79
Discharge: HOME | End: 2024-07-31
Attending: PODIATRIST
Payer: MEDICARE

## 2024-07-31 VITALS
HEIGHT: 65 IN | BODY MASS INDEX: 21.67 KG/M2 | DIASTOLIC BLOOD PRESSURE: 85 MMHG | SYSTOLIC BLOOD PRESSURE: 146 MMHG | HEART RATE: 68 BPM | WEIGHT: 130.06 LBS

## 2024-07-31 DIAGNOSIS — M72.2 PLANTAR FASCIITIS: ICD-10-CM

## 2024-07-31 DIAGNOSIS — M79.672 PAIN OF LEFT HEEL: ICD-10-CM

## 2024-07-31 DIAGNOSIS — M79.672 PAIN OF LEFT HEEL: Primary | ICD-10-CM

## 2024-07-31 PROCEDURE — 99214 OFFICE O/P EST MOD 30 MIN: CPT | Mod: S$PBB,,, | Performed by: PODIATRIST

## 2024-07-31 PROCEDURE — 73630 X-RAY EXAM OF FOOT: CPT | Mod: 26,LT,, | Performed by: RADIOLOGY

## 2024-07-31 PROCEDURE — 73630 X-RAY EXAM OF FOOT: CPT | Mod: TC,LT

## 2024-07-31 PROCEDURE — 99215 OFFICE O/P EST HI 40 MIN: CPT | Mod: PBBFAC,25 | Performed by: PODIATRIST

## 2024-07-31 PROCEDURE — 99999 PR PBB SHADOW E&M-EST. PATIENT-LVL V: CPT | Mod: PBBFAC,,, | Performed by: PODIATRIST

## 2024-08-03 ENCOUNTER — OFFICE VISIT (OUTPATIENT)
Dept: URGENT CARE | Facility: CLINIC | Age: 79
End: 2024-08-03
Payer: MEDICARE

## 2024-08-03 VITALS
TEMPERATURE: 99 F | HEART RATE: 79 BPM | DIASTOLIC BLOOD PRESSURE: 82 MMHG | WEIGHT: 130 LBS | BODY MASS INDEX: 21.66 KG/M2 | HEIGHT: 65 IN | RESPIRATION RATE: 16 BRPM | SYSTOLIC BLOOD PRESSURE: 130 MMHG | OXYGEN SATURATION: 95 %

## 2024-08-03 DIAGNOSIS — U07.1 COVID-19: ICD-10-CM

## 2024-08-03 DIAGNOSIS — R50.9 FEVER, UNSPECIFIED FEVER CAUSE: Primary | ICD-10-CM

## 2024-08-03 DIAGNOSIS — U07.1 COVID-19 VIRUS DETECTED: ICD-10-CM

## 2024-08-03 LAB
CTP QC/QA: YES
FLUAV AG NPH QL: NEGATIVE
FLUBV AG NPH QL: NEGATIVE
S PYO RRNA THROAT QL PROBE: NEGATIVE
SARS-COV-2 AG RESP QL IA.RAPID: POSITIVE

## 2024-08-03 PROCEDURE — 87804 INFLUENZA ASSAY W/OPTIC: CPT | Mod: QW,,, | Performed by: STUDENT IN AN ORGANIZED HEALTH CARE EDUCATION/TRAINING PROGRAM

## 2024-08-03 PROCEDURE — 87811 SARS-COV-2 COVID19 W/OPTIC: CPT | Mod: QW,S$GLB,, | Performed by: STUDENT IN AN ORGANIZED HEALTH CARE EDUCATION/TRAINING PROGRAM

## 2024-08-03 PROCEDURE — 99214 OFFICE O/P EST MOD 30 MIN: CPT | Mod: S$GLB,,, | Performed by: STUDENT IN AN ORGANIZED HEALTH CARE EDUCATION/TRAINING PROGRAM

## 2024-08-03 PROCEDURE — 87880 STREP A ASSAY W/OPTIC: CPT | Mod: QW,,, | Performed by: STUDENT IN AN ORGANIZED HEALTH CARE EDUCATION/TRAINING PROGRAM

## 2024-08-03 RX ORDER — FLUTICASONE PROPIONATE 50 MCG
1 SPRAY, SUSPENSION (ML) NASAL DAILY
Qty: 11.1 ML | Refills: 0 | Status: SHIPPED | OUTPATIENT
Start: 2024-08-03

## 2024-08-03 RX ORDER — LEVOCETIRIZINE DIHYDROCHLORIDE 5 MG/1
5 TABLET, FILM COATED ORAL NIGHTLY
Qty: 30 TABLET | Refills: 0 | Status: SHIPPED | OUTPATIENT
Start: 2024-08-03 | End: 2025-08-03

## 2024-08-03 RX ORDER — GUAIFENESIN 600 MG/1
1200 TABLET, EXTENDED RELEASE ORAL 2 TIMES DAILY
Qty: 30 TABLET | Refills: 0 | Status: SHIPPED | OUTPATIENT
Start: 2024-08-03

## 2024-08-03 RX ORDER — PROMETHAZINE HYDROCHLORIDE AND DEXTROMETHORPHAN HYDROBROMIDE 6.25; 15 MG/5ML; MG/5ML
5 SYRUP ORAL EVERY 4 HOURS PRN
Qty: 118 ML | Refills: 0 | Status: SHIPPED | OUTPATIENT
Start: 2024-08-03 | End: 2024-08-13

## 2024-08-03 NOTE — PROGRESS NOTES
"Subjective:      Patient ID: Jazz Martinez is a 79 y.o. female.    Vitals:  height is 5' 5" (1.651 m) and weight is 59 kg (130 lb). Her oral temperature is 98.7 °F (37.1 °C). Her blood pressure is 130/82 and her pulse is 79. Her respiration is 16 and oxygen saturation is 95%.     Chief Complaint: Headache (Sore throat, chills fever.)    Ambulatory to room with complaint of headache sinus congestion and a cough, admits to subjective fevers with a T-max of 102°.  Symptoms present for 2 days.    Headache   This is a new problem. The current episode started in the past 7 days (3 days). The problem occurs intermittently. The problem has been unchanged. The pain is located in the Temporal region. Radiates to: middle of neck. The pain quality is not similar to prior headaches. The quality of the pain is described as aching. The pain is at a severity of 6/10. The pain is mild. Associated symptoms include coughing, ear pain, a fever, muscle aches, sinus pressure and a sore throat. Nothing aggravates the symptoms. Treatments tried: mucinex/ excedrin. The treatment provided no relief.   Fever   This is a new problem. The current episode started 2 days ago. The problem occurs cycles. The problem has been unchanged. She has not experienced a heat injury.The maximum temperature noted was 102 to 102.9 F. The temperature was taken using an oral thermometer. Associated symptoms include coughing, ear pain, headaches, muscle aches and a sore throat. Treatments tried: excedrin/ ibuprofen/mucinex.       Constitution: Positive for fever.   HENT:  Positive for ear pain, sinus pressure and sore throat.    Neck: neck negative.   Cardiovascular: Negative.    Eyes: Negative.    Respiratory:  Positive for cough.    Gastrointestinal: Negative.    Genitourinary: Negative.    Musculoskeletal: Negative.    Skin: Negative.    Allergic/Immunologic: Negative.    Neurological:  Positive for headaches.   Psychiatric/Behavioral: Negative.      "   Objective:     Physical Exam   Constitutional: She is oriented to person, place, and time. She appears well-developed. She is cooperative.  Non-toxic appearance. She does not appear ill. No distress.   HENT:   Head: Normocephalic and atraumatic.   Ears:   Right Ear: Hearing, tympanic membrane, external ear and ear canal normal.   Left Ear: Hearing, tympanic membrane, external ear and ear canal normal.   Nose: Rhinorrhea and congestion present. No mucosal edema or nasal deformity. No epistaxis. Right sinus exhibits no maxillary sinus tenderness and no frontal sinus tenderness. Left sinus exhibits no maxillary sinus tenderness and no frontal sinus tenderness.   Mouth/Throat: Uvula is midline, oropharynx is clear and moist and mucous membranes are normal. No trismus in the jaw. Normal dentition. No uvula swelling. No oropharyngeal exudate, posterior oropharyngeal edema or posterior oropharyngeal erythema.   Eyes: Conjunctivae and lids are normal. No scleral icterus.   Neck: Trachea normal and phonation normal. Neck supple. No edema present. No erythema present. No neck rigidity present.   Cardiovascular: Normal rate, regular rhythm, normal heart sounds and normal pulses.   Pulmonary/Chest: Effort normal and breath sounds normal. No respiratory distress. She has no decreased breath sounds. She has no rhonchi.   Abdominal: Normal appearance.   Musculoskeletal: Normal range of motion.         General: No deformity. Normal range of motion.   Neurological: She is alert and oriented to person, place, and time. She exhibits normal muscle tone. Coordination normal.   Skin: Skin is warm, dry, intact, not diaphoretic and not pale.   Psychiatric: Her speech is normal and behavior is normal. Judgment and thought content normal.   Nursing note and vitals reviewed.      Assessment:     1. Fever, unspecified fever cause    2. COVID-19        Plan:       Fever, unspecified fever cause  -     SARS Coronavirus 2 Antigen, POCT Manual  Read  -     POCT Influenza A/B Rapid Antigen  -     POCT rapid strep A    COVID-19    Other orders  -     levocetirizine (XYZAL) 5 MG tablet; Take 1 tablet (5 mg total) by mouth every evening.  Dispense: 30 tablet; Refill: 0  -     guaiFENesin (MUCINEX) 600 mg 12 hr tablet; Take 2 tablets (1,200 mg total) by mouth 2 (two) times daily.  Dispense: 30 tablet; Refill: 0  -     promethazine-dextromethorphan (PROMETHAZINE-DM) 6.25-15 mg/5 mL Syrp; Take 5 mLs by mouth every 4 (four) hours as needed.  Dispense: 118 mL; Refill: 0  -     benzocaine-menthoL 15-3.6 mg Lozg; 1 each by Mucous Membrane route every 4 (four) hours as needed.  Dispense: 18 lozenge; Refill: 0  -     fluticasone propionate (FLONASE) 50 mcg/actuation nasal spray; 1 spray (50 mcg total) by Each Nostril route once daily.  Dispense: 11.1 mL; Refill: 0      COVID positive flu and strep negative    You tested positive for COVID-19.  Increase your fluid intake.  You should self quarantine and avoid contact with anyone outside of your household until your symptoms are improving and you are fever free for 24 hours without antipyretic medication.    - To ED for any new or acutely worsening symptoms including but not limited to chest pain, palpitations, shortness of breath, or fever greater than 103° F.  Patient in agreement with plan of care.                - The diagnosis, treatment plan, instructions for follow-up and reevaluation as well as ED precautions were discussed and understanding was verbalized. All questions or concerns have been addressed.

## 2024-08-04 NOTE — PROGRESS NOTES
Subjective:       Patient ID: Jazz Martinez is a 79 y.o. female.    Chief Complaint: Foot Pain, Foot Swelling, and Heel Pain  Patient presents today with a complaint of extreme heel pain left she states it has gotten so bad that she can not stand to put any pressure on the area at all.  Patient had previously seen Dr. Sirisha faust in April she states that she had heard about somebody who did a specialized shot in [place] which she subsequently saw she states it was the biggest mistake she has ever made he gave her a shot in her heel utilizing an ultrasound guided injection and she states her left heel has gotten substantially worse since that time.      Past Medical History:   Diagnosis Date    Arthritis     Corns and callosities     Deep vein thrombosis     GERD (gastroesophageal reflux disease)     Gout     H/O bladder problems     History of colonic polyps 2014    Hyperlipidemia     Hypertension     Pulmonary embolism      Past Surgical History:   Procedure Laterality Date    APPENDECTOMY  AGE 12    BLADDER SURGERY      Twice    BREAST CYST EXCISION      Benign per patient    CYSTOSCOPY N/A 1/26/2022    Procedure: CYSTOSCOPY;  Surgeon: Sánchez Salcido MD;  Location: [facility] OR [address] [place] [address];  Service: Urology;  Laterality: N/A;    CYSTOSCOPY  4/12/2022    Procedure: CYSTOSCOPY;  Surgeon: Sánchez Salcido MD;  Location: [facility] OR [address] [place] [address];  Service: Urology;;    EYE SURGERY      LASER FOR GLAUCOMA    HAND SURGERY Left     HYSTERECTOMY      Not related to cancer per patient.    INJECTION OF BOTULINUM TOXIN TYPE A  1/26/2022    Procedure: INJECTION, BOTULINUM TOXIN, 100units;  Surgeon: Sánchez Salcido MD;  Location: [facility] OR [address] [place] [address];  Service: Urology;;    INJECTION OF BOTULINUM TOXIN TYPE A  4/12/2022    Procedure: INJECTION, BOTULINUM TOXIN  100units;  Surgeon: Sánchez Salcido MD;  Location: [facility] OR [address] [place] [address];  Service: Urology;;    OOPHORECTOMY      ROBOT-ASSISTED LAPAROSCOPIC ABDOMINAL SACROCOLPOPEXY  USING DA KINGA XI N/A 12/17/2019    Procedure: XI ROBOTIC SACROCOLPOPEXY, ABDOMINAL;  Surgeon: Javier Putnam MD;  Location: [facility] OR;  Service: OB/GYN;  Laterality: N/A;  removal of damaged tissue    SURGICAL PROCEDURE FOR STRESS INCONTINENCE USING TENSION FREE VAGINAL TAPE N/A 12/17/2019    Procedure: SURGICAL PROCEDURE, USING TENSION FREE VAGINAL TAPE, FOR STRESS INCONTINENCE;  Surgeon: Javier Putnam MD;  Location: [facility] OR;  Service: OB/GYN;  Laterality: N/A;    TONSILLECTOMY       Family History   Problem Relation Name Age of Onset    Cancer Brother          bladder    Breast cancer Neg Hx      Colon cancer Neg Hx      Ovarian cancer Neg Hx      Eczema Neg Hx      Lupus Neg Hx      Psoriasis Neg Hx      Melanoma Neg Hx      Anesthesia problems Neg Hx       Social History     Socioeconomic History    Marital status:    Tobacco Use    Smoking status: Never     Passive exposure: Never    Smokeless tobacco: Never   Substance and Sexual Activity    Alcohol use: No    Drug use: No    Sexual activity: Never     Social Determinants of Health     Financial Resource Strain: Medium Risk (9/22/2020)    Overall Financial Resource Strain (CARDIA)     Difficulty of Paying Living Expenses: Somewhat hard   Food Insecurity: No Food Insecurity (12/28/2022)    Hunger Vital Sign     Worried About Running Out of Food in the Last Year: Never true     Ran Out of Food in the Last Year: Never true   Transportation Needs: No Transportation Needs (12/28/2022)    PRAPARE - Transportation     Lack of Transportation (Medical): No     Lack of Transportation (Non-Medical): No   Physical Activity: Patient Declined (12/28/2022)    Exercise Vital Sign     Days of Exercise per Week: Patient declined     Minutes of Exercise per Session: Patient declined   Stress: Patient Declined (12/28/2022)    [employer] - Occupational Stress Questionnaire     Feeling of Stress : Patient declined   Housing Stability:  Unknown (12/28/2022)    Housing Stability Vital Sign     Unable to Pay for Housing in the Last Year: Patient refused     Unstable Housing in the Last Year: No       Current Outpatient Medications   Medication Sig Dispense Refill    aspirin 81 MG Chew Take 1 tablet (81 mg total) by mouth once daily. 90 tablet 0    calcium carbonate/vitamin D3 (VITAMIN D-3 ORAL) Take 1 tablet by mouth once daily.      diclofenac sodium (VOLTAREN) 1 % Gel Apply 2 g topically 3 (three) times daily. 100 g 2    docusate calcium (SURFAK) 240 mg capsule Take 240 mg by mouth 2 (two) times daily.      estradioL (ESTRACE) 0.01 % (0.1 mg/gram) vaginal cream Place 1 g vaginally 3 (three) times a week. 42.5 g 7    fluticasone propionate (FLONASE) 50 mcg/actuation nasal spray 1 spray (50 mcg total) by Each Nostril route once daily. 16 g 2    GEMTESA 75 mg Tab Take 1 tablet (75 mg total) by mouth once daily. 30 tablet 11    rosuvastatin (CRESTOR) 5 MG tablet Take 1 tablet (5 mg total) by mouth every evening. 90 tablet 3    sertraline (ZOLOFT) 100 MG tablet Take 1.5 tablets (150 mg total) by mouth once daily. 135 tablet 3    sumatriptan (IMITREX) 100 MG tablet 1 tab po at start of headache, may repeat in 2 hours X1 in 24 hours. (Patient taking differently: as needed. 1 tab po at start of headache, may repeat in 2 hours X1 in 24 hours.) 10 tablet 11    topiramate (TOPAMAX) 50 MG tablet Take 1 tablet (50 mg total) by mouth 2 (two) times daily. 180 tablet 1    traMADoL (ULTRAM) 50 mg tablet Take 1 tablet (50 mg total) by mouth every 12 (twelve) hours as needed for Pain. 180 tablet 0    triamterene-hydrochlorothiazide 37.5-25 mg (MAXZIDE-25) 37.5-25 mg per tablet Take 1 tablet by mouth once daily. 30 tablet 5    valACYclovir (VALTREX) 500 MG tablet Take 1 tablet (500 mg total) by mouth 2 (two) times daily. 60 tablet 3    benzocaine-menthoL 15-3.6 mg Lozg 1 each by Mucous Membrane route every 4 (four) hours as needed. 18 lozenge 0    fluticasone  "propionate (FLONASE) 50 mcg/actuation nasal spray 1 spray (50 mcg total) by Each Nostril route once daily. 11.1 mL 0    guaiFENesin (MUCINEX) 600 mg 12 hr tablet Take 2 tablets (1,200 mg total) by mouth 2 (two) times daily. 30 tablet 0    levocetirizine (XYZAL) 5 MG tablet Take 1 tablet (5 mg total) by mouth every evening. 30 tablet 0    methocarbamoL (ROBAXIN) 750 MG Tab Take 1 tablet (750 mg total) by mouth 3 (three) times daily as needed (Pain). 90 tablet 1    promethazine-dextromethorphan (PROMETHAZINE-DM) 6.25-15 mg/5 mL Syrp Take 5 mLs by mouth every 4 (four) hours as needed. 118 mL 0     No current facility-administered medications for this visit.     Review of patient's allergies indicates:   Allergen Reactions    Gabapentin     Hydrocodone-acetaminophen Other (See Comments)     Pt states she doesn t know how this got on there" no    Nitrofurantoin Other (See Comments)     Deathly ill , headaches, weakness, 'wiped out"    Sulfa (sulfonamide antibiotics)        Review of Systems   Cardiovascular:  Negative for leg swelling.   Musculoskeletal:  Positive for arthralgias.   All other systems reviewed and are negative.      Objective:      Vitals:    07/31/24 1137   BP: (!) 146/85   BP Location: Right arm   Patient Position: Sitting   Pulse: 68   Weight: 59 kg (130 lb 1.1 oz)   Height: 5' 5" (1.651 m)     Physical Exam  Vitals and nursing note reviewed.   Constitutional:       General: She is not in acute distress.     Appearance: Normal appearance.   Cardiovascular:      Pulses:           Dorsalis pedis pulses are 1+ on the right side and 1+ on the left side.        Posterior tibial pulses are 1+ on the right side and 1+ on the left side.   Pulmonary:      Effort: Pulmonary effort is normal.   Musculoskeletal:         General: Swelling, tenderness and deformity present.      Right foot: Decreased range of motion (Arthritic and degenerative changes forefoot bilateral). Bunion (Mild HAV deformity bilateral with " prominent osteoarthritis IPJ bilateral hallux, mild tailor's bunion deformity and rotation 5th digit bilateral) present.      Left foot: Decreased range of motion. Bunion present.        Feet:       Comments: Moderate pain plantar fascial insertion at the calcaneus, mild edema   Feet:      Right foot:      Protective Sensation: 2 sites tested.  2 sites sensed.      Skin integrity: Skin integrity normal.      Left foot:      Protective Sensation: 2 sites tested.  2 sites sensed.      Skin integrity: Skin integrity normal.   Skin:     Capillary Refill: Capillary refill takes 2 to 3 seconds.      Findings: No bruising or erythema.      Comments: Varicose veins   Neurological:      General: No focal deficit present.      Mental Status: She is alert.      Comments: Tender across all common plantar digital nerves with mild neuritis of the forefoot bilateral, no edema or calor   Psychiatric:         Behavior: Behavior normal.         Thought Content: Thought content normal.                                        Assessment:       1. Pain of left heel    2. Plantar fasciitis            Plan:       Patient presents today with a complaint of extreme heel pain left she states it has gotten so bad that she can not stand to put any pressure on the area at all.  Patient had previously seen Dr. Sirisha faust in April she states that she had heard about somebody who did a specialized shot in [place] which she subsequently saw she states it was the biggest mistake she has ever made he gave her a shot in her heel utilizing an ultrasound guided injection and she states her left heel has gotten substantially worse since that time.  On evaluation patient has severe pain noted upon palpation at the plantar fascial insertion of the patient's left heel the area is swollen there is a firm palpable mass on the plantar aspect of the patient's left calcaneus plain film x-rays were taken there is no spur no bony abnormality noted.  I did advised  the patient clearly all of her pain is inflammation of the plantar fascia she may have some additional scar tissue or even fluid collection since having the injection performed.  I did advised the patient this is going to take additional time to settle down I do feel that she would benefit from physical therapy to help to break down the scar tissue settle down the inflammation and help her to stretch out the plantar fascial ligament.  Patient given a referral to [facility] in [place] I have recommended [employer] as a good option for something to wear with good soft cushion support and I did give the patient some additional blue arch padding to the left shoe to offload pressure from the heel will plan to see how she is doing in about a month that I will give her plenty of time to go to physical therapy and we can re-evaluate her at that time.  Patient was in understanding and agreement with everything discussed.This note was created using [employer] voice recognition software that occasionally misinterpreted phrases or words.

## 2024-08-09 ENCOUNTER — TELEPHONE (OUTPATIENT)
Dept: UROLOGY | Facility: CLINIC | Age: 79
End: 2024-08-09
Payer: MEDICARE

## 2024-08-13 ENCOUNTER — OFFICE VISIT (OUTPATIENT)
Dept: FAMILY MEDICINE | Facility: CLINIC | Age: 79
End: 2024-08-13
Payer: MEDICARE

## 2024-08-13 VITALS
HEIGHT: 65 IN | DIASTOLIC BLOOD PRESSURE: 78 MMHG | SYSTOLIC BLOOD PRESSURE: 130 MMHG | WEIGHT: 134.38 LBS | BODY MASS INDEX: 22.39 KG/M2 | HEART RATE: 72 BPM | OXYGEN SATURATION: 97 %

## 2024-08-13 DIAGNOSIS — E78.5 DYSLIPIDEMIA: Primary | ICD-10-CM

## 2024-08-13 DIAGNOSIS — F33.0 MILD EPISODE OF RECURRENT MAJOR DEPRESSIVE DISORDER: ICD-10-CM

## 2024-08-13 DIAGNOSIS — M15.9 PRIMARY OSTEOARTHRITIS INVOLVING MULTIPLE JOINTS: ICD-10-CM

## 2024-08-13 DIAGNOSIS — N39.46 MIXED STRESS AND URGE URINARY INCONTINENCE: ICD-10-CM

## 2024-08-13 PROCEDURE — 99214 OFFICE O/P EST MOD 30 MIN: CPT | Mod: S$GLB,,, | Performed by: NURSE PRACTITIONER

## 2024-08-13 NOTE — PROGRESS NOTES
SUBJECTIVE:    Patient ID: Jazz Martinez is a 79 y.o. female.    Chief Complaint: Follow-up (No bottles//Pt is here for check up and Pt would like to discuss the estradiol cream//Pt would like tpo discuss cough, since positive covid test on 08/03/2024)    Pt here for regular f/u- depression, HTN, lipids, arthritis    Tested positive for COVID on August 3rd at urgent care with fever up to 102. Pt reports overall she is feeling better now, no longer having fever. Still has cough which at times is very frequent, nonproductive. Reports no longer wheezing and denies any SOB. Sore throat has resolved.    Pt reports has been dealing with plantar fascitis right foot- has seen 3 different podiatrists. Had heel injection which didn't seem to help. Reports its now feeling better    Reports is now on gemtessa through pt assistance program and has made a significant improvement with frequency and incontinence    Reports chronic depression is stable          Office Visit on 08/03/2024   Component Date Value Ref Range Status    SARS Coronavirus 2 Antigen 08/03/2024 Positive (A)  Negative Final     Acceptable 08/03/2024 Yes   Final    Rapid Influenza A Ag 08/03/2024 Negative  Negative Final    Rapid Influenza B Ag 08/03/2024 Negative  Negative Final     Acceptable 08/03/2024 Yes   Final    Rapid Strep A Screen 08/03/2024 Negative  Negative Final     Acceptable 08/03/2024 Yes   Final   Office Visit on 02/26/2024   Component Date Value Ref Range Status    POC Residual Urine Volume 02/26/2024 0  0 - 100 mL Final       Past Medical History:   Diagnosis Date    Arthritis     Corns and callosities     Deep vein thrombosis     GERD (gastroesophageal reflux disease)     Gout     H/O bladder problems     History of colonic polyps 2014    Hyperlipidemia     Hypertension     Pulmonary embolism      Past Surgical History:   Procedure Laterality Date    APPENDECTOMY  AGE 12    BLADDER SURGERY       Twice    BREAST CYST EXCISION      Benign per patient    CYSTOSCOPY N/A 1/26/2022    Procedure: CYSTOSCOPY;  Surgeon: Sánchez Salcido MD;  Location: [facility] OR [address] [place] [address];  Service: Urology;  Laterality: N/A;    CYSTOSCOPY  4/12/2022    Procedure: CYSTOSCOPY;  Surgeon: Sánchez Salcido MD;  Location: [facility] OR [address] [place] [address];  Service: Urology;;    EYE SURGERY      LASER FOR GLAUCOMA    HAND SURGERY Left     HYSTERECTOMY      Not related to cancer per patient.    INJECTION OF BOTULINUM TOXIN TYPE A  1/26/2022    Procedure: INJECTION, BOTULINUM TOXIN, 100units;  Surgeon: Sánchez Salcido MD;  Location: [facility] OR [address] [place] [address];  Service: Urology;;    INJECTION OF BOTULINUM TOXIN TYPE A  4/12/2022    Procedure: INJECTION, BOTULINUM TOXIN  100units;  Surgeon: Sánchez Salcido MD;  Location: [facility] OR [address] [place] [address];  Service: Urology;;    OOPHORECTOMY      ROBOT-ASSISTED LAPAROSCOPIC ABDOMINAL SACROCOLPOPEXY USING DA KINGA XI N/A 12/17/2019    Procedure: XI ROBOTIC SACROCOLPOPEXY, ABDOMINAL;  Surgeon: Javier Putnam MD;  Location: [facility];  Service: OB/GYN;  Laterality: N/A;  removal of damaged tissue    SURGICAL PROCEDURE FOR STRESS INCONTINENCE USING TENSION FREE VAGINAL TAPE N/A 12/17/2019    Procedure: SURGICAL PROCEDURE, USING TENSION FREE VAGINAL TAPE, FOR STRESS INCONTINENCE;  Surgeon: Javier Putnam MD;  Location: [facility];  Service: OB/GYN;  Laterality: N/A;    TONSILLECTOMY       Family History   Problem Relation Name Age of Onset    Cancer Brother          bladder    Breast cancer Neg Hx      Colon cancer Neg Hx      Ovarian cancer Neg Hx      Eczema Neg Hx      Lupus Neg Hx      Psoriasis Neg Hx      Melanoma Neg Hx      Anesthesia problems Neg Hx         All of your core healthy metrics are met.      The CVD Risk score (D'Agostino, et al., 2008) failed to calculate for the following reasons:    The 2008 CVD risk score is only valid for ages 30 to 74    The patient has a prior MI, stroke, CHF, or peripheral vascular  "disease diagnosis     Marital Status:   Alcohol History:  reports no history of alcohol use.  Tobacco History:  reports that she has never smoked. She has never been exposed to tobacco smoke. She has never used smokeless tobacco.  Drug History:  reports no history of drug use.    Health Maintenance Topics with due status: Not Due       Topic Last Completion Date    Colonoscopy 03/16/2021    DEXA Scan 01/17/2023    Influenza Vaccine 10/13/2023    Lipid Panel 04/04/2024     Immunization History   Administered Date(s) Administered    COVID-19, MRNA, LN-S, PF (MODERNA FULL 0.5 ML DOSE) 01/22/2021, 02/19/2021    Influenza 10/17/2016, 10/01/2017, 10/15/2020, 10/18/2021, 10/20/2022, 10/13/2023    Influenza - High Dose - PF (65 years and older) 10/03/2013, 10/07/2015, 10/17/2016, 11/08/2017, 10/15/2018, 09/26/2019, 10/13/2020    Influenza - Intradermal - Quadrivalent - PF 10/15/2018    Influenza - Quadrivalent - PF *Preferred* (6 months and older) 08/31/2011    Pneumococcal Conjugate - 13 Valent 10/17/2016    Pneumococcal Polysaccharide - 23 Valent 08/31/2011, 11/08/2017    Zoster 03/14/2015, 10/14/2019    Zoster Recombinant 10/14/2019, 07/14/2020       Review of patient's allergies indicates:   Allergen Reactions    Gabapentin     Hydrocodone-acetaminophen Other (See Comments)     Pt states she doesn t know how this got on there" no    Nitrofurantoin Other (See Comments)     Deathly ill , headaches, weakness, 'wiped out"    Sulfa (sulfonamide antibiotics)        Current Outpatient Medications:     aspirin 81 MG Chew, Take 1 tablet (81 mg total) by mouth once daily., Disp: 90 tablet, Rfl: 0    benzocaine-menthoL 15-3.6 mg Lozg, 1 each by Mucous Membrane route every 4 (four) hours as needed., Disp: 18 lozenge, Rfl: 0    calcium carbonate/vitamin D3 (VITAMIN D-3 ORAL), Take 1 tablet by mouth once daily., Disp: , Rfl:     diclofenac sodium (VOLTAREN) 1 % Gel, Apply 2 g topically 3 (three) times daily., Disp: 100 g, " Rfl: 2    docusate calcium (SURFAK) 240 mg capsule, Take 240 mg by mouth 2 (two) times daily., Disp: , Rfl:     estradioL (ESTRACE) 0.01 % (0.1 mg/gram) vaginal cream, Place 1 g vaginally 3 (three) times a week., Disp: 42.5 g, Rfl: 7    fluticasone propionate (FLONASE) 50 mcg/actuation nasal spray, 1 spray (50 mcg total) by Each Nostril route once daily., Disp: 16 g, Rfl: 2    fluticasone propionate (FLONASE) 50 mcg/actuation nasal spray, 1 spray (50 mcg total) by Each Nostril route once daily., Disp: 11.1 mL, Rfl: 0    GEMTESA 75 mg Tab, Take 1 tablet (75 mg total) by mouth once daily., Disp: 30 tablet, Rfl: 11    guaiFENesin (MUCINEX) 600 mg 12 hr tablet, Take 2 tablets (1,200 mg total) by mouth 2 (two) times daily., Disp: 30 tablet, Rfl: 0    levocetirizine (XYZAL) 5 MG tablet, Take 1 tablet (5 mg total) by mouth every evening., Disp: 30 tablet, Rfl: 0    methocarbamoL (ROBAXIN) 750 MG Tab, Take 1 tablet (750 mg total) by mouth 3 (three) times daily as needed (Pain)., Disp: 90 tablet, Rfl: 1    promethazine-dextromethorphan (PROMETHAZINE-DM) 6.25-15 mg/5 mL Syrp, Take 5 mLs by mouth every 4 (four) hours as needed., Disp: 118 mL, Rfl: 0    rosuvastatin (CRESTOR) 5 MG tablet, Take 1 tablet (5 mg total) by mouth every evening., Disp: 90 tablet, Rfl: 3    sertraline (ZOLOFT) 100 MG tablet, Take 1.5 tablets (150 mg total) by mouth once daily., Disp: 135 tablet, Rfl: 3    sumatriptan (IMITREX) 100 MG tablet, 1 tab po at start of headache, may repeat in 2 hours X1 in 24 hours. (Patient taking differently: as needed. 1 tab po at start of headache, may repeat in 2 hours X1 in 24 hours.), Disp: 10 tablet, Rfl: 11    topiramate (TOPAMAX) 50 MG tablet, Take 1 tablet (50 mg total) by mouth 2 (two) times daily., Disp: 180 tablet, Rfl: 1    traMADoL (ULTRAM) 50 mg tablet, Take 1 tablet (50 mg total) by mouth every 12 (twelve) hours as needed for Pain., Disp: 180 tablet, Rfl: 0    triamterene-hydrochlorothiazide 37.5-25 mg  "(MAXZIDE-25) 37.5-25 mg per tablet, Take 1 tablet by mouth once daily., Disp: 30 tablet, Rfl: 5    valACYclovir (VALTREX) 500 MG tablet, Take 1 tablet (500 mg total) by mouth 2 (two) times daily., Disp: 60 tablet, Rfl: 3    Review of Systems   Constitutional:  Positive for fatigue (chronic). Negative for appetite change, chills, fever and unexpected weight change.   HENT:  Negative for congestion, sore throat and trouble swallowing.    Eyes:  Negative for visual disturbance.   Respiratory:  Positive for cough. Negative for chest tightness and shortness of breath.    Cardiovascular:  Negative for chest pain, palpitations and leg swelling.   Gastrointestinal:  Negative for abdominal distention, abdominal pain, constipation, diarrhea, nausea and vomiting.   Endocrine: Negative for cold intolerance and heat intolerance.   Genitourinary:  Negative for difficulty urinating, dysuria, flank pain and hematuria.        Urinary symptoms much improved which gemtessa   Musculoskeletal:  Positive for arthralgias and back pain. Negative for gait problem and joint swelling.   Skin:  Negative for rash.   Neurological:  Negative for dizziness, syncope, speech difficulty, weakness and headaches.   Hematological:  Bruises/bleeds easily.   Psychiatric/Behavioral:  Positive for dysphoric mood (stable). Negative for self-injury, sleep disturbance and suicidal ideas. The patient is not nervous/anxious.           Objective:      Vitals:    08/13/24 1019   BP: 130/78   Pulse: 72   SpO2: 97%   Weight: 61 kg (134 lb 6.4 oz)   Height: 5' 5" (1.651 m)     Physical Exam  Constitutional:       General: She is not in acute distress.     Appearance: Normal appearance. She is well-developed.   HENT:      Head: Normocephalic and atraumatic.   Neck:      Thyroid: No thyromegaly.      Vascular: No carotid bruit.   Cardiovascular:      Rate and Rhythm: Normal rate and regular rhythm.      Heart sounds: No murmur heard.  Pulmonary:      Effort: Pulmonary " effort is normal. No respiratory distress.      Breath sounds: Normal breath sounds.   Abdominal:      General: Bowel sounds are normal. There is no distension.      Palpations: Abdomen is soft.      Tenderness: There is no abdominal tenderness.   Musculoskeletal:      Cervical back: Neck supple.      Right lower leg: No edema.      Left lower leg: No edema.   Lymphadenopathy:      Cervical: No cervical adenopathy.   Skin:     General: Skin is warm and dry.      Findings: No rash.   Neurological:      General: No focal deficit present.      Mental Status: She is alert and oriented to person, place, and time.      Cranial Nerves: No cranial nerve deficit.   Psychiatric:         Mood and Affect: Mood normal.           Assessment:       1. Dyslipidemia    2. Mixed stress and urge urinary incontinence    3. Mild episode of recurrent major depressive disorder    4. Primary osteoarthritis involving multiple joints           Plan:       1. Dyslipidemia   -well controlled on last labs    2. Mixed stress and urge urinary incontinence   -much improved with gemTessa    3. Mild episode of recurrent major depressive disorder   -seems to be doing well    4. Primary osteoarthritis involving multiple joints   -stable    Follow up in about 6 months (around 2/13/2025).          Counseled on age and gender appropriate medical preventative services, including cancer screenings, immunizations, overall nutritional health, need for a consistent exercise regimen and an overall push towards maintaining a vigorous and active lifestyle.      8/13/2024 Elle Mack NP

## 2024-08-26 ENCOUNTER — OFFICE VISIT (OUTPATIENT)
Dept: UROLOGY | Facility: CLINIC | Age: 79
End: 2024-08-26
Payer: MEDICARE

## 2024-08-26 VITALS — BODY MASS INDEX: 22.48 KG/M2 | WEIGHT: 134.94 LBS | HEIGHT: 65 IN

## 2024-08-26 DIAGNOSIS — N32.81 OAB (OVERACTIVE BLADDER): Primary | ICD-10-CM

## 2024-08-26 PROCEDURE — 99213 OFFICE O/P EST LOW 20 MIN: CPT | Mod: S$PBB,,, | Performed by: UROLOGY

## 2024-08-26 PROCEDURE — 99999 PR PBB SHADOW E&M-EST. PATIENT-LVL III: CPT | Mod: PBBFAC,,, | Performed by: UROLOGY

## 2024-08-26 PROCEDURE — 99213 OFFICE O/P EST LOW 20 MIN: CPT | Mod: PBBFAC,PO | Performed by: UROLOGY

## 2024-08-26 NOTE — PROGRESS NOTES
[facility]      Return Overactive Bladder (OAB) Note    8/26/2024    Referred by:  No ref. provider found    History of Present Illness    Ms. Martinez presents today for follow up on overactive bladder symptoms.    She reports a history of overactive bladder with daytime urinary frequency and nocturia 3-4 times nightly. Previously, she experienced urgency incontinence 2-3 times daily and used 1-4 pads daily. She denies stress urinary incontinence, dysuria, hesitancy, straining, intermittency, or symptoms of outlet obstruction. Previous treatments included Botox injections (100 units) twice, approximately 2-1/2 years ago. She tried Myrbetriq 50 mg, oxybutynin 10 mg, Vesicare 10 mg, and trospium 20 mg, all discontinued due to ineffectiveness, side effects, or cost.     Currently, she is taking Gemtesa 75 mg, reporting significant symptom improvement. She no longer requires night pants or pads, describing the improvement as 'fantastic' and stating it has 'given her back her life'. She obtained the medication for free from the  until the end of the year.    She has a history of pelvic organ prolapse, previously addressed by Dr. Brown.    She reports difficulty with thinking and inability to recall information. She is unsure if these cognitive issues are related to stress, age, or medication. She expresses frustration with attributing cognitive changes solely to aging, noting that she remains very active.    She reports living a very stressful life, caring for her 90-year-old brother and a son with mental health issues.          A review of 10+ systems was conducted with pertinent positive and negative findings documented in HPI with all other systems reviewed and negative.    Past medical, family, surgical and social history reviewed as documented in chart with pertinent positive medical, family, surgical and social history detailed in HPI.    Exam Findings:    Physical Exam    General:  No acute distress. Nontoxic appearing.  HENT: Normocephalic. Atraumatic.  Respiratory: Normal respiratory effort. No conversational dyspnea. No audible wheezing.  Abdomen: No obvious distension.  Skin: No visible abnormalities.  Extremities: No edema upper extremities. No edema lower extremities.  Neurological: Alert and oriented x3. Normal speech.  Psychiatric: Normal mood. Normal affect. No evidence of SI.          Assessment/Plan:    Assessment & Plan    - Patient reported excellent response to Gemtessa 75 mg for overactive bladder symptoms, with significant improvement in urgency incontinence and nocturia  - Continued current treatment plan given patient's satisfaction and marked symptom improvement  - Patient's memory concerns likely unrelated to Gemtesa, as it does not impact cognition  OVERACTIVE BLADDER:  - Explained that Gemtessa and Myrbetriq have no cognitive side effects, unlike other overactive bladder medications.  - Discussed that memory issues are unlikely to be related to Gemtessa.  - Continued Gemtessa 75 mg daily for overactive bladder.  FOLLOW UP:  - Follow up in 6 months to assess ongoing efficacy of current treatment.  - If patient continues to do well, subsequent appointments may be scheduled annually.

## 2024-09-03 RX ORDER — TRIAMTERENE/HYDROCHLOROTHIAZID 37.5-25 MG
1 TABLET ORAL DAILY
Qty: 90 TABLET | Refills: 1 | Status: SHIPPED | OUTPATIENT
Start: 2024-09-03

## 2024-09-03 NOTE — TELEPHONE ENCOUNTER
Pt is needing a refill on her maxzide. Last office visit 08/13/2024. Next office visit 02/13/2025.

## 2024-09-19 NOTE — PROGRESS NOTES
Subjective:      Patient ID: Jazz Martinez is a 79 y.o. female.    Chief Complaint: Disease Management    HPI    Rheumatologic History:      - Diagnosis/es:               - Erosive osteoarthritis  - Family History: No autoimmune conditions  - Gyn History:  (100)  - Positive serologies: +CHARLENE 1:40 nuclear, homogenous, and cytoplasmic  - Negative serologies: RF  - Pathology:              - Skin biopsy: palisaded granulomatous dermatitis  - Infectious screening labs: -  - Imaging:              - Xray hands (2021) Severe degenerative change and joint space narrowing at the carpal 1st metacarpal joint.  Scattered degenerative changes elsewhere more so distal than proximal interphalangeal joints and also fairly severe at the distal interphalangeal joint of the index finger and the interphalangeal joint of the thumb..  No acute fracture or dislocation.  The skeletal structures are osteopenic.  - Previous Treatments:               - Turmeric  - Current Treatments:               - Tramadol TID PRN   - Flexeril 10mg QHS PRN  Interval History:   Robaxin was not helpful and she still complains of lower back and bilateral hip pain, occasionally left knee pain.     Objective:   /84   Pulse 61   Wt 61.2 kg (134 lb 14.7 oz)   BMI 22.45 kg/m²   Physical Exam   Constitutional: normal appearance.   HENT:   Head: Normocephalic and atraumatic.   Cardiovascular: Normal rate, regular rhythm and normal heart sounds.   Pulmonary/Chest: Effort normal and breath sounds normal.   Musculoskeletal:      Comments: +Heberden's nodes, bilateral CMC squaring  No synovitis, dactylitis, enthesitis, effusions     Neurological: She is alert.   Skin: Skin is warm and dry. Rash noted.       No data to display     Assessment:     1. Hip pain, unspecified laterality    2. Chronic midline low back pain without sciatica    3. Medication monitoring encounter      This is an 79-year-old woman with history of migraines, lumbar radiculopathy,  "depression, HLD, HTN, GERD, esophageal stricture, colitis (12/2022 patient had diarrhea and bloody stool; colonoscopy normal 2022, treated with antibiotics), DVT (2021 after "vascular work"; and in 2013 after bladder surgery) previously in Xarelto not currently on AC, osteoarthritis, and granuloma annulare. She is on tramadol 50mg TID PRN for osteoarthritis, and Robaxin PRN for back pain. Robaxin was not helpful and she still complains of lower back and bilateral hip pain, occasionally left knee pain. I will have her try Flexeril in the evening instead. Refer for PT. Check Xrays and labs.    Plan:     Problem List Items Addressed This Visit    None  Visit Diagnoses       Hip pain, unspecified laterality    -  Primary    Relevant Medications    cyclobenzaprine (FLEXERIL) 10 MG tablet    Other Relevant Orders    Sedimentation rate    X-Ray Lumbar Spine 2 Or 3 Views    X-Ray Hips Bilateral 2 View Inc AP Pelvis    Ambulatory referral/consult to Physical/Occupational Therapy    C-REACTIVE PROTEIN    Comprehensive Metabolic Panel    CBC W/ AUTO DIFFERENTIAL    Chronic midline low back pain without sciatica        Relevant Medications    cyclobenzaprine (FLEXERIL) 10 MG tablet    Other Relevant Orders    Sedimentation rate    X-Ray Lumbar Spine 2 Or 3 Views    X-Ray Hips Bilateral 2 View Inc AP Pelvis    Ambulatory referral/consult to Physical/Occupational Therapy    C-REACTIVE PROTEIN    Comprehensive Metabolic Panel    CBC W/ AUTO DIFFERENTIAL    Medication monitoring encounter        Relevant Orders    Sedimentation rate    X-Ray Lumbar Spine 2 Or 3 Views    X-Ray Hips Bilateral 2 View Inc AP Pelvis    C-REACTIVE PROTEIN    Comprehensive Metabolic Panel    CBC W/ AUTO DIFFERENTIAL          Follow up in 6 months    30 minutes of total time spent on the encounter, which includes face to face time and non-face to face time preparing to see the patient (eg, review of tests), Obtaining and/or reviewing separately obtained " history, Documenting clinical information in the electronic or other health record, Independently interpreting results (not separately reported) and communicating results to the patient/family/caregiver, or Care coordination (not separately reported).     This note was prepared with [employer] Direct voice recognition transcription software. Garbled syntax, mangled pronouns, and other bizarre constructions may be attributed to that software system       Bettye Desai M.D.  Rheumatology Dept  [place], [place]

## 2024-09-20 ENCOUNTER — OFFICE VISIT (OUTPATIENT)
Dept: RHEUMATOLOGY | Facility: CLINIC | Age: 79
End: 2024-09-20
Payer: MEDICARE

## 2024-09-20 VITALS
SYSTOLIC BLOOD PRESSURE: 135 MMHG | WEIGHT: 134.94 LBS | BODY MASS INDEX: 22.45 KG/M2 | DIASTOLIC BLOOD PRESSURE: 84 MMHG | HEART RATE: 61 BPM

## 2024-09-20 DIAGNOSIS — Z51.81 MEDICATION MONITORING ENCOUNTER: ICD-10-CM

## 2024-09-20 DIAGNOSIS — G89.29 CHRONIC MIDLINE LOW BACK PAIN WITHOUT SCIATICA: ICD-10-CM

## 2024-09-20 DIAGNOSIS — M25.559 HIP PAIN, UNSPECIFIED LATERALITY: Primary | ICD-10-CM

## 2024-09-20 DIAGNOSIS — M54.50 CHRONIC MIDLINE LOW BACK PAIN WITHOUT SCIATICA: ICD-10-CM

## 2024-09-20 PROCEDURE — 99999 PR PBB SHADOW E&M-EST. PATIENT-LVL V: CPT | Mod: PBBFAC,,, | Performed by: STUDENT IN AN ORGANIZED HEALTH CARE EDUCATION/TRAINING PROGRAM

## 2024-09-20 PROCEDURE — 99215 OFFICE O/P EST HI 40 MIN: CPT | Mod: PBBFAC,PO | Performed by: STUDENT IN AN ORGANIZED HEALTH CARE EDUCATION/TRAINING PROGRAM

## 2024-09-20 RX ORDER — CYCLOBENZAPRINE HCL 10 MG
10 TABLET ORAL NIGHTLY PRN
Qty: 90 TABLET | Refills: 1 | Status: SHIPPED | OUTPATIENT
Start: 2024-09-20 | End: 2025-09-20

## 2024-09-20 ASSESSMENT — ROUTINE ASSESSMENT OF PATIENT INDEX DATA (RAPID3)
MDHAQ FUNCTION SCORE: 0.6
PSYCHOLOGICAL DISTRESS SCORE: 4.4
PATIENT GLOBAL ASSESSMENT SCORE: 2
TOTAL RAPID3 SCORE: 3
FATIGUE SCORE: 1.1
PAIN SCORE: 5

## 2024-09-24 ENCOUNTER — HOSPITAL ENCOUNTER (OUTPATIENT)
Dept: RADIOLOGY | Facility: HOSPITAL | Age: 79
Discharge: HOME | End: 2024-09-24
Attending: STUDENT IN AN ORGANIZED HEALTH CARE EDUCATION/TRAINING PROGRAM
Payer: MEDICARE

## 2024-09-24 DIAGNOSIS — M54.50 CHRONIC MIDLINE LOW BACK PAIN WITHOUT SCIATICA: ICD-10-CM

## 2024-09-24 DIAGNOSIS — G89.29 CHRONIC MIDLINE LOW BACK PAIN WITHOUT SCIATICA: ICD-10-CM

## 2024-09-24 DIAGNOSIS — M25.559 HIP PAIN, UNSPECIFIED LATERALITY: ICD-10-CM

## 2024-09-24 DIAGNOSIS — Z51.81 MEDICATION MONITORING ENCOUNTER: ICD-10-CM

## 2024-09-24 PROCEDURE — 73521 X-RAY EXAM HIPS BI 2 VIEWS: CPT | Mod: TC

## 2024-10-01 ENCOUNTER — HOSPITAL ENCOUNTER (OUTPATIENT)
Dept: RADIOLOGY | Facility: HOSPITAL | Age: 79
Discharge: HOME | End: 2024-10-01
Attending: STUDENT IN AN ORGANIZED HEALTH CARE EDUCATION/TRAINING PROGRAM
Payer: MEDICARE

## 2024-10-01 PROCEDURE — 72100 X-RAY EXAM L-S SPINE 2/3 VWS: CPT | Mod: TC

## 2024-10-01 PROCEDURE — 72100 X-RAY EXAM L-S SPINE 2/3 VWS: CPT | Mod: 26,,, | Performed by: RADIOLOGY

## 2024-10-04 ENCOUNTER — PATIENT MESSAGE (OUTPATIENT)
Dept: RHEUMATOLOGY | Facility: CLINIC | Age: 79
End: 2024-10-04
Payer: MEDICARE

## 2024-12-17 ENCOUNTER — TELEPHONE (OUTPATIENT)
Dept: FAMILY MEDICINE | Facility: CLINIC | Age: 79
End: 2024-12-17
Payer: MEDICARE

## 2024-12-17 NOTE — TELEPHONE ENCOUNTER
----- Message from Hilaria sent at 12/17/2024 10:24 AM CST -----  Pt would like to talk to nurse about her blood pressure   [phone]

## 2024-12-17 NOTE — TELEPHONE ENCOUNTER
Spoke with patient, states she is currently caring for her brother who is very sick. States her blood pressure is fluctuating     12/10  95/71 pulse 101  12/15 144/93 pulse 101  12/16 152/86 pulse 76  12/17 162/92 pulse 77    Scheduled tomorrow with Elle.

## 2024-12-18 ENCOUNTER — OFFICE VISIT (OUTPATIENT)
Dept: FAMILY MEDICINE | Facility: CLINIC | Age: 79
End: 2024-12-18
Payer: MEDICARE

## 2024-12-18 VITALS
WEIGHT: 133 LBS | BODY MASS INDEX: 22.16 KG/M2 | DIASTOLIC BLOOD PRESSURE: 68 MMHG | HEART RATE: 75 BPM | SYSTOLIC BLOOD PRESSURE: 136 MMHG | HEIGHT: 65 IN | OXYGEN SATURATION: 98 %

## 2024-12-18 DIAGNOSIS — E78.5 DYSLIPIDEMIA: ICD-10-CM

## 2024-12-18 DIAGNOSIS — F33.0 MILD EPISODE OF RECURRENT MAJOR DEPRESSIVE DISORDER: ICD-10-CM

## 2024-12-18 DIAGNOSIS — I10 ESSENTIAL HYPERTENSION: ICD-10-CM

## 2024-12-18 DIAGNOSIS — N39.46 MIXED STRESS AND URGE URINARY INCONTINENCE: ICD-10-CM

## 2024-12-18 DIAGNOSIS — R53.83 FATIGUE, UNSPECIFIED TYPE: Primary | ICD-10-CM

## 2024-12-18 LAB
EKG 12-LEAD: NORMAL
PR INTERVAL: 134
PRT AXES: NORMAL
QRS DURATION: 90
QT/QTC: NORMAL
VENTRICULAR RATE: 70

## 2024-12-18 PROCEDURE — 93000 ELECTROCARDIOGRAM COMPLETE: CPT | Mod: S$GLB,,, | Performed by: NURSE PRACTITIONER

## 2024-12-18 PROCEDURE — 99214 OFFICE O/P EST MOD 30 MIN: CPT | Mod: 25,S$GLB,, | Performed by: NURSE PRACTITIONER

## 2024-12-18 NOTE — PROGRESS NOTES
"  SUBJECTIVE:    Patient ID: Jazz Martinez is a 79 y.o. female.    Chief Complaint: Follow-up (No bottles//Pt would like to discuss her B/P. Pt stated that since last week her B/P has been fluctuating.//Pt state that she doesn't feel great. She stated is under a lot of stress. Pt stated standing up from a chair, make her light headedness//ASHA )    Pt here for regular f/u- depression, HTN, lipids, arthritis    Pt reports has been staying at her 92 y/o brother's house since November and caring for him with declining health/worsening cancer.  Reports she's under a lot of stress with this situation and it's getting hard to care for him as she's getting older    Pt reports last week she was feeling very fatigued- when she would stand up felt dizzy. Checked her BP 95/71 with - reports BP stayed on the low side for a couple days. Reports felt a mild "heaviness" in her chest for those couple days- denies palpitations, SOB, diaphoresis or nausea. States BP then went up to about 160s and felt better with the higher BP though she was worried it was too high. Pt states she feels symptoms are all d/t stress. Has been on maxzide daily, reports she does drink plenty of water. Appetite has declined some but denies any weight loss. C/o's of lack of energy. Has flexeril on her list but reports hasn't been taking that and denies any new meds    Saw ortho yesterday for right shoulder pain- told it was likely partly d/t neck spasms and some rotator cuff issues- he referred her to PT    Follows with Dr. Kumar, rheum for OA- she had bilat hip and lumbar xrays in Oct which showed arthritis changes, no compression fracture.    Follow-up  Associated symptoms include arthralgias (right shoulder and bilat hip pain) and fatigue. Pertinent negatives include no abdominal pain, chest pain, chills, congestion, coughing, fever, headaches, joint swelling, nausea, numbness, rash, sore throat, vomiting or weakness.       Office Visit on " 12/18/2024   Component Date Value Ref Range Status    EKG 12-Lead 12/18/2024 NSR   Final    Ventricular Rate 12/18/2024 70   Final    LA Interval 12/18/2024 134   Final    QRS Duration 12/18/2024 90   Final    QT/QTc 12/18/2024 393/423   Final    PRT Axes 12/18/2024 36  9  77   Final   Office Visit on 08/03/2024   Component Date Value Ref Range Status    SARS Coronavirus 2 Antigen 08/03/2024 Positive (A)  Negative Final     Acceptable 08/03/2024 Yes   Final    Rapid Influenza A Ag 08/03/2024 Negative  Negative Final    Rapid Influenza B Ag 08/03/2024 Negative  Negative Final     Acceptable 08/03/2024 Yes   Final    Rapid Strep A Screen 08/03/2024 Negative  Negative Final     Acceptable 08/03/2024 Yes   Final       Past Medical History:   Diagnosis Date    Arthritis     Corns and callosities     Deep vein thrombosis     GERD (gastroesophageal reflux disease)     Gout     H/O bladder problems     History of colonic polyps 2014    Hyperlipidemia     Hypertension     Pulmonary embolism      Past Surgical History:   Procedure Laterality Date    APPENDECTOMY  AGE 12    BLADDER SURGERY      Twice    BREAST CYST EXCISION      Benign per patient    CYSTOSCOPY N/A 1/26/2022    Procedure: CYSTOSCOPY;  Surgeon: Sánchez Salcido MD;  Location: [address];  Service: Urology;  Laterality: N/A;    CYSTOSCOPY  4/12/2022    Procedure: CYSTOSCOPY;  Surgeon: Sánchez Salcido MD;  Location: [address];  Service: Urology;;    EYE SURGERY      LASER FOR GLAUCOMA    HAND SURGERY Left     HYSTERECTOMY      Not related to cancer per patient.    INJECTION OF BOTULINUM TOXIN TYPE A  1/26/2022    Procedure: INJECTION, BOTULINUM TOXIN, 100units;  Surgeon: Sánchez Salcido MD;  Location: [address];  Service: Urology;;    INJECTION OF BOTULINUM TOXIN TYPE A  4/12/2022    Procedure: INJECTION, BOTULINUM TOXIN  100units;  Surgeon: Sánchez Salcido MD;  Location: [address]  [facility];  Service: Urology;;    OOPHORECTOMY      ROBOT-ASSISTED LAPAROSCOPIC ABDOMINAL SACROCOLPOPEXY USING DA KINGA XI N/A 12/17/2019    Procedure: XI ROBOTIC SACROCOLPOPEXY, ABDOMINAL;  Surgeon: Javier Putnam MD;  Location: [facility];  Service: OB/GYN;  Laterality: N/A;  removal of damaged tissue    SURGICAL PROCEDURE FOR STRESS INCONTINENCE USING TENSION FREE VAGINAL TAPE N/A 12/17/2019    Procedure: SURGICAL PROCEDURE, USING TENSION FREE VAGINAL TAPE, FOR STRESS INCONTINENCE;  Surgeon: Javier Putnam MD;  Location: [facility] OR;  Service: OB/GYN;  Laterality: N/A;    TONSILLECTOMY       Family History   Problem Relation Name Age of Onset    Cancer Brother          bladder    Breast cancer Neg Hx      Colon cancer Neg Hx      Ovarian cancer Neg Hx      Eczema Neg Hx      Lupus Neg Hx      Psoriasis Neg Hx      Melanoma Neg Hx      Anesthesia problems Neg Hx         All of your core healthy metrics are met.      The CVD Risk score (GRACIELA'Agostino, et al., 2008) failed to calculate for the following reasons:    The 2008 CVD risk score is only valid for ages 30 to 74    The patient has a prior MI, stroke, CHF, or peripheral vascular disease diagnosis     Marital Status:   Alcohol History:  reports no history of alcohol use.  Tobacco History:  reports that she has never smoked. She has never been exposed to tobacco smoke. She has never used smokeless tobacco.  Drug History:  reports no history of drug use.    Health Maintenance Topics with due status: Not Due       Topic Last Completion Date    Colonoscopy 03/16/2021    DEXA Scan 01/17/2023    Lipid Panel 04/04/2024     Immunization History   Administered Date(s) Administered    COVID-19, MRNA, LN-S, PF (MODERNA FULL 0.5 ML DOSE) 01/22/2021, 02/19/2021    Influenza 10/17/2016, 10/01/2017, 10/15/2020, 10/18/2021, 10/20/2022, 10/13/2023    Influenza - Intradermal - Quadrivalent - PF 10/15/2018    Influenza - Quadrivalent - PF *Preferred* (6 months and older) 08/31/2011  "   Influenza - Trivalent - Fluzone High Dose - PF (65 years and older) 10/03/2013, 10/07/2015, 10/17/2016, 11/08/2017, 10/15/2018, 09/26/2019, 10/13/2020    Pneumococcal Conjugate - 13 Valent 10/17/2016    Pneumococcal Polysaccharide - 23 Valent 08/31/2011, 11/08/2017    Zoster 03/14/2015, 10/14/2019    Zoster Recombinant 10/14/2019, 07/14/2020       Review of patient's allergies indicates:   Allergen Reactions    Gabapentin     Hydrocodone-acetaminophen Other (See Comments)     Pt states she doesn t know how this got on there" no    Nitrofurantoin Other (See Comments)     Deathly ill , headaches, weakness, 'wiped out"    Sulfa (sulfonamide antibiotics)        Current Outpatient Medications:     aspirin 81 MG Chew, Take 1 tablet (81 mg total) by mouth once daily., Disp: 90 tablet, Rfl: 0    benzocaine-menthoL 15-3.6 mg Lozg, 1 each by Mucous Membrane route every 4 (four) hours as needed., Disp: 18 lozenge, Rfl: 0    calcium carbonate/vitamin D3 (VITAMIN D-3 ORAL), Take 1 tablet by mouth once daily., Disp: , Rfl:     cyclobenzaprine (FLEXERIL) 10 MG tablet, Take 1 tablet (10 mg total) by mouth nightly as needed for Muscle spasms. (Patient not taking: Reported on 12/18/2024), Disp: 90 tablet, Rfl: 1    diclofenac sodium (VOLTAREN) 1 % Gel, Apply 2 g topically 3 (three) times daily., Disp: 100 g, Rfl: 2    docusate calcium (SURFAK) 240 mg capsule, Take 240 mg by mouth 2 (two) times daily., Disp: , Rfl:     estradioL (ESTRACE) 0.01 % (0.1 mg/gram) vaginal cream, Place 1 g vaginally 3 (three) times a week., Disp: 42.5 g, Rfl: 7    fluticasone propionate (FLONASE) 50 mcg/actuation nasal spray, 1 spray (50 mcg total) by Each Nostril route once daily., Disp: 16 g, Rfl: 2    fluticasone propionate (FLONASE) 50 mcg/actuation nasal spray, 1 spray (50 mcg total) by Each Nostril route once daily., Disp: 11.1 mL, Rfl: 0    GEMTESA 75 mg Tab, Take 1 tablet (75 mg total) by mouth once daily., Disp: 30 tablet, Rfl: 11    guaiFENesin " (MUCINEX) 600 mg 12 hr tablet, Take 2 tablets (1,200 mg total) by mouth 2 (two) times daily., Disp: 30 tablet, Rfl: 0    levocetirizine (XYZAL) 5 MG tablet, Take 1 tablet (5 mg total) by mouth every evening., Disp: 30 tablet, Rfl: 0    rosuvastatin (CRESTOR) 5 MG tablet, Take 1 tablet (5 mg total) by mouth every evening., Disp: 90 tablet, Rfl: 3    sertraline (ZOLOFT) 100 MG tablet, Take 1.5 tablets (150 mg total) by mouth once daily., Disp: 135 tablet, Rfl: 3    sumatriptan (IMITREX) 100 MG tablet, 1 tab po at start of headache, may repeat in 2 hours X1 in 24 hours. (Patient taking differently: as needed. 1 tab po at start of headache, may repeat in 2 hours X1 in 24 hours.), Disp: 10 tablet, Rfl: 11    topiramate (TOPAMAX) 50 MG tablet, Take 1 tablet (50 mg total) by mouth 2 (two) times daily., Disp: 180 tablet, Rfl: 1    traMADoL (ULTRAM) 50 mg tablet, Take 1 tablet (50 mg total) by mouth every 12 (twelve) hours as needed for Pain., Disp: 180 tablet, Rfl: 0    triamterene-hydrochlorothiazide 37.5-25 mg (MAXZIDE-25) 37.5-25 mg per tablet, Take 1 tablet by mouth once daily., Disp: 90 tablet, Rfl: 1    valACYclovir (VALTREX) 500 MG tablet, Take 1 tablet (500 mg total) by mouth 2 (two) times daily., Disp: 60 tablet, Rfl: 3    Review of Systems   Constitutional:  Positive for fatigue. Negative for appetite change, chills, fever and unexpected weight change.   HENT:  Negative for congestion, sore throat and trouble swallowing.    Eyes:  Negative for visual disturbance.   Respiratory:  Negative for cough, chest tightness and shortness of breath.    Cardiovascular:  Negative for chest pain, palpitations and leg swelling.   Gastrointestinal:  Negative for abdominal distention, abdominal pain, constipation, diarrhea, nausea and vomiting.   Endocrine: Negative for cold intolerance and heat intolerance.   Genitourinary:  Negative for difficulty urinating, dysuria, flank pain and hematuria.        Urinary symptoms much  "improved which gemtessa   Musculoskeletal:  Positive for arthralgias (right shoulder and bilat hip pain) and back pain. Negative for gait problem and joint swelling.   Skin:  Negative for rash.   Neurological:  Positive for dizziness (last week with changing positions, now resolved). Negative for syncope, speech difficulty, weakness, numbness and headaches.   Hematological:  Bruises/bleeds easily.   Psychiatric/Behavioral:  Positive for dysphoric mood. Negative for self-injury, sleep disturbance and suicidal ideas. The patient is not nervous/anxious.           Objective:      Vitals:    12/18/24 1623 12/18/24 1628   BP: (!) 140/64 136/68   Pulse: 75    SpO2: 98%    Weight: 60.3 kg (133 lb)    Height: 5' 5" (1.651 m)      Physical Exam  Constitutional:       General: She is not in acute distress.     Appearance: Normal appearance. She is well-developed.   HENT:      Head: Normocephalic and atraumatic.      Right Ear: Tympanic membrane and ear canal normal.      Left Ear: Tympanic membrane and ear canal normal.   Neck:      Thyroid: No thyromegaly.      Vascular: No carotid bruit.   Cardiovascular:      Rate and Rhythm: Normal rate and regular rhythm.      Heart sounds: No murmur heard.  Pulmonary:      Effort: Pulmonary effort is normal. No respiratory distress.      Breath sounds: Normal breath sounds. No wheezing or rales.   Abdominal:      Palpations: Abdomen is soft.      Tenderness: There is no abdominal tenderness.   Musculoskeletal:      Cervical back: Neck supple.      Right lower leg: No edema.      Left lower leg: No edema.   Lymphadenopathy:      Cervical: No cervical adenopathy.   Skin:     General: Skin is warm and dry.      Findings: No rash.   Neurological:      General: No focal deficit present.      Mental Status: She is alert and oriented to person, place, and time.      Cranial Nerves: No cranial nerve deficit.   Psychiatric:         Mood and Affect: Mood normal.           Assessment:       1. " Fatigue, unspecified type    2. Essential hypertension    3. Dyslipidemia    4. Mixed stress and urge urinary incontinence    5. Mild episode of recurrent major depressive disorder           Plan:       1. Fatigue, unspecified type  -pt c/oing of fatigue, lack of energy- she attributes most of her symptoms to caregiver stress though did have episodes of low BP last week. BP improved today.  Discussed Differential for fatigue and advised stress certainly could be contributed but also can not completely rule out other causes such as cardiac.  EKG today without ischemic changes though cautioned patient if symptoms are recurring or becoming more severe then further evaluation would be warranted.  She would like to hold off for now given she is caring for her brother  -     POCT EKG 12-LEAD (NOT FOR [facility] USE)    2. Essential hypertension  -BP stable today though episode of low blood pressure last week.  Stressed importance of making sure she is eating and drinking well and if blood pressure is low to stop Maxzide    3. Dyslipidemia  -well controlled on April labs    4. Mixed stress and urge urinary incontinence  -improved on Gemtesa    5. Mild episode of recurrent major depressive disorder  -history of chronic depression on sertraline 150 mg daily, now with increase in stress due to caregiving.  She declines additional medication for depression.  Denies any suicidal thoughts or plans.  Recommend daily wall to try to help with stress relief and even consider meditation or other relaxation techniques.      Follow up for as scheduled. In February          Counseled on age and gender appropriate medical preventative services, including cancer screenings, immunizations, overall nutritional health, need for a consistent exercise regimen and an overall push towards maintaining a vigorous and active lifestyle.      12/18/2024 Elle Mack NP

## 2024-12-27 ENCOUNTER — PATIENT MESSAGE (OUTPATIENT)
Dept: UROLOGY | Facility: CLINIC | Age: 79
End: 2024-12-27
Payer: MEDICARE

## 2025-01-02 RX ORDER — VIBEGRON 75 MG/1
75 TABLET, FILM COATED ORAL DAILY
Qty: 90 TABLET | Refills: 3 | Status: SHIPPED | OUTPATIENT
Start: 2025-01-02

## 2025-01-27 ENCOUNTER — PATIENT MESSAGE (OUTPATIENT)
Dept: UROLOGY | Facility: CLINIC | Age: 80
End: 2025-01-27
Payer: MEDICARE

## 2025-01-28 ENCOUNTER — TELEPHONE (OUTPATIENT)
Dept: UROLOGY | Facility: CLINIC | Age: 80
End: 2025-01-28
Payer: MEDICARE

## 2025-01-28 DIAGNOSIS — N32.81 OAB (OVERACTIVE BLADDER): Primary | ICD-10-CM

## 2025-01-28 RX ORDER — VIBEGRON 75 MG/1
75 TABLET, FILM COATED ORAL DAILY
Qty: 90 TABLET | Refills: 3 | Status: SHIPPED | OUTPATIENT
Start: 2025-01-28 | End: 2025-01-28

## 2025-01-28 RX ORDER — VIBEGRON 75 MG/1
75 TABLET, FILM COATED ORAL DAILY
Qty: 90 TABLET | Refills: 3 | Status: SHIPPED | OUTPATIENT
Start: 2025-01-28

## 2025-01-28 RX ORDER — VIBEGRON 75 MG/1
75 TABLET, FILM COATED ORAL DAILY
Qty: 30 TABLET | Refills: 11 | Status: SHIPPED | OUTPATIENT
Start: 2025-01-28 | End: 2025-01-28

## 2025-01-31 ENCOUNTER — PATIENT MESSAGE (OUTPATIENT)
Dept: UROLOGY | Facility: CLINIC | Age: 80
End: 2025-01-31
Payer: MEDICARE

## 2025-01-31 ENCOUNTER — TELEPHONE (OUTPATIENT)
Dept: FAMILY MEDICINE | Facility: CLINIC | Age: 80
End: 2025-01-31
Payer: MEDICARE

## 2025-01-31 DIAGNOSIS — E78.5 DYSLIPIDEMIA: ICD-10-CM

## 2025-01-31 DIAGNOSIS — Z79.899 ENCOUNTER FOR LONG-TERM (CURRENT) USE OF MEDICATIONS: Primary | ICD-10-CM

## 2025-01-31 NOTE — TELEPHONE ENCOUNTER
Spoke with MsYvonne Juan and relayed the message to her that her medication should be at her pharmacy as it was called in by the physician.

## 2025-02-08 LAB
ALBUMIN SERPL-MCNC: 4.3 G/DL (ref 3.6–5.1)
ALBUMIN/GLOB SERPL: 1.8 (CALC) (ref 1–2.5)
ALP SERPL-CCNC: 64 U/L (ref 37–153)
ALT SERPL-CCNC: 12 U/L (ref 6–29)
AST SERPL-CCNC: 20 U/L (ref 10–35)
BILIRUB SERPL-MCNC: 0.5 MG/DL (ref 0.2–1.2)
BUN SERPL-MCNC: 29 MG/DL (ref 7–25)
BUN/CREAT SERPL: 37 (CALC) (ref 6–22)
CALCIUM SERPL-MCNC: 9.7 MG/DL (ref 8.6–10.4)
CHLORIDE SERPL-SCNC: 100 MMOL/L (ref 98–110)
CHOLEST SERPL-MCNC: 177 MG/DL
CHOLEST/HDLC SERPL: 2.6 (CALC)
CO2 SERPL-SCNC: 30 MMOL/L (ref 20–32)
CREAT SERPL-MCNC: 0.79 MG/DL (ref 0.6–0.95)
EGFR: 75 ML/MIN/1.73M2
GLOBULIN SER CALC-MCNC: 2.4 G/DL (CALC) (ref 1.9–3.7)
GLUCOSE SERPL-MCNC: 74 MG/DL (ref 65–99)
HDLC SERPL-MCNC: 68 MG/DL
LDLC SERPL CALC-MCNC: 82 MG/DL (CALC)
NONHDLC SERPL-MCNC: 109 MG/DL (CALC)
POTASSIUM SERPL-SCNC: 3.7 MMOL/L (ref 3.5–5.3)
PROT SERPL-MCNC: 6.7 G/DL (ref 6.1–8.1)
SODIUM SERPL-SCNC: 141 MMOL/L (ref 135–146)
TRIGL SERPL-MCNC: 171 MG/DL

## 2025-02-13 ENCOUNTER — OFFICE VISIT (OUTPATIENT)
Dept: FAMILY MEDICINE | Facility: CLINIC | Age: 80
End: 2025-02-13
Payer: MEDICARE

## 2025-02-13 VITALS
HEIGHT: 65 IN | HEART RATE: 76 BPM | DIASTOLIC BLOOD PRESSURE: 76 MMHG | BODY MASS INDEX: 22.33 KG/M2 | SYSTOLIC BLOOD PRESSURE: 116 MMHG | OXYGEN SATURATION: 99 % | WEIGHT: 134 LBS

## 2025-02-13 DIAGNOSIS — F33.0 MILD EPISODE OF RECURRENT MAJOR DEPRESSIVE DISORDER: ICD-10-CM

## 2025-02-13 DIAGNOSIS — M15.0 PRIMARY OSTEOARTHRITIS INVOLVING MULTIPLE JOINTS: ICD-10-CM

## 2025-02-13 DIAGNOSIS — I10 ESSENTIAL HYPERTENSION: Primary | ICD-10-CM

## 2025-02-13 DIAGNOSIS — R53.83 FATIGUE, UNSPECIFIED TYPE: ICD-10-CM

## 2025-02-13 DIAGNOSIS — E78.5 DYSLIPIDEMIA: ICD-10-CM

## 2025-02-13 DIAGNOSIS — N39.46 MIXED STRESS AND URGE URINARY INCONTINENCE: ICD-10-CM

## 2025-02-13 DIAGNOSIS — F41.1 GAD (GENERALIZED ANXIETY DISORDER): ICD-10-CM

## 2025-02-13 RX ORDER — BUSPIRONE HYDROCHLORIDE 10 MG/1
10 TABLET ORAL 2 TIMES DAILY
Qty: 60 TABLET | Refills: 11 | Status: SHIPPED | OUTPATIENT
Start: 2025-02-13 | End: 2026-02-13

## 2025-02-13 RX ORDER — TRIAMTERENE/HYDROCHLOROTHIAZID 37.5-25 MG
1 TABLET ORAL EVERY OTHER DAY
Start: 2025-02-13

## 2025-02-13 NOTE — PROGRESS NOTES
SUBJECTIVE:    Patient ID: Jazz Martinez is a 79 y.o. female.    Chief Complaint: Follow-up (No bottles//Pt is here for a 6 month check up//Pt would like to discuss increased stress.//ASHA )    Pt here for regular f/u- depression, HTN, lipids, arthritis    Pt reports has continued to be under a significant amt of stress caring for her elderly brother. Feels the stress is causing most of her symptoms of fatigue and body aches.. Reports her brother's ear was cut off d/t cancer and she's having to pack the wound which is very stressful- states they didn't order HH to help her. Has been living at her brother's house now for months.    Hx of chronic depression, has been on sertraline 150mg for years but doesn't feel it's helping anymore. Reports feels very anxious at times and feels she needs something for anxiety.    Reports intermittent brief dizziness, has been monitoring her BP and seems to fluctuate- high at time up to 150s and other times down to 90s. On maxzide for HTN. Denies syncope, palpitations or SOB    Reports going back to PT for right shoulder pain which his helping. Sees Dr. Kumar, rheum for OA- has f/u in March        Follow-up  Associated symptoms include arthralgias (right shoulder and bilat hip pain) and fatigue. Pertinent negatives include no abdominal pain, chest pain, chills, congestion, coughing, fever, headaches, joint swelling, nausea, numbness, rash, sore throat, vomiting or weakness.       Telephone on 01/31/2025   Component Date Value Ref Range Status    Glucose 02/07/2025 74  65 - 99 mg/dL Final    BUN 02/07/2025 29 (H)  7 - 25 mg/dL Final    Creatinine 02/07/2025 0.79  0.60 - 0.95 mg/dL Final    eGFR 02/07/2025 75  > OR = 60 mL/min/1.73m2 Final    BUN/Creatinine Ratio 02/07/2025 37 (H)  6 - 22 (calc) Final    Sodium 02/07/2025 141  135 - 146 mmol/L Final    Potassium 02/07/2025 3.7  3.5 - 5.3 mmol/L Final    Chloride 02/07/2025 100  98 - 110 mmol/L Final    CO2 02/07/2025 30  20 -  32 mmol/L Final    Calcium 02/07/2025 9.7  8.6 - 10.4 mg/dL Final    Total Protein 02/07/2025 6.7  6.1 - 8.1 g/dL Final    Albumin 02/07/2025 4.3  3.6 - 5.1 g/dL Final    Globulin, Total 02/07/2025 2.4  1.9 - 3.7 g/dL (calc) Final    Albumin/Globulin Ratio 02/07/2025 1.8  1.0 - 2.5 (calc) Final    Total Bilirubin 02/07/2025 0.5  0.2 - 1.2 mg/dL Final    Alkaline Phosphatase 02/07/2025 64  37 - 153 U/L Final    AST 02/07/2025 20  10 - 35 U/L Final    ALT 02/07/2025 12  6 - 29 U/L Final    Cholesterol 02/07/2025 177  <200 mg/dL Final    HDL 02/07/2025 68  > OR = 50 mg/dL Final    Triglycerides 02/07/2025 171 (H)  <150 mg/dL Final    LDL Cholesterol 02/07/2025 82  mg/dL (calc) Final    HDL/Cholesterol Ratio 02/07/2025 2.6  <5.0 (calc) Final    Non HDL Chol. (LDL+VLDL) 02/07/2025 109  <130 mg/dL (calc) Final   Office Visit on 12/18/2024   Component Date Value Ref Range Status    EKG 12-Lead 12/18/2024 NSR   Final    Ventricular Rate 12/18/2024 70   Final    GA Interval 12/18/2024 134   Final    QRS Duration 12/18/2024 90   Final    QT/QTc 12/18/2024 393/423   Final    PRT Axes 12/18/2024 36  9  77   Final       Past Medical History:   Diagnosis Date    Arthritis     Corns and callosities     Deep vein thrombosis     GERD (gastroesophageal reflux disease)     Gout     H/O bladder problems     History of colonic polyps 2014    Hyperlipidemia     Hypertension     Pulmonary embolism      Past Surgical History:   Procedure Laterality Date    APPENDECTOMY  AGE 12    BLADDER SURGERY      Twice    BREAST CYST EXCISION      Benign per patient    CYSTOSCOPY N/A 1/26/2022    Procedure: CYSTOSCOPY;  Surgeon: Sánchez Salcido MD;  Location: [facility] OR [address] [place] [address];  Service: Urology;  Laterality: N/A;    CYSTOSCOPY  4/12/2022    Procedure: CYSTOSCOPY;  Surgeon: Sánchez Salcido MD;  Location: [facility] OR [address] [place] [address];  Service: Urology;;    EYE SURGERY      LASER FOR GLAUCOMA    HAND SURGERY Left     HYSTERECTOMY      Not related to cancer  per patient.    INJECTION OF BOTULINUM TOXIN TYPE A  1/26/2022    Procedure: INJECTION, BOTULINUM TOXIN, 100units;  Surgeon: Sánchez Salcido MD;  Location: [facility] OR [address] [place] [address];  Service: Urology;;    INJECTION OF BOTULINUM TOXIN TYPE A  4/12/2022    Procedure: INJECTION, BOTULINUM TOXIN  100units;  Surgeon: Sánchez Salcido MD;  Location: [facility] OR [address] [place] [address];  Service: Urology;;    OOPHORECTOMY      ROBOT-ASSISTED LAPAROSCOPIC ABDOMINAL SACROCOLPOPEXY USING DA KINGA XI N/A 12/17/2019    Procedure: XI ROBOTIC SACROCOLPOPEXY, ABDOMINAL;  Surgeon: Javier Putnam MD;  Location: [facility] OR;  Service: OB/GYN;  Laterality: N/A;  removal of damaged tissue    SURGICAL PROCEDURE FOR STRESS INCONTINENCE USING TENSION FREE VAGINAL TAPE N/A 12/17/2019    Procedure: SURGICAL PROCEDURE, USING TENSION FREE VAGINAL TAPE, FOR STRESS INCONTINENCE;  Surgeon: Javier Putnam MD;  Location: [facility] OR;  Service: OB/GYN;  Laterality: N/A;    TONSILLECTOMY       Family History   Problem Relation Name Age of Onset    Cancer Brother          bladder    Breast cancer Neg Hx      Colon cancer Neg Hx      Ovarian cancer Neg Hx      Eczema Neg Hx      Lupus Neg Hx      Psoriasis Neg Hx      Melanoma Neg Hx      Anesthesia problems Neg Hx         All of your core healthy metrics are met.      The CVD Risk score (D'Agostino, et al., 2008) failed to calculate for the following reasons:    The 2008 CVD risk score is only valid for ages 30 to 74    The patient has a prior MI, stroke, CHF, or peripheral vascular disease diagnosis     Marital Status:   Alcohol History:  reports no history of alcohol use.  Tobacco History:  reports that she has never smoked. She has never been exposed to tobacco smoke. She has never used smokeless tobacco.  Drug History:  reports no history of drug use.    Health Maintenance Topics with due status: Not Due       Topic Last Completion Date    Colonoscopy 03/16/2021    DEXA Scan 01/17/2023    Lipid Panel 02/07/2025  "    Immunization History   Administered Date(s) Administered    COVID-19, MRNA, LN-S, PF (MODERNA FULL 0.5 ML DOSE) 01/22/2021, 02/19/2021    Influenza 10/17/2016, 10/01/2017, 10/15/2020, 10/18/2021, 10/20/2022, 10/13/2023    Influenza - Intradermal - Quadrivalent - PF 10/15/2018    Influenza - Quadrivalent - PF *Preferred* (6 months and older) 08/31/2011    Influenza - Trivalent - Afluria, Fluzone MDV 08/31/2011    Influenza - Trivalent - Fluzone High Dose - PF (65 years and older) 10/03/2013, 10/07/2015, 10/17/2016, 11/08/2017, 10/15/2018, 09/26/2019, 10/13/2020, 10/01/2024    Pneumococcal Conjugate - 13 Valent 10/17/2016    Pneumococcal Polysaccharide - 23 Valent 08/31/2011, 11/08/2017    Zoster 03/14/2015, 10/14/2019    Zoster Recombinant 10/14/2019, 07/14/2020       Review of patient's allergies indicates:   Allergen Reactions    Gabapentin     Hydrocodone-acetaminophen Other (See Comments)     Pt states she doesn t know how this got on there" no    Nitrofurantoin Other (See Comments)     Deathly ill , headaches, weakness, 'wiped out"    Sulfa (sulfonamide antibiotics)        Current Outpatient Medications:     aspirin 81 MG Chew, Take 1 tablet (81 mg total) by mouth once daily., Disp: 90 tablet, Rfl: 0    benzocaine-menthoL 15-3.6 mg Lozg, 1 each by Mucous Membrane route every 4 (four) hours as needed., Disp: 18 lozenge, Rfl: 0    busPIRone (BUSPAR) 10 MG tablet, Take 1 tablet (10 mg total) by mouth 2 (two) times daily., Disp: 60 tablet, Rfl: 11    calcium carbonate/vitamin D3 (VITAMIN D-3 ORAL), Take 1 tablet by mouth once daily., Disp: , Rfl:     cyclobenzaprine (FLEXERIL) 10 MG tablet, Take 1 tablet (10 mg total) by mouth nightly as needed for Muscle spasms. (Patient not taking: Reported on 12/18/2024), Disp: 90 tablet, Rfl: 1    diclofenac sodium (VOLTAREN) 1 % Gel, Apply 2 g topically 3 (three) times daily., Disp: 100 g, Rfl: 2    docusate calcium (SURFAK) 240 mg capsule, Take 240 mg by mouth 2 (two) " times daily., Disp: , Rfl:     estradioL (ESTRACE) 0.01 % (0.1 mg/gram) vaginal cream, Place 1 g vaginally 3 (three) times a week., Disp: 42.5 g, Rfl: 7    fluticasone propionate (FLONASE) 50 mcg/actuation nasal spray, 1 spray (50 mcg total) by Each Nostril route once daily., Disp: 16 g, Rfl: 2    fluticasone propionate (FLONASE) 50 mcg/actuation nasal spray, 1 spray (50 mcg total) by Each Nostril route once daily., Disp: 11.1 mL, Rfl: 0    GEMTESA 75 mg Tab, Take 1 tablet (75 mg total) by mouth once daily., Disp: 90 tablet, Rfl: 3    guaiFENesin (MUCINEX) 600 mg 12 hr tablet, Take 2 tablets (1,200 mg total) by mouth 2 (two) times daily., Disp: 30 tablet, Rfl: 0    levocetirizine (XYZAL) 5 MG tablet, Take 1 tablet (5 mg total) by mouth every evening., Disp: 30 tablet, Rfl: 0    rosuvastatin (CRESTOR) 5 MG tablet, Take 1 tablet (5 mg total) by mouth every evening., Disp: 90 tablet, Rfl: 3    sertraline (ZOLOFT) 100 MG tablet, Take 1.5 tablets (150 mg total) by mouth once daily., Disp: 135 tablet, Rfl: 3    sumatriptan (IMITREX) 100 MG tablet, 1 tab po at start of headache, may repeat in 2 hours X1 in 24 hours. (Patient taking differently: as needed. 1 tab po at start of headache, may repeat in 2 hours X1 in 24 hours.), Disp: 10 tablet, Rfl: 11    topiramate (TOPAMAX) 50 MG tablet, Take 1 tablet (50 mg total) by mouth 2 (two) times daily., Disp: 180 tablet, Rfl: 1    traMADoL (ULTRAM) 50 mg tablet, Take 1 tablet (50 mg total) by mouth every 12 (twelve) hours as needed for Pain., Disp: 180 tablet, Rfl: 0    triamterene-hydrochlorothiazide 37.5-25 mg (MAXZIDE-25) 37.5-25 mg per tablet, Take 1 tablet by mouth every other day., Disp: , Rfl:     valACYclovir (VALTREX) 500 MG tablet, Take 1 tablet (500 mg total) by mouth 2 (two) times daily., Disp: 60 tablet, Rfl: 3    Review of Systems   Constitutional:  Positive for fatigue. Negative for chills, fever and unexpected weight change.   HENT:  Negative for congestion and sore  "throat.    Eyes:  Negative for visual disturbance.   Respiratory:  Negative for cough, shortness of breath and wheezing.    Cardiovascular:  Negative for chest pain, palpitations and leg swelling.   Gastrointestinal:  Negative for abdominal pain, diarrhea, nausea and vomiting.   Genitourinary:  Negative for difficulty urinating, dysuria and hematuria.   Musculoskeletal:  Positive for arthralgias (right shoulder and bilat hip pain). Negative for joint swelling.   Skin:  Negative for rash.   Neurological:  Positive for light-headedness. Negative for syncope, speech difficulty, weakness, numbness and headaches.   Psychiatric/Behavioral:  Positive for dysphoric mood. Negative for hallucinations, self-injury and suicidal ideas. The patient is nervous/anxious.           Objective:      Vitals:    02/13/25 0945   BP: 116/76   Pulse: 76   SpO2: 99%   Weight: 60.8 kg (134 lb)   Height: 5' 5" (1.651 m)     Physical Exam  Constitutional:       General: She is not in acute distress.     Appearance: Normal appearance. She is well-developed.   HENT:      Head: Normocephalic and atraumatic.      Right Ear: Tympanic membrane and ear canal normal.      Left Ear: Tympanic membrane and ear canal normal.   Neck:      Thyroid: No thyromegaly.      Vascular: No carotid bruit.   Cardiovascular:      Rate and Rhythm: Normal rate and regular rhythm.      Heart sounds: No murmur heard.  Pulmonary:      Effort: Pulmonary effort is normal. No respiratory distress.      Breath sounds: Normal breath sounds. No wheezing or rales.   Abdominal:      Palpations: Abdomen is soft.      Tenderness: There is no abdominal tenderness.   Musculoskeletal:      Cervical back: Neck supple.      Right lower leg: No edema.      Left lower leg: No edema.   Lymphadenopathy:      Cervical: No cervical adenopathy.   Skin:     General: Skin is warm and dry.      Findings: No rash.   Neurological:      General: No focal deficit present.      Mental Status: She is " alert and oriented to person, place, and time.      Cranial Nerves: No cranial nerve deficit.   Psychiatric:         Mood and Affect: Mood normal.           Assessment:       1. Essential hypertension    2. Fatigue, unspecified type    3. Mild episode of recurrent major depressive disorder    4. Dyslipidemia    5. Mixed stress and urge urinary incontinence    6. Primary osteoarthritis involving multiple joints    7. QIAN (generalized anxiety disorder)           Plan:       1. Essential hypertension  -Bp well controlled today, recommend reducing maxzide to QOD and make sure she's staying hydrated  -     triamterene-hydrochlorothiazide 37.5-25 mg (MAXZIDE-25) 37.5-25 mg per tablet; Take 1 tablet by mouth every other day.    2. Fatigue, unspecified type   -continues with chronic fatigue c/o's though she attributes mainly to caregiver stress. We have discussed possibility of cardiac w/u but she declines that for now    3. Mild episode of recurrent major depressive disorder  -chronic depression with worsening anxiety. We discussed several options- adding bupropion to sertraline, adding buspirone or switching to a different agent. Will first try adding buspirone and advised to call me if no improvement or any issues with med  -     busPIRone (BUSPAR) 10 MG tablet; Take 1 tablet (10 mg total) by mouth 2 (two) times daily.  Dispense: 60 tablet; Refill: 11    4. Dyslipidemia  - reviewed recent labs- mildly elevated TG, LDL well controlled    5. Mixed stress and urge urinary incontinence  -stable on gemtessa    6. Primary osteoarthritis involving multiple joints  -stable, going back to PT which is helping    7. QIAN (generalized anxiety disorder)  -     busPIRone (BUSPAR) 10 MG tablet; Take 1 tablet (10 mg total) by mouth 2 (two) times daily.  Dispense: 60 tablet; Refill: 11      Follow up in about 3 months (around 5/13/2025).          Counseled on age and gender appropriate medical preventative services, including cancer  screenings, immunizations, overall nutritional health, need for a consistent exercise regimen and an overall push towards maintaining a vigorous and active lifestyle.      2/16/2025 Elle Mack NP

## 2025-02-13 NOTE — PATIENT INSTRUCTIONS
Start buspirone 10mg one tablet twice a day for anxiety    Reduce triamterene/hydrochlorothiazide to every other day

## 2025-02-15 ENCOUNTER — RESULTS FOLLOW-UP (OUTPATIENT)
Dept: FAMILY MEDICINE | Facility: CLINIC | Age: 80
End: 2025-02-15

## 2025-02-20 ENCOUNTER — TELEPHONE (OUTPATIENT)
Dept: UROLOGY | Facility: CLINIC | Age: 80
End: 2025-02-20
Payer: MEDICARE

## 2025-02-24 ENCOUNTER — TELEPHONE (OUTPATIENT)
Dept: UROLOGY | Facility: CLINIC | Age: 80
End: 2025-02-24
Payer: MEDICARE

## 2025-02-24 NOTE — TELEPHONE ENCOUNTER
Spoke with Ms. Chuchoclint and told her the medication she needed was approved.  She should call the pharmacy to be sure they have the medication ready for her before she drives to fill it.

## 2025-03-17 ENCOUNTER — OFFICE VISIT (OUTPATIENT)
Dept: UROLOGY | Facility: CLINIC | Age: 80
End: 2025-03-17
Payer: MEDICARE

## 2025-03-17 VITALS — WEIGHT: 140.19 LBS | BODY MASS INDEX: 23.36 KG/M2 | HEIGHT: 65 IN

## 2025-03-17 DIAGNOSIS — N32.81 OAB (OVERACTIVE BLADDER): Primary | ICD-10-CM

## 2025-03-17 LAB
BILIRUBIN, UA POC OHS: NEGATIVE
BLOOD, UA POC OHS: ABNORMAL
CLARITY, UA POC OHS: CLEAR
COLOR, UA POC OHS: YELLOW
GLUCOSE, UA POC OHS: NEGATIVE
KETONES, UA POC OHS: NEGATIVE
LEUKOCYTES, UA POC OHS: NEGATIVE
NITRITE, UA POC OHS: NEGATIVE
PH, UA POC OHS: 5.5
PROTEIN, UA POC OHS: NEGATIVE
SPECIFIC GRAVITY, UA POC OHS: 1.02
UROBILINOGEN, UA POC OHS: 0.2

## 2025-03-17 PROCEDURE — 81003 URINALYSIS AUTO W/O SCOPE: CPT | Mod: PBBFAC,PO | Performed by: UROLOGY

## 2025-03-17 PROCEDURE — 87088 URINE BACTERIA CULTURE: CPT | Performed by: UROLOGY

## 2025-03-17 PROCEDURE — 87186 SC STD MICRODIL/AGAR DIL: CPT | Performed by: UROLOGY

## 2025-03-17 PROCEDURE — 99999 PR PBB SHADOW E&M-EST. PATIENT-LVL III: CPT | Mod: PBBFAC,,, | Performed by: UROLOGY

## 2025-03-17 PROCEDURE — 99213 OFFICE O/P EST LOW 20 MIN: CPT | Mod: PBBFAC,PO | Performed by: UROLOGY

## 2025-03-17 PROCEDURE — 87077 CULTURE AEROBIC IDENTIFY: CPT | Performed by: UROLOGY

## 2025-03-17 PROCEDURE — G2211 COMPLEX E/M VISIT ADD ON: HCPCS | Mod: S$PBB,,, | Performed by: UROLOGY

## 2025-03-17 PROCEDURE — 99214 OFFICE O/P EST MOD 30 MIN: CPT | Mod: S$PBB,,, | Performed by: UROLOGY

## 2025-03-17 PROCEDURE — 99999PBSHW POCT URINALYSIS(INSTRUMENT): Mod: PBBFAC,,,

## 2025-03-17 PROCEDURE — 87086 URINE CULTURE/COLONY COUNT: CPT | Performed by: UROLOGY

## 2025-03-18 NOTE — PROGRESS NOTES
[facility]      Return Overactive Bladder (OAB) Note    3/17/2025    Referred by:  No ref. provider found    History of Present Illness    CHIEF COMPLAINT:  Patient presents with a recurrence of overactive bladder symptoms after a period of improvement on medication.    HPI:  Patient is an 79-year-old female reporting worsening of overactive bladder symptoms over the past 3-4 weeks. She had previously improved significantly with Gemtesa 75 mg daily for about a year. She describes a return to her previous symptoms, including increased urinary frequency, needing to use the bathroom 3 times during a recent visit to a cancer center, which is unusual for her as she typically avoids public restrooms. She has reverted to wearing protective undergarments at night and pads during the day. She attributes this exacerbation to increased stress due to caring for her brother who has cancer. She sought help from her nurse practitioner for anxiety related to this situation and was prescribed medication, but it has not improved her bladder symptoms. Her history includes daytime frequency and nocturia 3-4 times per night, with previous incontinence 2-3 times daily requiring 1-4 pads daily. She has a history of pelvic organ prolapse, which was surgically addressed by Dr. Hurtado. She also mentions a history of cognitive issues.    She denies burning sensation or other symptoms suggestive of urinary tract infection.    MEDICATIONS:  Patient is on Gemtesa 75 mg daily for overactive bladder, which was previously effective but is now less effective due to stress. She has discontinued several medications for overactive bladder including Botox 100 units, Myrbetriq 50 mg, Oxibutin 10 mg, Vesicare 10 mg, and Trospium 20 mg due to ineffectiveness, side effects, or cost.    MEDICAL HISTORY:  Patient has a history of overactive bladder, pelvic organ prolapse, and cognitive issues.    FAMILY HISTORY:  Family history is  significant for brother with squamous cell carcinoma, who had his ear removed due to cancer and is currently dealing with pain and drainage. Her brother's wife passed away from Alzheimer's disease.    SURGICAL HISTORY:  She underwent pelvic organ prolapse surgery performed by Dr. Hurtado for the indication of pelvic organ prolapse. Patient also received Botox injections for overactive bladder, which were noted to be ineffective.    TEST RESULTS:  Patient's urinalysis results show negative leukocyte esterase and nitrate, with trace blood present.          A review of 10+ systems was conducted with pertinent positive and negative findings documented in HPI with all other systems reviewed and negative.    Past medical, family, surgical and social history reviewed as documented in chart with pertinent positive medical, family, surgical and social history detailed in HPI.    Previous OAB therapies:  Behavioral Therapies  : (fluid/dietary modification timed voiding/bladder training) -  provided some but insufficient benefit  Medications  oral oxybutynin ER 10 mg >3 months (provided some but insufficient benefit)  solifenacin (VESIcare)  10  >3 months (provided some but insufficient benefit)  mirabegron (Myrbetriq) 50 mg >1 months ( cost prohibitive )  trospium ER (Sanctura Er) 60 mg >3 months (provided some but insufficient benefit)  Gemtesa 75 mg >12 months ( Initially worked very well, now much less effective )  Other Therapies  Botox -  resulted in voiding difficulty    Previous FLEX therapies:  no previous therapy      Exam Findings:    Physical Exam    General: No acute distress. Nontoxic appearing.  HENT: Normocephalic. Atraumatic.  Respiratory: Normal respiratory effort. No conversational dyspnea. No audible wheezing.  Abdomen: No obvious distension.  Skin: No visible abnormalities.  Extremities: No edema upper extremities. No edema lower extremities.  Neurological: Alert and oriented x3. Normal  speech.  Psychiatric: Normal mood. Normal affect. No evidence of SI.          Assessment/Plan:    Assessment & Plan    IMPRESSION:  Considered history of overactive bladder and recent exacerbation of symptoms.  Attributed symptom worsening to stress related to caring for ill brother.  Ruled out urinary tract infection as unlikely cause due to duration of symptoms and lack of other typical signs.  Considered potential future treatments including sacral nerve stimulation or tibial nerve stimulator, pending FDA approval.    PLAN SUMMARY:  - Ordered urine culture  - Continue Gemtesa 75 mg daily  - Reassess in 6 weeks before considering neuromodulation  - Follow-up in 6 weeks to evaluate symptoms and treatment efficacy    OVERACTIVE BLADDER:  - Continued current medication (Gemtesa 75 mg daily) despite recent ineffectiveness, given its previous success.  - Ordered urine culture.  - Plan to reassess in 6 weeks before considering more invasive therapies like neuromodulation.    FOLLOW-UP:  - Follow up in 6 weeks to reassess symptoms and treatment efficacy.              Visit complexity today is associated with medical care services that are part of the ongoing care related to the single serious and/or complex condition of Overactive bladder (OAB). A longitudinal relationship exists or is being developed between the patient and this practitioner for the care of this condition. '

## 2025-03-20 ENCOUNTER — PATIENT MESSAGE (OUTPATIENT)
Dept: UROLOGY | Facility: CLINIC | Age: 80
End: 2025-03-20
Payer: MEDICARE

## 2025-03-20 LAB — BACTERIA UR CULT: ABNORMAL

## 2025-03-20 RX ORDER — AMPICILLIN 500 MG/1
500 CAPSULE ORAL 4 TIMES DAILY
Qty: 20 CAPSULE | Refills: 0 | Status: SHIPPED | OUTPATIENT
Start: 2025-03-20 | End: 2025-03-25

## 2025-04-28 ENCOUNTER — OFFICE VISIT (OUTPATIENT)
Dept: UROLOGY | Facility: CLINIC | Age: 80
End: 2025-04-28
Payer: MEDICARE

## 2025-04-28 VITALS — BODY MASS INDEX: 23.25 KG/M2 | WEIGHT: 139.56 LBS | HEIGHT: 65 IN

## 2025-04-28 DIAGNOSIS — N32.81 OAB (OVERACTIVE BLADDER): Primary | ICD-10-CM

## 2025-04-28 LAB
BILIRUBIN, UA POC OHS: NEGATIVE
BLOOD, UA POC OHS: NEGATIVE
CLARITY, UA POC OHS: CLEAR
COLOR, UA POC OHS: YELLOW
GLUCOSE, UA POC OHS: NEGATIVE
KETONES, UA POC OHS: NEGATIVE
LEUKOCYTES, UA POC OHS: NEGATIVE
NITRITE, UA POC OHS: NEGATIVE
PH, UA POC OHS: 6.5
PROTEIN, UA POC OHS: NEGATIVE
SPECIFIC GRAVITY, UA POC OHS: 1.02
UROBILINOGEN, UA POC OHS: 0.2

## 2025-04-28 PROCEDURE — 99999PBSHW POCT URINALYSIS(INSTRUMENT): Mod: PBBFAC,,,

## 2025-04-28 PROCEDURE — G2211 COMPLEX E/M VISIT ADD ON: HCPCS | Mod: S$PBB,,, | Performed by: UROLOGY

## 2025-04-28 PROCEDURE — 99214 OFFICE O/P EST MOD 30 MIN: CPT | Mod: S$PBB,,, | Performed by: UROLOGY

## 2025-04-28 PROCEDURE — 99999 PR PBB SHADOW E&M-EST. PATIENT-LVL III: CPT | Mod: PBBFAC,,, | Performed by: UROLOGY

## 2025-04-28 PROCEDURE — 99213 OFFICE O/P EST LOW 20 MIN: CPT | Mod: PBBFAC,PO | Performed by: UROLOGY

## 2025-04-28 PROCEDURE — 81003 URINALYSIS AUTO W/O SCOPE: CPT | Mod: PBBFAC,PO | Performed by: UROLOGY

## 2025-04-29 NOTE — PROGRESS NOTES
[facility]      Return Overactive Bladder (OAB) Note    4/28/2025    Referred by:  Self, Aaareferral    History of Present Illness    CHIEF COMPLAINT:  Patient presents for follow-up evaluation of overactive bladder symptoms, with particular concern about nighttime urinary frequency.    HPI:  Patient is an 79-year-old woman with a history of overactive bladder. She was last seen in March 2025 for evaluation of worsening symptoms after Gemtesa 75 mg, which had previously been effective for about a year, became less effective. At that time, she reported frequent urgent urination, using 1-4 pads daily, getting up 3-4 times at night, and having 2-3 episodes of urinary incontinence per day. She has a history of pelvic organ prolapse, previously treated by Dr. Putnam.    In March, she was diagnosed with a urinary tract infection, which was treated with antibiotics (sensitive to ampicillin and nitrofurantoin). After antibiotic treatment, symptoms improved, but she continues to have issues, particularly at night. Last night, she woke up at 1 AM, 3 AM, 5 AM, and 7 AM to urinate, significantly disrupting her sleep. Her symptoms are stress-related, and her stress levels have increased due to caring for her 91-year-old brother, who was placed in hospice care about 3 weeks ago.    She has previously tried several medications for overactive bladder, including Mirabegron 50 mg, oxybutynin 10 mg, VESIcare 10 mg, Trospium 20 mg, and Botox injections. Gemtesa had been the most effective treatment until recently.    2 weeks ago, she had severe neck spasms, causing difficulty standing upright or moving her neck backward. She required assistance to drink and needed a friend to transport her to urgent care. At urgent care, she was prescribed Valium, which she felt was inappropriate given her age and responsibilities.    MEDICATIONS:  Patient is on Gemtesa 75 mg, taken in the morning for overactive bladder. This  medication was previously effective for about a year but has recently become less effective. She has discontinued several medications for overactive bladder due to ineffectiveness, including Mirabegron 50 mg, oxybutynin 10 mg, VESIcare 10 mg, Trospium 20 mg, and Botox. In March, she took Ampicillin and Nitrofurantoin for a urinary tract infection.    MEDICAL HISTORY:  Patient has a history of overactive bladder, which previously improved significantly with Gemtesa for about a year before symptoms returned. She also has a history of pelvic organ prolapse, which was treated by Dr. Putnam in the past. In March, she was treated for a urinary tract infection that was sensitive to ampicillin and nitrofurantoin.    FAMILY HISTORY:  Family history is significant for brother with cancer, who has recently been placed in hospice care.    TEST RESULTS:  A urine culture conducted in March 2025 was positive and showed sensitivity to ampicillin and nitrofurantoin.    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    Past medical, family, surgical and social history reviewed as documented in chart with pertinent positive medical, family, surgical and social history detailed in HPI.    Previous OAB therapies:  Behavioral Therapies  : (fluid/dietary modification timed voiding/bladder training) -  provided some but insufficient benefit  Medications  oral oxybutynin ER 10 mg >3 months (provided some but insufficient benefit)  solifenacin (VESIcare)  10  >3 months (provided some but insufficient benefit)  mirabegron (Myrbetriq) 50 mg >1 months ( cost prohibitive )  trospium ER (Sanctura Er) 60 mg >3 months (provided some but insufficient benefit)  Gemtesa 75 mg >12 months ( Initially worked very well, now much less effective )  Other Therapies  Botox -   resulted in voiding difficulty     Previous FLEX therapies:  no previous therapy      Exam Findings:    Physical Exam    General: No acute distress. Nontoxic appearing.  HENT: Normocephalic. Atraumatic.  Respiratory: Normal respiratory effort. No conversational dyspnea. No audible wheezing.  Abdomen: No obvious distension.  Skin: No visible abnormalities.  Extremities: No edema upper extremities. No edema lower extremities.  Neurological: Alert and oriented x3. Normal speech.  Psychiatric: Normal mood. Normal affect. No evidence of SI.          Assessment/Plan:    Assessment & Plan    N32.81 Overactive bladder  N39.41 Urge incontinence  N39.44 Nocturnal enuresis  R35.1 Nocturia  M62.838 Other muscle spasm  Z87.42 Personal history of other diseases of the female genital tract  Z80.0 Family history of malignant neoplasm of digestive organs    Assessed overactive bladder symptoms, which previously improved with Gemtesa 75 mg but recently worsened.  Evaluated stress as potential contributing factor to symptom exacerbation.  Determined Gemtesa is still beneficial but may require timing adjustment: Changed Gemtesa 75 mg to take in evening instead of morning.    PLAN SUMMARY:  - Restrict fluid intake 3-4 hours before bedtime  - Continue normal fluid intake during daytime  - Changed Gemtesa 75 mg to evening dosage  - Discussed stress management for overactive bladder symptoms    N32.81 OVERACTIVE BLADDER:  - Explained relationship between stress and overactive bladder symptoms.  - Changed Gemtesa 75 mg to take in evening instead of morning.    R35.1 NOCTURIA:  - Patient to restrict fluid intake starting 3-4 hours before bedtime.    LIFESTYLE CHANGES:  - Patient to restrict fluid intake starting 3-4 hours before bedtime.  - Patient to continue drinking fluids as needed during daytime.              Visit complexity today is associated with medical care services that are part of the ongoing care related to the single serious  and/or complex condition of Overactive bladder (OAB). A longitudinal relationship exists or is being developed between the patient and this practitioner for the care of this condition.

## 2025-05-13 ENCOUNTER — TELEPHONE (OUTPATIENT)
Dept: FAMILY MEDICINE | Facility: CLINIC | Age: 80
End: 2025-05-13
Payer: MEDICARE

## 2025-05-13 DIAGNOSIS — E78.5 DYSLIPIDEMIA: ICD-10-CM

## 2025-05-13 DIAGNOSIS — I10 ESSENTIAL HYPERTENSION: ICD-10-CM

## 2025-05-13 DIAGNOSIS — Z79.899 ENCOUNTER FOR LONG-TERM (CURRENT) USE OF MEDICATIONS: Primary | ICD-10-CM

## 2025-05-16 DIAGNOSIS — G60.9 HEREDITARY AND IDIOPATHIC NEUROPATHY, UNSPECIFIED: Primary | ICD-10-CM

## 2025-05-21 ENCOUNTER — OFFICE VISIT (OUTPATIENT)
Dept: FAMILY MEDICINE | Facility: CLINIC | Age: 80
End: 2025-05-21
Payer: MEDICARE

## 2025-05-21 ENCOUNTER — HOSPITAL ENCOUNTER (OUTPATIENT)
Dept: RADIOLOGY | Facility: HOSPITAL | Age: 80
Discharge: HOME | End: 2025-05-21
Attending: PSYCHIATRY & NEUROLOGY
Payer: MEDICARE

## 2025-05-21 VITALS
HEART RATE: 70 BPM | BODY MASS INDEX: 23.16 KG/M2 | OXYGEN SATURATION: 99 % | WEIGHT: 139 LBS | DIASTOLIC BLOOD PRESSURE: 58 MMHG | HEIGHT: 65 IN | SYSTOLIC BLOOD PRESSURE: 118 MMHG

## 2025-05-21 DIAGNOSIS — G60.9 HEREDITARY AND IDIOPATHIC NEUROPATHY, UNSPECIFIED: ICD-10-CM

## 2025-05-21 DIAGNOSIS — M54.2 CERVICALGIA: ICD-10-CM

## 2025-05-21 DIAGNOSIS — R29.6 RECURRENT FALLS: ICD-10-CM

## 2025-05-21 DIAGNOSIS — I10 ESSENTIAL HYPERTENSION: Primary | ICD-10-CM

## 2025-05-21 DIAGNOSIS — N39.46 MIXED STRESS AND URGE URINARY INCONTINENCE: ICD-10-CM

## 2025-05-21 DIAGNOSIS — E78.5 DYSLIPIDEMIA: ICD-10-CM

## 2025-05-21 DIAGNOSIS — F33.0 MAJOR DEPRESSIVE DISORDER, RECURRENT EPISODE, MILD WITH ANXIOUS DISTRESS: ICD-10-CM

## 2025-05-21 DIAGNOSIS — G60.9 IDIOPATHIC NEUROPATHY: ICD-10-CM

## 2025-05-21 PROCEDURE — 71046 X-RAY EXAM CHEST 2 VIEWS: CPT | Mod: 26,,, | Performed by: RADIOLOGY

## 2025-05-21 PROCEDURE — 99214 OFFICE O/P EST MOD 30 MIN: CPT | Mod: S$GLB,,, | Performed by: NURSE PRACTITIONER

## 2025-05-21 PROCEDURE — 71046 X-RAY EXAM CHEST 2 VIEWS: CPT | Mod: TC

## 2025-05-21 PROCEDURE — G2211 COMPLEX E/M VISIT ADD ON: HCPCS | Mod: S$GLB,,, | Performed by: NURSE PRACTITIONER

## 2025-05-21 RX ORDER — SERTRALINE HYDROCHLORIDE 100 MG/1
150 TABLET, FILM COATED ORAL DAILY
Qty: 135 TABLET | Refills: 3 | Status: SHIPPED | OUTPATIENT
Start: 2025-05-21

## 2025-05-21 RX ORDER — TRIAMTERENE AND HYDROCHLOROTHIAZIDE 37.5; 25 MG/1; MG/1
1 TABLET ORAL EVERY OTHER DAY
Qty: 45 TABLET | Refills: 1 | Status: SHIPPED | OUTPATIENT
Start: 2025-05-21

## 2025-05-21 RX ORDER — ESTRADIOL 0.1 MG/G
1 CREAM VAGINAL
Qty: 42.5 G | Refills: 7 | Status: SHIPPED | OUTPATIENT
Start: 2025-05-21

## 2025-05-21 RX ORDER — ROSUVASTATIN CALCIUM 5 MG/1
5 TABLET, COATED ORAL NIGHTLY
Qty: 90 TABLET | Refills: 3 | Status: SHIPPED | OUTPATIENT
Start: 2025-05-21

## 2025-05-21 NOTE — PROGRESS NOTES
"  SUBJECTIVE:    Patient ID: Jazz Martinez is a 79 y.o. female.    Chief Complaint: Follow-up (No bottles//Pt is here for a check up and medication refills//ASHA )      History of Present Illness    Patient presents today for three-month follow-up of depression, hypertension, dyslipidemia, and osteoarthritis.    Reports since last visit here 3 months ago she has had several falls.  Was diagnosed with flu A in February and during that illness she fell due to generalized weakness.  She denied any syncope or injuries.  Three weeks ago [place] and stumbled in [place] grass and fell.  She laid there in the rain for about 30 minutes before neighbor came and helped her up.  Last fall was last Thursday, she was walking outside with both hands carrying garbage bags when she fell forward onto the grass.  She denies any injuries from either fall though she was slightly sore for a day or 2.    Since last visit here she saw Dr. Ding, neurology a couple times.  He diagnosed her with neuropathy and has placed a physical therapy referral though she has not heard from them yet. Dr. Ding treated her for cervical neck pain and headaches with NSAIDs which did help but seems to be slowly coming back since she's been off meds. Dr. Ding has ordered xrays but she's not sure what kind. She reports Dr. 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  She is now living at his house in [place]. Buspar was added at last visit and she reports this has been effective after initially having some mild dizziness with medication    Follows with Dr. Hernandez, urology- treated for UTI last month that improved with treatment. GEMTESA seems to be helping some      ROS:  General: no fever, no chills, no fatigue, no weight gain, no weight loss, complains of weakness  Eyes: " no vision changes, no redness, no discharge  ENT: no ear pain, no nasal congestion, no sore throat  Cardiovascular: no chest pain, no palpitations, no lower extremity edema  Respiratory: no cough, no shortness of breath  Gastrointestinal: no abdominal pain, no nausea, no vomiting, no diarrhea, no constipation, no blood in stool  Genitourinary: no dysuria, no hematuria, no frequency  Musculoskeletal: complains of joint pain, no muscle pain, complains of neck pain  Skin: no rash, no lesion  Neurological: +headache and posterior neck pain, no dizziness, no numbness, no tingling, complains of falling, complains of migraines  Psychiatric: complains of anxiety, no depression, no sleep difficulty            Office Visit on 04/28/2025   Component Date Value Ref Range Status    Color, POC UA 04/28/2025 Yellow  Yellow, Straw, Colorless Final    Clarity, POC UA 04/28/2025 Clear  Clear Final    Glucose, POC UA 04/28/2025 Negative  Negative Final    Bilirubin, POC UA 04/28/2025 Negative  Negative Final    Ketones, POC UA 04/28/2025 Negative  Negative Final    Spec Grav POC UA 04/28/2025 1.020  1.005 - 1.030 Final    Blood, POC UA 04/28/2025 Negative  Negative Final    pH, POC UA 04/28/2025 6.5  5.0 - 8.0 Final    Protein, POC UA 04/28/2025 Negative  Negative Final    Urobilinogen, POC UA 04/28/2025 0.2  <=1.0 Final    Nitrite, POC UA 04/28/2025 Negative  Negative Final    WBC, POC UA 04/28/2025 Negative  Negative Final   Office Visit on 03/17/2025   Component Date Value Ref Range Status    Color, POC UA 03/17/2025 Yellow  Yellow, Straw, Colorless Final    Clarity, POC UA 03/17/2025 Clear  Clear Final    Glucose, POC UA 03/17/2025 Negative  Negative Final    Bilirubin, POC UA 03/17/2025 Negative  Negative Final    Ketones, POC UA 03/17/2025 Negative  Negative Final    Spec Grav POC UA 03/17/2025 1.020  1.005 - 1.030 Final    Blood, POC UA 03/17/2025 Trace-intact (A)  Negative Final    pH, POC UA 03/17/2025 5.5  5.0 - 8.0 Final     Protein, POC UA 03/17/2025 Negative  Negative Final    Urobilinogen, POC UA 03/17/2025 0.2  <=1.0 Final    Nitrite, POC UA 03/17/2025 Negative  Negative Final    WBC, POC UA 03/17/2025 Negative  Negative Final    Urine Culture, Routine 03/17/2025  (A)   Final                    Value:ENTEROCOCCUS FAECALIS  10,000 - 49,999 cfu/ml     Telephone on 01/31/2025   Component Date Value Ref Range Status    Glucose 02/07/2025 74  65 - 99 mg/dL Final    BUN 02/07/2025 29 (H)  7 - 25 mg/dL Final    Creatinine 02/07/2025 0.79  0.60 - 0.95 mg/dL Final    eGFR 02/07/2025 75  > OR = 60 mL/min/1.73m2 Final    BUN/Creatinine Ratio 02/07/2025 37 (H)  6 - 22 (calc) Final    Sodium 02/07/2025 141  135 - 146 mmol/L Final    Potassium 02/07/2025 3.7  3.5 - 5.3 mmol/L Final    Chloride 02/07/2025 100  98 - 110 mmol/L Final    CO2 02/07/2025 30  20 - 32 mmol/L Final    Calcium 02/07/2025 9.7  8.6 - 10.4 mg/dL Final    Total Protein 02/07/2025 6.7  6.1 - 8.1 g/dL Final    Albumin 02/07/2025 4.3  3.6 - 5.1 g/dL Final    Globulin, Total 02/07/2025 2.4  1.9 - 3.7 g/dL (calc) Final    Albumin/Globulin Ratio 02/07/2025 1.8  1.0 - 2.5 (calc) Final    Total Bilirubin 02/07/2025 0.5  0.2 - 1.2 mg/dL Final    Alkaline Phosphatase 02/07/2025 64  37 - 153 U/L Final    AST 02/07/2025 20  10 - 35 U/L Final    ALT 02/07/2025 12  6 - 29 U/L Final    Cholesterol 02/07/2025 177  <200 mg/dL Final    HDL 02/07/2025 68  > OR = 50 mg/dL Final    Triglycerides 02/07/2025 171 (H)  <150 mg/dL Final    LDL Cholesterol 02/07/2025 82  mg/dL (calc) Final    HDL/Cholesterol Ratio 02/07/2025 2.6  <5.0 (calc) Final    Non HDL Chol. (LDL+VLDL) 02/07/2025 109  <130 mg/dL (calc) Final   Office Visit on 12/18/2024   Component Date Value Ref Range Status    EKG 12-Lead 12/18/2024 NSR   Final    Ventricular Rate 12/18/2024 70   Final    MD Interval 12/18/2024 134   Final    QRS Duration 12/18/2024 90   Final    QT/QTc 12/18/2024 393/423   Final    PRT Axes 12/18/2024 36  9  77    Final       Past Medical History:   Diagnosis Date    Arthritis     Corns and callosities     Deep vein thrombosis     GERD (gastroesophageal reflux disease)     Gout     H/O bladder problems     History of colonic polyps 2014    Hyperlipidemia     Hypertension     Pulmonary embolism      Past Surgical History:   Procedure Laterality Date    APPENDECTOMY  AGE 12    BLADDER SURGERY      Twice    BREAST CYST EXCISION      Benign per patient    CYSTOSCOPY N/A 1/26/2022    Procedure: CYSTOSCOPY;  Surgeon: Sánchez Salcido MD;  Location: [address];  Service: Urology;  Laterality: N/A;    CYSTOSCOPY  4/12/2022    Procedure: CYSTOSCOPY;  Surgeon: Sánchez Salcido MD;  Location: [facility] OR [address] [place] [address];  Service: Urology;;    EYE SURGERY      LASER FOR GLAUCOMA    HAND SURGERY Left     HYSTERECTOMY      Not related to cancer per patient.    INJECTION OF BOTULINUM TOXIN TYPE A  1/26/2022    Procedure: INJECTION, BOTULINUM TOXIN, 100units;  Surgeon: Sánchez Salcido MD;  Location: [facility] OR [address] [place] [address];  Service: Urology;;    INJECTION OF BOTULINUM TOXIN TYPE A  4/12/2022    Procedure: INJECTION, BOTULINUM TOXIN  100units;  Surgeon: Sánchez Salcido MD;  Location: [facility] OR [address] [place] [address];  Service: Urology;;    OOPHORECTOMY      ROBOT-ASSISTED LAPAROSCOPIC ABDOMINAL SACROCOLPOPEXY USING DA KINGA XI N/A 12/17/2019    Procedure: XI ROBOTIC SACROCOLPOPEXY, ABDOMINAL;  Surgeon: Javier Putnam MD;  Location: [facility];  Service: OB/GYN;  Laterality: N/A;  removal of damaged tissue    SURGICAL PROCEDURE FOR STRESS INCONTINENCE USING TENSION FREE VAGINAL TAPE N/A 12/17/2019    Procedure: SURGICAL PROCEDURE, USING TENSION FREE VAGINAL TAPE, FOR STRESS INCONTINENCE;  Surgeon: Javier Putnam MD;  Location: [facility];  Service: OB/GYN;  Laterality: N/A;    TONSILLECTOMY       Family History   Problem Relation Name Age of Onset    Cancer Brother          bladder    Breast cancer Neg Hx      Colon cancer Neg Hx      Ovarian cancer Neg  "Hx      Eczema Neg Hx      Lupus Neg Hx      Psoriasis Neg Hx      Melanoma Neg Hx      Anesthesia problems Neg Hx         All of your core healthy metrics are met.      The CVD Risk score (GRACIELA'Agostino, et al., 2008) failed to calculate for the following reasons:    The 2008 CVD risk score is only valid for ages 30 to 74    The patient has a prior MI, stroke, CHF, or peripheral vascular disease diagnosis     Marital Status:   Alcohol History:  reports no history of alcohol use.  Tobacco History:  reports that she has never smoked. She has never been exposed to tobacco smoke. She has never used smokeless tobacco.  Drug History:  reports no history of drug use.    Health Maintenance Topics with due status: Not Due       Topic Last Completion Date    Colonoscopy 03/16/2021    DEXA Scan 01/17/2023    Lipid Panel 02/07/2025     Immunization History   Administered Date(s) Administered    COVID-19 MRNA, LN-S PF (MODERNA HALF 0.25 ML DOSE) 12/22/2021    COVID-19, MRNA, LN-S, PF (MODERNA FULL 0.5 ML DOSE) 01/22/2021, 02/19/2021    Influenza 10/17/2016, 10/01/2017, 10/15/2020, 10/18/2021, 10/20/2022, 10/13/2023    Influenza - Intradermal - Quadrivalent - PF 10/15/2018    Influenza - Quadrivalent - PF *Preferred* (6 months and older) 08/31/2011    Influenza - Trivalent - Afluria, Fluzone MDV 08/31/2011    Influenza - Trivalent - Fluzone High Dose - PF (65 years and older) 10/03/2013, 10/07/2015, 10/17/2016, 11/08/2017, 10/15/2018, 09/26/2019, 10/13/2020, 10/01/2024    Pneumococcal Conjugate - 13 Valent 10/17/2016    Pneumococcal Polysaccharide - 23 Valent 08/31/2011, 11/08/2017    Zoster 03/14/2015, 10/14/2019    Zoster Recombinant 10/14/2019, 07/14/2020       Review of patient's allergies indicates:   Allergen Reactions    Gabapentin     Hydrocodone-acetaminophen Other (See Comments)     Pt states she doesn t know how this got on there" no    Nitrofurantoin Other (See Comments)     Deathly ill , headaches, weakness, " "'wiped out"    Sulfa (sulfonamide antibiotics)      Current Medications[1]        Objective:      Vitals:    05/21/25 1137   BP: (!) 118/58   Pulse: 70   SpO2: 99%   Weight: 63 kg (139 lb)   Height: 5' 5" (1.651 m)       Physical Exam  Constitutional:       General: She is not in acute distress.     Appearance: Normal appearance. She is well-developed.   HENT:      Head: Normocephalic and atraumatic.      Right Ear: Tympanic membrane and ear canal normal.      Left Ear: Tympanic membrane and ear canal normal.   Neck:      Thyroid: No thyromegaly.      Vascular: No carotid bruit.   Cardiovascular:      Rate and Rhythm: Normal rate and regular rhythm.      Heart sounds: No murmur heard.  Pulmonary:      Effort: Pulmonary effort is normal. No respiratory distress.      Breath sounds: Normal breath sounds. No wheezing or rales.   Abdominal:      Palpations: Abdomen is soft.      Tenderness: There is no abdominal tenderness.   Musculoskeletal:      Cervical back: Neck supple.      Right lower leg: No edema.      Left lower leg: No edema.   Lymphadenopathy:      Cervical: No cervical adenopathy.   Skin:     General: Skin is warm and dry.      Findings: No rash.   Neurological:      General: No focal deficit present.      Mental Status: She is alert and oriented to person, place, and time.      Cranial Nerves: No cranial nerve deficit.   Psychiatric:         Mood and Affect: Mood normal.          Assessment:       1. Essential hypertension    2. Dyslipidemia    3. Mixed stress and urge urinary incontinence    4. Major depressive disorder, recurrent episode, mild with anxious distress    5. Idiopathic neuropathy    6. Recurrent falls           Assessment & Plan    PATIENT HYPERTENSION:  - Blood pressure is well-maintained at 118/58, which is within normal range.  - Continued current medication    RECURRENT FALLS  - Patient reports multiple falls, difficulty getting up, and weakness in legs.  - Assessed that neuropathy may " be a potential cause based on Dr. Ding's evaluation.  - Advised on potential side effects of muscle relaxants, particularly dizziness and unsteadiness, which may contribute to fall risk.  - Instructed patient to consistently use a walker during daily activities to prevent future falls.  - Recommend physical therapy to improve balance and stability.  - Patient should be cautious with fall prevention in daily life.    IDIOPATHIC NEUROPATHY  -recent diagnosis by neuro- cautioned on risk of falls with neuropathy  -recommend PT for balance    HYPERLIPIDEMIA:  -bold on February labs  - Refilled cholesterol medication.    MIXED STRESS AND URGE URINARY INCONTINENCE  - Reviewed urinary symptoms and recent UTI treatment.  - Refilled estrogen vaginal cream.  - Continued GEMTESA for urinary symptoms.    MAJOR DEPRESSIVE DISORDER AND ANXIETY:  - Refilled sertraline.  - Continued buspirone for depression and anxiety management.  - Buspirone has effectively reduced anxiety symptoms and will be maintained in combination with sertraline for managing anxiety and depression    CERVICALGIA:  - advised headaches like d/t cervical issues. She reports Dr. Ding has ordered xrays  - Recommend physical therapy for neck pain management.    FOLLOW-UP:  - Follow up in 3 months.  - Contact physical therapy office by tomorrow if they have not reached out to schedule an appointment.        Plan:       1. Essential hypertension  -     triamterene-hydrochlorothiazide 37.5-25 mg (MAXZIDE-25) 37.5-25 mg per tablet; Take 1 tablet by mouth every other day.  Dispense: 45 tablet; Refill: 1    2. Dyslipidemia  -     rosuvastatin (CRESTOR) 5 MG tablet; Take 1 tablet (5 mg total) by mouth every evening.  Dispense: 90 tablet; Refill: 3    3. Mixed stress and urge urinary incontinence  -     estradioL (ESTRACE) 0.01 % (0.1 mg/gram) vaginal cream; Place 1 g vaginally 3 (three) times a week.  Dispense: 42.5 g; Refill: 7    4. Major depressive disorder,  recurrent episode, mild with anxious distress  -     sertraline (ZOLOFT) 100 MG tablet; Take 1.5 tablets (150 mg total) by mouth once daily.  Dispense: 135 tablet; Refill: 3    5. Idiopathic neuropathy    6. Recurrent falls      Follow up in about 3 months (around 8/21/2025).          Counseled on age and gender appropriate medical preventative services, including cancer screenings, immunizations, overall nutritional health, need for a consistent exercise regimen and an overall push towards maintaining a vigorous and active lifestyle.      This note was generated with the assistance of ambient listening technology. Verbal consent was obtained by the patient and accompanying visitor(s) for the recording of patient appointment to facilitate this note. I attest to having reviewed and edited the generated note for accuracy, though some syntax or spelling errors may persist. Please contact the author of this note for any clarification.       5/21/2025 Elle Mack NP           [1]   Current Outpatient Medications:     aspirin 81 MG Chew, Take 1 tablet (81 mg total) by mouth once daily., Disp: 90 tablet, Rfl: 0    benzocaine-menthoL 15-3.6 mg Lozg, 1 each by Mucous Membrane route every 4 (four) hours as needed., Disp: 18 lozenge, Rfl: 0    busPIRone (BUSPAR) 10 MG tablet, Take 1 tablet (10 mg total) by mouth 2 (two) times daily., Disp: 60 tablet, Rfl: 11    calcium carbonate/vitamin D3 (VITAMIN D-3 ORAL), Take 1 tablet by mouth once daily., Disp: , Rfl:     cyclobenzaprine (FLEXERIL) 10 MG tablet, Take 1 tablet (10 mg total) by mouth nightly as needed for Muscle spasms. (Patient not taking: Reported on 12/18/2024), Disp: 90 tablet, Rfl: 1    diclofenac sodium (VOLTAREN) 1 % Gel, Apply 2 g topically 3 (three) times daily., Disp: 100 g, Rfl: 2    docusate calcium (SURFAK) 240 mg capsule, Take 240 mg by mouth 2 (two) times daily., Disp: , Rfl:     estradioL (ESTRACE) 0.01 % (0.1 mg/gram) vaginal cream, Place 1 g  vaginally 3 (three) times a week., Disp: 42.5 g, Rfl: 7    fluticasone propionate (FLONASE) 50 mcg/actuation nasal spray, 1 spray (50 mcg total) by Each Nostril route once daily., Disp: 16 g, Rfl: 2    fluticasone propionate (FLONASE) 50 mcg/actuation nasal spray, 1 spray (50 mcg total) by Each Nostril route once daily., Disp: 11.1 mL, Rfl: 0    GEMTESA 75 mg Tab, Take 1 tablet (75 mg total) by mouth once daily., Disp: 90 tablet, Rfl: 3    guaiFENesin (MUCINEX) 600 mg 12 hr tablet, Take 2 tablets (1,200 mg total) by mouth 2 (two) times daily., Disp: 30 tablet, Rfl: 0    levocetirizine (XYZAL) 5 MG tablet, Take 1 tablet (5 mg total) by mouth every evening., Disp: 30 tablet, Rfl: 0    rosuvastatin (CRESTOR) 5 MG tablet, Take 1 tablet (5 mg total) by mouth every evening., Disp: 90 tablet, Rfl: 3    sertraline (ZOLOFT) 100 MG tablet, Take 1.5 tablets (150 mg total) by mouth once daily., Disp: 135 tablet, Rfl: 3    sumatriptan (IMITREX) 100 MG tablet, 1 tab po at start of headache, may repeat in 2 hours X1 in 24 hours. (Patient taking differently: as needed. 1 tab po at start of headache, may repeat in 2 hours X1 in 24 hours.), Disp: 10 tablet, Rfl: 11    topiramate (TOPAMAX) 50 MG tablet, Take 1 tablet (50 mg total) by mouth 2 (two) times daily., Disp: 180 tablet, Rfl: 1    traMADoL (ULTRAM) 50 mg tablet, Take 1 tablet (50 mg total) by mouth every 12 (twelve) hours as needed for Pain., Disp: 180 tablet, Rfl: 0    triamterene-hydrochlorothiazide 37.5-25 mg (MAXZIDE-25) 37.5-25 mg per tablet, Take 1 tablet by mouth every other day., Disp: 45 tablet, Rfl: 1    valACYclovir (VALTREX) 500 MG tablet, Take 1 tablet (500 mg total) by mouth 2 (two) times daily., Disp: 60 tablet, Rfl: 3

## 2025-05-21 NOTE — PROGRESS NOTES
"Subjective:      Patient ID: Jazz Martinez is a 79 y.o. female.    Chief Complaint: Disease Management    HPI    Rheumatologic History:      - Diagnosis/es:               - Erosive osteoarthritis  - Family History: No autoimmune conditions  - Gyn History:  (100)  - Positive serologies: +CHARLENE 1:40 nuclear, homogenous, and cytoplasmic  - Negative serologies: RF  - Pathology:              - Skin biopsy: palisaded granulomatous dermatitis  - Infectious screening labs: -  - Imaging:              - Xray hands (2021) Severe degenerative change and joint space narrowing at the carpal 1st metacarpal joint.  Scattered degenerative changes elsewhere more so distal than proximal interphalangeal joints and also fairly severe at the distal interphalangeal joint of the index finger and the interphalangeal joint of the thumb..  No acute fracture or dislocation.  The skeletal structures are osteopenic.  - Previous Treatments:               - Turmeric   - Gabapentin  - Current Treatments:    - Lyrica 25mg BID               - Tramadol TID PRN              - Flexeril 10mg QHS PRN  Interval History:   She has had 3 falls over the past few months and has been referred for PT. She has been caring for her older brother with cancer and has been very tired. She is still having pain in her back., but Flexeril does help somewhat. She has also been diagnosed with neuropathy.     Objective:   /73 (BP Location: Right arm, Patient Position: Sitting)   Pulse 71   Ht 5' 5" (1.651 m)   Wt 63.9 kg (140 lb 14 oz)   BMI 23.44 kg/m²   Physical Exam   Constitutional: normal appearance.   HENT:   Head: Normocephalic and atraumatic.   Cardiovascular: Normal rate, regular rhythm and normal heart sounds.   Pulmonary/Chest: Effort normal and breath sounds normal.   Musculoskeletal:      Comments: +Heberden's nodes, bilateral CMC squaring  No synovitis, dactylitis, enthesitis, effusions     Neurological: She is alert.   Skin: Skin is warm " "and dry. No rash noted.     No data to display     Assessment:     1. Primary osteoarthritis involving multiple joints    2. Asymptomatic menopause    3. Chronic midline low back pain without sciatica    4. Peripheral polyneuropathy      This is an 79-year-old woman with history of migraines, lumbar radiculopathy, depression, HLD, HTN, GERD, esophageal stricture, colitis (12/2022 patient had diarrhea and bloody stool; colonoscopy normal 2022, treated with antibiotics), DVT (2021 after "vascular work"; and in 2013 after bladder surgery) previously in Xarelto not currently on AC, osteoarthritis, and granuloma annulare. She is on tramadol 50mg TID PRN for osteoarthritis, and Flexeril PRN. She has had 3 falls over the past few months and has been referred for PT. She has been caring for her older brother with cancer and has been very tired. She is still having pain in her back., but Flexeril does help somewhat. She has also been diagnosed with neuropathy. Try adding Lyrica. Obtain DEXA.     Plan:     Problem List Items Addressed This Visit    None  Visit Diagnoses         Primary osteoarthritis involving multiple joints    -  Primary    Relevant Medications    pregabalin (LYRICA) 25 MG capsule      Asymptomatic menopause        Relevant Orders    DXA Bone Density Axial Skeleton 1 or more sites      Chronic midline low back pain without sciatica        Relevant Medications    pregabalin (LYRICA) 25 MG capsule      Peripheral polyneuropathy        Relevant Medications    pregabalin (LYRICA) 25 MG capsule          - Lyrica 25mg BID   - Tramadol TID PRN  - Flexeril 10mg QHS PRN  - DEXA    Follow up in 4 months    30 minutes of total time spent on the encounter, which includes face to face time and non-face to face time preparing to see the patient (eg, review of tests), Obtaining and/or reviewing separately obtained history, Documenting clinical information in the electronic or other health record, Independently " interpreting results (not separately reported) and communicating results to the patient/family/caregiver, or Care coordination (not separately reported).     This note was prepared with [employer] voice recognition transcription software. Garbled syntax, mangled pronouns, and other bizarre constructions may be attributed to that software system       Bettye Desai M.D.  Rheumatology Dept  [place], [place]

## 2025-05-22 ENCOUNTER — OFFICE VISIT (OUTPATIENT)
Dept: RHEUMATOLOGY | Facility: CLINIC | Age: 80
End: 2025-05-22
Payer: MEDICARE

## 2025-05-22 VITALS
SYSTOLIC BLOOD PRESSURE: 115 MMHG | HEART RATE: 71 BPM | BODY MASS INDEX: 23.47 KG/M2 | HEIGHT: 65 IN | DIASTOLIC BLOOD PRESSURE: 73 MMHG | WEIGHT: 140.88 LBS

## 2025-05-22 DIAGNOSIS — Z78.0 ASYMPTOMATIC MENOPAUSE: ICD-10-CM

## 2025-05-22 DIAGNOSIS — M54.50 CHRONIC MIDLINE LOW BACK PAIN WITHOUT SCIATICA: ICD-10-CM

## 2025-05-22 DIAGNOSIS — M15.0 PRIMARY OSTEOARTHRITIS INVOLVING MULTIPLE JOINTS: Primary | ICD-10-CM

## 2025-05-22 DIAGNOSIS — G62.9 PERIPHERAL POLYNEUROPATHY: ICD-10-CM

## 2025-05-22 DIAGNOSIS — G89.29 CHRONIC MIDLINE LOW BACK PAIN WITHOUT SCIATICA: ICD-10-CM

## 2025-05-22 PROCEDURE — 99999 PR PBB SHADOW E&M-EST. PATIENT-LVL IV: CPT | Mod: PBBFAC,,, | Performed by: STUDENT IN AN ORGANIZED HEALTH CARE EDUCATION/TRAINING PROGRAM

## 2025-05-22 PROCEDURE — 99214 OFFICE O/P EST MOD 30 MIN: CPT | Mod: PBBFAC,PO | Performed by: STUDENT IN AN ORGANIZED HEALTH CARE EDUCATION/TRAINING PROGRAM

## 2025-05-22 PROCEDURE — 99214 OFFICE O/P EST MOD 30 MIN: CPT | Mod: S$PBB,,, | Performed by: STUDENT IN AN ORGANIZED HEALTH CARE EDUCATION/TRAINING PROGRAM

## 2025-05-22 RX ORDER — PREGABALIN 25 MG/1
25 CAPSULE ORAL 2 TIMES DAILY
Qty: 60 CAPSULE | Refills: 6 | Status: SHIPPED | OUTPATIENT
Start: 2025-05-22 | End: 2025-11-20

## 2025-05-22 ASSESSMENT — ROUTINE ASSESSMENT OF PATIENT INDEX DATA (RAPID3)
PAIN SCORE: 6
TOTAL RAPID3 SCORE: 5.22
MDHAQ FUNCTION SCORE: 1.1
PSYCHOLOGICAL DISTRESS SCORE: 4.4
FATIGUE SCORE: 2.2
PATIENT GLOBAL ASSESSMENT SCORE: 6

## 2025-06-09 ENCOUNTER — OFFICE VISIT (OUTPATIENT)
Dept: UROLOGY | Facility: CLINIC | Age: 80
End: 2025-06-09
Payer: MEDICARE

## 2025-06-09 VITALS — WEIGHT: 141.13 LBS | BODY MASS INDEX: 23.52 KG/M2 | HEIGHT: 65 IN

## 2025-06-09 DIAGNOSIS — R35.1 NOCTURIA: Primary | ICD-10-CM

## 2025-06-09 DIAGNOSIS — N39.41 URGENCY INCONTINENCE: ICD-10-CM

## 2025-06-09 DIAGNOSIS — R35.0 URINARY FREQUENCY: ICD-10-CM

## 2025-06-09 PROCEDURE — 99213 OFFICE O/P EST LOW 20 MIN: CPT | Mod: PBBFAC,PO | Performed by: UROLOGY

## 2025-06-09 PROCEDURE — G2211 COMPLEX E/M VISIT ADD ON: HCPCS | Mod: ,,, | Performed by: UROLOGY

## 2025-06-09 PROCEDURE — 99999 PR PBB SHADOW E&M-EST. PATIENT-LVL III: CPT | Mod: PBBFAC,,, | Performed by: UROLOGY

## 2025-06-09 PROCEDURE — 99214 OFFICE O/P EST MOD 30 MIN: CPT | Mod: S$PBB,,, | Performed by: UROLOGY

## 2025-06-09 NOTE — PROGRESS NOTES
[facility]      Return Overactive Bladder (OAB) Note    6/9/2025    Referred by:  No ref. provider found    History of Present Illness    CHIEF COMPLAINT:  Patient presents for follow-up evaluation of overactive bladder symptoms and to assess the effectiveness of recent treatment changes.    HPI:  Patient is an 79-year-old woman with a history of overactive bladder, returning for continued evaluation approximately 6 weeks after her last visit in April 2024. Prior to her previous appointment, she had worsening symptoms despite taking Gemtesa 75 mg, which had been effective for about a year but became less effective over time. Her symptoms included using up to 4 pads per day, getting up 3-4 times at night, and having 2-3 incontinence episodes per day.    She has a history of pelvic organ prolapse, previously surgically treated by Dr. Aggarwal. She has tried various medications for her overactive bladder, including Myrbetriq 50 mg, oxybutynin 10 mg, VESIcare 10 mg, and Trospium 20 mg. Gemtesa had been the most effective treatment until recently. She also underwent Botox injections of 100 units, which resulted in voiding difficulty, leading to a decision not to increase the dose.    At her last visit, she reported significant personal stress. The doctor recommended fluid moderation, advising her to consume most fluids during the daytime and stop fluid intake 3-4 hours before bedtime. She was instructed to continue taking Gemtesa 75 mg, but to take it at night. Stress management was also discussed.    Following these behavioral changes and the adjustment in medication timing, she reports significant improvement. She now gets up only once most nights, occasionally twice, which is a substantial improvement from her previous condition. She is satisfied with her progress.    MEDICATIONS:  Patient is on Gemtesa 75 mg, taken at night before bed for overactive bladder, which has been effective. She  previously took Gemtesa 75 mg during the day but changed to nighttime dosing about 6 weeks ago. She has discontinued several medications for overactive bladder, including Myrbetriq 50 mg, oxybutynin 10 mg, VESIcare 10 mg, and Trospium 20 mg. Patient has also stopped Botox injections of 100 units for overactive bladder due to voiding difficulty.    MEDICAL HISTORY:  Patient has a history of overactive bladder and pelvic organ prolapse.    SURGICAL HISTORY:  Patient underwent pelvic organ prolapse surgery performed by Dr. Aggarwal, though the specific date was not provided.          A review of 10+ systems was conducted with pertinent positive and negative findings documented in HPI with all other systems reviewed and negative.    Past medical, family, surgical and social history reviewed as documented in chart with pertinent positive medical, family, surgical and social history detailed in HPI.    Previous OAB therapies:  Behavioral Therapies  : (fluid/dietary modification timed voiding/bladder training) -  provided some but insufficient benefit  Medications  oral oxybutynin ER 10 mg >3 months (provided some but insufficient benefit)  solifenacin (VESIcare)  10  >3 months (provided some but insufficient benefit)  mirabegron (Myrbetriq) 50 mg >1 months ( cost prohibitive )  trospium ER (Sanctura Er) 60 mg >3 months (provided some but insufficient benefit)  Gemtesa 75 mg >12 months ( Initially worked very well, now much less effective )  Other Therapies  Botox -  resulted in voiding difficulty      Exam Findings:    Physical Exam    General: No acute distress. Nontoxic appearing.  HENT: Normocephalic. Atraumatic.  Respiratory: Normal respiratory effort. No conversational dyspnea. No audible wheezing.  Abdomen: No obvious distension.  Skin: No visible abnormalities.  Extremities: No edema upper extremities. No edema lower extremities.  Neurological: Alert and oriented x3. Normal speech.  Psychiatric: Normal mood.  Normal affect. No evidence of SI.          Assessment/Plan:    Assessment & Plan    R35.1 Nocturia  R35.0 Urinary frequency  N39.41 Urgency incontinence    IMPRESSION:  History of overactive bladder and pelvic organ prolapse, previously treated with various medications and Botox injections.  Gemtesa 75 mg had been most effective treatment until recent worsening of symptoms.  Implemented combination of behavioral changes and nighttime Gemtesa administration with significant improvement in symptoms, particularly reduced nighttime urination frequency.    PLAN SUMMARY:  - Continue fluid moderation, with majority of intake during daytime  - Maintain stress management techniques  - Continue Gemtesa 75 mg nightly before bed  - Cease fluid intake 3-4 hours before bedtime    NOCTURIA:  - Patient to continue fluid moderation, consuming most fluids during daytime.  - Patient to stop fluid intake 3-4 hours before bedtime.  - Continued Gemtesa 75 mg to be taken at night before bed.    URINARY FREQUENCY:  - Patient to continue fluid moderation, consuming most fluids during daytime.  - Continued Gemtesa 75 mg to be taken at night before bed.    URGENCY INCONTINENCE:  - Patient to continue stress management techniques.  - Continued Gemtesa 75 mg to be taken at night before bed.              Visit complexity today is associated with medical care services that are part of the ongoing care related to the single serious and/or complex condition of Overactive bladder (OAB). A longitudinal relationship exists or is being developed between the patient and this practitioner for the care of this condition.

## 2025-06-12 ENCOUNTER — HOSPITAL ENCOUNTER (OUTPATIENT)
Dept: RADIOLOGY | Facility: HOSPITAL | Age: 80
Discharge: HOME | End: 2025-06-12
Attending: STUDENT IN AN ORGANIZED HEALTH CARE EDUCATION/TRAINING PROGRAM
Payer: MEDICARE

## 2025-06-12 DIAGNOSIS — Z78.0 ASYMPTOMATIC MENOPAUSE: ICD-10-CM

## 2025-06-12 PROCEDURE — 77091 TBS TECHL CALCULATION ONLY: CPT | Mod: PO

## 2025-06-12 PROCEDURE — 77092 TBS I&R FX RSK QHP: CPT | Mod: ,,, | Performed by: RADIOLOGY

## 2025-06-12 PROCEDURE — 77080 DXA BONE DENSITY AXIAL: CPT | Mod: 26,,, | Performed by: RADIOLOGY

## 2025-06-13 ENCOUNTER — RESULTS FOLLOW-UP (OUTPATIENT)
Dept: RHEUMATOLOGY | Facility: CLINIC | Age: 80
End: 2025-06-13

## 2025-06-19 ENCOUNTER — TELEPHONE (OUTPATIENT)
Dept: RHEUMATOLOGY | Facility: CLINIC | Age: 80
End: 2025-06-19
Payer: MEDICARE

## 2025-06-19 NOTE — TELEPHONE ENCOUNTER
----- Message from Bettye Desai MD sent at 6/13/2025 11:11 AM CDT -----  Bone density scan show osteopenia with elevated hip FRAX. She has a history of GERD and esophageal strictures, please recommend Reclast. TY

## 2025-08-11 ENCOUNTER — TELEPHONE (OUTPATIENT)
Dept: FAMILY MEDICINE | Facility: CLINIC | Age: 80
End: 2025-08-11
Payer: MEDICARE

## 2025-08-22 ENCOUNTER — OFFICE VISIT (OUTPATIENT)
Facility: CLINIC | Age: 80
End: 2025-08-22
Payer: MEDICARE

## 2025-08-22 DIAGNOSIS — M85.80 OSTEOPENIA, UNSPECIFIED LOCATION: ICD-10-CM

## 2025-08-22 DIAGNOSIS — Z91.81 AT RISK FOR FALLS: ICD-10-CM

## 2025-08-22 DIAGNOSIS — M81.0 POSTMENOPAUSAL OSTEOPOROSIS: Primary | ICD-10-CM

## 2025-08-22 DIAGNOSIS — Z91.89 FRACTURE RISK ASSESSMENT SCORE (FRAX) INDICATING GREATER THAN 3% RISK FOR HIP FRACTURE: ICD-10-CM

## 2025-08-25 ENCOUNTER — OFFICE VISIT (OUTPATIENT)
Dept: FAMILY MEDICINE | Facility: CLINIC | Age: 80
End: 2025-08-25
Payer: MEDICARE

## 2025-08-25 VITALS
BODY MASS INDEX: 23.06 KG/M2 | DIASTOLIC BLOOD PRESSURE: 70 MMHG | SYSTOLIC BLOOD PRESSURE: 126 MMHG | HEIGHT: 65 IN | WEIGHT: 138.38 LBS | OXYGEN SATURATION: 98 % | HEART RATE: 82 BPM

## 2025-08-25 DIAGNOSIS — G60.9 IDIOPATHIC NEUROPATHY: ICD-10-CM

## 2025-08-25 DIAGNOSIS — E78.5 DYSLIPIDEMIA: ICD-10-CM

## 2025-08-25 DIAGNOSIS — M85.80 OSTEOPENIA, UNSPECIFIED LOCATION: ICD-10-CM

## 2025-08-25 DIAGNOSIS — I10 ESSENTIAL HYPERTENSION: Primary | ICD-10-CM

## 2025-08-25 DIAGNOSIS — L92.0 GRANULOMA ANNULARE: ICD-10-CM

## 2025-08-25 DIAGNOSIS — F33.0 MAJOR DEPRESSIVE DISORDER, RECURRENT EPISODE, MILD WITH ANXIOUS DISTRESS: ICD-10-CM

## 2025-08-25 PROCEDURE — 99214 OFFICE O/P EST MOD 30 MIN: CPT | Mod: S$GLB,,, | Performed by: NURSE PRACTITIONER

## 2025-08-25 PROCEDURE — G2211 COMPLEX E/M VISIT ADD ON: HCPCS | Mod: S$GLB,,, | Performed by: NURSE PRACTITIONER

## (undated) DEVICE — SYS LABEL CORRECT MED

## (undated) DEVICE — SUT GORETEX CV-4 TH-26 36IN

## (undated) DEVICE — CORD BIPOLAR 12 FOOT

## (undated) DEVICE — MATRIX HEMOSTATIC FLOSEAL 5ML

## (undated) DEVICE — TIP SACROCOLPOPEXY SM 1.3X3.5

## (undated) DEVICE — TRAY CYSTO BASIN

## (undated) DEVICE — NDL SAFETY 21G X 1 1/2 ECLPSE

## (undated) DEVICE — SEE MEDLINE ITEM 153151

## (undated) DEVICE — NDL SAFETY 25G X 1.5 ECLIPSE

## (undated) DEVICE — SCRUB 10% POVIDONE IODINE 4OZ

## (undated) DEVICE — TRAY DRY SPONGE SCRUB W FOAM

## (undated) DEVICE — SEE MEDLINE ITEM 146292

## (undated) DEVICE — SLEEVE SCD EXPRESS CALF MEDIUM

## (undated) DEVICE — PACK CYSTO

## (undated) DEVICE — SEE MEDLINE ITEM 152680

## (undated) DEVICE — CLOSURE SKIN STERI STRIP 1/2X4

## (undated) DEVICE — SUT EASE CROSSBOW CLSR SYS

## (undated) DEVICE — ADHESIVE DERMABOND ADVANCED

## (undated) DEVICE — KIT WING PAD POSITIONING

## (undated) DEVICE — KIT RETR PERI/GYN W/O STAYS

## (undated) DEVICE — SEE MEDLINE ITEM 157117

## (undated) DEVICE — SOL IRR WATER STRL 3000 ML

## (undated) DEVICE — SEE MEDLINE ITEM 152622

## (undated) DEVICE — SOL PVP-I SCRUB 7.5% 4OZ

## (undated) DEVICE — ELECTRODE REM PLYHSV RETURN 9

## (undated) DEVICE — SEE MEDLINE ITEM 157181

## (undated) DEVICE — NDL INJETAK ADJ TIP 70CM

## (undated) DEVICE — SPONGE DERMACEA GAUZE 4X4

## (undated) DEVICE — LEGGINGS X LARGE 6IN CUFF

## (undated) DEVICE — SET CYSTO IRRIGATION UNIV SPIK

## (undated) DEVICE — SOL NS 1000CC

## (undated) DEVICE — LUBRICANT SURGILUBE 2 OZ

## (undated) DEVICE — APPLICATOR CHLORAPREP ORN 26ML

## (undated) DEVICE — SUT MONOCRYL 0 CT-1 UND MON

## (undated) DEVICE — COVER TIP CURVED SCISSORS XI

## (undated) DEVICE — IRRIGATOR ENDOSCOPY DISP.

## (undated) DEVICE — CARTRIDGE BABCOCK GRASPER 5X45

## (undated) DEVICE — UNDERGLOVES BIOGEL PI SIZE 8

## (undated) DEVICE — LINER SUCTION 3000CC

## (undated) DEVICE — SUT 0 VICRYL / UR6 (J603)

## (undated) DEVICE — GLOVE SURG ULTRA TOUCH 7.5

## (undated) DEVICE — SYR SALINE  FLUSH PREFIL 10ML

## (undated) DEVICE — UNDERGLOVES BIOGEL PI SIZE 7.5

## (undated) DEVICE — SUT MONOCRYL 4-0 PS-2

## (undated) DEVICE — SUT ETHIBOND EXCEL 0 CT2 30

## (undated) DEVICE — GOWN SURGICAL X-LARGE

## (undated) DEVICE — OBTURATOR 8MM BLADELESS

## (undated) DEVICE — SOL 9P NACL IRR PIC IL

## (undated) DEVICE — SYR 10CC LUER LOCK

## (undated) DEVICE — SUT CTD VICRYL 0 UND BR

## (undated) DEVICE — SUT VICRYL 0 CT-2 27 DYE

## (undated) DEVICE — TROCAR ENDOPATH XCEL 12X100MM

## (undated) DEVICE — KIT ROBOTIC 4 ARM DA VINCI SI

## (undated) DEVICE — SEE MEDLINE ITEM 152651

## (undated) DEVICE — DRAPE ADPT RUMI II ADVINCULA

## (undated) DEVICE — APPLICATOR ARISTA FLEX XL

## (undated) DEVICE — PAD PREP 50/CA

## (undated) DEVICE — SET TUBE PNEUMOCLEAR SE HI FLO

## (undated) DEVICE — ADAPTER HOSE 10FT 8MM

## (undated) DEVICE — HOOK STAY ELAS 5MM 8EA/PK

## (undated) DEVICE — DRAPE ABDOMINAL TIBURON 14X11

## (undated) DEVICE — PACK CUSTOM UNIV BASIN SLI

## (undated) DEVICE — STRAP OR TABLE 5IN X 72IN

## (undated) DEVICE — Device

## (undated) DEVICE — SUT 3-0 VICRYL / SH (J416)

## (undated) DEVICE — SUT ABS CLIP LAPRA-TY CTD

## (undated) DEVICE — BLADE SURG #15 CARBON STEEL

## (undated) DEVICE — PACK BASIC

## (undated) DEVICE — TROCAR ENDO Z THREAD KII 5X100

## (undated) DEVICE — STOPCOCK DISCOFIX 3 WAY

## (undated) DEVICE — PAD OB HS-77 STRL PREPACK 12S

## (undated) DEVICE — TROCAR ENDOPATH XCEL 11MM 10CM

## (undated) DEVICE — SOL CLEARIFY VISUALIZATION LAP

## (undated) DEVICE — SOL ELECTROLUBE ANTI-STIC